# Patient Record
Sex: MALE | Race: WHITE | NOT HISPANIC OR LATINO | Employment: OTHER | URBAN - METROPOLITAN AREA
[De-identification: names, ages, dates, MRNs, and addresses within clinical notes are randomized per-mention and may not be internally consistent; named-entity substitution may affect disease eponyms.]

---

## 2017-01-12 ENCOUNTER — PATIENT OUTREACH (OUTPATIENT)
Dept: OTHER | Facility: OTHER | Age: 67
End: 2017-01-12
Payer: COMMERCIAL

## 2017-01-12 NOTE — LETTER
"   Hillary Connell, PharmD  [address] [place] [address]     Dear Esteban Mike,    Welcome to the [facility]!         My name is Hillary Connell and I am your dedicated clinical pharmacist.  As an expert in medication management, I will help ensure that the medications you are taking continue to provide you with the intended benefits.      This is Diana Nolasco and she will be your health  for the duration of the program.  Her job is to help you identify lifestyle changes to improve your blood pressure control.  You will talk about nutrition, exercise, and other ways that you may be able to adjust your current habits to better your health. Together, we will work to improve your overall health and encourage you to meet your goals for a healthier lifestyle.    What we expect from YOU:    You will need to take blood pressure readings multiple times a week and no less than one reading per week.   It is important that you take your measurements at different times during the day, when possible.     What you should expect from your Digital Medicine Care Team:   We will provide you with education about high blood pressure, including lifestyle changes that could help you to control your blood pressure.   We will review your weekly readings and provide you with monthly blood pressure progress reports after you have been in the program for more than 30 days.   We will send monthly progress reports on your blood pressure control to your physician so they can follow along with your progress as well.    You will be able to reach me by phone at   or through your [facility] account by clicking "Send a message to your doctor's office" on the home screen then selecting my name in the "To the office of:" field.     I look forward to working with you to achieve your blood pressure goals!    Sincerely,    Hillary Connell, Chun  Your personal Clinical " Pharmacist    Please visit [email or web address] to learn more about high blood pressure and what you can do lower your blood pressure.                                                                                         Esteban Mike  [address] [place] [id number]

## 2017-01-12 NOTE — PROGRESS NOTES
Last 5 Patient Entered Readings                                                               Current 30 Day Average: 157/86     Recent Readings 1/10/2017 12/31/2016 12/25/2016 12/23/2016 12/20/2016    Systolic BP (mmHg) 144 150 178 140 154    Diastolic BP (mmHg) 78 83 101 72 96    Pulse 66 57 70 62 67          Called Mr. Mike to introduce him to the Hypertension Digital Medicine Program.     He is currently not taking BP readings after meds, but will be start this going forward.      Reviewed patient's medications and verified allergies on file.     Reviewed questionnaire:  Depression: indicated- he is going through a divorce and felt the holidays were rough, but he feels he doesn't need medications or a referral at this time.  Sleep apnea: not indicated    Explained that we expect him to obtain several blood pressures/week at random times of day. Also asked that the BP be taken at least 1 hour after taking BP medications.     Explained that our goal is to get his BP to consistently below 140/90mmHg.     Patient and I agreed that the patient will take his BP daily to every other day at varying times of the day.     I will plan to follow-up with the patient in 2 weeks.    Emailed patient link to [email or web address] HT[facility] webpage as well as my direct phone number in case he has in questions.

## 2017-01-19 ENCOUNTER — PATIENT OUTREACH (OUTPATIENT)
Dept: OTHER | Facility: OTHER | Age: 67
End: 2017-01-19
Payer: COMMERCIAL

## 2017-01-19 NOTE — PROGRESS NOTES
"Last 5 Patient Entered Readings                                                               Current 30 Day Average: 152/86     Recent Readings 1/18/2017 1/10/2017 12/31/2016 12/25/2016 12/23/2016    Systolic BP (mmHg) 150 144 150 178 140    Diastolic BP (mmHg) 86 78 83 101 72    Pulse 64 66 57 70 62        Follow up with Mr. Esteban Gomes Cee completed. Patient has not made any additional changes to his lifestyle. He is still doing some walking though. He "knows" he needs to make more changes and "plans on it". Patient did not have any further questions or concerns. I will follow up in a few weeks to see how he is doing and progressing.        "

## 2017-01-31 ENCOUNTER — PATIENT OUTREACH (OUTPATIENT)
Dept: OTHER | Facility: OTHER | Age: 67
End: 2017-01-31
Payer: COMMERCIAL

## 2017-01-31 NOTE — PROGRESS NOTES
Last 5 Patient Entered Readings                                                               Current 30 Day Average: 145/81     Recent Readings 1/22/2017 1/18/2017 1/10/2017 12/31/2016 12/25/2016    Systolic BP (mmHg) 142 150 144 150 178    Diastolic BP (mmHg) 79 86 78 83 101    Pulse 71 64 66 57 70        Mr. Mike BP is not at goal. He denies any symptoms. He has a lot of stress in his life right now as he's going through a divorce. He has been very preoccupied, but will begin sending more readings. Explained that I would like 2-3 readings/week to determine if a medication adjustment is needed.     Current HTN regimen:  Outpatient Hypertension Medications as of 1/31/2017             atenolol (TENORMIN) 50 MG tablet TAKE ONE TABLET BY MOUTH EVERY DAY    hydrochlorothiazide (HYDRODIURIL) 25 MG tablet Take 1 tablet (25 mg total) by mouth once daily.    losartan (COZAAR) 100 MG tablet Take 1 tablet (100 mg total) by mouth once daily.            Changes made today:  None    Will continue to monitor regularly. Will follow up in 2-3 weeks, sooner if BP begins to trend upward or downward.    Patient has my contact information and knows to call with any concerns or clinical changes.

## 2017-02-08 ENCOUNTER — PATIENT OUTREACH (OUTPATIENT)
Dept: OTHER | Facility: OTHER | Age: 67
End: 2017-02-08
Payer: COMMERCIAL

## 2017-02-08 NOTE — PROGRESS NOTES
Last 5 Patient Entered Readings                                                               Current 30 Day Average: 151/81     Recent Readings 2/21/2017 2/6/2017 2/5/2017 2/1/2017 1/22/2017    Systolic BP (mmHg) 138 144 161 163 142    Diastolic BP (mmHg) 85 72 82 85 79    Pulse 66 60 55 57 71      Last average: 152/86    Follow up with Mr. Esteban Mike completed. Mr. Mike is doing well. His blood pressure wasn't working because he had the Bluetooth turned off on his phone. He will continue to take readings. Patient has not made many changes to his diet and exercise routine. Patient did not have any further questions or concerns. I will follow up in a few weeks to see how he is doing and progressing.

## 2017-02-10 RX ORDER — HYDROCHLOROTHIAZIDE 25 MG/1
25 TABLET ORAL DAILY
Qty: 90 TABLET | Refills: 0 | Status: SHIPPED | OUTPATIENT
Start: 2017-02-10 | End: 2017-04-26 | Stop reason: SDUPTHER

## 2017-02-10 RX ORDER — ATENOLOL 50 MG/1
50 TABLET ORAL DAILY
Qty: 90 TABLET | Refills: 3 | Status: SHIPPED | OUTPATIENT
Start: 2017-02-10 | End: 2017-04-26 | Stop reason: SDUPTHER

## 2017-03-10 ENCOUNTER — PATIENT OUTREACH (OUTPATIENT)
Dept: OTHER | Facility: OTHER | Age: 67
End: 2017-03-10
Payer: COMMERCIAL

## 2017-03-10 DIAGNOSIS — I10 ESSENTIAL HYPERTENSION: Primary | ICD-10-CM

## 2017-03-10 RX ORDER — VALSARTAN 160 MG/1
160 TABLET ORAL DAILY
Qty: 90 TABLET | Refills: 3 | Status: SHIPPED | OUTPATIENT
Start: 2017-03-10 | End: 2017-04-26 | Stop reason: SDUPTHER

## 2017-03-10 NOTE — PROGRESS NOTES
Last 5 Patient Entered Readings                                                               Current 30 Day Average: 149/84     Recent Readings 3/10/2017 3/5/2017 2/27/2017 2/23/2017 2/21/2017    Systolic BP (mmHg) 145 143 166 153 138    Diastolic BP (mmHg) 76 79 96 85 85    Pulse 62 65 63 56 66        Mr. Mike's BP is not at goal. He denies any symptoms. Will change losartan to valsartan. He wants to finish his losartan before starting the valsartan to avoid wasting them. He thinks he has about 30 left, so he will start valsartan in about 1 month. Will follow up in about 6-8 weeks.     Current HTN regimen:   Hypertension Medications             atenolol (TENORMIN) 50 MG tablet Take 1 tablet (50 mg total) by mouth once daily.    hydrochlorothiazide (HYDRODIURIL) 25 MG tablet Take 1 tablet (25 mg total) by mouth once daily.    valsartan (DIOVAN) 160 MG tablet Take 1 tablet (160 mg total) by mouth once daily.          Will continue to monitor regularly.     Patient has my contact information and knows to call with any concerns or clinical changes.

## 2017-03-23 ENCOUNTER — PATIENT OUTREACH (OUTPATIENT)
Dept: OTHER | Facility: OTHER | Age: 67
End: 2017-03-23
Payer: COMMERCIAL

## 2017-03-23 NOTE — PROGRESS NOTES
Last 5 Patient Entered Readings                                                               Current 30 Day Average: 152/79     Recent Readings 3/20/2017 3/16/2017 3/15/2017 3/14/2017 3/13/2017    Systolic BP (mmHg) 153 157 150 159 164    Diastolic BP (mmHg) 86 80 78 75 75    Pulse 62 59 57 55 52        Follow up with Mr. Esteban Mike completed. Mr. Mike is doing okay. Patient has family in from out of town and has been very busy with them. They will be leaving in about a week. Things will settle back down for him and then he will be able to get back into his normal routine. Patient did not have any further questions or concerns. I will follow up in a few weeks to see how he is doing and progressing.

## 2017-04-11 ENCOUNTER — PATIENT OUTREACH (OUTPATIENT)
Dept: OTHER | Facility: OTHER | Age: 67
End: 2017-04-11
Payer: COMMERCIAL

## 2017-04-11 NOTE — PROGRESS NOTES
Last 5 Patient Entered Readings                                                               Current 30 Day Average: 151/79     Recent Readings 4/11/2017 4/10/2017 4/7/2017 4/6/2017 4/2/2017    Systolic BP (mmHg) 138 155 144 137 152    Diastolic BP (mmHg) 78 74 89 67 86    Pulse 59 65 66 56 56        Follow up with Mr. Esteban Mike completed. Mr. Mike is doing okay. He had laser eye surgery on 4/4/17. He cannot exercise for lift anything over 20 pounds for two weeks. Patient reports that he is maintaining his diet. Patient did not have any further questions or concerns. I will follow up in a few weeks to see how he is doing and progressing.

## 2017-04-26 ENCOUNTER — PATIENT OUTREACH (OUTPATIENT)
Dept: OTHER | Facility: OTHER | Age: 67
End: 2017-04-26
Payer: COMMERCIAL

## 2017-04-26 DIAGNOSIS — I10 ESSENTIAL HYPERTENSION: ICD-10-CM

## 2017-04-26 RX ORDER — ATENOLOL 50 MG/1
50 TABLET ORAL DAILY
Qty: 90 TABLET | Refills: 3 | Status: SHIPPED | OUTPATIENT
Start: 2017-04-26 | End: 2017-07-18

## 2017-04-26 RX ORDER — HYDROCHLOROTHIAZIDE 25 MG/1
25 TABLET ORAL DAILY
Qty: 90 TABLET | Refills: 3 | Status: SHIPPED | OUTPATIENT
Start: 2017-04-26 | End: 2018-07-27 | Stop reason: SDUPTHER

## 2017-04-26 RX ORDER — VALSARTAN 320 MG/1
320 TABLET ORAL DAILY
Qty: 90 TABLET | Refills: 3 | Status: SHIPPED | OUTPATIENT
Start: 2017-04-26 | End: 2018-04-02 | Stop reason: SDUPTHER

## 2017-04-26 NOTE — PROGRESS NOTES
Last 5 Patient Entered Readings                                                               Current 30 Day Average: 147/78     Recent Readings 4/19/2017 4/15/2017 4/14/2017 4/11/2017 4/10/2017    Systolic BP (mmHg) 149 145 139 138 155    Diastolic BP (mmHg) 79 84 74 78 74    Pulse 55 70 59 59 65        Mr. Mike has not started valsartan. He's continued on losartan 100 mg QD. He is needing a refill now, so will send valsartan to his new pharmacy. He is also asking that all HTN meds be sent to new pharmacy as his old pharmacy was shut down. He is also having issues with his new phone and taking BP readings; he will visit [facility] about this.     Patient's BP is not at goal. Patient denies any symptoms.      Current HTN regimen:  Hypertension Medications             atenolol (TENORMIN) 50 MG tablet Take 1 tablet (50 mg total) by mouth once daily.    hydrochlorothiazide (HYDRODIURIL) 25 MG tablet Take 1 tablet (25 mg total) by mouth once daily.    valsartan (DIOVAN) 320 MG tablet Take 1 tablet (320 mg total) by mouth once daily.          Will continue to monitor regularly. Will follow up in 2-3 weeks, sooner if BP begins to trend upward or downward.    Patient has my contact information and knows to call with any concerns or clinical changes.

## 2017-05-03 ENCOUNTER — PATIENT OUTREACH (OUTPATIENT)
Dept: OTHER | Facility: OTHER | Age: 67
End: 2017-05-03
Payer: COMMERCIAL

## 2017-05-03 NOTE — PROGRESS NOTES
Last 5 Patient Entered Readings                                                               Current 30 Day Average: 150/83     Recent Readings 5/9/2017 5/8/2017 4/19/2017 4/15/2017 4/14/2017    Systolic BP (mmHg) 138 168 149 145 139    Diastolic BP (mmHg) 79 90 79 84 74    Pulse 68 61 55 70 59        Follow up with Mr. Esteban Mike completed. Mr. Mike reports that he has started taking valsartan 320 mg. He has not been sending in many BP readings because he got a new phone. Everything is working properly now and he will resume taking readings. Patient did not have any further questions or concerns. I will follow up in a few weeks to see how he is doing and progressing.

## 2017-06-05 ENCOUNTER — PATIENT OUTREACH (OUTPATIENT)
Dept: OTHER | Facility: OTHER | Age: 67
End: 2017-06-05
Payer: COMMERCIAL

## 2017-06-05 NOTE — PROGRESS NOTES
"Last 5 Patient Entered Readings                                                               Current 30 Day Average: 155/79     Recent Readings 6/1/2017 5/27/2017 5/27/2017 5/19/2017 5/17/2017    Systolic BP (mmHg) 165 151 188 127 164    Diastolic BP (mmHg) 79 83 89 65 73    Pulse 58 62 60 58 61        Mr. Mike says he "just got his blood pressure medications." He confirms he is taking valsartan, atenolol, and HCTZ. He says he is not waiting enough time from being active to resting before checking his BP, but will start to do so. Explained that if his average remains elevated in 2 weeks, we will have to make a medication change and add amlodipine.     Patient's BP is not at goal. Patient denies any symptoms.      Current HTN regimen:  Hypertension Medications             atenolol (TENORMIN) 50 MG tablet Take 1 tablet (50 mg total) by mouth once daily.    hydrochlorothiazide (HYDRODIURIL) 25 MG tablet Take 1 tablet (25 mg total) by mouth once daily.    valsartan (DIOVAN) 320 MG tablet Take 1 tablet (320 mg total) by mouth once daily.     Will continue to monitor regularly. Will follow up in 2-3 weeks, sooner if BP begins to trend upward or downward.    Patient has my contact information and knows to call with any concerns or clinical changes.     "

## 2017-06-05 NOTE — PROGRESS NOTES
Last 5 Patient Entered Readings                                                               Current 30 Day Average: 155/79     Recent Readings 6/1/2017 5/27/2017 5/27/2017 5/19/2017 5/17/2017    Systolic BP (mmHg) 165 151 188 127 164    Diastolic BP (mmHg) 79 83 89 65 73    Pulse 58 62 60 58 61          Follow up with Mr. Esteban Mike completed. Mr. Mike is doing well. Patient reports that he does not give himself enough time to rest before taking a BP reading. He also is concerned about having to start using insulin. I encouraged Mr. Mike to increase his physical activity and focus more on his diet. Patient did not have any further questions or concerns. I will follow up in a few weeks to see how he is doing and progressing.

## 2017-06-26 ENCOUNTER — PATIENT OUTREACH (OUTPATIENT)
Dept: OTHER | Facility: OTHER | Age: 67
End: 2017-06-26

## 2017-06-26 NOTE — PROGRESS NOTES
Last 5 Patient Entered Readings                                                               Current 30 Day Average: 153/77     Recent Readings 6/24/2017 6/13/2017 6/13/2017 6/7/2017 6/5/2017    Systolic BP (mmHg) 136 134 154 154 145    Diastolic BP (mmHg) 63 80 82 82 77    Pulse 63 53 60 60 59          Mr. Mike's BP remains above goal, but most recent readings are improved. Asked that he send readings every other day for the next 2 weeks to determine if we need to add amlodipine.     Patient's BP is not at goal. Patient denies any symptoms.      Current HTN regimen:  Hypertension Medications             atenolol (TENORMIN) 50 MG tablet Take 1 tablet (50 mg total) by mouth once daily.    hydrochlorothiazide (HYDRODIURIL) 25 MG tablet Take 1 tablet (25 mg total) by mouth once daily.    valsartan (DIOVAN) 320 MG tablet Take 1 tablet (320 mg total) by mouth once daily.          Will continue to monitor regularly. Will follow up in 4-6 weeks, sooner if BP begins to trend upward or downward.    Patient has my contact information and knows to call with any concerns or clinical changes.

## 2017-07-05 ENCOUNTER — PATIENT OUTREACH (OUTPATIENT)
Dept: OTHER | Facility: OTHER | Age: 67
End: 2017-07-05

## 2017-07-05 NOTE — PROGRESS NOTES
Last 5 Patient Entered Readings                                                               Current 30 Day Average: 143/75     Recent Readings 6/30/2017 6/28/2017 6/24/2017 6/13/2017 6/13/2017    Systolic BP (mmHg) 138 138 136 134 154    Diastolic BP (mmHg) 74 72 63 80 82    Pulse 62 52 63 53 60        Follow up with Mr. Esteban Mike completed. Mr. Mike is doing well. Patient reports that he has been giving himself enough time to relax before taking a BP reading. Mr. Mike is conintuing to focus on maintaining a healthier diet and increasing physical activity to avoid being put back on insulin. Patient did not have any further questions or concerns. I will follow up in a few weeks to see how he is doing and progressing.

## 2017-07-18 ENCOUNTER — OFFICE VISIT (OUTPATIENT)
Dept: INTERNAL MEDICINE | Facility: CLINIC | Age: 67
End: 2017-07-18
Payer: COMMERCIAL

## 2017-07-18 VITALS
DIASTOLIC BLOOD PRESSURE: 80 MMHG | WEIGHT: 188.25 LBS | HEIGHT: 67 IN | TEMPERATURE: 98 F | BODY MASS INDEX: 29.55 KG/M2 | SYSTOLIC BLOOD PRESSURE: 142 MMHG | HEART RATE: 53 BPM

## 2017-07-18 DIAGNOSIS — E11.51 TYPE 2 DIABETES, WITH PERIPHERAL CIRCULATORY DISORDER NOT AT GOAL: Primary | ICD-10-CM

## 2017-07-18 DIAGNOSIS — I65.21 OCCLUSION OF RIGHT CAROTID ARTERY: ICD-10-CM

## 2017-07-18 DIAGNOSIS — E78.2 MIXED HYPERLIPIDEMIA: ICD-10-CM

## 2017-07-18 DIAGNOSIS — I25.810 CORONARY ARTERY DISEASE INVOLVING AUTOLOGOUS VEIN CORONARY BYPASS GRAFT WITHOUT ANGINA PECTORIS: ICD-10-CM

## 2017-07-18 DIAGNOSIS — I10 HYPERTENSION, ESSENTIAL: ICD-10-CM

## 2017-07-18 PROCEDURE — 3045F PR MOST RECENT HEMOGLOBIN A1C LEVEL 7.0-9.0%: CPT | Mod: S$GLB,,, | Performed by: INTERNAL MEDICINE

## 2017-07-18 PROCEDURE — 99214 OFFICE O/P EST MOD 30 MIN: CPT | Mod: S$GLB,,, | Performed by: INTERNAL MEDICINE

## 2017-07-18 PROCEDURE — 4010F ACE/ARB THERAPY RXD/TAKEN: CPT | Mod: S$GLB,,, | Performed by: INTERNAL MEDICINE

## 2017-07-18 PROCEDURE — 99999 PR PBB SHADOW E&M-EST. PATIENT-LVL III: CPT | Mod: PBBFAC,,, | Performed by: INTERNAL MEDICINE

## 2017-07-18 RX ORDER — CARVEDILOL 12.5 MG/1
12.5 TABLET ORAL 2 TIMES DAILY WITH MEALS
Qty: 180 TABLET | Refills: 3 | Status: SHIPPED | OUTPATIENT
Start: 2017-07-18 | End: 2017-08-04 | Stop reason: SDUPTHER

## 2017-07-18 NOTE — PROGRESS NOTES
Subjective:       Patient ID: Esteban Mike is a 66 y.o. male.    Chief Complaint: Diabetes and Hypertension    HPI - Pt of Dr. Hartley's who hasn't been seen in a year.  He doesn't know his medications and seems to be taking two ARBs and two gliptins.  He was hard to focus during the interview, but his main complaint seemed to be fatigue.  Not checking BS at home.  He says he had his diabetic eye exam done at an outside facility 3 months ago.  Declines pneumovax, as he says he's had that done, also.  He is a never smoker, uses no illicit drugs.  Sexually active with his wife.  Drinks alcohol socially.  Considering retiring from his job to reduce stress, which seems extensive.    PMH/Meds:  Reviewed and reconciled in EPIC with patient during visit today.     Review of Systems   Constitutional: Positive for fatigue.   HENT: Negative for congestion.    Cardiovascular: Negative for chest pain.   Gastrointestinal: Negative for abdominal pain.   Endocrine: Positive for polyuria. Negative for polydipsia and polyphagia.   Genitourinary: Positive for frequency.   Musculoskeletal: Positive for arthralgias.   Skin: Negative for pallor.   Neurological: Negative for dizziness, tremors, seizures, speech difficulty, weakness and headaches.   Psychiatric/Behavioral: The patient is nervous/anxious.        Objective:      Physical Exam   Constitutional: He is oriented to person, place, and time. He appears well-developed and well-nourished. No distress.   Very talkative man with loose associations and perseveration   HENT:   Head: Normocephalic and atraumatic.   Cardiovascular: Normal rate, regular rhythm and normal heart sounds.  Exam reveals no gallop and no friction rub.    No murmur heard.  Pulmonary/Chest: No respiratory distress. He has no wheezes. He has no rales. He exhibits no tenderness.   Neurological: He is alert and oriented to person, place, and time.   Skin: Skin is warm. He is not diaphoretic. No erythema.    Psychiatric: He has a normal mood and affect. Thought content normal.   Nursing note and vitals reviewed.      Assessment:       1. Type 2 diabetes, with peripheral circulatory disorder not at goal    2. Hypertension, essential    3. Mixed hyperlipidemia    4. Coronary artery disease involving autologous vein coronary bypass graft without angina pectoris    5. Occlusion of right carotid artery        Plan:       Esteban was seen today for diabetes and hypertension.    Diagnoses and all orders for this visit:    Type 2 diabetes, with peripheral circulatory disorder not at goal - uncertain status; well overdue for a1c.    -     Hemoglobin A1c; Future    Hypertension, essential - poor control.  Repeat labwork and change atenolol to carvedilol.  I'm suspicious of his adherence; recommended he bring pill bottles to next appt with Dr. Hartley, Aug 11.  -     Comprehensive metabolic panel; Future    Mixed hyperlipidemia - on a statin, I think.  Repeat lipid panel  -     Lipid panel; Future    Coronary artery disease involving autologous vein coronary bypass graft without angina pectoris  -     carvedilol (COREG) 12.5 MG tablet; Take 1 tablet (12.5 mg total) by mouth 2 (two) times daily with meals.    Occlusion of right carotid artery    rtc prn; copy to Dr. Naeem Ashby MD MPH  Staff Internist

## 2017-07-20 ENCOUNTER — PATIENT MESSAGE (OUTPATIENT)
Dept: CARDIOLOGY | Facility: CLINIC | Age: 67
End: 2017-07-20

## 2017-07-25 ENCOUNTER — PATIENT OUTREACH (OUTPATIENT)
Dept: OTHER | Facility: OTHER | Age: 67
End: 2017-07-25

## 2017-07-25 NOTE — PROGRESS NOTES
Last 5 Patient Entered Redings Current 30 Day Average: 141/76     Recent Readings 7/13/2017 7/9/2017 7/7/2017 6/30/2017 6/28/2017    Systolic BP (mmHg) 160 140 127 138 138    Diastolic BP (mmHg) 85 82 67 74 72    Pulse 59 62 59 62 52        Mr. Mike's BP is not at goal. He saw Dr. Ashby last week and Dr. Ashby found duplications of his medications. He was still taking losartan and valsartan, despite losartan being discontinued in April. Dr. Ashby stopped atenolol and started carvedilol. Mr. Mike says he will start carvedilol once he finishes atenolol. He also wants to finish losartan before starting valsartan. Mr. Mike uses different local pharmacies which I think is causing the issues with duplications of therapy. He is currently at work and unable to review all medications. Asked that he call me tomorrow with all of his medication bottles to review what he is currently taking to prevent duplications and also will advise he use only one pharmacy.     Current HTN regimen:  Hypertension Medications             carvedilol (COREG) 12.5 MG tablet Take 1 tablet (12.5 mg total) by mouth 2 (two) times daily with meals.    hydrochlorothiazide (HYDRODIURIL) 25 MG tablet Take 1 tablet (25 mg total) by mouth once daily.    valsartan (DIOVAN) 320 MG tablet Take 1 tablet (320 mg total) by mouth once daily.          Will continue to monitor regularly. Will follow up in 2-3 weeks, sooner if BP begins to trend upward or downward.    Patient has my contact information and knows to call with any concerns or clinical changes.

## 2017-08-01 ENCOUNTER — PATIENT OUTREACH (OUTPATIENT)
Dept: OTHER | Facility: OTHER | Age: 67
End: 2017-08-01

## 2017-08-01 NOTE — PROGRESS NOTES
Last 5 Patient Entered Redings Current 30 Day Average: 154/81     Recent Readings 8/1/2017 8/1/2017 7/28/2017 7/13/2017 7/9/2017    Systolic BP (mmHg) 181 181 160 160 140    Diastolic BP (mmHg) 86 92 85 85 82    Pulse 50 49 59 59 62        Follow up with Mr. Esteban Mike for elevated BP completed. Patient reports that he may not have given himself enough time to rest before taking the reading. He will retake the reading soon. Mr. Mike has a lot of questions about his medications. Patient will call PharmD to discuss. Patient did not have any further questions or concerns. I will follow up in a few weeks to see how he is doing and progressing.

## 2017-08-02 ENCOUNTER — PATIENT MESSAGE (OUTPATIENT)
Dept: CARDIOLOGY | Facility: CLINIC | Age: 67
End: 2017-08-02

## 2017-08-04 ENCOUNTER — PATIENT OUTREACH (OUTPATIENT)
Dept: OTHER | Facility: OTHER | Age: 67
End: 2017-08-04

## 2017-08-04 DIAGNOSIS — I25.810 CORONARY ARTERY DISEASE INVOLVING AUTOLOGOUS VEIN CORONARY BYPASS GRAFT WITHOUT ANGINA PECTORIS: ICD-10-CM

## 2017-08-04 DIAGNOSIS — I10 ESSENTIAL HYPERTENSION: ICD-10-CM

## 2017-08-04 RX ORDER — CARVEDILOL 12.5 MG/1
12.5 TABLET ORAL 2 TIMES DAILY WITH MEALS
Qty: 180 TABLET | Refills: 3 | Status: SHIPPED | OUTPATIENT
Start: 2017-08-04 | End: 2017-11-13 | Stop reason: SDUPTHER

## 2017-08-04 NOTE — PROGRESS NOTES
"Last 5 Patient Entered Redings Current 30 Day Average: 154/81     Recent Readings 8/1/2017 8/1/2017 7/28/2017 7/13/2017 7/9/2017    Systolic BP (mmHg) 181 181 160 160 140    Diastolic BP (mmHg) 86 92 85 85 82    Pulse 50 49 59 59 62        Mr. Mike has called to review his medications with me. He was still taking atenolol, despite it being discontinued when he saw Dr. Ashby on 7/18/17. Dr. Ashby started him on carvedilol 12.5 mg BID. He also had losartan 100 mg, valsartan 160 mg, and valsartan 320 mg in his possession. He was taking losartan 100 mg and valsartan 160 mg. Explained that losartan was discontinued by me on 3/10/17 and valsartan 160 mg QD was started. On 4/26/17, I increased valsartan to 320 mg, and he received this prescription on 5/19/17. He did not understand that when the valsartan was started that he should have stopped the losartan, so he was taking both losartan and valsartan. It is unclear if he has been taking both everyday, as he still has "half a bottle of losartan" left. Asked that he discard the losartan and atenolol to prevent further inadvertent duplications of therapy. By the end of the encounter, Mr. Mike understood that he should not be taking losartan 100 mg at all and that he should not be taking atenolol 50 mg. He is now taking 2 valsartan 160 mg tablets once daily (will start valsartan 320 mg QD once he is out of the 160 mg tablets), HCTZ 25 mg, and carvedilol 12.5 mg BID. Also clarified that he is taking tradjenta 5 mg QD and no longer taking Januvia 100 mg QD.     Called [facility] pharmacy to confirm all of the above changes with the pharmacist, Ti. He will deactivate prescriptions for atenolol 50 mg, losartan 100 mg, valsartan 160 mg, and Januvia 100 mg.     Current HTN regimen:  Hypertension Medications             carvedilol (COREG) 12.5 MG tablet Take 1 tablet (12.5 mg total) by mouth 2 (two) times daily with meals.    hydrochlorothiazide (HYDRODIURIL) 25 MG tablet " Take 1 tablet (25 mg total) by mouth once daily.    valsartan (DIOVAN) 320 MG tablet Take 1 tablet (320 mg total) by mouth once daily.          Will continue to monitor regularly. Will follow up in 4-6 weeks, sooner if BP begins to trend upward or downward.    Patient has my contact information and knows to call with any concerns or clinical changes.

## 2017-08-07 ENCOUNTER — OFFICE VISIT (OUTPATIENT)
Dept: CARDIOLOGY | Facility: CLINIC | Age: 67
End: 2017-08-07
Payer: COMMERCIAL

## 2017-08-07 VITALS
BODY MASS INDEX: 29.13 KG/M2 | OXYGEN SATURATION: 97 % | SYSTOLIC BLOOD PRESSURE: 116 MMHG | HEIGHT: 67 IN | WEIGHT: 185.63 LBS | DIASTOLIC BLOOD PRESSURE: 69 MMHG | HEART RATE: 61 BPM

## 2017-08-07 DIAGNOSIS — I25.810 CORONARY ARTERY DISEASE INVOLVING AUTOLOGOUS VEIN CORONARY BYPASS GRAFT WITHOUT ANGINA PECTORIS: Primary | ICD-10-CM

## 2017-08-07 DIAGNOSIS — I65.21 OCCLUSION OF RIGHT CAROTID ARTERY: ICD-10-CM

## 2017-08-07 DIAGNOSIS — I73.9 PVD (PERIPHERAL VASCULAR DISEASE): ICD-10-CM

## 2017-08-07 DIAGNOSIS — K76.0 NAFLD (NONALCOHOLIC FATTY LIVER DISEASE): ICD-10-CM

## 2017-08-07 DIAGNOSIS — I10 HYPERTENSION, ESSENTIAL: ICD-10-CM

## 2017-08-07 DIAGNOSIS — E78.2 MIXED HYPERLIPIDEMIA: ICD-10-CM

## 2017-08-07 DIAGNOSIS — E11.51 TYPE 2 DIABETES, WITH PERIPHERAL CIRCULATORY DISORDER NOT AT GOAL: ICD-10-CM

## 2017-08-07 PROCEDURE — 3078F DIAST BP <80 MM HG: CPT | Mod: S$GLB,,, | Performed by: NURSE PRACTITIONER

## 2017-08-07 PROCEDURE — 99999 PR PBB SHADOW E&M-EST. PATIENT-LVL III: CPT | Mod: PBBFAC,,, | Performed by: NURSE PRACTITIONER

## 2017-08-07 PROCEDURE — 3045F PR MOST RECENT HEMOGLOBIN A1C LEVEL 7.0-9.0%: CPT | Mod: S$GLB,,, | Performed by: NURSE PRACTITIONER

## 2017-08-07 PROCEDURE — 3074F SYST BP LT 130 MM HG: CPT | Mod: S$GLB,,, | Performed by: NURSE PRACTITIONER

## 2017-08-07 PROCEDURE — 4010F ACE/ARB THERAPY RXD/TAKEN: CPT | Mod: S$GLB,,, | Performed by: NURSE PRACTITIONER

## 2017-08-07 PROCEDURE — 3008F BODY MASS INDEX DOCD: CPT | Mod: S$GLB,,, | Performed by: NURSE PRACTITIONER

## 2017-08-07 PROCEDURE — 99214 OFFICE O/P EST MOD 30 MIN: CPT | Mod: S$GLB,,, | Performed by: NURSE PRACTITIONER

## 2017-08-07 NOTE — PATIENT INSTRUCTIONS
Call [facility] and have results faxed to Dr. Grajeda [phone].  Continue current medications. Continue with digital hypertension program.  Patient has been encouraged to exercise a minimum of 30 minutes daily, 5 times per week.   Patient advised to consume a low salt, heart healthy diet. Mediterranean diet recommended.  Carotid ultrasound in one year. Return to clinic in one year.

## 2017-08-07 NOTE — PROGRESS NOTES
Mr. Mike is a patient of Dr. Grajeda and was last seen in [facility] 7/22/2016.      Subjective:   Patient ID:  Esteban Mike is a 66 y.o. male who presents for follow-up of Hypertension  .   HPI:    Mr. Mike is a 67yo male with a PMHx of CAD (CABGx4 in 2008), carotid artery disease (s/p right CEA in 2006 and left ICA stent 2007), DM II, NLFLD, HTN, HLD, and PVD here for follow up. He is currently in the digital HTN program. Today he has no cardiac complaints. Mr. Mike denies chest pain with exertion or at rest, palpitations, SOB, CHAU, dizziness, syncope, leg edema, claudication, PND, or orthopnea. He is currently taking ASA 81mg and atorvastatin 80mg for CAD management. LDL 56.2 on 9/3/2016. He also takes carvedilol 12.5mg BID, HCTZ 25mg, and valsartan 320mg. He cuts 6 yards per week for exercise.He experiences no limitations in activity tolerance. [facility] but results are not in his chart. ALT chronically elevated in context of NAFLD.     Recent Cardiac Tests:    Carotid U/S (7/25/2016):  Known right ICA total occlusion.  There is 40 - 49% left Internal Carotid stenosis.  High grade left ECA stenosis.    Current Outpatient Prescriptions   Medication Sig    aspirin 81 MG chewable tablet Take 81 mg by mouth once daily.      atorvastatin (LIPITOR) 80 MG tablet Take 1 tablet (80 mg total) by mouth once daily.    blood-glucose meter (CONTOUR METER) kit Use as instructed    carvedilol (COREG) 12.5 MG tablet Take 1 tablet (12.5 mg total) by mouth 2 (two) times daily with meals.    CONTOUR NEXT STRIPS Strp TEST UP TO FOUR TIMES A DAY    fish oil-omega-3 fatty acids 300-1,000 mg capsule Take 2 g by mouth once daily. 3 tablets       hydrochlorothiazide (HYDRODIURIL) 25 MG tablet Take 1 tablet (25 mg total) by mouth once daily.    linagliptin (TRADJENTA) 5 mg Tab tablet Take 5 mg by mouth once daily.    metformin (GLUCOPHAGE) 500 MG tablet TAKE TWO  "TABLETS BY MOUTH TWICE DAILY    MICROLET LANCET Misc TEST UP TO FOUR TIMES A DAY    valsartan (DIOVAN) 320 MG tablet Take 1 tablet (320 mg total) by mouth once daily.     No current facility-administered medications for this visit.      Review of Systems   Constitution: Negative for malaise/fatigue.   HENT: Negative for headaches.    Eyes: Negative for blurred vision.   Cardiovascular: Negative for chest pain, claudication, dyspnea on exertion, irregular heartbeat, leg swelling, orthopnea, palpitations, paroxysmal nocturnal dyspnea and syncope.   Respiratory: Negative for shortness of breath.    Hematologic/Lymphatic: Negative for bleeding problem.   Skin: Negative for rash.   Musculoskeletal: Negative for myalgias.   Gastrointestinal: Negative for abdominal pain, constipation, diarrhea and nausea.   Genitourinary: Negative for hematuria.   Neurological: Negative for loss of balance and numbness.   Psychiatric/Behavioral: Negative for altered mental status.   Allergic/Immunologic: Negative for persistent infections.     Objective:     Right Arm BP - Sittin/70 (17 1243)  Left Arm BP - Sittin/69 (17 1243)    /69 (BP Location: Left arm, Patient Position: Sitting, BP Method: Automatic)   Pulse 61   Ht 5' 7" (1.702 m)   Wt 84.2 kg (185 lb 10 oz)   SpO2 97%   BMI 29.07 kg/m²     Physical Exam   Constitutional: He is oriented to person, place, and time. He appears well-developed and well-nourished.   HENT:   Head: Normocephalic.   Nose: Nose normal.   Eyes: Pupils are equal, round, and reactive to light.   Neck: No JVD present. Carotid bruit is not present.   Right CEA scar noted   Cardiovascular: Normal rate, regular rhythm, S1 normal and S2 normal.   Occasional extrasystoles are present. Exam reveals no gallop.    Murmur heard.   Blowing systolic murmur is present with a grade of 1/6  at the upper left sternal border  Pulses:       Carotid pulses are 2+ on the right side, and 2+ on " the left side.       Radial pulses are 2+ on the right side, and 2+ on the left side.        Dorsalis pedis pulses are 2+ on the right side, and 2+ on the left side.   Pulmonary/Chest: Breath sounds normal. No respiratory distress.   Median sternotomy scar noted.     Abdominal: Soft. Bowel sounds are normal. He exhibits no distension. There is no tenderness.   Musculoskeletal: Normal range of motion. He exhibits no edema.   Neurological: He is alert and oriented to person, place, and time.   Skin: Skin is warm and dry. No erythema.   Psychiatric: He has a normal mood and affect. His speech is normal and behavior is normal.   Nursing note and vitals reviewed.    Lab Results   Component Value Date     09/03/2016    K 4.5 09/03/2016    MG 2.6 05/06/2008     09/03/2016    CO2 28 09/03/2016    BUN 22 09/03/2016    CREATININE 1.1 09/03/2016     (H) 09/03/2016    HGBA1C 7.6 (H) 09/03/2016    AST 35 09/03/2016    ALT 85 (H) 09/03/2016    ALBUMIN 3.9 09/03/2016    PROT 6.4 09/03/2016    BILITOT 0.6 09/03/2016    WBC 6.31 10/22/2014    HGB 14.6 10/22/2014    HCT 44.4 10/22/2014    MCV 94 10/22/2014     10/22/2014    TSH 1.632 09/03/2016    CHOL 113 (L) 09/03/2016    HDL 33 (L) 09/03/2016    LDLCALC 56.2 (L) 09/03/2016    TRIG 119 09/03/2016          Test(s) Reviewed  I have reviewed the following in detail:  [] Stress test   [] Angiography   [] Echocardiogram   [x] Labs   [] Other:         Assessment:         1. Coronary artery disease involving autologous vein coronary bypass graft without angina pectoris. On ASA and high intensity statin. Patient has no angina, SOB or other signs of ischemia. No unstable arrhythmia. No symptoms of heart failure. Will obtain lab results.     2. Mixed hyperlipidemia. LDL 56.2 on atorvastatin 80mg.     3. PVD (peripheral vascular disease). Asymptomatic.     4. Occlusion of right carotid artery. On ASA and statin.     5. Hypertension, essential. Controlled on digital  HTN program.     6. Type 2 diabetes, with peripheral circulatory disorder not at goal. Not on insulin. Last creatinine 1.1.     7. NAFLD (nonalcoholic fatty liver disease). ALT elevated but stable.     Plan:     Esteban was seen today for hypertension.    Diagnoses and all orders for this visit:    Coronary artery disease involving autologous vein coronary bypass graft without angina pectoris    Mixed hyperlipidemia    PVD (peripheral vascular disease)    Occlusion of right carotid artery  -     CAR Ultrasound doppler carotid bliateral; Future; Expected date: 08/07/2018    Hypertension, essential    Type 2 diabetes, with peripheral circulatory disorder not at goal    NAFLD (nonalcoholic fatty liver disease)      Call [email or web address] and have results faxed to Dr. Grajeda [phone].  Continue current medications. Continue with digital hypertension program.  Patient has been encouraged to exercise a minimum of 30 minutes daily, 5 times per week.   Patient advised to consume a low salt, heart healthy diet. Mediterranean diet recommended.  Carotid ultrasound in one year. Return to clinic in one year.    Return in about 1 year (around 8/7/2018).    ------------------------------------------------------------------    MYRANDA Palma, NP-C  Consult Cardiology

## 2017-08-09 DIAGNOSIS — Z00.00 ANNUAL PHYSICAL EXAM: Primary | ICD-10-CM

## 2017-08-09 DIAGNOSIS — E78.5 HYPERLIPIDEMIA, UNSPECIFIED HYPERLIPIDEMIA TYPE: ICD-10-CM

## 2017-08-09 DIAGNOSIS — E11.9 TYPE 2 DIABETES MELLITUS WITHOUT COMPLICATION, UNSPECIFIED LONG TERM INSULIN USE STATUS: ICD-10-CM

## 2017-08-09 DIAGNOSIS — I10 HYPERTENSION, ESSENTIAL: ICD-10-CM

## 2017-08-09 RX ORDER — METFORMIN HYDROCHLORIDE 500 MG/1
1000 TABLET ORAL 2 TIMES DAILY
Qty: 360 TABLET | Refills: 1 | Status: SHIPPED | OUTPATIENT
Start: 2017-08-09 | End: 2017-09-18 | Stop reason: SDUPTHER

## 2017-08-09 NOTE — TELEPHONE ENCOUNTER
Patient is due for a follow up appointment - please review lab order section and draw any before appointment on 8/11 - thank you

## 2017-08-10 NOTE — TELEPHONE ENCOUNTER
Spoke with pt in regards to scheduling lab appt prior to appt on 8/11/17. Pt stated [facility] and will have results faxed to us.

## 2017-08-11 ENCOUNTER — OFFICE VISIT (OUTPATIENT)
Dept: INTERNAL MEDICINE | Facility: CLINIC | Age: 67
End: 2017-08-11
Attending: FAMILY MEDICINE
Payer: COMMERCIAL

## 2017-08-11 ENCOUNTER — TELEPHONE (OUTPATIENT)
Dept: INTERNAL MEDICINE | Facility: CLINIC | Age: 67
End: 2017-08-11

## 2017-08-11 VITALS
OXYGEN SATURATION: 98 % | DIASTOLIC BLOOD PRESSURE: 60 MMHG | WEIGHT: 187 LBS | HEIGHT: 67 IN | BODY MASS INDEX: 29.35 KG/M2 | SYSTOLIC BLOOD PRESSURE: 128 MMHG | HEART RATE: 73 BPM

## 2017-08-11 DIAGNOSIS — I65.23 BILATERAL CAROTID ARTERY STENOSIS: ICD-10-CM

## 2017-08-11 DIAGNOSIS — I25.810 CORONARY ARTERY DISEASE INVOLVING AUTOLOGOUS VEIN CORONARY BYPASS GRAFT WITHOUT ANGINA PECTORIS: ICD-10-CM

## 2017-08-11 DIAGNOSIS — Z00.00 ANNUAL PHYSICAL EXAM: Primary | ICD-10-CM

## 2017-08-11 DIAGNOSIS — E78.2 MIXED HYPERLIPIDEMIA: ICD-10-CM

## 2017-08-11 DIAGNOSIS — K76.0 NAFLD (NONALCOHOLIC FATTY LIVER DISEASE): ICD-10-CM

## 2017-08-11 DIAGNOSIS — I73.9 PVD (PERIPHERAL VASCULAR DISEASE): ICD-10-CM

## 2017-08-11 DIAGNOSIS — E11.51 TYPE 2 DIABETES, WITH PERIPHERAL CIRCULATORY DISORDER NOT AT GOAL: ICD-10-CM

## 2017-08-11 DIAGNOSIS — I10 HYPERTENSION, ESSENTIAL: ICD-10-CM

## 2017-08-11 DIAGNOSIS — E11.42 DIABETIC POLYNEUROPATHY ASSOCIATED WITH TYPE 2 DIABETES MELLITUS: ICD-10-CM

## 2017-08-11 PROBLEM — Z91.199 NONCOMPLIANCE: Status: ACTIVE | Noted: 2017-08-11

## 2017-08-11 LAB
ALBUMIN SERPL-MCNC: 4.8 G/DL (ref 3.6–4.8)
ALBUMIN/GLOB SERPL: 2.8 {RATIO} (ref 1.2–2.2)
ALP SERPL-CCNC: 71 IU/L (ref 39–117)
ALT SERPL-CCNC: 86 IU/L (ref 0–44)
AST SERPL-CCNC: 37 IU/L (ref 0–40)
BILIRUB SERPL-MCNC: 0.5 MG/DL (ref 0–1.2)
BUN SERPL-MCNC: 15 MG/DL (ref 8–27)
BUN/CREAT SERPL: 15 (ref 10–24)
CALCIUM SERPL-MCNC: 10 MG/DL (ref 8.6–10.2)
CHLORIDE SERPL-SCNC: 101 MMOL/L (ref 96–106)
CHOLEST SERPL-MCNC: 155 MG/DL (ref 100–199)
CO2 SERPL-SCNC: 24 MMOL/L (ref 18–29)
CREAT SERPL-MCNC: 0.97 MG/DL (ref 0.76–1.27)
GLOBULIN SER CALC-MCNC: 1.7 G/DL (ref 1.5–4.5)
GLUCOSE SERPL-MCNC: 206 MG/DL (ref 65–99)
HBA1C MFR BLD: 8.2 % (ref 4.8–5.6)
HDLC SERPL-MCNC: 42 MG/DL
LDLC SERPL CALC-MCNC: 85 MG/DL (ref 0–99)
POTASSIUM SERPL-SCNC: 4.8 MMOL/L (ref 3.5–5.2)
PROT SERPL-MCNC: 6.5 G/DL (ref 6–8.5)
SODIUM SERPL-SCNC: 143 MMOL/L (ref 134–144)
SPECIMEN STATUS REPORT: NORMAL
TRIGL SERPL-MCNC: 139 MG/DL (ref 0–149)
VLDLC SERPL CALC-MCNC: 28 MG/DL (ref 5–40)

## 2017-08-11 PROCEDURE — 99999 PR PBB SHADOW E&M-EST. PATIENT-LVL IV: CPT | Mod: PBBFAC,,, | Performed by: FAMILY MEDICINE

## 2017-08-11 PROCEDURE — 99396 PREV VISIT EST AGE 40-64: CPT | Mod: S$GLB,,, | Performed by: FAMILY MEDICINE

## 2017-08-11 NOTE — PROGRESS NOTES
Subjective:       Patient ID: Esteban Mike is a 66 y.o. male.    Chief Complaint: Annual Exam    Established patient for an annual wellness check/physical exam. No specific complaints, please see ROS.\  Established patient follows up for management of chronic medical illnesses without complaints today. Please see ROS for interval problems and complaints.        Review of Systems   Constitutional: Positive for fatigue. Negative for chills and fever.   HENT: Negative for congestion and trouble swallowing.    Eyes: Negative for redness.   Respiratory: Negative for cough, chest tightness and shortness of breath.    Cardiovascular: Negative for chest pain, palpitations and leg swelling.   Gastrointestinal: Negative for abdominal pain and blood in stool.   Genitourinary: Negative for hematuria.   Musculoskeletal: Negative for arthralgias, back pain, gait problem, joint swelling, myalgias and neck pain.   Skin: Negative for color change and rash.   Neurological: Negative for tremors, speech difficulty, weakness, numbness and headaches.   Hematological: Negative for adenopathy. Does not bruise/bleed easily.   Psychiatric/Behavioral: Positive for sleep disturbance. Negative for behavioral problems and confusion. The patient is not nervous/anxious.        Objective:      Physical Exam   Constitutional: He is oriented to person, place, and time. He appears well-developed and well-nourished.   Eyes: No scleral icterus.   Neck: Normal range of motion. Neck supple. Carotid bruit is not present.   Cardiovascular: Normal rate, regular rhythm, normal heart sounds and intact distal pulses.  Exam reveals no gallop and no friction rub.    No murmur heard.  Pulses:       Dorsalis pedis pulses are 2+ on the right side, and 2+ on the left side.   Pulmonary/Chest: Effort normal and breath sounds normal. No respiratory distress. He has no wheezes. He has no rales.   Abdominal: Soft. Bowel sounds are normal.   Musculoskeletal: He  exhibits no edema.   Feet:   Right Foot:   Protective Sensation: 3 sites tested. 3 sites sensed.   Skin Integrity: Negative for skin breakdown.   Left Foot:   Protective Sensation: 3 sites tested. 3 sites sensed.   Skin Integrity: Negative for skin breakdown.   Neurological: He is alert and oriented to person, place, and time. He displays no tremor. No cranial nerve deficit. Coordination and gait normal.   Skin: Skin is warm and dry. No rash noted. He is not diaphoretic. No erythema.   Psychiatric: He has a normal mood and affect. His behavior is normal. Judgment and thought content normal.   Nursing note and vitals reviewed.      Assessment:       1. Annual physical exam    2. Type 2 diabetes, with peripheral circulatory disorder not at goal    3. PVD (peripheral vascular disease)    4. NAFLD (nonalcoholic fatty liver disease)    5. Mixed hyperlipidemia    6. Hypertension, essential    7. Diabetic polyneuropathy associated with type 2 diabetes mellitus    8. Coronary artery disease involving autologous vein coronary bypass graft without angina pectoris    9. Bilateral carotid artery stenosis        Plan:   Esteban was seen today for annual exam.    Diagnoses and all orders for this visit:    Annual physical exam    Type 2 diabetes, with peripheral circulatory disorder not at goal  -     Ambulatory referral to Optometry  -     Ambulatory consult to Endocrinology  -     Ambulatory Referral to Diabetes Education    PVD (peripheral vascular disease)    NAFLD (nonalcoholic fatty liver disease)  -     Ambulatory Referral to Hepatology    Mixed hyperlipidemia    Hypertension, essential    Diabetic polyneuropathy associated with type 2 diabetes mellitus    Coronary artery disease involving autologous vein coronary bypass graft without angina pectoris    Bilateral carotid artery stenosis      See meds, orders, follow up, routing and instructions sections of encounter.    A 66-year-old patient.  He is in, lost to follow up.   He is overdue for his   carotid ultrasound.  He had outside laboratory.  A1c was 8.2.  He has been   noncompliant with his diabetic treatment, followups, etc.  He states he had an   eye exam recently.    RECOMMENDATIONS:  Follow up in three months.  We will schedule his carotid   ultrasound.  He is due for Endocrinology followup.  His medications were changed   last year.  His hypertension is adequate control today, continue digital   management.      SONU/RAGHAVENDRA  dd: 08/11/2017 10:11:39 (CDT)  td: 08/11/2017 12:48:20 (CDT)  Doc ID   #[id number]  Job ID #[id number]    CC:

## 2017-08-11 NOTE — MEDICAL/APP STUDENT
Subjective:       Patient ID: Esteban Mike is a 66 y.o. male.    Chief Complaint: Annual Exam    Patient is a 66 year old male here for annual physical      Review of Systems   Constitutional: Positive for fatigue. Negative for activity change and appetite change.   HENT: Negative for congestion, hearing loss, sinus pressure and sore throat.    Eyes: Positive for visual disturbance (Floaters right eye).   Respiratory: Negative for apnea, cough, choking, chest tightness and shortness of breath.    Cardiovascular: Positive for chest pain (GERD). Negative for palpitations and leg swelling.   Gastrointestinal: Negative for abdominal distention, abdominal pain, constipation and diarrhea.   Endocrine: Negative.    Genitourinary: Negative for difficulty urinating, discharge, dysuria and hematuria.   Musculoskeletal: Negative for arthralgias, back pain, myalgias and neck pain.   Skin: Negative.    Allergic/Immunologic: Negative.    Neurological: Positive for dizziness. Negative for seizures, speech difficulty and headaches.   Psychiatric/Behavioral: Negative.        Objective:      Physical Exam   Constitutional: He is oriented to person, place, and time. He appears well-developed and well-nourished.   HENT:   Head: Normocephalic and atraumatic.   Right Ear: External ear normal.   Left Ear: External ear normal.   Nose: Nose normal.   Mouth/Throat: Oropharynx is clear and moist.   Eyes: Conjunctivae and EOM are normal. Pupils are equal, round, and reactive to light.   Neck: Normal range of motion. Neck supple.   Cardiovascular: Normal rate, regular rhythm, normal heart sounds and intact distal pulses.    Pulmonary/Chest: Effort normal and breath sounds normal.   Abdominal: Soft. Bowel sounds are normal.   Neurological: He is alert and oriented to person, place, and time.   Skin: Skin is warm.       Assessment:   DMII  HTN essential  Carotid artery disease s/p carotid endarterectomy R, current Right carotid  occlusion  HLD  Micro steatosis of the liver  Plan:   -HbA1c, CMP, LFT  -Melatonin for sleep disturbance  -PPSV23 today  -Diabetic Education  -Endocrinology and Hepatology    Srikanth Jackson MS4

## 2017-08-14 ENCOUNTER — PATIENT MESSAGE (OUTPATIENT)
Dept: INTERNAL MEDICINE | Facility: CLINIC | Age: 67
End: 2017-08-14

## 2017-08-18 ENCOUNTER — PATIENT OUTREACH (OUTPATIENT)
Dept: OTHER | Facility: OTHER | Age: 67
End: 2017-08-18

## 2017-08-18 NOTE — PROGRESS NOTES
Last 5 Patient Entered [place] Current 30 Day Average: 148/81     Recent Readings 8/17/2017 8/13/2017 8/13/2017 8/9/2017 8/7/2017    Systolic BP (mmHg) 146 162 149 136 105    Diastolic BP (mmHg) 78 88 96 72 74    Pulse 63 61 60 65 65        Follow up with Mr. Esteban Mike completed. Mr. Mike reports that he is doing okay. Patient is concerned about fluctuations in A1C. He has been modifying his diet by trying to stay away from carbs. Advised Mr. Mike to avoid salt to help improve BP. He will meet with a diabetes educator next week. Will reiterate the importance of physical activity for BP and diabetes at next encounter. Patient did not have any further questions or concerns. I will follow up in a few weeks to see how he is doing and progressing.

## 2017-09-05 ENCOUNTER — PATIENT OUTREACH (OUTPATIENT)
Dept: OTHER | Facility: OTHER | Age: 67
End: 2017-09-05

## 2017-09-05 NOTE — PROGRESS NOTES
Last 5 Patient Entered [facility] Current 30 Day Average: 137/76     Recent Readings 9/3/2017 9/2/2017 8/29/2017 8/23/2017 8/17/2017    Systolic BP (mmHg) 147 141 145 117 146    Diastolic BP (mmHg) 75 73 78 68 78    Pulse 53 55 60 61 63        Hypertension Digital Medicine Program (HDMP): Health  Follow Up    Lifestyle Modifications:    1. Low sodium diet: yes Patient reports that he is trying to eat a little better.     2.Physical activity: no Patient is trying to stay active.     3.Hypotension/Hypertension symptoms: no   Frequency/Alleviating factors/Precipitating factors, etc.     4. Patient has been compliant with the medicaiton regimen    Follow up with  Esteban Gomes Cee completed. Mr. Mike is retiring soon. Encouraged patient to keep making healthy changes to his diet and exercise routine for improved quality of life post jail. No further questions or concerns. I will follow up in a few weeks to assess progress.

## 2017-09-07 ENCOUNTER — CLINICAL SUPPORT (OUTPATIENT)
Dept: CARDIOLOGY | Facility: CLINIC | Age: 67
End: 2017-09-07
Payer: COMMERCIAL

## 2017-09-07 DIAGNOSIS — I65.21 OCCLUSION OF RIGHT CAROTID ARTERY: ICD-10-CM

## 2017-09-07 LAB — INTERNAL CAROTID STENOSIS: ABNORMAL

## 2017-09-07 PROCEDURE — 93880 EXTRACRANIAL BILAT STUDY: CPT | Mod: S$GLB,,, | Performed by: INTERNAL MEDICINE

## 2017-09-08 ENCOUNTER — CLINICAL SUPPORT (OUTPATIENT)
Dept: DIABETES | Facility: CLINIC | Age: 67
End: 2017-09-08
Attending: FAMILY MEDICINE
Payer: COMMERCIAL

## 2017-09-08 DIAGNOSIS — E11.42 DIABETIC POLYNEUROPATHY ASSOCIATED WITH TYPE 2 DIABETES MELLITUS: ICD-10-CM

## 2017-09-08 PROCEDURE — G0109 DIAB MANAGE TRN IND/GROUP: HCPCS | Mod: S$GLB,,, | Performed by: INTERNAL MEDICINE

## 2017-09-08 NOTE — PROGRESS NOTES
Diabetes Education  Author: Brenda Hood RD, CDE  Date: 9/8/2017    Diabetes Education Visit  Diabetes Education Record Assessment/Progress: Initial    Diabetes Type  Diabetes Type : Type II    Diabetes History  Diabetes Diagnosis: 5-10 years    Nutrition  Meal Planning: 3 meals per day, snacks between meal, artificial sweeteners (u/s tea, juice, coffee, powerade)    Monitoring   Self Monitoring :  (Rarely checks)    Exercise   Exercise Type:  (Cuts grass - 2 days a week for 6 hours each)    Current Diabetes Treatment   Current Treatment: Oral Medication    Social History  Preferred Learning Method: Group Education  Primary Support: Self  Educational Level: [employer]  Smoking Status: Never a Smoker  Alcohol Use: Rarely            DDS Score  ( > 3 = SIGNIFICANT DISTRESS): 3.35  Emotional [place] Score: 3.8  Physician-Related Distress: 1.25  Regimen-Related Distress: 6  Interpersonal Distress: 1    Barriers to Change  Barriers to Change: None  Learning Challenges : None    Readiness to Learn   Readiness to Learn : Acceptance    Cultural Influences  Cultural Influences: No    Diabetes Education Assessment/Progress  Acute Complications (preventing, detecting, and treating acute complications): Discussion, Class, Instructed, Written Materials Provided  Chronic Complications (preventing, detecting, and treating chronic complications): Discussion, Class, Instructed, Written Materials Provided  Diabetes Disease Process (diabetes disease process and treatment options): Discussion, Class, Instructed, Written Materials Provided  Nutrition (Incorporating nutritional management into one's lifestyle): Discussion, Class, Instructed, Written Materials Provided  Physical Activity (incorporating physical activity into one's lifestyle): Discussion, Class, Instructed, Written Materials Provided  Medications (states correct name, dose, onset, peak, duration, side effects & timing of meds): Discussion, Class, Instructed, Written  Materials Provided  Monitoring (monitoring blood glucose/other parameters & using results): Discussion, Class, Instructed, Written Materials Provided  Goal Setting and Problem Solving (verbalizes behavior change strategies & sets realistic goals): Discussion, Class, Instructed  Behavior Change (developing personal strategies to health & behavior change): Discussion, Class, Instructed  Psychosocial Issues (developing personal srategies to address psychosocial concerns): Discussion, Class, Instructed    Goals  Healthy Eating: Set (See attached goal sheet)  Start Date: 09/08/17  Target Date: 03/08/18  Physical Activity: Set (See attached goal sheet)  Start Date: 09/08/17  Target Date: 03/08/18  Monitoring: Set (See attached goal sheet)  Start Date: 09/08/17  Target Date: 03/08/18  Medications: Set (See attached goal sheet)  Start Date: 09/08/17  Target Date: 03/08/18  Healthy Coping: Set (See attached goal sheet)  Start Date: 09/08/17  Target Date: 03/08/18  Reducing Risks: Set (See attached goal sheet)  Start Date: 09/08/17  Target Date: 03/08/18         Diabetes Care Plan/Intervention  Education Plan/Intervention: Individual Follow-Up DSMT    Diabetes Meal Plan  Carbohydrate Per Meal: 45-60g  Carbohydrate Per Snack : 7-15g    Education Units of Time   Time Spent: 60 min      Health Maintenance Topics with due status: Not Due       Topic Last Completion Date    TETANUS VACCINE 05/18/2009    Colonoscopy 01/23/2013    Lipid Panel 07/20/2017    Foot Exam 08/11/2017    Hemoglobin A1c 08/11/2017    Eye Exam 08/11/2017     Health Maintenance Due   Topic Date Due    Influenza Vaccine  08/01/2017    Pneumococcal (65+) (1 of 2 - PCV13) 08/18/2017

## 2017-09-12 ENCOUNTER — PATIENT OUTREACH (OUTPATIENT)
Dept: OTHER | Facility: OTHER | Age: 67
End: 2017-09-12

## 2017-09-12 NOTE — PROGRESS NOTES
"Last 5 Patient Entered [place] Current 30 Day Average: 135/73     Recent Readings 9/12/2017 9/11/2017 9/3/2017 9/2/2017 8/29/2017    Systolic BP (mmHg) 140 121 147 141 145    Diastolic BP (mmHg) 77 64 75 73 78    Pulse 55 56 53 55 60          Mr. Mike's BP average is at goal. It was elevated at his clinic appointment yesterday but he attributes this to drinking coffee prior to the appointment. He says he is taking his BP medications "most of the time." Explained the importance of taking all medications as prescribed. He has also not taken metformin for over 2 weeks due to it "smelling bad" and thinking he got a "bad batch." He will be picking up a new prescription and resume metformin as prescribed.     Current HTN regimen:  Hypertension Medications             carvedilol (COREG) 12.5 MG tablet Take 1 tablet (12.5 mg total) by mouth 2 (two) times daily with meals.    hydrochlorothiazide (HYDRODIURIL) 25 MG tablet Take 1 tablet (25 mg total) by mouth once daily.    valsartan (DIOVAN) 320 MG tablet Take 1 tablet (320 mg total) by mouth once daily.        Will continue to monitor regularly. Will follow up in 1-2 months, sooner if BP begins to trend upward or downward.    Patient has my contact information and knows to call with any concerns or clinical changes.           "

## 2017-09-18 ENCOUNTER — OFFICE VISIT (OUTPATIENT)
Dept: ENDOCRINOLOGY | Facility: CLINIC | Age: 67
End: 2017-09-18
Payer: COMMERCIAL

## 2017-09-18 VITALS
SYSTOLIC BLOOD PRESSURE: 170 MMHG | DIASTOLIC BLOOD PRESSURE: 80 MMHG | BODY MASS INDEX: 29.82 KG/M2 | HEART RATE: 68 BPM | WEIGHT: 190 LBS | HEIGHT: 67 IN

## 2017-09-18 DIAGNOSIS — E11.42 DIABETIC POLYNEUROPATHY ASSOCIATED WITH TYPE 2 DIABETES MELLITUS: ICD-10-CM

## 2017-09-18 DIAGNOSIS — I10 HYPERTENSION, ESSENTIAL: ICD-10-CM

## 2017-09-18 DIAGNOSIS — E78.2 MIXED HYPERLIPIDEMIA: ICD-10-CM

## 2017-09-18 DIAGNOSIS — E11.51 TYPE 2 DIABETES, WITH PERIPHERAL CIRCULATORY DISORDER NOT AT GOAL: Primary | ICD-10-CM

## 2017-09-18 DIAGNOSIS — K76.0 NAFLD (NONALCOHOLIC FATTY LIVER DISEASE): ICD-10-CM

## 2017-09-18 PROCEDURE — 99214 OFFICE O/P EST MOD 30 MIN: CPT | Mod: S$GLB,,, | Performed by: NURSE PRACTITIONER

## 2017-09-18 PROCEDURE — 99999 PR PBB SHADOW E&M-EST. PATIENT-LVL III: CPT | Mod: PBBFAC,,, | Performed by: NURSE PRACTITIONER

## 2017-09-18 PROCEDURE — 3045F PR MOST RECENT HEMOGLOBIN A1C LEVEL 7.0-9.0%: CPT | Mod: S$GLB,,, | Performed by: NURSE PRACTITIONER

## 2017-09-18 PROCEDURE — 3077F SYST BP >= 140 MM HG: CPT | Mod: S$GLB,,, | Performed by: NURSE PRACTITIONER

## 2017-09-18 PROCEDURE — 3079F DIAST BP 80-89 MM HG: CPT | Mod: S$GLB,,, | Performed by: NURSE PRACTITIONER

## 2017-09-18 PROCEDURE — 3008F BODY MASS INDEX DOCD: CPT | Mod: S$GLB,,, | Performed by: NURSE PRACTITIONER

## 2017-09-18 PROCEDURE — 4010F ACE/ARB THERAPY RXD/TAKEN: CPT | Mod: S$GLB,,, | Performed by: NURSE PRACTITIONER

## 2017-09-18 RX ORDER — METFORMIN HYDROCHLORIDE 500 MG/1
1000 TABLET ORAL 2 TIMES DAILY
Qty: 360 TABLET | Refills: 1 | Status: SHIPPED | OUTPATIENT
Start: 2017-09-18 | End: 2017-11-02

## 2017-09-18 NOTE — PROGRESS NOTES
"CC: Esteban Mike arrives today for management of Type 2 DM.      HPI: Esteban Mike was diagnosed with T2 Dm in 2008.    DM complicated by neuropathy and PVD.    Medical history also includes CAD post CABG x4 in 2008, post right CEA 2006 and left ICA stent 2007.   NAFLD. 06/2014 liver Bx: simple steatosis with no steatohepatitis or fibrosis.        A1c increased. Reports had A1c at health screening 1 month ago and BG 7.4%.  Has not been taking metformin x 2 weeks-- believes pharmacy gave him a bad batch.  Not checking BG.     HTN- BP above goal. Did not take medication this AM.   Home BP monitoring reported at goal.    CURRENT DIABETIC MEDS: Tradjenta 100mg daily  metformin 1000mg BID (Rx)    Hypoglycemia: No  Type of Glucose Meter: Contour    Physical Activity: No  Dietary Habits: Eats 3 meals/day, denies snacking.    Last Eye Exam: 6/2017, external [facility], no DR  Last Podiatry Exam: 12/2016    REVIEW OF SYSTEMS  General: no weakness or fatigue.   Eyes: no intermittent blurry vision or visual disturbances. Uses reading glasses.   Cardiac: no chest pain or palpitations.   Respiratory: no cough or dyspnea.   GI: no abdominal pain or nausea.   Skin: no rashes or itching.   Neuro: no numbness or tingling.   Endocrine: no polyuria, polydipsia, polyphagia.     Vital Signs  BP (!) 170/80   Pulse 68   Ht 5' 7" (1.702 m)   Wt 86.2 kg (190 lb)   BMI 29.76 kg/m²     Hemoglobin A1C   Date Value Ref Range Status   07/20/2017 8.2 (H) 4.8 - 5.6 % Final     Comment:              Pre-diabetes: 5.7 - 6.4           Diabetes: >6.4           Glycemic control for adults with diabetes: <7.0     09/03/2016 7.6 (H) 4.5 - 6.2 % Final     Comment:     According to ADA guidelines, hemoglobin A1C <7.0% represents  optimal control in non-pregnant diabetic patients.  Different  metrics may apply to specific populations.   Standards of Medical Care in Diabetes - 2016.  For the purpose of screening for the " presence of diabetes:  <5.7%     Consistent with the absence of diabetes  5.7-6.4%  Consistent with increasing risk for diabetes   (prediabetes)  >or=6.5%  Consistent with diabetes  Currently no consensus exists for use of hemoglobin A1C  for diagnosis of diabetes for children.     03/30/2016 7.4 (H) 4.5 - 6.2 % Final       Chemistry        Component Value Date/Time     07/20/2017 0801    K 4.8 07/20/2017 0801     07/20/2017 0801    CO2 24 07/20/2017 0801    BUN 15 07/20/2017 0801    CREATININE 0.97 07/20/2017 0801     (H) 07/20/2017 0801        Component Value Date/Time    CALCIUM 10.0 07/20/2017 0801    ALKPHOS 71 07/20/2017 0801    AST 37 07/20/2017 0801    ALT 86 (H) 07/20/2017 0801    BILITOT 0.5 07/20/2017 0801        Lab Results   Component Value Date    CHOL 155 07/20/2017    CHOL 113 (L) 09/03/2016    CHOL 135 05/15/2015     Lab Results   Component Value Date    HDL 42 07/20/2017    HDL 33 (L) 09/03/2016    HDL 35 (L) 05/15/2015     Lab Results   Component Value Date    LDLCALC 85 07/20/2017    LDLCALC 56.2 (L) 09/03/2016    LDLCALC 81.4 05/15/2015     Lab Results   Component Value Date    TRIG 139 07/20/2017    TRIG 119 09/03/2016    TRIG 93 05/15/2015     Lab Results   Component Value Date    CHOLHDL 29.2 09/03/2016    CHOLHDL 25.9 05/15/2015    CHOLHDL 28.6 10/22/2014     Lab Results   Component Value Date    TSH 1.632 09/03/2016     Lab Results   Component Value Date    MICALBCREAT 6.0 04/06/2016     Vit D, 25-Hydroxy   Date Value Ref Range Status   09/03/2016 30 30 - 96 ng/mL Final     Comment:     Vitamin D deficiency.........<10 ng/mL                              Vitamin D insufficiency......10-29 ng/mL       Vitamin D sufficiency........> or equal to 30 ng/mL  Vitamin D toxicity............>100 ng/mL         PHYSICAL EXAMINATION  Constitutional: Appears well, no distress  Neck: Supple, trachea midline. No thyromegaly.   Respiratory: even and unlabored, CTA without  wheezes.  Cardiovascular: RRR; no murmurs.   Lymph: DP pulses  2+ bilaterally; no edema.   Skin: warm and dry; no acanthosis nigracans observed.  Neuro: patient alert and cooperative; CN 2-12 grossly intact  Feet: appropriate shoes    Assessment/Plan    1. Type 2 diabetes, with complications   - A1c at goal for cardiac, PVD h/o. No hypoglycemia.     - Continue Tradjenta. Resume metformin.   - Discussed BG monitoring twice daily at alternating times.   - Reviewed BG goals. Send logs if repeatedly above goal. Consider GLP1RA instead of DPP4i at that time.    -On ACEi, statin, ASA   2. Diabetic neuropathy- Optimize BG control. Reviewed DM foot care.   3. NAFLD- Discussed diet and lifestyle modification. Recommend follow-up with  Hepatology. He cancelled last appt.    4. Hypertension, essential - Above goal. Take meds this AM. On ARB. No changes.    5. Hyperlipidemia - On statin and fish oil. LDL and trigs acceptable.      FOLLOW UP  Return in about 3 months (around 12/18/2017).     Orders Placed This Encounter   Procedures    Hemoglobin A1c     Standing Status:   Future     Standing Expiration Date:   11/17/2018

## 2017-09-23 RX ORDER — LINAGLIPTIN 5 MG/1
TABLET, FILM COATED ORAL
Qty: 90 TABLET | Refills: 3 | Status: SHIPPED | OUTPATIENT
Start: 2017-09-23 | End: 2018-02-26 | Stop reason: SDUPTHER

## 2017-10-06 ENCOUNTER — PATIENT OUTREACH (OUTPATIENT)
Dept: OTHER | Facility: OTHER | Age: 67
End: 2017-10-06

## 2017-10-06 NOTE — PROGRESS NOTES
"Last 5 Patient Entered Redings Current 30 Day Average: 136/74     Recent Readings 10/4/2017 10/1/2017 9/26/2017 9/26/2017 9/24/2017    Systolic BP (mmHg) 145 144 137 146 142    Diastolic BP (mmHg) 75 77 87 102 83    Pulse 57 57 59 62 67        Hypertension Digital Medicine Program (HDMP): Health  Follow Up    Lifestyle Modifications:    1.Low sodium diet: Deferred    2.Physical activity: Deferred    3.Hypotension/Hypertension symptoms: no   Frequency/Alleviating factors/Precipitating factors, etc.     4.Patient has been compliant with the medication regimen.     Follow up with Mr. Esteban Mike completed. Mr. Mike is doing okay. Patient states that he has been urinating more frequently and has been unable to "hold it". Mr. Mike is also concerned about his metformin. Patient reports that he has not been taking it because it "smells bad". Patient also reports having diarrhea when taking metformin. Advised patient to message PCP with health/medication concerns. Patient confirms understanding. No further questions or concerns. I will follow up in a few weeks to assess progress.         "

## 2017-10-24 ENCOUNTER — PATIENT OUTREACH (OUTPATIENT)
Dept: OTHER | Facility: OTHER | Age: 67
End: 2017-10-24

## 2017-10-24 NOTE — PROGRESS NOTES
Last 5 Patient Entered Readings Current 30 Day Average: 133/75     Recent Readings 11/6/2017 11/5/2017 11/4/2017 11/2/2017 10/29/2017    Systolic BP (mmHg) 122 118 134 127 139    Diastolic BP (mmHg) 67 73 73 77 80    Pulse 54 65 55 55 62        Mr. Mike's BP average is controlled. He is very pleased to have BP better controlled. He is now trying to get his diabetes better controlled because he A1C increased from 8.2 in July to 12.2 in November. He is very shocked by this but acknowledges it is because he was not taking metformin or monitoring carb intake for quite sometime. He does not want to be put back on insulin, so he is working to reduce carb intake and he is now taking metformin ER as prescribed. He is eligible for DDMP, so his health , Diana, will discuss this with him at her next encounter.     Current HTN regimen:  Hypertension Medications             carvedilol (COREG) 12.5 MG tablet Take 1 tablet (12.5 mg total) by mouth 2 (two) times daily with meals.    hydrochlorothiazide (HYDRODIURIL) 25 MG tablet Take 1 tablet (25 mg total) by mouth once daily.    valsartan (DIOVAN) 320 MG tablet Take 1 tablet (320 mg total) by mouth once daily.        Will continue to monitor regularly. Will follow up in 1-2 months, sooner if BP begins to trend upward or downward.    Patient has my contact information and knows to call with any concerns or clinical changes.

## 2017-10-30 ENCOUNTER — PATIENT MESSAGE (OUTPATIENT)
Dept: INTERNAL MEDICINE | Facility: CLINIC | Age: 67
End: 2017-10-30

## 2017-10-30 DIAGNOSIS — R30.0 DYSURIA: Primary | ICD-10-CM

## 2017-11-02 ENCOUNTER — OFFICE VISIT (OUTPATIENT)
Dept: INTERNAL MEDICINE | Facility: CLINIC | Age: 67
End: 2017-11-02
Attending: FAMILY MEDICINE
Payer: COMMERCIAL

## 2017-11-02 VITALS
HEIGHT: 67 IN | DIASTOLIC BLOOD PRESSURE: 70 MMHG | BODY MASS INDEX: 29.03 KG/M2 | SYSTOLIC BLOOD PRESSURE: 124 MMHG | HEART RATE: 55 BPM | WEIGHT: 185 LBS

## 2017-11-02 DIAGNOSIS — E11.42 DIABETIC POLYNEUROPATHY ASSOCIATED WITH TYPE 2 DIABETES MELLITUS: ICD-10-CM

## 2017-11-02 DIAGNOSIS — I73.9 PVD (PERIPHERAL VASCULAR DISEASE): ICD-10-CM

## 2017-11-02 DIAGNOSIS — N40.0 BENIGN PROSTATIC HYPERPLASIA, UNSPECIFIED WHETHER LOWER URINARY TRACT SYMPTOMS PRESENT: ICD-10-CM

## 2017-11-02 DIAGNOSIS — I65.23 BILATERAL CAROTID ARTERY STENOSIS: ICD-10-CM

## 2017-11-02 DIAGNOSIS — Z12.5 PROSTATE CANCER SCREENING: ICD-10-CM

## 2017-11-02 DIAGNOSIS — Z00.00 ANNUAL PHYSICAL EXAM: Primary | ICD-10-CM

## 2017-11-02 DIAGNOSIS — I10 HYPERTENSION, ESSENTIAL: ICD-10-CM

## 2017-11-02 DIAGNOSIS — K76.0 NAFLD (NONALCOHOLIC FATTY LIVER DISEASE): ICD-10-CM

## 2017-11-02 DIAGNOSIS — E78.2 MIXED HYPERLIPIDEMIA: ICD-10-CM

## 2017-11-02 DIAGNOSIS — I25.810 CORONARY ARTERY DISEASE INVOLVING AUTOLOGOUS VEIN CORONARY BYPASS GRAFT WITHOUT ANGINA PECTORIS: ICD-10-CM

## 2017-11-02 PROCEDURE — 90471 IMMUNIZATION ADMIN: CPT | Mod: S$GLB,,, | Performed by: FAMILY MEDICINE

## 2017-11-02 PROCEDURE — 90670 PCV13 VACCINE IM: CPT | Mod: S$GLB,,, | Performed by: FAMILY MEDICINE

## 2017-11-02 PROCEDURE — 99999 PR PBB SHADOW E&M-EST. PATIENT-LVL III: CPT | Mod: PBBFAC,,, | Performed by: FAMILY MEDICINE

## 2017-11-02 PROCEDURE — 99397 PER PM REEVAL EST PAT 65+ YR: CPT | Mod: 25,S$GLB,, | Performed by: FAMILY MEDICINE

## 2017-11-02 RX ORDER — METFORMIN HYDROCHLORIDE 500 MG/1
1000 TABLET, EXTENDED RELEASE ORAL 2 TIMES DAILY WITH MEALS
Qty: 360 TABLET | Refills: 3 | Status: SHIPPED | OUTPATIENT
Start: 2017-11-02 | End: 2018-03-02 | Stop reason: SDUPTHER

## 2017-11-02 NOTE — PROGRESS NOTES
Subjective:       Patient ID: Esteban Mike is a 67 y.o. male.    Chief Complaint: Dysuria    HPI  Review of Systems   Constitutional: Negative for chills, fatigue and fever.   HENT: Negative for congestion and trouble swallowing.    Eyes: Negative for redness.   Respiratory: Negative for cough, chest tightness and shortness of breath.    Cardiovascular: Negative for chest pain, palpitations and leg swelling.   Gastrointestinal: Negative for abdominal pain and blood in stool.   Genitourinary: Positive for difficulty urinating and enuresis. Negative for hematuria.   Musculoskeletal: Negative for arthralgias, back pain, gait problem, joint swelling, myalgias and neck pain.   Skin: Negative for color change and rash.   Neurological: Negative for tremors, speech difficulty, weakness, numbness and headaches.   Hematological: Negative for adenopathy. Does not bruise/bleed easily.   Psychiatric/Behavioral: Negative for behavioral problems, confusion and sleep disturbance. The patient is not nervous/anxious.        Objective:      Physical Exam   Constitutional: He is oriented to person, place, and time. He appears well-developed and well-nourished. No distress.   Eyes: No scleral icterus.   Neck: Normal range of motion. Neck supple. Carotid bruit is not present.   Cardiovascular: Normal rate, regular rhythm, normal heart sounds and intact distal pulses.  Exam reveals no gallop and no friction rub.    No murmur heard.  Pulmonary/Chest: Effort normal and breath sounds normal. No respiratory distress. He has no wheezes. He has no rales.   Abdominal: Soft. Bowel sounds are normal.   Genitourinary: Rectal exam shows no mass. Prostate is not enlarged and not tender.   Musculoskeletal: He exhibits no edema.        Right lower leg: He exhibits no edema.        Left lower leg: He exhibits no edema.   Neurological: He is alert and oriented to person, place, and time. He displays no tremor. No cranial nerve deficit.  Coordination and gait normal.   Skin: Skin is warm and dry. No rash noted. He is not diaphoretic. No erythema.   Psychiatric: He has a normal mood and affect. His behavior is normal. Judgment and thought content normal.   Nursing note and vitals reviewed.      Assessment:       1. Annual physical exam    2. Bilateral carotid artery stenosis    3. Coronary artery disease involving autologous vein coronary bypass graft without angina pectoris    4. Diabetic polyneuropathy associated with type 2 diabetes mellitus    5. Hypertension, essential    6. Mixed hyperlipidemia    7. NAFLD (nonalcoholic fatty liver disease)    8. PVD (peripheral vascular disease)    9. Prostate cancer screening    10. Benign prostatic hyperplasia, unspecified whether lower urinary tract symptoms present        Plan:   Esteban was seen today for dysuria.    Diagnoses and all orders for this visit:    Annual physical exam  -     Comprehensive metabolic panel; Future  -     Hemoglobin A1c; Future  -     Lipid panel; Future  -     Urinalysis; Future  -     Urinalysis Microscopic; Future  -     Urinalysis  -     Urinalysis Microscopic  -     Comprehensive metabolic panel  -     Hemoglobin A1c  -     Lipid panel    Bilateral carotid artery stenosis    Coronary artery disease involving autologous vein coronary bypass graft without angina pectoris    Diabetic polyneuropathy associated with type 2 diabetes mellitus  -     Hemoglobin A1c; Future  -     Hemoglobin A1c    Hypertension, essential    Mixed hyperlipidemia  -     Lipid panel; Future  -     Lipid panel    NAFLD (nonalcoholic fatty liver disease)    PVD (peripheral vascular disease)    Prostate cancer screening  -     PSA, Screening; Future  -     PSA, Screening    Benign prostatic hyperplasia, unspecified whether lower urinary tract symptoms present    Other orders  -     metFORMIN (GLUCOPHAGE-XR) 500 MG 24 hr tablet; Take 2 tablets (1,000 mg total) by mouth 2 (two) times daily with meals.  -      Pneumococcal Conjugate Vaccine (13 Valent) (IM)      See meds, orders, follow up, routing and instructions sections of encounter.  The patient messaged in concerning dysuria, frequency, increased urination and   difficulty urination, nocturia x4.  I did review his labs, last A1c was 8.2 in   July.    The patient has a right ICA stenosis of 100%, old finding, and a left ICA   stenosis of 20-30%.    He had a right ICA, CEA in 2006 and a left ICA stent 2007.    See orders.  Diet and exercise discussed.      SONU/HN  dd: 11/02/2017 15:32:59 (CDT)  td: 11/03/2017 00:15:28 (CDT)  Doc ID   #[id number]  Job ID #[id number]    CC:

## 2017-11-03 ENCOUNTER — TELEPHONE (OUTPATIENT)
Dept: INTERNAL MEDICINE | Facility: CLINIC | Age: 67
End: 2017-11-03

## 2017-11-03 DIAGNOSIS — E11.9 TYPE 2 DIABETES MELLITUS WITHOUT COMPLICATION, UNSPECIFIED LONG TERM INSULIN USE STATUS: Primary | ICD-10-CM

## 2017-11-03 DIAGNOSIS — R79.9 ABNORMAL BLOOD CHEMISTRY: ICD-10-CM

## 2017-11-03 LAB
ALBUMIN SERPL-MCNC: 4.4 G/DL (ref 3.6–5.1)
ALBUMIN/GLOB SERPL: 1.8 (CALC) (ref 1–2.5)
ALP SERPL-CCNC: 121 U/L (ref 40–115)
ALT SERPL-CCNC: 75 U/L (ref 9–46)
APPEARANCE UR: CLEAR
AST SERPL-CCNC: 29 U/L (ref 10–35)
BACTERIA #/AREA URNS HPF: NORMAL /HPF
BILIRUB SERPL-MCNC: 0.9 MG/DL (ref 0.2–1.2)
BILIRUB UR QL STRIP: NEGATIVE
BUN SERPL-MCNC: 16 MG/DL (ref 7–25)
BUN/CREAT SERPL: ABNORMAL (CALC) (ref 6–22)
CALCIUM SERPL-MCNC: 10.4 MG/DL (ref 8.6–10.3)
CHLORIDE SERPL-SCNC: 95 MMOL/L (ref 98–110)
CHOLEST SERPL-MCNC: 137 MG/DL
CHOLEST/HDLC SERPL: 4.3 (CALC)
CO2 SERPL-SCNC: 30 MMOL/L (ref 20–31)
COLOR UR: YELLOW
CREAT SERPL-MCNC: 1.05 MG/DL (ref 0.7–1.25)
GFR SERPL CREATININE-BSD FRML MDRD: 74 ML/MIN/1.73M2
GLOBULIN SER CALC-MCNC: 2.5 G/DL (CALC) (ref 1.9–3.7)
GLUCOSE SERPL-MCNC: 413 MG/DL (ref 65–99)
GLUCOSE UR QL STRIP: ABNORMAL
HDLC SERPL-MCNC: 32 MG/DL
HGB UR QL STRIP: NEGATIVE
HYALINE CASTS #/AREA URNS LPF: NORMAL /LPF
KETONES UR QL STRIP: NEGATIVE
LDLC SERPL CALC-MCNC: 68 MG/DL (CALC)
LEUKOCYTE ESTERASE UR QL STRIP: NEGATIVE
NITRITE UR QL STRIP: NEGATIVE
NONHDLC SERPL-MCNC: 105 MG/DL (CALC)
PH UR STRIP: 5.5 [PH] (ref 5–8)
POTASSIUM SERPL-SCNC: 4.6 MMOL/L (ref 3.5–5.3)
PROT SERPL-MCNC: 6.9 G/DL (ref 6.1–8.1)
PROT UR QL STRIP: NEGATIVE
PSA SERPL-MCNC: 0.3 NG/ML
RBC #/AREA URNS HPF: NORMAL /HPF
SODIUM SERPL-SCNC: 135 MMOL/L (ref 135–146)
SP GR UR STRIP: ABNORMAL (ref 1–1.03)
SQUAMOUS #/AREA URNS HPF: NORMAL /HPF
TRIGL SERPL-MCNC: 304 MG/DL
WBC #/AREA URNS HPF: NORMAL /HPF

## 2017-11-03 NOTE — TELEPHONE ENCOUNTER
Called patient and discussed labs and or test results. Patient expressed understanding and had the opportunity to ask questions. Any questions were answered. See meds, orders, follow up and instructions sections of encounter.    Specific issues include:   Non-fasting sugar is 400+  Will repeat on Monday - asked him to check in on Monday- repeat [facility] labs on monday

## 2017-11-06 NOTE — TELEPHONE ENCOUNTER
Spoke to pt and he stated that he picked up his [facility] orders for labs. He also wanted to let the Dr know that his Blood sugar levels are decreasing. Pt reported that his blood sugar was 242 this morning.

## 2017-11-08 LAB
ALP BONE CFR SERPL: 30 % (ref 28–66)
ALP INTEST CFR SERPL: 33 % (ref 1–24)
ALP LIVER CFR SERPL: 37 % (ref 25–69)
ALP PLAC CFR SERPL: 0 %
ALP SERPL-CCNC: 70 U/L (ref 40–115)
GLUCOSE SERPL-MCNC: 272 MG/DL (ref 65–99)
HBA1C MFR BLD: 12.2 % OF TOTAL HGB

## 2017-11-09 ENCOUNTER — TELEPHONE (OUTPATIENT)
Dept: INTERNAL MEDICINE | Facility: CLINIC | Age: 67
End: 2017-11-09

## 2017-11-09 DIAGNOSIS — E11.9 TYPE 2 DIABETES MELLITUS WITHOUT COMPLICATION, UNSPECIFIED LONG TERM INSULIN USE STATUS: Primary | ICD-10-CM

## 2017-11-09 NOTE — TELEPHONE ENCOUNTER
Called patient and discussed labs and or test results. Patient expressed understanding and had the opportunity to ask questions. Any questions were answered. See meds, orders, follow up and instructions sections of encounter.    Specific issues include:  A1C 12.2  Better home control - 175 today   See's endo next month

## 2017-11-13 DIAGNOSIS — I25.810 CORONARY ARTERY DISEASE INVOLVING AUTOLOGOUS VEIN CORONARY BYPASS GRAFT WITHOUT ANGINA PECTORIS: ICD-10-CM

## 2017-11-13 RX ORDER — CARVEDILOL 12.5 MG/1
12.5 TABLET ORAL 2 TIMES DAILY WITH MEALS
Qty: 180 TABLET | Refills: 3 | Status: SHIPPED | OUTPATIENT
Start: 2017-11-13 | End: 2018-04-02

## 2017-11-13 RX ORDER — ATORVASTATIN CALCIUM 80 MG/1
80 TABLET, FILM COATED ORAL DAILY
Qty: 90 TABLET | Refills: 3 | Status: SHIPPED | OUTPATIENT
Start: 2017-11-13 | End: 2018-11-26 | Stop reason: SDUPTHER

## 2017-11-13 RX ORDER — LANCETS
EACH MISCELLANEOUS
Qty: 100 EACH | Refills: 11 | OUTPATIENT
Start: 2017-11-13

## 2017-11-13 NOTE — TELEPHONE ENCOUNTER
----- Message from Dnaa Bautista sent at 11/13/2017  1:50 PM CST -----  Contact: patient  Patient needs refills on his Lancets, strips & Carvedilol 12.5 mgs, 30 day supply.  Pharmacy is [facility] [phone]  Patient's # is [phone]

## 2017-11-16 ENCOUNTER — PATIENT OUTREACH (OUTPATIENT)
Dept: OTHER | Facility: OTHER | Age: 67
End: 2017-11-16

## 2017-11-16 NOTE — PROGRESS NOTES
"Last 5 Patient Entered Readings Current 30 Day Average: 130/74     Recent Readings 11/10/2017 11/6/2017 11/5/2017 11/4/2017 11/2/2017    Systolic BP (mmHg) 107 122 118 134 127    Diastolic BP (mmHg) 63 67 73 73 77    Pulse 57 54 65 55 55        Hypertension Digital Medicine Program (HDMP): Health  Follow Up    Lifestyle Modifications:    1.Low sodium diet: no Patient reports that he has been making small changes to his diet. He is trying to cook healthier foods at home, instead of eating out as often as he used to. Mr. Mike would like some additional information on how to season his foods and ideas of things to cook. Will send resources via email.    2.Physical activity: no Patient denies any changes to physical activity.    3.Hypotension/Hypertension symptoms: no   Frequency/Alleviating factors/Precipitating factors, etc.     4.Patient has been compliant with the medication regimen.     Follow up with Mr. Esteban Mike completed. Mr. Mike is doing okay. Patient states that he is having trouble getting more test strips. He should be able to pick some up from the pharmacy tomorrow. Mr. Mike still has some concerns taking metformin and has questions about whether there is "something else that is safer and has less side effects". Mr. Mike would like to take advantage of the DDMP to help with better A1C and BG control. No further questions or concerns. I will follow up in a few weeks to assess progress.         "

## 2017-11-17 ENCOUNTER — TELEPHONE (OUTPATIENT)
Dept: INTERNAL MEDICINE | Facility: CLINIC | Age: 67
End: 2017-11-17

## 2017-11-17 DIAGNOSIS — E11.51 TYPE 2 DIABETES, WITH PERIPHERAL CIRCULATORY DISORDER NOT AT GOAL: Primary | ICD-10-CM

## 2017-11-17 RX ORDER — INSULIN PUMP SYRINGE, 3 ML
EACH MISCELLANEOUS
Qty: 1 EACH | Refills: 0 | Status: SHIPPED | OUTPATIENT
Start: 2017-11-17

## 2017-11-17 RX ORDER — LANCETS
EACH MISCELLANEOUS
Qty: 200 EACH | Refills: 11 | Status: SHIPPED | OUTPATIENT
Start: 2017-11-17

## 2017-11-17 NOTE — TELEPHONE ENCOUNTER
Please give patient a call and let him know that we sent in a script for diabetic testing supplies. The original request from his pharmacy did not have the correct information to run through Epic. Thank you.

## 2017-11-30 ENCOUNTER — PATIENT MESSAGE (OUTPATIENT)
Dept: ADMINISTRATIVE | Facility: OTHER | Age: 67
End: 2017-11-30

## 2017-11-30 DIAGNOSIS — E11.51 TYPE 2 DIABETES, WITH PERIPHERAL CIRCULATORY DISORDER NOT AT GOAL: Primary | ICD-10-CM

## 2017-12-07 ENCOUNTER — PATIENT OUTREACH (OUTPATIENT)
Dept: OTHER | Facility: OTHER | Age: 67
End: 2017-12-07

## 2017-12-07 DIAGNOSIS — E11.51 TYPE 2 DIABETES, WITH PERIPHERAL CIRCULATORY DISORDER NOT AT GOAL: Primary | ICD-10-CM

## 2017-12-07 NOTE — PROGRESS NOTES
"Last 5 Patient Entered Readings Current 30 Day Average: 131/70     Recent Readings 12/3/2017 12/3/2017 11/17/2017 11/10/2017 11/6/2017    Systolic BP (mmHg) - 158 129 107 122    Diastolic BP (mmHg) - 79 67 63 67    Pulse 55 55 47 57 54        Hypertension & Diabetes Digital Medicine Program (DDMP & HDMP): Health  Follow Up    Lifestyle Modifications:    1.Low sodium diet: no Mr. Mike was able to review the diabetes meal plan and salt free seasoning information that was sent to him. Patient reports that the meal plan is "complicated". Advised him to use it as a reference. He can choose one meal at a time to prepare, or take snack ideas from it. Mr. Mike states that he is making small changes to his diet such as swapping out white rice for brown, trying salt free seasonings, and cooking with a bigger variety of fresh vegetables.     2.Physical activity: yes Patient is on his feet most hours of the day at work.     3.Hypotension/Hypertension symptoms: no   Frequency/Alleviating factors/Precipitating factors, etc.     4.Patient has been compliant with the medication regimen.     Follow up with Mr. Esteban Mike completed. Mr. Mike has a cold. He has completed his questionnaries for DDMP and picked up his glucometer from the [facility]. Patient states that he took a reading this morning, but not readings have transmitted. Asked that he double check the apps. Mr. Mike continues to express concern about metformin.  He has also heard that you are not supposed to be taking metformin with tradashutosha. Mr. Mike will see Dr. De La Rosa on 12/19 and express all concerns. No further questions or concerns. I will follow up in a few weeks to assess progress.         "

## 2017-12-07 NOTE — PROGRESS NOTES
"Last 5 Patient Entered Readings Current 30 Day Average: 148/78     Recent Readings 12/20/2017 12/20/2017 12/3/2017 12/3/2017 11/17/2017    SBP (mmHg) - 137 - 158 129    DBP (mmHg) - 76 - 79 67    Pulse 59 59 55 55 47        Diabetes orders placed.     Mr. Mike has not started using the [employer] glucometer yet. He also has not taken a BP reading in over a week. He plans to send a BP reading today. He thinks he "messed something up" when using the test solution on [employer] glucometer, so he will go back to the [facility] to get assistance with the glucometer. Mr. Mike is feeling well. He confirms adherence to all HTN and DM medications.     Patient's BP average is above goal of <130/80.     Will continue to monitor regularly. Will follow up in 2-3 weeks, sooner if BP begins to trend upward or downward.    Patient has my contact information and knows to call with any concerns or clinical changes.     Current HTN regimen:  Hypertension Medications             carvedilol (COREG) 12.5 MG tablet Take 1 tablet (12.5 mg total) by mouth 2 (two) times daily with meals.    hydrochlorothiazide (HYDRODIURIL) 25 MG tablet Take 1 tablet (25 mg total) by mouth once daily.    valsartan (DIOVAN) 320 MG tablet Take 1 tablet (320 mg total) by mouth once daily.            "

## 2017-12-19 ENCOUNTER — OFFICE VISIT (OUTPATIENT)
Dept: ENDOCRINOLOGY | Facility: CLINIC | Age: 67
End: 2017-12-19
Payer: COMMERCIAL

## 2017-12-19 VITALS
HEART RATE: 69 BPM | BODY MASS INDEX: 28.99 KG/M2 | SYSTOLIC BLOOD PRESSURE: 122 MMHG | DIASTOLIC BLOOD PRESSURE: 68 MMHG | HEIGHT: 67 IN | WEIGHT: 184.69 LBS

## 2017-12-19 DIAGNOSIS — E78.2 MIXED HYPERLIPIDEMIA: ICD-10-CM

## 2017-12-19 DIAGNOSIS — E11.42 DIABETIC POLYNEUROPATHY ASSOCIATED WITH TYPE 2 DIABETES MELLITUS: Primary | ICD-10-CM

## 2017-12-19 DIAGNOSIS — I10 HYPERTENSION, ESSENTIAL: ICD-10-CM

## 2017-12-19 DIAGNOSIS — E11.65 TYPE 2 DIABETES MELLITUS WITH HYPERGLYCEMIA, WITHOUT LONG-TERM CURRENT USE OF INSULIN: ICD-10-CM

## 2017-12-19 LAB
CREAT UR-MCNC: 147 MG/DL
MICROALBUMIN UR DL<=1MG/L-MCNC: 9 UG/ML
MICROALBUMIN/CREATININE RATIO: 6.1 UG/MG

## 2017-12-19 PROCEDURE — 99214 OFFICE O/P EST MOD 30 MIN: CPT | Mod: S$GLB,,, | Performed by: NURSE PRACTITIONER

## 2017-12-19 PROCEDURE — 99999 PR PBB SHADOW E&M-EST. PATIENT-LVL III: CPT | Mod: PBBFAC,,, | Performed by: NURSE PRACTITIONER

## 2017-12-19 PROCEDURE — 82570 ASSAY OF URINE CREATININE: CPT

## 2017-12-19 RX ORDER — GLIPIZIDE 5 MG/1
5 TABLET ORAL 2 TIMES DAILY WITH MEALS
Qty: 60 TABLET | Refills: 3 | Status: SHIPPED | OUTPATIENT
Start: 2017-12-19 | End: 2018-01-25 | Stop reason: SDUPTHER

## 2017-12-19 NOTE — PROGRESS NOTES
"CC: Esteban Mike arrives today for management of Type 2 DM.      HPI: Esteban Mike was diagnosed with T2 Dm in 2008.    DM complicated by neuropathy and PVD.    Medical history also includes CAD post CABG x4 in 2008, post right CEA 2006 and left ICA stent 2007.   NAFLD. 06/2014 liver Bx: simple steatosis with no steatohepatitis or fibrosis.        A1c continues to increase, now 12.2%.  States he was not checking BGs and eating whatever he wanted.   Has started SMBG again. BGs fasting 140s-170s. No hypoglycemia.  BG was 170s-200s in November when he resumed self-management. He was experiencing polyuria at that time.   Trying to watch what he eats.     CURRENT DIABETIC MEDS: Tradjenta 100mg daily  Metformin XR 1000mg BID  Denies missed doses.    Hypoglycemia: No  Type of Glucose Meter: Contour    Physical Activity: No formal exercise.   Dietary Habits: Eats 3 meals/day, +snacking.    Last Eye Exam: 6/2017, external [facility], no DR  Last Podiatry Exam: 12/2016    REVIEW OF SYSTEMS  General: no weakness or fatigue.   Eyes: no intermittent blurry vision or visual disturbances. Uses reading glasses.   Cardiac: no chest pain or palpitations.   Respiratory: no cough or dyspnea.   GI: no abdominal pain or nausea.   Skin: no rashes or itching.   Neuro: no numbness or tingling.   Endocrine: no polyuria, polydipsia, polyphagia.     Vital Signs  /68   Pulse 69   Ht 5' 7" (1.702 m)   Wt 83.8 kg (184 lb 11.2 oz)   BMI 28.93 kg/m²     Lab Results   Component Value Date    LABA1C 12.2 (H) 11/06/2017    HGBA1C 8.2 (H) 07/20/2017         Chemistry        Component Value Date/Time     11/02/2017 1519    K 4.6 11/02/2017 1519    CL 95 (L) 11/02/2017 1519    CO2 30 11/02/2017 1519    BUN 16 11/02/2017 1519    CREATININE 1.05 11/02/2017 1519     (H) 11/06/2017 0928        Component Value Date/Time    CALCIUM 10.4 (H) 11/02/2017 1519    ALKPHOS 70 11/06/2017 0928    AST 29 " 11/02/2017 1519    ALT 75 (H) 11/02/2017 1519    BILITOT 0.9 11/02/2017 1519        Lab Results   Component Value Date    CHOL 137 11/02/2017    CHOL 155 07/20/2017    CHOL 113 (L) 09/03/2016     Lab Results   Component Value Date    HDL 32 (L) 11/02/2017    HDL 42 07/20/2017    HDL 33 (L) 09/03/2016     Lab Results   Component Value Date    LDLCALC 68 11/02/2017    LDLCALC 85 07/20/2017    LDLCALC 56.2 (L) 09/03/2016     Lab Results   Component Value Date    TRIG 304 (H) 11/02/2017    TRIG 139 07/20/2017    TRIG 119 09/03/2016     Lab Results   Component Value Date    CHOLHDL 4.3 11/02/2017    CHOLHDL 29.2 09/03/2016    CHOLHDL 25.9 05/15/2015     Lab Results   Component Value Date    TSH 1.632 09/03/2016     Lab Results   Component Value Date    MICALBCREAT 6.0 04/06/2016     Vit D, 25-Hydroxy   Date Value Ref Range Status   09/03/2016 30 30 - 96 ng/mL Final     Comment:     Vitamin D deficiency.........<10 ng/mL                              Vitamin D insufficiency......10-29 ng/mL       Vitamin D sufficiency........> or equal to 30 ng/mL  Vitamin D toxicity............>100 ng/mL         PHYSICAL EXAMINATION  Constitutional: Appears well, no distress  Neck: Supple, trachea midline. No thyromegaly or nodule.   Respiratory: even and unlabored, CTA without wheezes.  Cardiovascular: RRR; no murmurs.   Lymph: DP pulses  2+ bilaterally; no edema.   Skin: warm and dry; no acanthosis nigracans observed.  Neuro: patient alert and cooperative; CN 2-12 grossly intact  Feet: appropriate shoes    Assessment/Plan    1. Type 2 diabetes, with complications   - A1c at goal for cardiac, PVD h/o. No hypoglycemia.     - Continue Tradjenta, metformin.   - Discussed A1c indication for basal insulin. He is not interested in insulin/ injectables. He is firm that he will continue current progress.   - Glipizide 5mg twice daily. Reviewed hypoglycemia management and precautions.   - Advise continued diet modifications, start exercise as  tolerated.     - Discussed BG monitoring once- twice daily at alternating times.   Call for BG repeatedly <100 or > 200.    - Reviewed BG goals. Send logs if repeatedly above goal. Consider GLP1RA instead of DPP4i at that time.    -On ARB, statin, ASA   2. Diabetic neuropathy- Optimize BG control.    3. Hypertension, essential - Controlled. On ARB. No changes.    4 Hyperlipidemia - On statin and fish oil. LDL at goal, < 70.      FOLLOW UP  Return in about 3 months (around 3/19/2018).     Orders Placed This Encounter   Procedures    Hemoglobin A1c     Standing Status:   Future     Standing Expiration Date:   2/17/2019    TSH     Standing Status:   Future     Standing Expiration Date:   2/17/2019    Microalbumin/creatinine urine ratio     Order Specific Question:   Specimen Source     Answer:   Urine

## 2018-01-15 ENCOUNTER — PATIENT OUTREACH (OUTPATIENT)
Dept: OTHER | Facility: OTHER | Age: 68
End: 2018-01-15

## 2018-01-15 NOTE — PROGRESS NOTES
Last 5 Patient Entered Readings                                      Current 30 Day Average: 141/76     Recent Readings 1/15/2018 1/15/2018 1/5/2018 1/5/2018 1/3/2018    SBP (mmHg) - 138 - 156 137    DBP (mmHg) - 74 - 78 81    Pulse 54 54 55 55 51          Last 6 Patient Entered Readings                                          Most Recent A1c: 12.2% (Goal: 8%)     Recent Readings 1/10/2018 1/9/2018 1/8/2018 1/6/2018 1/6/2018    Blood Glucose (mg/dL) 144 144 98 147 140        Lifestyle Modifications:    1.Dietary Modifications (Sodium intake <2,000mg/day, food labels, dining out): Patient states that he is trying to eat better.     2.Physical Activity: Patient states that he has not started an exercise routine. He plans to start walking soon.    3.Medication Therapy: Patient has been compliant with the medication regimen. He started Glipizide per Dr. De La Rosa.     4.Patient has the following medication side effects/concerns: NA  (Frequency/Alleviating factors/Precipitating factors, etc.)     Follow up with Fausto Esteban Gomes Cee completed. Mr. Mike is doing well. Patient states that he is continuing to make small changes to his diet. Encouragement provided for patient to start regular exercise. He would like to retest A1C next month to determine progress since dietary changes. No further questions or concerns. I will follow up in a few weeks to assess progress.

## 2018-01-19 ENCOUNTER — PATIENT OUTREACH (OUTPATIENT)
Dept: OTHER | Facility: OTHER | Age: 68
End: 2018-01-19

## 2018-01-19 DIAGNOSIS — E11.65 TYPE 2 DIABETES MELLITUS WITH HYPERGLYCEMIA, WITHOUT LONG-TERM CURRENT USE OF INSULIN: Primary | ICD-10-CM

## 2018-01-19 NOTE — PROGRESS NOTES
Last 5 Patient Entered Readings                                      Current 30 Day Average: 141/76     Recent Readings 1/15/2018 1/15/2018 1/5/2018 1/5/2018 1/3/2018    SBP (mmHg) - 138 - 156 137    DBP (mmHg) - 74 - 78 81    Pulse 54 54 55 55 51      Patient's BP average is above goal of <130/80.     Mr. Mike's BP has been higher lately. He has not been checking BP often, but will begin to do so. Explained that we need more BP readings to determine if a medication change is warranted.     Patient denies s/s of hypertension (SOB, CP, severe headaches, changes in vision) associated with high readings. Instructed patient to go to the ED if BP > 180/110 and accompanied by hypertensive s/s, patient confirms understanding.    Current HTN regimen:  Hypertension Medications             carvedilol (COREG) 12.5 MG tablet Take 1 tablet (12.5 mg total) by mouth 2 (two) times daily with meals.    hydrochlorothiazide (HYDRODIURIL) 25 MG tablet Take 1 tablet (25 mg total) by mouth once daily.    valsartan (DIOVAN) 320 MG tablet Take 1 tablet (320 mg total) by mouth once daily.        ________________________________________________________________________________________________________________________________________________________________________________________________________________    Last 6 Patient Entered Readings                                          Most Recent A1c: 12.2% (Goal: 8%)     Recent Readings 1/23/2018 1/23/2018 1/22/2018 1/21/2018 1/20/2018    Blood Glucose (mg/dL) 85 148 159 165 86        Mr. Granger had a BG of 50 on Friday morning. He is unsure of the cause. He does not recall skipping dinner. He denies feeling LH/dizzy, weak, or nauseous. He reports that he is working on his diet. Encouraged him to limit carbs, especially candy. He ate half a pack of Mentos candy and BG was 165. Also reminded him to take glipizide with meals to prevent drops in BG.    Diabetes Medications              glipiZIDE (GLUCOTROL) 5 MG tablet Take 1 tablet (5 mg total) by mouth 2 (two) times daily with meals.    metFORMIN (GLUCOPHAGE-XR) 500 MG 24 hr tablet Take 2 tablets (1,000 mg total) by mouth 2 (two) times daily with meals.    TRADJENTA 5 mg Tab tablet TAKE ONE TABLET BY MOUTH EVERY DAY FOR DIABETES

## 2018-01-25 RX ORDER — GLIPIZIDE 5 MG/1
5 TABLET ORAL 2 TIMES DAILY WITH MEALS
Qty: 180 TABLET | Refills: 3 | Status: SHIPPED | OUTPATIENT
Start: 2018-01-25 | End: 2019-07-31 | Stop reason: SDUPTHER

## 2018-02-15 ENCOUNTER — PATIENT OUTREACH (OUTPATIENT)
Dept: OTHER | Facility: OTHER | Age: 68
End: 2018-02-15

## 2018-02-15 DIAGNOSIS — E11.65 TYPE 2 DIABETES MELLITUS WITH HYPERGLYCEMIA, WITHOUT LONG-TERM CURRENT USE OF INSULIN: Primary | ICD-10-CM

## 2018-02-15 NOTE — PROGRESS NOTES
Last 5 Patient Entered Readings                                      Current 30 Day Average: 135/75     Recent Readings 2/23/2018 2/23/2018 2/21/2018 2/21/2018 2/19/2018    SBP (mmHg) 119 - 154 - 137    DBP (mmHg) 66 - 80 - 75    Pulse 57 57 54 54 58         Mr. Mike's BP is improving. He has both valsartan 160 mg and 320 mg listed in his medication list. Asked that he check to verify he is taking 320 mg QD. He has been monitoring salt intake. He reports not sleeping well and being stressed which he knows can cause elevated BP readings. He is looking into a sleep study but wants to see what his insurance will cover first.     Patient's BP average is above goal of <130/80.     Will continue to monitor regularly. Will follow up in 4-6 weeks, sooner if BP begins to trend upward or downward.    Patient has my contact information and knows to call with any concerns or clinical changes.     Current HTN regimen:  Hypertension Medications             atenolol (TENORMIN) 50 MG tablet Take 50 mg by mouth once daily.    carvedilol (COREG) 12.5 MG tablet Take 1 tablet (12.5 mg total) by mouth 2 (two) times daily with meals.    hydrochlorothiazide (HYDRODIURIL) 25 MG tablet Take 1 tablet (25 mg total) by mouth once daily.    valsartan (DIOVAN) 160 MG tablet Take 160 mg by mouth once daily.    valsartan (DIOVAN) 320 MG tablet Take 1 tablet (320 mg total) by mouth once daily.          ______________________________________________________________________________________________      Last 6 Patient Entered Readings                                          Most Recent A1c: 12.2% (Goal: 8%)     Recent Readings 2/14/2018 2/13/2018 2/12/2018 2/11/2018 2/10/2018    Blood Glucose (mg/dL) 124 130 150 149 129        Mr. Mike's BG readings are lower. He had an A1C done on Monday at [facility], so results are not available for review. Encouraged him to continue to take his medications as prescribed.     Diabetes Medications              glipiZIDE (GLUCOTROL) 5 MG tablet Take 1 tablet (5 mg total) by mouth 2 (two) times daily with meals.    metFORMIN (GLUCOPHAGE-XR) 500 MG 24 hr tablet Take 2 tablets (1,000 mg total) by mouth 2 (two) times daily with meals.    TRADJENTA 5 mg Tab tablet TAKE ONE TABLET BY MOUTH EVERY DAY FOR DIABETES        Will continue to monitor regularly. Will follow up in 4-6 weeks, sooner if BP begins to trend upward or downward.    Patient has my contact information and knows to call with any concerns or clinical changes.

## 2018-02-19 ENCOUNTER — OFFICE VISIT (OUTPATIENT)
Dept: INTERNAL MEDICINE | Facility: CLINIC | Age: 68
End: 2018-02-19
Attending: FAMILY MEDICINE
Payer: COMMERCIAL

## 2018-02-19 VITALS
HEIGHT: 67 IN | SYSTOLIC BLOOD PRESSURE: 138 MMHG | DIASTOLIC BLOOD PRESSURE: 66 MMHG | HEART RATE: 60 BPM | TEMPERATURE: 98 F | WEIGHT: 187 LBS | BODY MASS INDEX: 29.35 KG/M2 | OXYGEN SATURATION: 97 %

## 2018-02-19 DIAGNOSIS — E11.319 DIABETIC RETINOPATHY ASSOCIATED WITH TYPE 2 DIABETES MELLITUS, MACULAR EDEMA PRESENCE UNSPECIFIED, UNSPECIFIED LATERALITY, UNSPECIFIED RETINOPATHY SEVERITY: ICD-10-CM

## 2018-02-19 DIAGNOSIS — G47.33 OSA (OBSTRUCTIVE SLEEP APNEA): ICD-10-CM

## 2018-02-19 DIAGNOSIS — I25.810 CORONARY ARTERY DISEASE INVOLVING AUTOLOGOUS VEIN CORONARY BYPASS GRAFT WITHOUT ANGINA PECTORIS: ICD-10-CM

## 2018-02-19 DIAGNOSIS — E11.42 DIABETIC POLYNEUROPATHY ASSOCIATED WITH TYPE 2 DIABETES MELLITUS: ICD-10-CM

## 2018-02-19 DIAGNOSIS — E78.2 MIXED HYPERLIPIDEMIA: ICD-10-CM

## 2018-02-19 DIAGNOSIS — I65.23 BILATERAL CAROTID ARTERY STENOSIS: ICD-10-CM

## 2018-02-19 DIAGNOSIS — I65.21 INTERNAL CAROTID ARTERY OCCLUSION, RIGHT: ICD-10-CM

## 2018-02-19 DIAGNOSIS — I10 HYPERTENSION, ESSENTIAL: ICD-10-CM

## 2018-02-19 DIAGNOSIS — G47.00 INSOMNIA, UNSPECIFIED TYPE: ICD-10-CM

## 2018-02-19 PROCEDURE — 3008F BODY MASS INDEX DOCD: CPT | Mod: S$GLB,,, | Performed by: FAMILY MEDICINE

## 2018-02-19 PROCEDURE — 99214 OFFICE O/P EST MOD 30 MIN: CPT | Mod: S$GLB,,, | Performed by: FAMILY MEDICINE

## 2018-02-19 PROCEDURE — 99999 PR PBB SHADOW E&M-EST. PATIENT-LVL III: CPT | Mod: PBBFAC,,, | Performed by: FAMILY MEDICINE

## 2018-02-19 RX ORDER — VALSARTAN 160 MG/1
160 TABLET ORAL DAILY
Refills: 3 | COMMUNITY
Start: 2018-01-24 | End: 2018-04-02 | Stop reason: SDUPTHER

## 2018-02-19 RX ORDER — ATENOLOL 50 MG/1
50 TABLET ORAL DAILY
Refills: 3 | COMMUNITY
Start: 2017-12-28 | End: 2018-05-21

## 2018-02-19 NOTE — PATIENT INSTRUCTIONS
Melatonin OTC - sleep aid        Information about cholesterol, high blood pressure and healthy diet and activity recommendations can be found at the following links on the Internet.    http://www.nhlbi.nih.gov/health/health-topics/topics/hbc    http://www.nhlbi.nih.gov/health/educational/lose_wt/index.htm    Http://www.nhlbi.nih.gov/files/docs/public/heart/hbp_low.pdf    http://www.heart.org/HEARTORG/    http://diabetes.org/    https://www.cdc.gov/    https://healthfinder.gov/

## 2018-02-19 NOTE — PROGRESS NOTES
Subjective:       Patient ID: Esteban Mike is a 67 y.o. male.    Chief Complaint: General Illness    HPI  Review of Systems   Constitutional: Positive for fatigue. Negative for chills and fever.   HENT: Positive for congestion. Negative for trouble swallowing.    Eyes: Negative for redness.   Respiratory: Positive for cough. Negative for chest tightness and shortness of breath.    Cardiovascular: Negative for chest pain, palpitations and leg swelling.   Gastrointestinal: Negative for abdominal pain and blood in stool.   Genitourinary: Negative for hematuria.   Musculoskeletal: Negative for arthralgias, back pain, gait problem, joint swelling, myalgias and neck pain.   Skin: Negative for color change and rash.   Neurological: Negative for tremors, speech difficulty, weakness, numbness and headaches.   Hematological: Negative for adenopathy. Does not bruise/bleed easily.   Psychiatric/Behavioral: Positive for dysphoric mood and sleep disturbance. Negative for behavioral problems and confusion. The patient is not nervous/anxious.        Objective:      Physical Exam   Constitutional: He is oriented to person, place, and time. He appears well-developed and well-nourished. No distress.   HENT:   Head: Normocephalic.   Right Ear: Tympanic membrane, external ear and ear canal normal.   Left Ear: Tympanic membrane, external ear and ear canal normal.   Nose: Nose normal.   Mouth/Throat: Uvula is midline, oropharynx is clear and moist and mucous membranes are normal. No oropharyngeal exudate.   Eyes: Conjunctivae are normal. Right eye exhibits no discharge. Left eye exhibits no discharge. No scleral icterus.   Neck: Normal range of motion. Neck supple. No thyromegaly present.   Cardiovascular: Normal rate, regular rhythm, normal heart sounds and intact distal pulses.  Exam reveals no gallop and no friction rub.    No murmur heard.  Pulmonary/Chest: Effort normal. No respiratory distress. He has no wheezes. He has  "no rales. He exhibits no tenderness.   Abdominal: Soft. There is no tenderness.   Musculoskeletal: He exhibits no edema.   Lymphadenopathy:     He has no cervical adenopathy.   Neurological: He is alert and oriented to person, place, and time.   Skin: Skin is warm and dry. No rash noted. He is not diaphoretic.   Nursing note and vitals reviewed.      Assessment:       1. Uncontrolled type 2 diabetes mellitus with complication, without long-term current use of insulin    2. Hypertension, essential    3. Mixed hyperlipidemia    4. Insomnia, unspecified type    5. Bilateral carotid artery stenosis    6. Coronary artery disease involving autologous vein coronary bypass graft without angina pectoris    7. Diabetic polyneuropathy associated with type 2 diabetes mellitus    8. Internal carotid artery occlusion, right    9. Diabetic retinopathy associated with type 2 diabetes mellitus, macular edema presence unspecified, unspecified laterality, unspecified retinopathy severity    10. NIDIA (obstructive sleep apnea)        Plan:   Esteban was seen today for general illness.    Diagnoses and all orders for this visit:    Uncontrolled type 2 diabetes mellitus with complication, without long-term current use of insulin  -     Hemoglobin A1c; Future  -     Hemoglobin A1c    Hypertension, essential    Mixed hyperlipidemia    Insomnia, unspecified type    Bilateral carotid artery stenosis    Coronary artery disease involving autologous vein coronary bypass graft without angina pectoris    Diabetic polyneuropathy associated with type 2 diabetes mellitus    Internal carotid artery occlusion, right    Diabetic retinopathy associated with type 2 diabetes mellitus, macular edema presence unspecified, unspecified laterality, unspecified retinopathy severity  Comments:  NO records - patient is treated at "[facility]"    NIDIA (obstructive sleep apnea)  Comments:  patient reports was advised to seek sleep study from Ophthalmologist - " declines referral at this time      See meds, orders, follow up, routing and instructions sections of encounter.  A 67-year-old established male patient who was scheduled in a non-appointment   slot today for upper respiratory complaints, but he had several other issues   that he discussed including stress, anxiety, insomnia, recent visit with an   ophthalmologist who stated he had some issues with his eyes and attributes these   to stress and insomnia.  He had apparently requested a sleep study, but the   patient did not want a follow through with that.  His recent laboratory was   noted and his visit with the diabetic provider was noted.  The patient   apparently is not adhering to a significant diet and until recently was not   checking his sugars.  He does have some data in the digital system now, which we   reviewed.    RECOMMENDATIONS:  1.  Diet and exercise was discussed and encouraged follow up in a few months was   discussed.  Check A1c today.  He has no active cold symptoms.  His cough,   congestion that was present prior to this visit seems to be improving.  2.  I did review the patient's cardiovascular status, he will be due for some   followups in the coming months.    At this time, he has no chest pain, dyspnea, diaphoresis, syncope, near syncope,   palpitations or significant exercise intolerance, though he is not performing   any current aerobic activity.      SONU/HN  dd: 02/19/2018 13:14:28 (CST)  td: 02/20/2018 03:10:18 (CST)  Doc ID   #[id number]  Job ID #[id number]    CC:

## 2018-02-26 ENCOUNTER — PATIENT MESSAGE (OUTPATIENT)
Dept: INTERNAL MEDICINE | Facility: CLINIC | Age: 68
End: 2018-02-26

## 2018-03-02 ENCOUNTER — PATIENT OUTREACH (OUTPATIENT)
Dept: OTHER | Facility: OTHER | Age: 68
End: 2018-03-02

## 2018-03-02 RX ORDER — METFORMIN HYDROCHLORIDE 500 MG/1
1000 TABLET, EXTENDED RELEASE ORAL 2 TIMES DAILY WITH MEALS
Qty: 360 TABLET | Refills: 3 | Status: SHIPPED | OUTPATIENT
Start: 2018-03-02 | End: 2019-01-28 | Stop reason: SDUPTHER

## 2018-03-02 NOTE — PROGRESS NOTES
"Last 5 Patient Entered Readings                                      Current 30 Day Average: 135/73     Recent Readings 2/26/2018 2/26/2018 2/25/2018 2/25/2018 2/25/2018    SBP (mmHg) 120 - 137 - 156    DBP (mmHg) 61 - 72 - 86    Pulse 58 58 52 52 54          Last 6 Patient Entered Readings                                          Most Recent A1c: 12.2% (Goal: 8%)     Recent Readings 2/28/2018 2/27/2018 2/26/2018 2/25/2018 2/24/2018    Blood Glucose (mg/dL) 124 139 145 112 135        Digital Medicine: Health  Follow Up    Lifestyle Modifications:    1.Dietary Modifications (Sodium intake <2,000mg/day, food labels, dining out): Patient reports that he is trying to eat better, and cut back on portions.    2.Physical Activity: Mr. Mike reports that he has been "more mobile" since the weather has been warmer. He also has an elliptical that he can use.     3.Medication Therapy: Patient has been compliant with the medication regimen.    4.Patient has the following medication side effects/concerns: NA  (Frequency/Alleviating factors/Precipitating factors, etc.)     Follow up with Mr. Esteban Mike completed. Mr. Mike is doing okay. He is frustrated that [facility] has not received his lab results from [facility]. Advised patient to and request results be sent to Dr. Hartley. He also needs a refill submitted for metformin. No further questions or concerns. I will follow up in a few weeks to assess progress.      "

## 2018-03-13 ENCOUNTER — PATIENT OUTREACH (OUTPATIENT)
Dept: OTHER | Facility: OTHER | Age: 68
End: 2018-03-13

## 2018-03-13 DIAGNOSIS — E11.65 TYPE 2 DIABETES MELLITUS WITH HYPERGLYCEMIA, WITHOUT LONG-TERM CURRENT USE OF INSULIN: Primary | ICD-10-CM

## 2018-03-13 NOTE — PROGRESS NOTES
Last 6 Patient Entered Readings                                          Most Recent A1c: 12.2% (Goal: 8%)     Recent Readings 4/2/2018 4/1/2018 3/31/2018 3/30/2018 3/29/2018    Blood Glucose (mg/dL) 113 166 101 115 100        Mr. Mike has not had any more low BG readings recently. He is unsure if the low BG readings are accurate as he denies any s/s of hypoglycemia. He has still not received results of A1C from [facility] or from Dr. Hartley's office. He confirms he is taking all medications as prescribed.     Patient's health , Diana Nolasco, will be following up every 3-4 weeks. I will continue to monitor regularly and will follow up in 1-2 months, sooner if there are any concerning blood glucose readings.    Patient has my contact information and knows to call with any concerns or clinical changes.     Diabetes Medications             glipiZIDE (GLUCOTROL) 5 MG tablet Take 1 tablet (5 mg total) by mouth 2 (two) times daily with meals.    linagliptin (TRADJENTA) 5 mg Tab tablet Take 1 tablet (5 mg total) by mouth once daily.    metFORMIN (GLUCOPHAGE-XR) 500 MG 24 hr tablet Take 2 tablets (1,000 mg total) by mouth 2 (two) times daily with meals.

## 2018-03-27 ENCOUNTER — OFFICE VISIT (OUTPATIENT)
Dept: INTERNAL MEDICINE | Facility: CLINIC | Age: 68
End: 2018-03-27
Payer: COMMERCIAL

## 2018-03-27 VITALS
OXYGEN SATURATION: 98 % | BODY MASS INDEX: 29.66 KG/M2 | WEIGHT: 189 LBS | SYSTOLIC BLOOD PRESSURE: 161 MMHG | HEART RATE: 63 BPM | DIASTOLIC BLOOD PRESSURE: 60 MMHG | TEMPERATURE: 99 F | HEIGHT: 67 IN

## 2018-03-27 DIAGNOSIS — J06.9 VIRAL URI: Primary | ICD-10-CM

## 2018-03-27 PROCEDURE — 99214 OFFICE O/P EST MOD 30 MIN: CPT | Mod: S$GLB,,, | Performed by: INTERNAL MEDICINE

## 2018-03-27 PROCEDURE — 3078F DIAST BP <80 MM HG: CPT | Mod: CPTII,S$GLB,, | Performed by: INTERNAL MEDICINE

## 2018-03-27 PROCEDURE — 3077F SYST BP >= 140 MM HG: CPT | Mod: CPTII,S$GLB,, | Performed by: INTERNAL MEDICINE

## 2018-03-27 PROCEDURE — 99999 PR PBB SHADOW E&M-EST. PATIENT-LVL III: CPT | Mod: PBBFAC,,, | Performed by: INTERNAL MEDICINE

## 2018-03-27 RX ORDER — PROMETHAZINE HYDROCHLORIDE AND CODEINE PHOSPHATE 6.25; 1 MG/5ML; MG/5ML
5 SOLUTION ORAL EVERY 8 HOURS PRN
Qty: 100 ML | Refills: 0 | Status: SHIPPED | OUTPATIENT
Start: 2018-03-27 | End: 2018-04-06

## 2018-03-27 NOTE — PROGRESS NOTES
"Subjective:       Patient ID: Esteban Mike is a 67 y.o. male.    Chief Complaint: URI (pt complains of symptoms like excessive coughing, fatigue, weak & low grade fever. started on Sat.)    HPI   68 yo M here for urgent eval of coughing, fatigue, weakness and low grade fever starting 2-3 days ago. Pt of Dr. Hartley.     Coughing, sore throat, tired, aching. No fever.  Took nyquil and waldryl last night without relief. Boss was also sick. Cough with clear phlegm.   Some wheezing. No asthma, copd nor smoking.     Review of Systems   Constitutional: Positive for fatigue. Negative for fever.   HENT: Positive for sore throat.    Respiratory: Positive for cough. Negative for shortness of breath.    Cardiovascular: Negative for chest pain.   Musculoskeletal: Negative.    Skin: Negative.        Objective:   BP (!) 161/60 (BP Location: Left arm, Patient Position: Sitting, BP Method: Large (Manual))   Pulse 63   Temp 98.6 °F (37 °C) (Oral)   Ht 5' 7" (1.702 m)   Wt 85.7 kg (189 lb)   SpO2 98%   BMI 29.60 kg/m²      Physical Exam   Constitutional: He is oriented to person, place, and time. He appears well-developed and well-nourished. No distress.   HENT:   Head: Normocephalic and atraumatic.   Cardiovascular: Normal rate and regular rhythm.    No murmur heard.  Pulmonary/Chest: Effort normal. No respiratory distress. He has no wheezes. He has no rales.   Neurological: He is alert and oriented to person, place, and time.   Skin: Skin is warm and dry. He is not diaphoretic.   Psychiatric: He has a normal mood and affect. His behavior is normal.       Assessment:       1. Viral URI        Plan:       Esteban was seen today for uri.    Diagnoses and all orders for this visit:    Viral URI  -     promethazine-codeine 6.25-10 mg/5 ml (PHENERGAN WITH CODEINE) 6.25-10 mg/5 mL syrup; Take 5 mLs by mouth every 8 (eight) hours as needed for Cough. May make you drowsy   Supportive care, rest, fluids   Counseled pt that " process likely viral and that based on exam and duration of symptoms antibiotic treatment is unlikely to be helpful. Also explained to pt process of antibiotic resistance that can result from taking unnecessary antibiotics.

## 2018-03-27 NOTE — PATIENT INSTRUCTIONS
Viral Upper Respiratory Illness (Adult)  You have a viral upper respiratory illness (URI), which is another term for the common cold. This illness is contagious during the first few days. It is spread through the air by coughing and sneezing. It may also be spread by direct contact (touching the sick person and then touching your own eyes, nose, or mouth). Frequent handwashing will decrease risk of spread. Most viral illnesses go away within 7 to 10 days with rest and simple home remedies. Sometimes the illness may last for several weeks. Antibiotics will not kill a virus, and they are generally not prescribed for this condition.    Home care  · If symptoms are severe, rest at home for the first 2 to 3 days. When you resume activity, don't let yourself get too tired.  · Avoid being exposed to cigarette smoke (yours or others).  · You may use acetaminophen or ibuprofen to control pain and fever, unless another medicine was prescribed. (Note: If you have chronic liver or kidney disease, have ever had a stomach ulcer or gastrointestinal bleeding, or are taking blood-thinning medicines, talk with your healthcare provider before using these medicines.) Aspirin should never be given to anyone under 18 years of age who is ill with a viral infection or fever. It may cause severe liver or brain damage.  · Your appetite may be poor, so a light diet is fine. Avoid dehydration by drinking 6 to 8 glasses of fluids per day (water, soft drinks, juices, tea, or soup). Extra fluids will help loosen secretions in the nose and lungs.  · Over-the-counter cold medicines will not shorten the length of time youre sick, but they may be helpful for the following symptoms: cough, sore throat, and nasal and sinus congestion. (Note: Do not use decongestants if you have high blood pressure.)  Follow-up care  Follow up with your healthcare provider, or as advised.  When to seek medical advice  Call your healthcare provider right away if any  of these occur:  · Cough with lots of colored sputum (mucus)  · Severe headache; face, neck, or ear pain  · Difficulty swallowing due to throat pain  · Fever of 100.4°F (38°C)  Call 911, or get immediate medical care  Call emergency services right away if any of these occur:  · Chest pain, shortness of breath, wheezing, or difficulty breathing  · Coughing up blood  · Inability to swallow due to throat pain  Date Last Reviewed: 9/13/2015  © 1839-1646 [employer]. [address] [place] [address]. All rights reserved. This information is not intended as a substitute for professional medical care. Always follow your healthcare professional's instructions.

## 2018-03-28 ENCOUNTER — TELEPHONE (OUTPATIENT)
Dept: INTERNAL MEDICINE | Facility: CLINIC | Age: 68
End: 2018-03-28

## 2018-03-28 NOTE — TELEPHONE ENCOUNTER
Called pt no answer left voicemail requesting a call back in regards to lab results.  Pt is requesting lab results

## 2018-03-28 NOTE — TELEPHONE ENCOUNTER
----- Message from Brittany Arce sent at 3/27/2018 10:51 AM CDT -----  Contact: Patient [phone]  Patient is requesting a call about test results.    Please call and advise.    Thank You

## 2018-04-02 ENCOUNTER — PATIENT OUTREACH (OUTPATIENT)
Dept: OTHER | Facility: OTHER | Age: 68
End: 2018-04-02

## 2018-04-02 DIAGNOSIS — I10 ESSENTIAL HYPERTENSION: ICD-10-CM

## 2018-04-02 RX ORDER — VALSARTAN 320 MG/1
320 TABLET ORAL DAILY
Qty: 90 TABLET | Refills: 1 | Status: SHIPPED | OUTPATIENT
Start: 2018-04-02 | End: 2018-06-28 | Stop reason: SDUPTHER

## 2018-04-02 NOTE — PROGRESS NOTES
Last 5 Patient Entered Readings                                      Current 30 Day Average: 137/74     Recent Readings 3/27/2018 3/27/2018 3/22/2018 3/22/2018 3/21/2018    SBP (mmHg) - 151 133 - 125    DBP (mmHg) - 72 71 - 70    Pulse 58 58 62 62 58        Mr. Mike's BP is above goal. Reviewed medications with him and he was taking carvedilol and atenolol. He has also been taking valsartan 160 mg QD instead of 320 mg QD. He is unsure about the length of time he has been taking these incorrectly. He will stop carvedilol today. He will start to double up on valsartan to get 320 mg QD. Also called his local pharmacy to reconcile his medication list. They have the correct medications listed. Mr. Mike is considering a sleep study also. He will speak to Dr. Hartley about this at his next appointment.    Will continue to monitor regularly. Will follow up in 2-3 weeks, sooner if BP begins to trend upward or downward.    Patient has my contact information and knows to call with any concerns or clinical changes.     Current HTN regimen:  Hypertension Medications             atenolol (TENORMIN) 50 MG tablet Take 50 mg by mouth once daily.    hydrochlorothiazide (HYDRODIURIL) 25 MG tablet Take 1 tablet (25 mg total) by mouth once daily.    valsartan (DIOVAN) 320 MG tablet Take 1 tablet (320 mg total) by mouth once daily.

## 2018-04-10 PROBLEM — I10 ESSENTIAL HYPERTENSION: Chronic | Status: ACTIVE | Noted: 2018-04-10

## 2018-04-10 PROBLEM — E11.9 NON-INSULIN DEPENDENT TYPE 2 DIABETES MELLITUS: Chronic | Status: ACTIVE | Noted: 2018-04-10

## 2018-04-13 ENCOUNTER — PATIENT OUTREACH (OUTPATIENT)
Dept: OTHER | Facility: OTHER | Age: 68
End: 2018-04-13

## 2018-04-13 NOTE — PROGRESS NOTES
"  Last 5 Patient Entered Readings                                      Current 30 Day Average: 150/74     Recent Readings 5/3/2018 5/3/2018 5/1/2018 5/1/2018 5/1/2018    SBP (mmHg) 149 - - 138 -    DBP (mmHg) 67 - - 70 -    Pulse 68 68 51 51 53        Last 6 Patient Entered Readings                                          Most Recent A1c: 6.8% (Goal: 8%)     Recent Readings 5/3/2018 5/2/2018 5/1/2018 4/30/2018 4/29/2018    Blood Glucose (mg/dL) 135 160 111 161 140        Digital Medicine: Health  Follow Up    Lifestyle Modifications:    1.Dietary Modifications (Sodium intake <2,000mg/day, food labels, dining out): Mr. Mike states that he has been trying to eat smaller portions. He said that he is trying eat better by choosing healthier foods. However, he finds this to be a challenge when dining out. Encouraged patient to continue making healthy food swaps.     2.Physical Activity: Patient states that he tries to stay active. He hopes that he will be able to maintain a regular exercise routine after retiring. He states that now he just "doesn't have the time". Encouragement provided for patient to continue to stay active.    3.Medication Therapy: Patient has been compliant with the medication regimen.    4.Patient has the following medication side effects/concerns: None  (Frequency/Alleviating factors/Precipitating factors, etc.)     Follow up with Mr. Esteban Mike completed. Mr. Mike is doing okay. He is pleased with his recent A1C results. However, his BP average has increased. He attributes elevated readings to being sick/cough. He saw Dr. Hartley on 4/18, and said that he was told stop valsartan "for a while" to see if cough subsides. He states that his coughing is minimal. Per PharmD, Hillary Connell, advised patient to restart valsartan tonight. Patient verbalized understanding. No further questions or concerns. Will continue follow up to achieve health goals.      "

## 2018-04-16 ENCOUNTER — PATIENT OUTREACH (OUTPATIENT)
Dept: OTHER | Facility: OTHER | Age: 68
End: 2018-04-16

## 2018-04-16 NOTE — PROGRESS NOTES
Last 5 Patient Entered Readings                                      Current 30 Day Average: 136/74     Recent Readings 4/4/2018 4/4/2018 4/4/2018 4/4/2018 3/27/2018    SBP (mmHg) 142 - 160 - -    DBP (mmHg) 66 - 82 - -    Pulse 46 46 46 46 58        Patient's BP average is above goal of <130/80.     Mr. Mike is still dealing with a cough that has not resolved after antibiotics. He will check his BP today. He did see an urgent care provider today and BP was 131/82. His priority is getting better and recovering from this cough.     Will continue to monitor regularly. Will follow up in 2-3 weeks, sooner if BP begins to trend upward or downward.    Patient has my contact information and knows to call with any concerns or clinical changes.     Current HTN regimen:  Hypertension Medications             atenolol (TENORMIN) 50 MG tablet Take 50 mg by mouth once daily.    hydrochlorothiazide (HYDRODIURIL) 25 MG tablet Take 1 tablet (25 mg total) by mouth once daily.    valsartan (DIOVAN) 320 MG tablet Take 1 tablet (320 mg total) by mouth once daily.

## 2018-04-18 ENCOUNTER — TELEPHONE (OUTPATIENT)
Dept: INTERNAL MEDICINE | Facility: CLINIC | Age: 68
End: 2018-04-18

## 2018-04-18 ENCOUNTER — OFFICE VISIT (OUTPATIENT)
Dept: INTERNAL MEDICINE | Facility: CLINIC | Age: 68
End: 2018-04-18
Attending: FAMILY MEDICINE
Payer: COMMERCIAL

## 2018-04-18 ENCOUNTER — HOSPITAL ENCOUNTER (OUTPATIENT)
Dept: RADIOLOGY | Facility: HOSPITAL | Age: 68
Discharge: HOME | End: 2018-04-18
Attending: FAMILY MEDICINE
Payer: COMMERCIAL

## 2018-04-18 VITALS
DIASTOLIC BLOOD PRESSURE: 70 MMHG | HEIGHT: 67 IN | SYSTOLIC BLOOD PRESSURE: 143 MMHG | WEIGHT: 185.75 LBS | BODY MASS INDEX: 29.15 KG/M2 | HEART RATE: 64 BPM

## 2018-04-18 DIAGNOSIS — E78.5 HYPERLIPIDEMIA, UNSPECIFIED HYPERLIPIDEMIA TYPE: ICD-10-CM

## 2018-04-18 DIAGNOSIS — E11.9 NON-INSULIN DEPENDENT TYPE 2 DIABETES MELLITUS: Chronic | ICD-10-CM

## 2018-04-18 DIAGNOSIS — R05.9 COUGH: Primary | ICD-10-CM

## 2018-04-18 DIAGNOSIS — R05.9 COUGH: ICD-10-CM

## 2018-04-18 DIAGNOSIS — E11.51 TYPE 2 DIABETES, WITH PERIPHERAL CIRCULATORY DISORDER NOT AT GOAL: ICD-10-CM

## 2018-04-18 DIAGNOSIS — I10 HYPERTENSION, ESSENTIAL: ICD-10-CM

## 2018-04-18 PROBLEM — E11.65 TYPE 2 DIABETES MELLITUS WITH HYPERGLYCEMIA: Status: RESOLVED | Noted: 2017-12-19 | Resolved: 2018-04-18

## 2018-04-18 PROCEDURE — 71046 X-RAY EXAM CHEST 2 VIEWS: CPT | Mod: TC

## 2018-04-18 PROCEDURE — 3078F DIAST BP <80 MM HG: CPT | Mod: CPTII,S$GLB,, | Performed by: FAMILY MEDICINE

## 2018-04-18 PROCEDURE — 3044F HG A1C LEVEL LT 7.0%: CPT | Mod: CPTII,S$GLB,, | Performed by: FAMILY MEDICINE

## 2018-04-18 PROCEDURE — 99214 OFFICE O/P EST MOD 30 MIN: CPT | Mod: S$GLB,,, | Performed by: FAMILY MEDICINE

## 2018-04-18 PROCEDURE — 71046 X-RAY EXAM CHEST 2 VIEWS: CPT | Mod: 26,,, | Performed by: RADIOLOGY

## 2018-04-18 PROCEDURE — 3077F SYST BP >= 140 MM HG: CPT | Mod: CPTII,S$GLB,, | Performed by: FAMILY MEDICINE

## 2018-04-18 PROCEDURE — 99999 PR PBB SHADOW E&M-EST. PATIENT-LVL III: CPT | Mod: PBBFAC,,, | Performed by: FAMILY MEDICINE

## 2018-04-18 RX ORDER — PROMETHAZINE HYDROCHLORIDE AND DEXTROMETHORPHAN HYDROBROMIDE 6.25; 15 MG/5ML; MG/5ML
5 SYRUP ORAL 3 TIMES DAILY PRN
Qty: 118 ML | Refills: 0 | Status: SHIPPED | OUTPATIENT
Start: 2018-04-18 | End: 2018-04-28

## 2018-04-18 RX ORDER — BUDESONIDE AND FORMOTEROL FUMARATE DIHYDRATE 80; 4.5 UG/1; UG/1
2 AEROSOL RESPIRATORY (INHALATION) 2 TIMES DAILY
Qty: 6.9 G | Refills: 1 | Status: SHIPPED | OUTPATIENT
Start: 2018-04-18 | End: 2019-01-24 | Stop reason: ALTCHOICE

## 2018-04-18 NOTE — PROGRESS NOTES
Subjective:       Patient ID: Esteban Mike is a 67 y.o. male.    Chief Complaint: Follow-up and Sinus Problem    HPI  Review of Systems   Constitutional: Negative for chills, fatigue and fever.   HENT: Positive for congestion and postnasal drip. Negative for trouble swallowing.    Eyes: Negative for redness.   Respiratory: Positive for cough. Negative for chest tightness and shortness of breath.    Cardiovascular: Negative for chest pain, palpitations and leg swelling.   Gastrointestinal: Negative for abdominal pain and blood in stool.   Genitourinary: Negative for hematuria.   Musculoskeletal: Negative for arthralgias, back pain, gait problem, joint swelling, myalgias and neck pain.   Skin: Negative for color change and rash.   Neurological: Negative for tremors, speech difficulty, weakness, numbness and headaches.   Hematological: Negative for adenopathy. Does not bruise/bleed easily.   Psychiatric/Behavioral: Negative for behavioral problems, confusion and sleep disturbance. The patient is not nervous/anxious.        Objective:      Physical Exam   Constitutional: He is oriented to person, place, and time. He appears well-developed and well-nourished. No distress.   HENT:   Head: Normocephalic.   Right Ear: Tympanic membrane, external ear and ear canal normal.   Left Ear: Tympanic membrane, external ear and ear canal normal.   Nose: Nose normal.   Mouth/Throat: Uvula is midline, oropharynx is clear and moist and mucous membranes are normal. No oropharyngeal exudate.   Eyes: Conjunctivae are normal. Right eye exhibits no discharge. Left eye exhibits no discharge. No scleral icterus.   Neck: Normal range of motion. Neck supple. No thyromegaly present.   Cardiovascular: Normal rate, regular rhythm, normal heart sounds and intact distal pulses.  Exam reveals no gallop and no friction rub.    No murmur heard.  Pulmonary/Chest: Effort normal. No respiratory distress. He has no wheezes. He has no rales. He  exhibits no tenderness.   Abdominal: Soft. There is no tenderness.   Musculoskeletal: He exhibits no edema.   Lymphadenopathy:     He has no cervical adenopathy.   Neurological: He is alert and oriented to person, place, and time.   Skin: Skin is warm and dry. No rash noted. He is not diaphoretic.   Nursing note and vitals reviewed.      Assessment:       1. Cough    2. Non-insulin dependent type 2 diabetes mellitus    3. Type 2 diabetes, with peripheral circulatory disorder not at goal    4. Hypertension, essential    5. Hyperlipidemia, unspecified hyperlipidemia type        Plan:   Esteban was seen today for follow-up and sinus problem.    Diagnoses and all orders for this visit:    Cough  -     X-Ray Chest PA And Lateral; Future    Non-insulin dependent type 2 diabetes mellitus    Type 2 diabetes, with peripheral circulatory disorder not at goal    Hypertension, essential    Hyperlipidemia, unspecified hyperlipidemia type    Other orders  -     promethazine-dextromethorphan (PROMETHAZINE-DM) 6.25-15 mg/5 mL Syrp; Take 5 mLs by mouth 3 (three) times daily as needed.  -     budesonide-formoterol 80-4.5 mcg (SYMBICORT) 80-4.5 mcg/actuation HFAA; Inhale 2 puffs into the lungs 2 (two) times daily.      See meds, orders, follow up, routing and instructions sections of encounter.  This is a gentleman in for a followup on laboratory.  His last A1c was 6.8.  It   was run through our lab through [facility].  Reviewed his lipids, CMP, etc.  His   blood pressure was a bit elevated today.  I would like him to come back in a   month for a recheck.  No medication changes at this time.    RECOMMENDATIONS:  Diet and exercise discussed.  Laboratory reviewed.      SONU/RAGHAVENDRA  dd: 04/18/2018 15:01:35 (CDT)  td: 04/19/2018 08:35:10 (CDT)  Doc ID   #[id number]  Job ID #[id number]    CC:

## 2018-04-18 NOTE — TELEPHONE ENCOUNTER
----- Message from Keren Hi MA sent at 4/17/2018 10:00 AM CDT -----  Contact: self/[phone]      ----- Message -----  From: Tair Johnston  Sent: 4/17/2018   9:55 AM  To: [facility] Staff    .1 Patient would like to get medical advice.  Symptoms (please be specific): cough,sinus,  How long has patient had these symptoms: 3 weeks  Pharmacy name and phone#: [facility]  [place], [place] - [address] [phone] (Phone) [phone] (Fax)   Any drug allergies: no  Comments: Patient would like to get medical advice.

## 2018-05-09 ENCOUNTER — PATIENT OUTREACH (OUTPATIENT)
Dept: OTHER | Facility: OTHER | Age: 68
End: 2018-05-09

## 2018-05-09 NOTE — PROGRESS NOTES
Last 6 Patient Entered Readings                                          Most Recent A1c: 6.8% (Goal: 8%)     Recent Readings 5/8/2018 5/7/2018 5/5/2018 5/4/2018 5/3/2018    Blood Glucose (mg/dL) 116 (No Data)  170 85 135        Mr. Mike's BG readings are improved. His A1C is now at goal. He had 2 low BG readings that were errors as he had no hypoglycemic s/s.     Patient's health , Diana Nolasco, will be following up every 3-4 weeks. I will continue to monitor regularly and will follow up in 1-2 months, sooner if there are any concerning blood glucose readings.     Patient has my contact information and knows to call with any concerns or clinical changes.     Diabetes Medications             glipiZIDE (GLUCOTROL) 5 MG tablet Take 1 tablet (5 mg total) by mouth 2 (two) times daily with meals.    linagliptin (TRADJENTA) 5 mg Tab tablet Take 1 tablet (5 mg total) by mouth once daily.    metFORMIN (GLUCOPHAGE-XR) 500 MG 24 hr tablet Take 2 tablets (1,000 mg total) by mouth 2 (two) times daily with meals.

## 2018-05-09 NOTE — PROGRESS NOTES
Last 5 Patient Entered Readings                                      Current 30 Day Average: 152/76     Recent Readings 5/4/2018 5/4/2018 5/3/2018 5/3/2018 5/3/2018    SBP (mmHg) - 149 159 - 149    DBP (mmHg) - 67 78 - 67    Pulse 49 49 55 55 68        Mr. Mike's BP has been elevated over the past month. He did stop valsartan at one point because Dr. Hartley thought it was causing his cough. He has resumed it because his cough did not improve while not taking valsartan. Will monitor for now and if BP remains elevated will discuss medication changes.     Patient's BP average is above goal of <130/80.     Patient denies s/s of hypertension (SOB, CP, severe headaches, changes in vision) associated with high readings. Instructed patient to go to the ED if BP > 180/110 and accompanied by hypertensive s/s, patient confirms understanding.    Will continue to monitor regularly. Will follow up in 2-3 weeks, sooner if BP begins to trend upward or downward.    Patient has my contact information and knows to call with any concerns or clinical changes.     Current HTN regimen:  Hypertension Medications             atenolol (TENORMIN) 50 MG tablet Take 50 mg by mouth once daily.    hydrochlorothiazide (HYDRODIURIL) 25 MG tablet Take 1 tablet (25 mg total) by mouth once daily.    valsartan (DIOVAN) 320 MG tablet Take 1 tablet (320 mg total) by mouth once daily.

## 2018-05-21 ENCOUNTER — OFFICE VISIT (OUTPATIENT)
Dept: INTERNAL MEDICINE | Facility: CLINIC | Age: 68
End: 2018-05-21
Attending: FAMILY MEDICINE
Payer: COMMERCIAL

## 2018-05-21 VITALS
OXYGEN SATURATION: 98 % | DIASTOLIC BLOOD PRESSURE: 68 MMHG | SYSTOLIC BLOOD PRESSURE: 144 MMHG | WEIGHT: 188 LBS | TEMPERATURE: 98 F | HEIGHT: 67 IN | BODY MASS INDEX: 29.51 KG/M2 | HEART RATE: 64 BPM

## 2018-05-21 DIAGNOSIS — I73.9 PVD (PERIPHERAL VASCULAR DISEASE): ICD-10-CM

## 2018-05-21 DIAGNOSIS — I25.810 CORONARY ARTERY DISEASE INVOLVING AUTOLOGOUS VEIN CORONARY BYPASS GRAFT WITHOUT ANGINA PECTORIS: ICD-10-CM

## 2018-05-21 DIAGNOSIS — B35.4 TINEA CORPORIS: ICD-10-CM

## 2018-05-21 DIAGNOSIS — I10 HYPERTENSION, ESSENTIAL: Primary | ICD-10-CM

## 2018-05-21 DIAGNOSIS — E78.5 HYPERLIPIDEMIA, UNSPECIFIED HYPERLIPIDEMIA TYPE: ICD-10-CM

## 2018-05-21 DIAGNOSIS — I65.21 INTERNAL CAROTID ARTERY OCCLUSION, RIGHT: ICD-10-CM

## 2018-05-21 DIAGNOSIS — E11.51 TYPE 2 DIABETES, WITH PERIPHERAL CIRCULATORY DISORDER NOT AT GOAL: ICD-10-CM

## 2018-05-21 DIAGNOSIS — I65.23 BILATERAL CAROTID ARTERY STENOSIS: ICD-10-CM

## 2018-05-21 DIAGNOSIS — E11.9 NON-INSULIN DEPENDENT TYPE 2 DIABETES MELLITUS: Chronic | ICD-10-CM

## 2018-05-21 PROCEDURE — 3078F DIAST BP <80 MM HG: CPT | Mod: CPTII,S$GLB,, | Performed by: FAMILY MEDICINE

## 2018-05-21 PROCEDURE — 3044F HG A1C LEVEL LT 7.0%: CPT | Mod: CPTII,S$GLB,, | Performed by: FAMILY MEDICINE

## 2018-05-21 PROCEDURE — 3008F BODY MASS INDEX DOCD: CPT | Mod: CPTII,S$GLB,, | Performed by: FAMILY MEDICINE

## 2018-05-21 PROCEDURE — 99214 OFFICE O/P EST MOD 30 MIN: CPT | Mod: S$GLB,,, | Performed by: FAMILY MEDICINE

## 2018-05-21 PROCEDURE — 3077F SYST BP >= 140 MM HG: CPT | Mod: CPTII,S$GLB,, | Performed by: FAMILY MEDICINE

## 2018-05-21 PROCEDURE — 99999 PR PBB SHADOW E&M-EST. PATIENT-LVL III: CPT | Mod: PBBFAC,,, | Performed by: FAMILY MEDICINE

## 2018-05-21 RX ORDER — KETOCONAZOLE 20 MG/G
CREAM TOPICAL 2 TIMES DAILY
Qty: 60 G | Refills: 0 | Status: SHIPPED | OUTPATIENT
Start: 2018-05-21 | End: 2019-01-24 | Stop reason: ALTCHOICE

## 2018-05-21 RX ORDER — CARVEDILOL 3.12 MG/1
3.12 TABLET ORAL 2 TIMES DAILY WITH MEALS
Qty: 60 TABLET | Refills: 2 | Status: SHIPPED | OUTPATIENT
Start: 2018-05-21 | End: 2018-07-11 | Stop reason: SDUPTHER

## 2018-05-21 NOTE — PROGRESS NOTES
Subjective:       Patient ID: Esteban Mike is a 67 y.o. male.    Chief Complaint: Follow-up    HPI  Review of Systems   Constitutional: Negative for chills, fatigue and fever.   HENT: Negative for congestion and trouble swallowing.    Eyes: Negative for redness.   Respiratory: Negative for cough, chest tightness and shortness of breath.    Cardiovascular: Negative for chest pain, palpitations and leg swelling.   Gastrointestinal: Negative for abdominal pain and blood in stool.   Genitourinary: Negative for hematuria.   Musculoskeletal: Negative for arthralgias, back pain, gait problem, joint swelling, myalgias and neck pain.   Skin: Positive for rash. Negative for color change.   Neurological: Negative for tremors, speech difficulty, weakness, numbness and headaches.   Hematological: Negative for adenopathy. Does not bruise/bleed easily.   Psychiatric/Behavioral: Negative for behavioral problems, confusion and sleep disturbance. The patient is not nervous/anxious.        Objective:      Physical Exam   Constitutional: He is oriented to person, place, and time. He appears well-developed and well-nourished.   Eyes: No scleral icterus.   Neck: Normal range of motion. Neck supple. Carotid bruit is not present.   Cardiovascular: Normal rate, regular rhythm and intact distal pulses.  Exam reveals distant heart sounds. Exam reveals no gallop and no friction rub.    No murmur heard.  Pulmonary/Chest: Effort normal and breath sounds normal. No respiratory distress. He has no wheezes. He has no rales.   Abdominal: Soft. Bowel sounds are normal.   Musculoskeletal: He exhibits no edema.   Neurological: He is alert and oriented to person, place, and time. He displays no tremor. No cranial nerve deficit. Coordination and gait normal.   Skin: Skin is warm and dry. No rash noted. He is not diaphoretic. No erythema.        Psychiatric: He has a normal mood and affect. His behavior is normal. Judgment and thought content  normal.   Nursing note and vitals reviewed.      Assessment:       1. Hypertension, essential    2. Hyperlipidemia, unspecified hyperlipidemia type    3. Bilateral carotid artery stenosis    4. Coronary artery disease involving autologous vein coronary bypass graft without angina pectoris    5. Type 2 diabetes, with peripheral circulatory disorder not at goal    6. PVD (peripheral vascular disease)    7. Non-insulin dependent type 2 diabetes mellitus    8. Internal carotid artery occlusion, right    9. Tinea corporis        Plan:   Esteban was seen today for follow-up.    Diagnoses and all orders for this visit:    Hypertension, essential    Hyperlipidemia, unspecified hyperlipidemia type    Bilateral carotid artery stenosis    Coronary artery disease involving autologous vein coronary bypass graft without angina pectoris    Type 2 diabetes, with peripheral circulatory disorder not at goal    PVD (peripheral vascular disease)    Non-insulin dependent type 2 diabetes mellitus    Internal carotid artery occlusion, right    Tinea corporis    Other orders  -     carvedilol (COREG) 3.125 MG tablet; Take 1 tablet (3.125 mg total) by mouth 2 (two) times daily with meals. Stop atenolol  -     ketoconazole (NIZORAL) 2 % cream; Apply topically 2 (two) times daily.      See meds, orders, follow up, routing and instructions sections of encounter.  SUBJECTIVE:  A 67-year-old established male patient, diabetes, hypertension, and   vascular disease followup.  His blood pressure is still elevated.  His home   sugars are better with an A1c of 6.8 recently.  He states compliance with   medications and acknowledges he is due to see Cardiology in August.  He is   currently having no TIA symptoms, chest pain, dyspnea, diaphoresis, syncope,   near syncope, or palpitations.    He complained of a rash to the chest over the past week or two.  Small circular   areas that do not hurt or itch.    The patient complained that his medications were  redundant.  He had had some   changes to the digital program.  I scanned his chart.  I do not see any   redundant medication listed.  I explained to him that the current format of   filling medications electronically does not cancel old medications.    With his hypertension today, we will discontinue atenolol.  I did document that   on his new prescription that that medication would be stopped.  I asked him to   also confirm that with his pharmacy and we will begin carvedilol 3.125 mg twice   daily.    Follow up in six weeks for BP recheck and diet, exercise, activity, etc., were   discussed.      SONU/RAGHAVENDRA  dd: 05/21/2018 09:59:45 (CDT)  td: 05/21/2018 20:23:34 (CDT)  Doc ID   #[id number]  Job ID #[id number]    CC:

## 2018-05-22 ENCOUNTER — PATIENT MESSAGE (OUTPATIENT)
Dept: CARDIOLOGY | Facility: CLINIC | Age: 68
End: 2018-05-22

## 2018-05-23 ENCOUNTER — PATIENT OUTREACH (OUTPATIENT)
Dept: OTHER | Facility: OTHER | Age: 68
End: 2018-05-23

## 2018-05-23 ENCOUNTER — PATIENT MESSAGE (OUTPATIENT)
Dept: CARDIOLOGY | Facility: CLINIC | Age: 68
End: 2018-05-23

## 2018-05-23 DIAGNOSIS — E11.9 NON-INSULIN DEPENDENT TYPE 2 DIABETES MELLITUS: Primary | Chronic | ICD-10-CM

## 2018-05-23 NOTE — PROGRESS NOTES
Last 5 Patient Entered Readings                                      Current 30 Day Average: 143/73     Recent Readings 5/30/2018 5/30/2018 5/30/2018 5/30/2018 5/24/2018    SBP (mmHg) 137 - 155 - 133    DBP (mmHg) 64 - 85 - 81    Pulse 54 54 54 54 63        Mr. Mike's BP average is improving. He has not checked his BP much since last encounter. Explained that we may need to increase carvedilol, but will monitor for a few more weeks. His HR is in the 50s-60s currently. He feels well and has no questions or concerns.    Patient's BP average is above goal of <130/80.     Will continue to monitor regularly. Will follow up in 4-6 weeks, sooner if BP begins to trend upward or downward.    Patient has my contact information and knows to call with any concerns or clinical changes.     Current HTN regimen:  Hypertension Medications             carvedilol (COREG) 3.125 MG tablet Take 1 tablet (3.125 mg total) by mouth 2 (two) times daily with meals. Stop atenolol    hydrochlorothiazide (HYDRODIURIL) 25 MG tablet Take 1 tablet (25 mg total) by mouth once daily.    valsartan (DIOVAN) 320 MG tablet Take 1 tablet (320 mg total) by mouth once daily.

## 2018-05-23 NOTE — PROGRESS NOTES
Last 6 Patient Entered Readings                                          Most Recent A1c: 6.8% (Goal: 8%)     Recent Readings 5/30/2018 5/29/2018 5/28/2018 5/27/2018 5/26/2018    Blood Glucose (mg/dL) 122 (No Data)  108 117 100        Mr. Mike has had 3 low BG readings come in over the past few weeks. He feels these readings were not accurate as he did not have any s/s of hypoglycemia. Mr. Mike does not use the test solution on test strips each time he opens a new bottle. Advised he start to do this going forward to ensure test strips are give accurate BG readings. He will have to open a new bottle in about 1 week and will be sure to test the solution first. Overall, he is doing well from diabetes standpoint. No changes required to medications today.    Patient's health , Diana Nolasco, will be following up every 3-4 weeks. I will continue to monitor regularly and will follow up in 1-2 months, sooner if there are any concerning blood glucose readings. .    Patient has my contact information and knows to call with any concerns or clinical changes.     Diabetes Medications             glipiZIDE (GLUCOTROL) 5 MG tablet Take 1 tablet (5 mg total) by mouth 2 (two) times daily with meals.    linagliptin (TRADJENTA) 5 mg Tab tablet Take 1 tablet (5 mg total) by mouth once daily.    metFORMIN (GLUCOPHAGE-XR) 500 MG 24 hr tablet Take 2 tablets (1,000 mg total) by mouth 2 (two) times daily with meals.

## 2018-06-13 ENCOUNTER — PATIENT OUTREACH (OUTPATIENT)
Dept: OTHER | Facility: OTHER | Age: 68
End: 2018-06-13

## 2018-06-13 NOTE — PROGRESS NOTES
"Last 5 Patient Entered Readings                                      Current 30 Day Average: 144/75     Recent Readings 6/12/2018 6/12/2018 6/8/2018 6/8/2018 5/30/2018    SBP (mmHg) - 167 164 - 137    DBP (mmHg) - 75 87 - 64    Pulse 60 60 56 56 54          Last 6 Patient Entered Readings                                          Most Recent A1c: 6.8% (Goal: 8%)     Recent Readings 6/13/2018 6/12/2018 6/10/2018 6/9/2018 6/8/2018    Blood Glucose (mg/dL) 145 64 201 175 118        Digital Medicine: Health  Follow Up    Lifestyle Modifications:    1.Dietary Modifications (Sodium intake <2,000mg/day, food labels, dining out): Mr. Mike attributes elevated BP to "eating too much salt". However, he reports that he has lost a little weight. This morning he weighted 180. He goal is to be less than 170. He wants to try to get back to maintaining a low sodium diet before adding more medications. Encouragement provided.     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    3.Medication Therapy: Patient has been compliant with the medication regimen.    4.Patient has the following medication side effects/concerns: None  (Frequency/Alleviating factors/Precipitating factors, etc.)     Follow up with Mr. Esteban Mike completed. Mr. Mike is doing okay. Patient states that he is not sure if his low BG reading was true. This was the first reading he took after using the test solution. He also reports that he did not feel bad. He hopes to see his A1c below his current 6.8%. He will have labs drawn again in August. No further questions or concerns. Will continue follow up to achieve health goals.          "

## 2018-06-28 DIAGNOSIS — I10 ESSENTIAL HYPERTENSION: ICD-10-CM

## 2018-06-28 RX ORDER — VALSARTAN 320 MG/1
TABLET ORAL
Qty: 90 TABLET | Refills: 1 | Status: SHIPPED | OUTPATIENT
Start: 2018-06-28 | End: 2019-05-11 | Stop reason: SDUPTHER

## 2018-07-03 NOTE — TELEPHONE ENCOUNTER
Patient Comment:   -->oone of the bottles was marked with a exp. date of march 2019, but when scanned it said that it exp. on march of 2018. I am out of the [employer] strips now.   --> [facility] supply [facility] strip, I usually have someone from [facility] send these to me in the mail

## 2018-07-11 ENCOUNTER — PATIENT OUTREACH (OUTPATIENT)
Dept: OTHER | Facility: OTHER | Age: 68
End: 2018-07-11

## 2018-07-11 DIAGNOSIS — E11.51 TYPE 2 DIABETES, WITH PERIPHERAL CIRCULATORY DISORDER NOT AT GOAL: ICD-10-CM

## 2018-07-11 DIAGNOSIS — I10 HYPERTENSION, ESSENTIAL: Primary | ICD-10-CM

## 2018-07-11 RX ORDER — CARVEDILOL 6.25 MG/1
6.25 TABLET ORAL 2 TIMES DAILY WITH MEALS
Qty: 60 TABLET | Refills: 1 | Status: SHIPPED | OUTPATIENT
Start: 2018-07-11 | End: 2018-08-31

## 2018-07-11 NOTE — PROGRESS NOTES
Last 5 Patient Entered Readings                                      Current 30 Day Average: 147/70     Recent Readings 7/5/2018 7/5/2018 7/2/2018 7/2/2018 6/29/2018    SBP (mmHg) - 144 - 151 164    DBP (mmHg) - 76 - 69 77    Pulse 56 56 59 59 61        Mr. Mike's BP average is above goal. He is taking smallest dose of carvedilol. Will increase dose as tolerated for better BP control.     Patient denies s/s of hypertension (SOB, CP, severe headaches, changes in vision) associated with high readings. Instructed patient to go to the ED if BP > 180/110 and accompanied by hypertensive s/s, patient confirms understanding.    Will continue to monitor regularly. Will follow up in 2-3 weeks, sooner if BP begins to trend upward or downward.    Patient has my contact information and knows to call with any concerns or clinical changes.     Current HTN regimen:  Hypertension Medications             carvedilol (COREG) 3.125 MG tablet Take 1 tablet (3.125 mg total) by mouth 2 (two) times daily with meals. Stop atenolol    hydrochlorothiazide (HYDRODIURIL) 25 MG tablet Take 1 tablet (25 mg total) by mouth once daily.    valsartan (DIOVAN) 320 MG tablet TAKE ONE TABLET BY MOUTH DAILY FOR BLOOD PRESSURE              Last 6 Patient Entered Readings                                          Most Recent A1c: 6.8% (Goal: 8%)     Recent Readings 7/10/2018 7/9/2018 7/7/2018 7/6/2018 7/5/2018    Blood Glucose (mg/dL) 146 147 92 111 143        Mr. Granger BG readings are stable. His A1C is at goal. He has not had any recent hypoglycemic readings.     Patient's health , Diana Nolasco, will be following up every 3-4 weeks. I will continue to monitor regularly and will follow up in 1-2 months, sooner if there are any concerning blood glucose readings. .    Patient has my contact information and knows to call with any concerns or clinical changes.     Diabetes Medications             glipiZIDE (GLUCOTROL) 5 MG tablet Take 1  tablet (5 mg total) by mouth 2 (two) times daily with meals.    linagliptin (TRADJENTA) 5 mg Tab tablet Take 1 tablet (5 mg total) by mouth once daily.    metFORMIN (GLUCOPHAGE-XR) 500 MG 24 hr tablet Take 2 tablets (1,000 mg total) by mouth 2 (two) times daily with meals.

## 2018-07-16 ENCOUNTER — OFFICE VISIT (OUTPATIENT)
Dept: URGENT CARE | Facility: CLINIC | Age: 68
End: 2018-07-16
Payer: COMMERCIAL

## 2018-07-16 VITALS
SYSTOLIC BLOOD PRESSURE: 147 MMHG | OXYGEN SATURATION: 98 % | HEIGHT: 67 IN | BODY MASS INDEX: 29.51 KG/M2 | WEIGHT: 188 LBS | DIASTOLIC BLOOD PRESSURE: 71 MMHG | RESPIRATION RATE: 18 BRPM | HEART RATE: 61 BPM | TEMPERATURE: 99 F

## 2018-07-16 DIAGNOSIS — R09.82 POST-NASAL DRIP: ICD-10-CM

## 2018-07-16 DIAGNOSIS — R09.81 SINUS CONGESTION: ICD-10-CM

## 2018-07-16 DIAGNOSIS — J20.9 ACUTE BRONCHITIS, UNSPECIFIED ORGANISM: Primary | ICD-10-CM

## 2018-07-16 DIAGNOSIS — H65.03 BILATERAL ACUTE SEROUS OTITIS MEDIA, RECURRENCE NOT SPECIFIED: ICD-10-CM

## 2018-07-16 PROCEDURE — 3008F BODY MASS INDEX DOCD: CPT | Mod: CPTII,S$GLB,, | Performed by: EMERGENCY MEDICINE

## 2018-07-16 PROCEDURE — 96372 THER/PROPH/DIAG INJ SC/IM: CPT | Mod: S$GLB,,, | Performed by: EMERGENCY MEDICINE

## 2018-07-16 PROCEDURE — 99214 OFFICE O/P EST MOD 30 MIN: CPT | Mod: 25,S$GLB,, | Performed by: EMERGENCY MEDICINE

## 2018-07-16 PROCEDURE — 3078F DIAST BP <80 MM HG: CPT | Mod: CPTII,S$GLB,, | Performed by: EMERGENCY MEDICINE

## 2018-07-16 PROCEDURE — 3077F SYST BP >= 140 MM HG: CPT | Mod: CPTII,S$GLB,, | Performed by: EMERGENCY MEDICINE

## 2018-07-16 RX ORDER — BETAMETHASONE SODIUM PHOSPHATE AND BETAMETHASONE ACETATE 3; 3 MG/ML; MG/ML
6 INJECTION, SUSPENSION INTRA-ARTICULAR; INTRALESIONAL; INTRAMUSCULAR; SOFT TISSUE
Status: COMPLETED | OUTPATIENT
Start: 2018-07-16 | End: 2018-07-16

## 2018-07-16 RX ORDER — DOXYCYCLINE 100 MG/1
100 CAPSULE ORAL 2 TIMES DAILY
Qty: 20 CAPSULE | Refills: 0 | Status: SHIPPED | OUTPATIENT
Start: 2018-07-16 | End: 2018-07-26

## 2018-07-16 RX ORDER — BENZONATATE 100 MG/1
100 CAPSULE ORAL 3 TIMES DAILY PRN
Qty: 21 CAPSULE | Refills: 0 | Status: SHIPPED | OUTPATIENT
Start: 2018-07-16 | End: 2018-07-23

## 2018-07-16 RX ADMIN — BETAMETHASONE SODIUM PHOSPHATE AND BETAMETHASONE ACETATE 6 MG: 3; 3 INJECTION, SUSPENSION INTRA-ARTICULAR; INTRALESIONAL; INTRAMUSCULAR; SOFT TISSUE at 03:07

## 2018-07-16 NOTE — PATIENT INSTRUCTIONS
See bronchitis sheet  Rest and hydrate with plenty of fluids  Doxycycline Rx-antibiotic  Tessalon perles rx for cough suppressant  Mucinex DM twiice daily for cough expectorant to get mucous up and cough suppressant  1 cc celestone given in clinic  Take over the counter Coricidin HBP for congestion/sinus drip  Tylenol 650 mg every 6 hours for fever/headaches, body aches, sore throat.    Go to the er if worse  Follow up with your pcp      Bronchitis, Antibiotic Treatment (Adult)    Bronchitis is an infection of the air passages (bronchial tubes) in your lungs. It often occurs when you have a cold. This illness is contagious during the first few days and is spread through the air by coughing and sneezing, or by direct contact (touching the sick person and then touching your own eyes, nose, or mouth).  Symptoms of bronchitis include cough with mucus (phlegm) and low-grade fever. Bronchitis usually lasts 7 to 14 days. Mild cases can be treated with simple home remedies. More severe infection is treated with an antibiotic.  Home care  Follow these guidelines when caring for yourself at home:  · If your symptoms are severe, rest at home for the first 2 to 3 days. When you go back to your usual activities, don't let yourself get too tired.  · Do not smoke. Also avoid being exposed to secondhand smoke.  · You may use over-the-counter medicines to control fever or pain, unless another medicine was prescribed. (Note: If you have chronic liver or kidney disease or have ever had a stomach ulcer or gastrointestinal bleeding, talk with your healthcare provider before using these medicines. Also talk to your provider if you are taking medicine to prevent blood clots.) Aspirin should never be given to anyone younger than 18 years of age who is ill with a viral infection or fever. It may cause severe liver or brain damage.  · Your appetite may be poor, so a light diet is fine. Avoid dehydration by drinking 6 to 8 glasses of fluids  per day (such as water, soft drinks, sports drinks, juices, tea, or soup). Extra fluids will help loosen secretions in the nose and lungs.  · Over-the-counter cough, cold, and sore-throat medicines will not shorten the length of the illness, but they may be helpful to reduce symptoms. (Note: Do not use decongestants if you have high blood pressure.)  · Finish all antibiotic medicine. Do this even if you are feeling better after only a few days.  Follow-up care  Follow up with your healthcare provider, or as advised. If you had an X-ray or ECG (electrocardiogram), a specialist will review it. You will be notified of any new findings that may affect your care.  Note: If you are age 65 or older, or if you have a chronic lung disease or condition that affects your immune system, or you smoke, talk to your healthcare provider about having pneumococcal vaccinations and a yearly influenza vaccination (flu shot).  When to seek medical advice  Call your healthcare provider right away if any of these occur:  · Fever of 100.4°F (38°C) or higher  · Coughing up increased amounts of colored sputum  · Weakness, drowsiness, headache, facial pain, ear pain, or a stiff neck  Call 911, or get immediate medical care  Contact emergency services right away if any of these occur.  · Coughing up blood  · Worsening weakness, drowsiness, headache, or stiff neck  · Trouble breathing, wheezing, or pain with breathing  Date Last Reviewed: 9/13/2015  © 1149-2710 [employer]. [address] [place] [address], [place] [address]. All rights reserved. This information is not intended as a substitute for professional medical care. Always follow your healthcare professional's instructions.        When You Have a Sore Throat    A sore throat can be painful. There are many reasons why you may have a sore throat. Your healthcare provider will work with you to find the cause of your sore throat. He or she will also find the best treatment for  you.  What causes a sore throat?  Sore throats can be caused or worsened by:  · Cold or flu viruses  · Bacteria  · Irritants such as tobacco smoke or air pollution  · Acid reflux  A healthy throat  The tonsils are on the sides of the throat near the base of the tongue. They collect viruses and bacteria and help fight infection. The throat (pharynx) is the passage for air. Mucus from the nasal cavity also moves down the passage.  An inflamed throat  The tonsils and pharynx can become inflamed due to a cold or flu virus. Postnasal drip (excess mucus draining from the nasal cavity) can irritate the throat. It can also make the throat or tonsils more likely to be infected by bacteria. Severe, untreated tonsillitis in children or adults can cause a pocket of pus (abscess) to form near the tonsil.  Your evaluation  A medical evaluation can help find the cause of your sore throat. It can also help your healthcare provider choose the best treatment for you. The evaluation may include a health history, physical exam, and diagnostic tests.  Health history  Your healthcare provider may ask you the following:  · How long has the sore throat lasted and how have you been treating it?  · Do you have any other symptoms, such as body aches, fever, or cough?  · Does your sore throat recur? If so, how often? How many days of school or work have you missed because of a sore throat?  · Do you have trouble eating or swallowing?  · Have you been told that you snore or have other sleep problems?  · Do you have bad breath?  · Do you cough up bad-tasting mucus?  Physical exam  During the exam, your healthcare provider checks your ears, nose, and throat for problems. He or she also checks for swelling in the neck, and may listen to your chest.  Possible tests  Other tests your healthcare provider may perform include:  · A throat swab to check for bacteria such as streptococcus (the bacteria that causes strep throat)  · A blood test to check  "for mononucleosis (a viral infection)  · A chest X-ray to rule out pneumonia, especially if you have a cough  Treating a sore throat  Treatment depends on many factors. What is the likely cause? Is the problem recent? Does it keep coming back? In many cases, the best thing to do is to treat the symptoms, rest, and let the problem heal itself. Antibiotics may help clear up some bacterial infections. For cases of severe or recurring tonsillitis, the tonsils may need to be removed.  Relieving your symptoms  · Dont smoke, and avoid secondhand smoke.  · For children, try throat sprays or Popsicles. Adults and older children may try lozenges.  · Drink warm liquids to soothe the throat and help thin mucus. Avoid alcohol, spicy foods, and acidic drinks such as orange juice. These can irritate the throat.  · Gargle with warm saltwater (1 teaspoon of salt to 8 ounces of warm water).  · Use a humidifier to keep air moist and relieve throat dryness.  · Try over-the-counter pain relievers such as acetaminophen or ibuprofen. Use as directed, and dont exceed the recommended dose. Dont give aspirin to children.   Are antibiotics needed?  If your sore throat is due to a bacterial infection, antibiotics may speed healing and prevent complications. Although group A streptococcus ("strep throat" or GAS) is the major treatable infection for a sore throat, GAS causes only 5% to 15% of sore throats in adults who seek medical care. Most sore throats are caused by cold or flu viruses. And antibiotics dont treat viral illness. In fact, using antibiotics when theyre not needed may produce bacteria that are harder to kill. Your healthcare provider will prescribe antibiotics only if he or she thinks they are likely to help.  If antibiotics are prescribed  Take the medicine exactly as directed. Be sure to finish your prescription even if youre feeling better. And be sure to ask your healthcare provider or pharmacist what side effects are " common and what to do about them.  Is surgery needed?  In some cases, tonsils need to be removed. This is often done as outpatient (same-day) surgery. Your healthcare provider may advise removing the tonsils in cases of:  · Several severe bouts of tonsillitis in a year. Severe episodes include those that lead to missed days of school or work, or that need to be treated with antibiotics.  · Tonsillitis that causes breathing problems during sleep  · Tonsillitis caused by food particles collecting in pouches in the tonsils (cryptic tonsillitis)  Call your healthcare provider if any of the following occur:  · Symptoms worsen, or new symptoms develop.  · Swollen tonsils make breathing difficult.  · The pain is severe enough to keep you from drinking liquids.  · A skin rash, hives, or wheezing develops. Any of these could signal an allergic reaction to antibiotics.  · Symptoms dont improve within a week.  · Symptoms dont improve within 2 to 3 days of starting antibiotics.   Date Last Reviewed: 10/1/2016  © 4489-0500 [employer]. [address] [place] [address], [place] [address]. All rights reserved. This information is not intended as a substitute for professional medical care. Always follow your healthcare professional's instructions.

## 2018-07-16 NOTE — PROGRESS NOTES
"Subjective:       Patient ID: Esteban Mike is a 67 y.o. male.    Vitals:    07/16/18 1534   BP: (!) 147/71   Pulse: 61   Resp: 18   Temp: 98.6 °F (37 °C)   SpO2: 98%   Weight: 85.3 kg (188 lb)   Height: 5' 7" (1.702 m)       Chief Complaint: Sinus Problem (3 days ) and Cough    Reports 3-4 days of cough productive of green sputum, no fever, no chills, no sob, no chest pain, no lower extremity edema. Also does report some ear pressure, loss of voice, sinus drainage and post-nasal drip, with no fever, and post-nasal drip. Reports several sick contascts in people at work and they were all using the same phone.      Sinus Problem   This is a new problem. The current episode started in the past 7 days. The problem has been gradually worsening since onset. There has been no fever. His pain is at a severity of 3/10. The pain is mild. Associated symptoms include coughing, headaches, sinus pressure and sneezing. Pertinent negatives include no chills, congestion, ear pain, hoarse voice, shortness of breath or sore throat. Past treatments include nothing.     Review of Systems   Constitution: Negative for chills, fever and malaise/fatigue.   HENT: Positive for sinus pressure and sneezing. Negative for congestion, ear pain, hoarse voice and sore throat.    Eyes: Negative for discharge and redness.   Cardiovascular: Negative for chest pain, dyspnea on exertion and leg swelling.   Respiratory: Positive for cough and sputum production (green ). Negative for shortness of breath and wheezing.    Musculoskeletal: Negative for myalgias.   Gastrointestinal: Negative for abdominal pain and nausea.   Neurological: Positive for headaches.       Objective:      Physical Exam   Constitutional: He is oriented to person, place, and time. He appears well-developed and well-nourished. He is cooperative.  Non-toxic appearance. He does not appear ill. No distress.   HENT:   Head: Normocephalic and atraumatic.   Right Ear: Hearing, " tympanic membrane, external ear and ear canal normal.   Left Ear: Hearing, tympanic membrane, external ear and ear canal normal.   Nose: Nose normal. No mucosal edema, rhinorrhea or nasal deformity. No epistaxis. Right sinus exhibits no maxillary sinus tenderness and no frontal sinus tenderness. Left sinus exhibits no maxillary sinus tenderness and no frontal sinus tenderness.   Mouth/Throat: Uvula is midline, oropharynx is clear and moist and mucous membranes are normal. No trismus in the jaw. Normal dentition. No uvula swelling. No posterior oropharyngeal erythema.   Clear fluid, scant amount noted bilateral tm, no layering out, not cloudy, clear    Eyes: Conjunctivae and lids are normal. No scleral icterus.   Sclera clear bilat   Neck: Trachea normal, normal range of motion, full passive range of motion without pain and phonation normal. Neck supple.   Cardiovascular: Normal rate, regular rhythm, normal heart sounds, intact distal pulses and normal pulses.    Pulmonary/Chest: Effort normal and breath sounds normal. No respiratory distress. He has no wheezes. He has no rales. He exhibits no tenderness.   Intermittent coughing on exam   Abdominal: Soft. Normal appearance and bowel sounds are normal. There is no tenderness.   Musculoskeletal: Normal range of motion. He exhibits no edema or deformity.   Neurological: He is alert and oriented to person, place, and time. He exhibits normal muscle tone. Coordination normal.   Skin: Skin is warm, dry and intact. He is not diaphoretic. No pallor.   Psychiatric: He has a normal mood and affect. His speech is normal and behavior is normal. Cognition and memory are normal.   Nursing note and vitals reviewed.      Assessment:       1. Acute bronchitis, unspecified organism    2. Sinus congestion    3. Post-nasal drip    4. Bilateral acute serous otitis media, recurrence not specified        Plan:       Esteban was seen today for sinus problem and cough.    Diagnoses and all  orders for this visit:    Acute bronchitis, unspecified organism    Sinus congestion    Post-nasal drip    Bilateral acute serous otitis media, recurrence not specified    Other orders  -     betamethasone acetate-betamethasone sodium phosphate injection 6 mg; Inject 1 mL (6 mg total) into the muscle one time.  -     benzonatate (TESSALON PERLES) 100 MG capsule; Take 1 capsule (100 mg total) by mouth 3 (three) times daily as needed for Cough.  -     doxycycline (VIBRAMYCIN) 100 MG Cap; Take 1 capsule (100 mg total) by mouth 2 (two) times daily. for 10 days          Patient Instructions     See bronchitis sheet  Rest and hydrate with plenty of fluids  Doxycycline Rx-antibiotic  Tessalon perles rx for cough suppressant  Mucinex DM twiice daily for cough expectorant to get mucous up and cough suppressant  1 cc celestone given in clinic  Take over the counter Coricidin HBP for congestion/sinus drip  Tylenol 650 mg every 6 hours for fever/headaches, body aches, sore throat.    Go to the er if worse  Follow up with your pcp      Bronchitis, Antibiotic Treatment (Adult)    Bronchitis is an infection of the air passages (bronchial tubes) in your lungs. It often occurs when you have a cold. This illness is contagious during the first few days and is spread through the air by coughing and sneezing, or by direct contact (touching the sick person and then touching your own eyes, nose, or mouth).  Symptoms of bronchitis include cough with mucus (phlegm) and low-grade fever. Bronchitis usually lasts 7 to 14 days. Mild cases can be treated with simple home remedies. More severe infection is treated with an antibiotic.  Home care  Follow these guidelines when caring for yourself at home:  · If your symptoms are severe, rest at home for the first 2 to 3 days. When you go back to your usual activities, don't let yourself get too tired.  · Do not smoke. Also avoid being exposed to secondhand smoke.  · You may use over-the-counter  medicines to control fever or pain, unless another medicine was prescribed. (Note: If you have chronic liver or kidney disease or have ever had a stomach ulcer or gastrointestinal bleeding, talk with your healthcare provider before using these medicines. Also talk to your provider if you are taking medicine to prevent blood clots.) Aspirin should never be given to anyone younger than 18 years of age who is ill with a viral infection or fever. It may cause severe liver or brain damage.  · Your appetite may be poor, so a light diet is fine. Avoid dehydration by drinking 6 to 8 glasses of fluids per day (such as water, soft drinks, sports drinks, juices, tea, or soup). Extra fluids will help loosen secretions in the nose and lungs.  · Over-the-counter cough, cold, and sore-throat medicines will not shorten the length of the illness, but they may be helpful to reduce symptoms. (Note: Do not use decongestants if you have high blood pressure.)  · Finish all antibiotic medicine. Do this even if you are feeling better after only a few days.  Follow-up care  Follow up with your healthcare provider, or as advised. If you had an X-ray or ECG (electrocardiogram), a specialist will review it. You will be notified of any new findings that may affect your care.  Note: If you are age 65 or older, or if you have a chronic lung disease or condition that affects your immune system, or you smoke, talk to your healthcare provider about having pneumococcal vaccinations and a yearly influenza vaccination (flu shot).  When to seek medical advice  Call your healthcare provider right away if any of these occur:  · Fever of 100.4°F (38°C) or higher  · Coughing up increased amounts of colored sputum  · Weakness, drowsiness, headache, facial pain, ear pain, or a stiff neck  Call 911, or get immediate medical care  Contact emergency services right away if any of these occur.  · Coughing up blood  · Worsening weakness, drowsiness, headache, or  stiff neck  · Trouble breathing, wheezing, or pain with breathing  Date Last Reviewed: 9/13/2015  © 0381-5165 [employer]. [address] [place] [address], [place] [address]. All rights reserved. This information is not intended as a substitute for professional medical care. Always follow your healthcare professional's instructions.        When You Have a Sore Throat    A sore throat can be painful. There are many reasons why you may have a sore throat. Your healthcare provider will work with you to find the cause of your sore throat. He or she will also find the best treatment for you.  What causes a sore throat?  Sore throats can be caused or worsened by:  · Cold or flu viruses  · Bacteria  · Irritants such as tobacco smoke or air pollution  · Acid reflux  A healthy throat  The tonsils are on the sides of the throat near the base of the tongue. They collect viruses and bacteria and help fight infection. The throat (pharynx) is the passage for air. Mucus from the nasal cavity also moves down the passage.  An inflamed throat  The tonsils and pharynx can become inflamed due to a cold or flu virus. Postnasal drip (excess mucus draining from the nasal cavity) can irritate the throat. It can also make the throat or tonsils more likely to be infected by bacteria. Severe, untreated tonsillitis in children or adults can cause a pocket of pus (abscess) to form near the tonsil.  Your evaluation  A medical evaluation can help find the cause of your sore throat. It can also help your healthcare provider choose the best treatment for you. The evaluation may include a health history, physical exam, and diagnostic tests.  Health history  Your healthcare provider may ask you the following:  · How long has the sore throat lasted and how have you been treating it?  · Do you have any other symptoms, such as body aches, fever, or cough?  · Does your sore throat recur? If so, how often? How many days of school or work have you  "missed because of a sore throat?  · Do you have trouble eating or swallowing?  · Have you been told that you snore or have other sleep problems?  · Do you have bad breath?  · Do you cough up bad-tasting mucus?  Physical exam  During the exam, your healthcare provider checks your ears, nose, and throat for problems. He or she also checks for swelling in the neck, and may listen to your chest.  Possible tests  Other tests your healthcare provider may perform include:  · A throat swab to check for bacteria such as streptococcus (the bacteria that causes strep throat)  · A blood test to check for mononucleosis (a viral infection)  · A chest X-ray to rule out pneumonia, especially if you have a cough  Treating a sore throat  Treatment depends on many factors. What is the likely cause? Is the problem recent? Does it keep coming back? In many cases, the best thing to do is to treat the symptoms, rest, and let the problem heal itself. Antibiotics may help clear up some bacterial infections. For cases of severe or recurring tonsillitis, the tonsils may need to be removed.  Relieving your symptoms  · Dont smoke, and avoid secondhand smoke.  · For children, try throat sprays or Popsicles. Adults and older children may try lozenges.  · Drink warm liquids to soothe the throat and help thin mucus. Avoid alcohol, spicy foods, and acidic drinks such as orange juice. These can irritate the throat.  · Gargle with warm saltwater (1 teaspoon of salt to 8 ounces of warm water).  · Use a humidifier to keep air moist and relieve throat dryness.  · Try over-the-counter pain relievers such as acetaminophen or ibuprofen. Use as directed, and dont exceed the recommended dose. Dont give aspirin to children.   Are antibiotics needed?  If your sore throat is due to a bacterial infection, antibiotics may speed healing and prevent complications. Although group A streptococcus ("strep throat" or GAS) is the major treatable infection for a sore " throat, GAS causes only 5% to 15% of sore throats in adults who seek medical care. Most sore throats are caused by cold or flu viruses. And antibiotics dont treat viral illness. In fact, using antibiotics when theyre not needed may produce bacteria that are harder to kill. Your healthcare provider will prescribe antibiotics only if he or she thinks they are likely to help.  If antibiotics are prescribed  Take the medicine exactly as directed. Be sure to finish your prescription even if youre feeling better. And be sure to ask your healthcare provider or pharmacist what side effects are common and what to do about them.  Is surgery needed?  In some cases, tonsils need to be removed. This is often done as outpatient (same-day) surgery. Your healthcare provider may advise removing the tonsils in cases of:  · Several severe bouts of tonsillitis in a year. Severe episodes include those that lead to missed days of school or work, or that need to be treated with antibiotics.  · Tonsillitis that causes breathing problems during sleep  · Tonsillitis caused by food particles collecting in pouches in the tonsils (cryptic tonsillitis)  Call your healthcare provider if any of the following occur:  · Symptoms worsen, or new symptoms develop.  · Swollen tonsils make breathing difficult.  · The pain is severe enough to keep you from drinking liquids.  · A skin rash, hives, or wheezing develops. Any of these could signal an allergic reaction to antibiotics.  · Symptoms dont improve within a week.  · Symptoms dont improve within 2 to 3 days of starting antibiotics.   Date Last Reviewed: 10/1/2016  © 7257-4867 [employer]. [address] [place] [address], [place] [address]. All rights reserved. This information is not intended as a substitute for professional medical care. Always follow your healthcare professional's instructions.

## 2018-07-18 ENCOUNTER — PATIENT OUTREACH (OUTPATIENT)
Dept: OTHER | Facility: OTHER | Age: 68
End: 2018-07-18

## 2018-07-18 ENCOUNTER — OFFICE VISIT (OUTPATIENT)
Dept: CARDIOLOGY | Facility: CLINIC | Age: 68
End: 2018-07-18
Payer: COMMERCIAL

## 2018-07-18 VITALS
HEART RATE: 53 BPM | SYSTOLIC BLOOD PRESSURE: 159 MMHG | WEIGHT: 188.88 LBS | BODY MASS INDEX: 29.64 KG/M2 | OXYGEN SATURATION: 96 % | HEIGHT: 67 IN | DIASTOLIC BLOOD PRESSURE: 80 MMHG

## 2018-07-18 DIAGNOSIS — R07.9 CHEST PAIN, UNSPECIFIED TYPE: ICD-10-CM

## 2018-07-18 DIAGNOSIS — I25.810 CORONARY ARTERY DISEASE INVOLVING AUTOLOGOUS VEIN CORONARY BYPASS GRAFT WITHOUT ANGINA PECTORIS: Primary | ICD-10-CM

## 2018-07-18 DIAGNOSIS — I65.23 BILATERAL CAROTID ARTERY STENOSIS: ICD-10-CM

## 2018-07-18 DIAGNOSIS — I10 HYPERTENSION, ESSENTIAL: ICD-10-CM

## 2018-07-18 DIAGNOSIS — E78.2 MIXED HYPERLIPIDEMIA: ICD-10-CM

## 2018-07-18 DIAGNOSIS — K21.00 GASTROESOPHAGEAL REFLUX DISEASE WITH ESOPHAGITIS: ICD-10-CM

## 2018-07-18 DIAGNOSIS — K76.0 NAFLD (NONALCOHOLIC FATTY LIVER DISEASE): ICD-10-CM

## 2018-07-18 DIAGNOSIS — E11.51 TYPE 2 DIABETES, WITH PERIPHERAL CIRCULATORY DISORDER NOT AT GOAL: ICD-10-CM

## 2018-07-18 PROCEDURE — 99999 PR PBB SHADOW E&M-EST. PATIENT-LVL III: CPT | Mod: PBBFAC,,, | Performed by: INTERNAL MEDICINE

## 2018-07-18 PROCEDURE — 99214 OFFICE O/P EST MOD 30 MIN: CPT | Mod: S$GLB,,, | Performed by: INTERNAL MEDICINE

## 2018-07-18 PROCEDURE — 3008F BODY MASS INDEX DOCD: CPT | Mod: CPTII,S$GLB,, | Performed by: INTERNAL MEDICINE

## 2018-07-18 PROCEDURE — 3044F HG A1C LEVEL LT 7.0%: CPT | Mod: CPTII,S$GLB,, | Performed by: INTERNAL MEDICINE

## 2018-07-18 PROCEDURE — 3079F DIAST BP 80-89 MM HG: CPT | Mod: CPTII,S$GLB,, | Performed by: INTERNAL MEDICINE

## 2018-07-18 PROCEDURE — 3077F SYST BP >= 140 MM HG: CPT | Mod: CPTII,S$GLB,, | Performed by: INTERNAL MEDICINE

## 2018-07-18 NOTE — PROGRESS NOTES
Last 5 Patient Entered Readings                                      Current 30 Day Average: 144/71     Recent Readings 7/16/2018 7/16/2018 7/5/2018 7/5/2018 7/2/2018    SBP (mmHg) - 147 - 144 -    DBP (mmHg) - 80 - 76 -    Pulse 54 54 56 56 59          Last 6 Patient Entered Readings                                          Most Recent A1c: 6.8% (Goal: 8%)     Recent Readings 7/18/2018 7/17/2018 7/17/2018 7/16/2018 7/15/2018    Blood Glucose (mg/dL) 170 177 203 106 115        Digital Medicine: Health  Follow Up    Lifestyle Modifications:    1.Dietary Modifications (Sodium intake <2,000mg/day, food labels, dining out): Patient reports that he is trying to make changes to his diet by trying to eat better (smaller portions, more fresh fruits and vegetables, etc). He admits to eating too late. He will try to eat 2-3 hours before bed.     2.Physical Activity: Mr. Mike is continuing to cut grass for exercise. He would like to increase walking after retiring this year.    3.Medication Therapy: Patient has been compliant with the medication regimen. Advised patient to call pharmacy to inquire about valsartan recall. He will call back if a change is needed.     4.Patient has the following medication side effects/concerns: None  (Frequency/Alleviating factors/Precipitating factors, etc.)     Follow up with Mr. Esteban Mike completed. Mr. Mike has a cold. He went to Urgent Care on Monday. He also saw cardiology today, and will have an echo stress test next week. He just received his refill test strips last week. No further questions or concerns. Will continue follow up to achieve health goals.

## 2018-07-18 NOTE — PROGRESS NOTES
"Subjective:    Patient ID:  Esteban Mike is a 67 y.o. male who presents for follow-up of CAD    HPI     67 year old male last seen 8/7/17 is followed with CAD post CABG 2008, post right CEA 2006 and left ICA stent 2007. He is followed by [facility] medicine. He reports several months of occasional non exertional "tightness" which radiated to his jaw. He mows several yards a week with out chest pain or CHAU. A recent A1C was 7.2. He has significant GERD.   Lab Results   Component Value Date     04/11/2018    K 4.3 04/11/2018     04/11/2018    CO2 25 04/11/2018    BUN 23 04/11/2018    CREATININE 0.90 04/11/2018     (H) 04/11/2018    HGBA1C 8.2 (H) 07/20/2017    MG 2.6 05/06/2008    AST 17 04/11/2018    ALT 41 04/11/2018    ALBUMIN 4.4 04/11/2018    PROT 6.7 04/11/2018    BILITOT 0.6 04/11/2018    WBC 10.3 04/11/2018    HGB 14.2 04/11/2018    HCT 42.3 04/11/2018    MCV 90.8 04/11/2018     04/11/2018    INR 1.0 06/25/2014    PSA 0.28 05/20/2015    TSH 0.69 04/11/2018         Lab Results   Component Value Date    CHOL 145 04/11/2018    HDL 44 04/11/2018    TRIG 101 04/11/2018       Lab Results   Component Value Date    LDLCALC 82 04/11/2018       Past Medical History:   Diagnosis Date    Aortic valve disease     CAD (coronary artery disease)     Carotid artery occlusion      LICA, 70-80% JULIUS    Diabetes mellitus     Diabetes mellitus type II     Elevated LFTs     GERD (gastroesophageal reflux disease)     Hyperlipidemia     Hypertension     PVD (peripheral vascular disease)        Current Outpatient Prescriptions:     albuterol 90 mcg/actuation inhaler, Inhale 2 puffs into the lungs every 6 (six) hours as needed for Wheezing. Rescue, Disp: 18 g, Rfl: 0    aspirin 81 MG chewable tablet, Take 81 mg by mouth once daily.  , Disp: , Rfl:     atorvastatin (LIPITOR) 80 MG tablet, Take 1 tablet (80 mg total) by mouth once daily., Disp: 90 tablet, Rfl: 3    benzonatate (TESSALON " PERLES) 100 MG capsule, Take 1 capsule (100 mg total) by mouth 3 (three) times daily as needed for Cough., Disp: 21 capsule, Rfl: 0    blood sugar diagnostic Strp, To check BG 3 times daily, to use with insurance preferred meter, Disp: 200 strip, Rfl: 11    blood-glucose meter kit, To check BG 3 times daily, to use with insurance preferred meter, Disp: 1 each, Rfl: 0    carvedilol (COREG) 6.25 MG tablet, Take 1 tablet (6.25 mg total) by mouth 2 (two) times daily with meals. Stop atenolol, Disp: 60 tablet, Rfl: 1    doxycycline (VIBRAMYCIN) 100 MG Cap, Take 1 capsule (100 mg total) by mouth 2 (two) times daily. for 10 days, Disp: 20 capsule, Rfl: 0    fish oil-omega-3 fatty acids 300-1,000 mg capsule, Take 2 g by mouth once daily. 3 tablets  , Disp: , Rfl:     glipiZIDE (GLUCOTROL) 5 MG tablet, Take 1 tablet (5 mg total) by mouth 2 (two) times daily with meals., Disp: 180 tablet, Rfl: 3    hydrochlorothiazide (HYDRODIURIL) 25 MG tablet, Take 1 tablet (25 mg total) by mouth once daily., Disp: 90 tablet, Rfl: 3    ketoconazole (NIZORAL) 2 % cream, Apply topically 2 (two) times daily., Disp: 60 g, Rfl: 0    lancets Misc, To check BG 3 times daily, to use with insurance preferred meter, Disp: 200 each, Rfl: 11    linagliptin (TRADJENTA) 5 mg Tab tablet, Take 1 tablet (5 mg total) by mouth once daily., Disp: 90 tablet, Rfl: 3    metFORMIN (GLUCOPHAGE-XR) 500 MG 24 hr tablet, Take 2 tablets (1,000 mg total) by mouth 2 (two) times daily with meals., Disp: 360 tablet, Rfl: 3    MICROLET LANCET Misc, TEST UP TO FOUR TIMES A DAY, Disp: 100 each, Rfl: 11    multivitamin capsule, Take 1 capsule by mouth once daily., Disp: , Rfl:     valsartan (DIOVAN) 320 MG tablet, TAKE ONE TABLET BY MOUTH DAILY FOR BLOOD PRESSURE, Disp: 90 tablet, Rfl: 1    vit C-vit E-lutein-min-om-3 (OCUVITE) 849-56-5-150 mg-unit-mg-mg Cap, Take 1 tablet by mouth once daily., Disp: , Rfl:     budesonide-formoterol 80-4.5 mcg (SYMBICORT)  "80-4.5 mcg/actuation HFAA, Inhale 2 puffs into the lungs 2 (two) times daily., Disp: 6.9 g, Rfl: 1        Review of Systems   Cardiovascular: Positive for chest pain.   Gastrointestinal: Positive for heartburn.        Objective:BP (!) 159/80   Pulse (!) 53   Ht 5' 7" (1.702 m)   Wt 85.7 kg (188 lb 14.4 oz)   SpO2 96%   BMI 29.59 kg/m²           Physical Exam   Constitutional: He is oriented to person, place, and time. He appears well-developed and well-nourished.   HENT:   Head: Normocephalic.   Right Ear: External ear normal.   Left Ear: External ear normal.   Nose: Nose normal.   Inspection of lips, teeth and gums normal   Eyes: EOM are normal. Pupils are equal, round, and reactive to light. No scleral icterus.   Neck: Normal range of motion. Neck supple. No JVD present. No tracheal deviation present. No thyromegaly present.   Cardiovascular: Normal rate, regular rhythm and intact distal pulses.  Exam reveals no gallop and no friction rub.    No murmur heard.  Pulses:       Dorsalis pedis pulses are 2+ on the right side, and 2+ on the left side.        Posterior tibial pulses are 2+ on the right side, and 2+ on the left side.   Pulmonary/Chest: Effort normal and breath sounds normal.       Abdominal: Bowel sounds are normal. He exhibits no distension. There is no hepatosplenomegaly. There is no tenderness. There is no guarding.   Musculoskeletal: Normal range of motion. He exhibits no edema or tenderness.   Lymphadenopathy:   Palpation of neck and groin lymph nodes normal   Neurological: He is alert and oriented to person, place, and time. No cranial nerve deficit. He exhibits normal muscle tone. Coordination normal.   Skin: Skin is dry.   Palpation of skin normal   Psychiatric: His behavior is normal. Judgment and thought content normal.         Assessment:       1. Coronary artery disease involving autologous vein coronary bypass graft without angina pectoris    2. Mixed hyperlipidemia    3. Hypertension, " essential    4. Type 2 diabetes, with peripheral circulatory disorder not at goal    5. NAFLD (nonalcoholic fatty liver disease)    6. Bilateral carotid artery stenosis    7. Gastroesophageal reflux disease with esophagitis    8. Chest pain, unspecified type         Plan:       Esteban was seen today for follow-up.    Diagnoses and all orders for this visit:    Coronary artery disease involving autologous vein coronary bypass graft without angina pectoris  -     Exercise stress echo; Future  -     Lipid panel; Future; Expected date: 01/17/2019    Mixed hyperlipidemia  -     Lipid panel; Future; Expected date: 01/17/2019    Hypertension, essential  -     Comprehensive metabolic panel; Future; Expected date: 01/17/2019  -     CBC auto differential; Future; Expected date: 01/17/2019    Type 2 diabetes, with peripheral circulatory disorder not at goal  -     Comprehensive metabolic panel; Future; Expected date: 01/17/2019  -     Hemoglobin A1c; Future; Expected date: 01/17/2019    NAFLD (nonalcoholic fatty liver disease)    Bilateral carotid artery stenosis    Gastroesophageal reflux disease with esophagitis    Chest pain, unspecified type  -     Exercise stress echo; Future

## 2018-07-26 ENCOUNTER — TELEPHONE (OUTPATIENT)
Dept: CARDIOLOGY | Facility: CLINIC | Age: 68
End: 2018-07-26

## 2018-07-26 ENCOUNTER — PATIENT OUTREACH (OUTPATIENT)
Dept: OTHER | Facility: OTHER | Age: 68
End: 2018-07-26

## 2018-07-26 DIAGNOSIS — I25.709 CORONARY ARTERY DISEASE INVOLVING CORONARY BYPASS GRAFT OF NATIVE HEART WITH ANGINA PECTORIS: Primary | ICD-10-CM

## 2018-07-26 DIAGNOSIS — R94.31 ABNORMAL ECG DURING EXERCISE STRESS TEST: ICD-10-CM

## 2018-07-26 NOTE — PROGRESS NOTES
"Last 5 Patient Entered Readings                                      Current 30 Day Average: 146/72     Recent Readings 7/24/2018 7/24/2018 7/20/2018 7/20/2018 7/16/2018    SBP (mmHg) 141 - 123 - -    DBP (mmHg) 73 - 61 - -    Pulse 55 55 61 61 54        Patient's BP average is above goal of <130/80.     Mr. Mike's BP continues to fluctuate. He had an exercise stress test yesterday and will be having a cardiac PET scan due to abnormal results. He is taking doxycycline for an upper respiratory infection. Explained that this can cause elevations in BP also. Will not adjust medication doses until after PET scan.     Patient does not currently have a prescription from manufacturers affected by the recall. Will continue current medication regimen.     Will continue to monitor regularly. Will follow up in 3-4 weeks, sooner if BP begins to trend upward or downward.    Patient has my contact information and knows to call with any concerns or clinical changes.     Current HTN regimen:  Hypertension Medications             carvedilol (COREG) 6.25 MG tablet Take 1 tablet (6.25 mg total) by mouth 2 (two) times daily with meals. Stop atenolol    hydrochlorothiazide (HYDRODIURIL) 25 MG tablet Take 1 tablet (25 mg total) by mouth once daily.    valsartan (DIOVAN) 320 MG tablet TAKE ONE TABLET BY MOUTH DAILY FOR BLOOD PRESSURE                Last 6 Patient Entered Readings                                          Most Recent A1c: 6.8% (Goal: 8%)     Recent Readings 7/25/2018 7/24/2018 7/23/2018 7/23/2018 7/22/2018    Blood Glucose (mg/dL) 142 146 158 (No Data)  335        Mr. Mike had elevated BG readings over the weekend. He reports "eating something I shouldn't have and eating too late." Readings have been lower the past few days. He is entering some readings manually as [facility] glucometer are giving frequent inaccurate low BG readings.     Patient has my contact information and knows to call with any concerns or " clinical changes.     Diabetes Medications             glipiZIDE (GLUCOTROL) 5 MG tablet Take 1 tablet (5 mg total) by mouth 2 (two) times daily with meals.    linagliptin (TRADJENTA) 5 mg Tab tablet Take 1 tablet (5 mg total) by mouth once daily.    metFORMIN (GLUCOPHAGE-XR) 500 MG 24 hr tablet Take 2 tablets (1,000 mg total) by mouth 2 (two) times daily with meals.

## 2018-07-27 DIAGNOSIS — I10 ESSENTIAL HYPERTENSION: ICD-10-CM

## 2018-07-27 RX ORDER — HYDROCHLOROTHIAZIDE 25 MG/1
25 TABLET ORAL DAILY
Qty: 90 TABLET | Refills: 3 | Status: SHIPPED | OUTPATIENT
Start: 2018-07-27 | End: 2019-05-11 | Stop reason: SDUPTHER

## 2018-08-01 ENCOUNTER — OFFICE VISIT (OUTPATIENT)
Dept: INTERNAL MEDICINE | Facility: CLINIC | Age: 68
End: 2018-08-01
Attending: FAMILY MEDICINE
Payer: COMMERCIAL

## 2018-08-01 VITALS
HEART RATE: 57 BPM | DIASTOLIC BLOOD PRESSURE: 58 MMHG | BODY MASS INDEX: 29.76 KG/M2 | HEIGHT: 67 IN | SYSTOLIC BLOOD PRESSURE: 138 MMHG | WEIGHT: 189.63 LBS | OXYGEN SATURATION: 96 %

## 2018-08-01 DIAGNOSIS — I25.810 CORONARY ARTERY DISEASE INVOLVING AUTOLOGOUS VEIN CORONARY BYPASS GRAFT WITHOUT ANGINA PECTORIS: ICD-10-CM

## 2018-08-01 DIAGNOSIS — I10 HYPERTENSION, ESSENTIAL: ICD-10-CM

## 2018-08-01 DIAGNOSIS — R94.39 POSITIVE CARDIAC STRESS TEST: ICD-10-CM

## 2018-08-01 DIAGNOSIS — E11.9 NON-INSULIN DEPENDENT TYPE 2 DIABETES MELLITUS: Chronic | ICD-10-CM

## 2018-08-01 DIAGNOSIS — R05.9 COUGH: Primary | ICD-10-CM

## 2018-08-01 DIAGNOSIS — I65.23 BILATERAL CAROTID ARTERY STENOSIS: ICD-10-CM

## 2018-08-01 DIAGNOSIS — I73.9 PVD (PERIPHERAL VASCULAR DISEASE): ICD-10-CM

## 2018-08-01 PROCEDURE — 99214 OFFICE O/P EST MOD 30 MIN: CPT | Mod: S$GLB,,, | Performed by: FAMILY MEDICINE

## 2018-08-01 PROCEDURE — 3075F SYST BP GE 130 - 139MM HG: CPT | Mod: CPTII,S$GLB,, | Performed by: FAMILY MEDICINE

## 2018-08-01 PROCEDURE — 3008F BODY MASS INDEX DOCD: CPT | Mod: CPTII,S$GLB,, | Performed by: FAMILY MEDICINE

## 2018-08-01 PROCEDURE — 99999 PR PBB SHADOW E&M-EST. PATIENT-LVL IV: CPT | Mod: PBBFAC,,, | Performed by: FAMILY MEDICINE

## 2018-08-01 PROCEDURE — 3078F DIAST BP <80 MM HG: CPT | Mod: CPTII,S$GLB,, | Performed by: FAMILY MEDICINE

## 2018-08-01 PROCEDURE — 3044F HG A1C LEVEL LT 7.0%: CPT | Mod: CPTII,S$GLB,, | Performed by: FAMILY MEDICINE

## 2018-08-01 NOTE — PROGRESS NOTES
Subjective:       Patient ID: Esteban Mike is a 67 y.o. male.    Chief Complaint: Follow-up (from cold)    HPI  Review of Systems   Constitutional: Negative for chills, fatigue, fever and unexpected weight change.   HENT: Positive for postnasal drip, rhinorrhea and sore throat. Negative for congestion, ear pain and trouble swallowing.    Eyes: Negative for redness and visual disturbance.   Respiratory: Positive for cough and wheezing. Negative for chest tightness and shortness of breath.    Cardiovascular: Negative for chest pain, palpitations and leg swelling.   Gastrointestinal: Negative for abdominal pain and blood in stool.   Genitourinary: Negative for difficulty urinating and hematuria.   Musculoskeletal: Positive for myalgias. Negative for arthralgias, back pain, gait problem, joint swelling and neck pain.   Skin: Negative for color change and rash.   Allergic/Immunologic: Negative for environmental allergies.   Neurological: Negative for tremors, speech difficulty, weakness, numbness and headaches.   Hematological: Negative for adenopathy. Does not bruise/bleed easily.   Psychiatric/Behavioral: Negative for behavioral problems, confusion and sleep disturbance. The patient is not nervous/anxious.        Objective:      Physical Exam   Constitutional: He is oriented to person, place, and time. He appears well-developed and well-nourished. No distress.   HENT:   Head: Normocephalic and atraumatic.   Eyes: Conjunctivae are normal. No scleral icterus.   Neck: Normal range of motion. Neck supple. Carotid bruit is not present.   Cardiovascular: Normal rate, regular rhythm and intact distal pulses.  Exam reveals no gallop and no friction rub.    Murmur heard.  Pulmonary/Chest: Effort normal and breath sounds normal. No respiratory distress. He has no wheezes. He has no rales.   Abdominal: He exhibits no distension. There is no tenderness.   Musculoskeletal: He exhibits no edema or deformity.        Right  lower leg: He exhibits no edema.        Left lower leg: He exhibits no edema.   Neurological: He is alert and oriented to person, place, and time. He displays no tremor. No cranial nerve deficit. Coordination and gait normal.   Skin: Skin is warm and dry. No rash noted. He is not diaphoretic. No erythema.   Psychiatric: He has a normal mood and affect. His behavior is normal. Judgment and thought content normal.   Nursing note and vitals reviewed.      Assessment:       1. Cough    2. Positive cardiac stress test    3. Coronary artery disease involving autologous vein coronary bypass graft without angina pectoris    4. Bilateral carotid artery stenosis    5. Hypertension, essential    6. Non-insulin dependent type 2 diabetes mellitus    7. PVD (peripheral vascular disease)        Plan:   Esteban was seen today for follow-up.    Diagnoses and all orders for this visit:    Cough  -     Ambulatory referral to Pulmonology    Positive cardiac stress test    Coronary artery disease involving autologous vein coronary bypass graft without angina pectoris    Bilateral carotid artery stenosis    Hypertension, essential    Non-insulin dependent type 2 diabetes mellitus  -     Ambulatory referral to Optometry  -     Ambulatory consult to Podiatry    PVD (peripheral vascular disease)      See meds, orders, follow up, routing and instructions sections of encounter.  A 67-year-old established male patient.  He is in for a followup on his blood   pressure.    We discussed these issues at length.  He is currently considering [facility].    He was counseled to get  first, which is in the process of.  He is   having continued to work difficulties.  His blood pressure today is under   control.  We did discuss his carotid vascular disease and recent stress test,   which was abnormal.  I did review the clinic notes and purported stress echo   that was ordered.  He did have some ST depressions as indicated.  He is having   no  anginal symptoms at this time.    We did discuss the possibility of him having a sleep test.  He wants to defer   that for the time being.    The patient is complaining of cough, not improved with Urgent Care antibiotics,   prednisone, etc.  He also is not complaining of any dyspnea.  Symptoms times   several weeks or beyond.  He is not having any sputum production.  He denies a   recent smoking history.  He did have a chest x-ray.    RECOMMENDATIONS:  Pulmonary consult.  Continue current medications.  Keep   appointments with his cardiologist and he is due to get a PET stress scan and we   will try to get that scheduled through his cardiologist at this time.      SONU/RAGHAVENDRA  dd: 08/01/2018 11:01:40 (CDT)  td: 08/01/2018 23:51:03 (CDT)  Doc ID   #[id number]  Job ID #[id number]    CC:

## 2018-08-07 ENCOUNTER — PATIENT OUTREACH (OUTPATIENT)
Dept: OTHER | Facility: OTHER | Age: 68
End: 2018-08-07

## 2018-08-07 NOTE — PROGRESS NOTES
Last 6 Patient Entered Readings                                          Most Recent A1c: 6.8% (Goal: 8%)     Recent Readings 8/6/2018 8/3/2018 8/3/2018 8/2/2018 8/1/2018    Blood Glucose (mg/dL) 140 132 (No Data)  139 94        Mr. Mike had a BG reading of 24 on 8/3/18. This reading was an error. He had no hypoglycemic symptoms. He repeated the reading on his other glucometer and BG was 132.     Patient's health , Diana Nolasco, will be following up every 3-4 weeks. I will continue to monitor regularly and will follow up in 1-2 months, sooner if there are any concerning blood glucose readings. .    Patient has my contact information and knows to call with any concerns or clinical changes.     Diabetes Medications             glipiZIDE (GLUCOTROL) 5 MG tablet Take 1 tablet (5 mg total) by mouth 2 (two) times daily with meals.    linagliptin (TRADJENTA) 5 mg Tab tablet Take 1 tablet (5 mg total) by mouth once daily.    metFORMIN (GLUCOPHAGE-XR) 500 MG 24 hr tablet Take 2 tablets (1,000 mg total) by mouth 2 (two) times daily with meals.

## 2018-08-08 ENCOUNTER — CLINICAL SUPPORT (OUTPATIENT)
Dept: CARDIOLOGY | Facility: CLINIC | Age: 68
End: 2018-08-08
Attending: INTERNAL MEDICINE
Payer: COMMERCIAL

## 2018-08-08 DIAGNOSIS — R94.31 ABNORMAL ECG DURING EXERCISE STRESS TEST: ICD-10-CM

## 2018-08-08 DIAGNOSIS — I25.709 CORONARY ARTERY DISEASE INVOLVING CORONARY BYPASS GRAFT OF NATIVE HEART WITH ANGINA PECTORIS: ICD-10-CM

## 2018-08-08 PROCEDURE — A9555 RB82 RUBIDIUM: HCPCS | Mod: S$GLB,,, | Performed by: INTERNAL MEDICINE

## 2018-08-08 PROCEDURE — 78492 MYOCRD IMG PET MLT RST&STRS: CPT | Mod: S$GLB,,, | Performed by: INTERNAL MEDICINE

## 2018-08-08 PROCEDURE — 93015 CV STRESS TEST SUPVJ I&R: CPT | Mod: S$GLB,,, | Performed by: INTERNAL MEDICINE

## 2018-08-14 ENCOUNTER — OFFICE VISIT (OUTPATIENT)
Dept: DERMATOLOGY | Facility: CLINIC | Age: 68
End: 2018-08-14
Payer: COMMERCIAL

## 2018-08-14 VITALS — BODY MASS INDEX: 1.88 KG/M2 | WEIGHT: 12 LBS

## 2018-08-14 DIAGNOSIS — L91.8 CUTANEOUS SKIN TAGS: ICD-10-CM

## 2018-08-14 DIAGNOSIS — L81.4 LENTIGINES: ICD-10-CM

## 2018-08-14 DIAGNOSIS — L82.1 SEBORRHEIC KERATOSES: Primary | ICD-10-CM

## 2018-08-14 DIAGNOSIS — D18.00 ANGIOMA: ICD-10-CM

## 2018-08-14 PROCEDURE — 3008F BODY MASS INDEX DOCD: CPT | Mod: CPTII,S$GLB,, | Performed by: DERMATOLOGY

## 2018-08-14 PROCEDURE — 99213 OFFICE O/P EST LOW 20 MIN: CPT | Mod: S$GLB,,, | Performed by: DERMATOLOGY

## 2018-08-14 PROCEDURE — 99999 PR PBB SHADOW E&M-EST. PATIENT-LVL III: CPT | Mod: PBBFAC,,, | Performed by: DERMATOLOGY

## 2018-08-14 NOTE — PROGRESS NOTES
Subjective:       Patient ID:  Estebna Mike is a 67 y.o. male who presents for   Chief Complaint   Patient presents with    Mole     right arm     Skin Check     UBSE     History of Present Illness: The patient presents with chief complaint of spots.  Location: left arm  Duration: months  Signs/Symptoms: none    Prior treatments: none          Review of Systems   Constitutional: Negative for fever.   Skin: Negative for itching and rash.   Hematologic/Lymphatic: Does not bruise/bleed easily.        Objective:    Physical Exam   Constitutional: He appears well-developed and well-nourished. No distress.   Neurological: He is alert and oriented to person, place, and time. He is not disoriented.   Psychiatric: He has a normal mood and affect.   Skin:   Areas Examined (abnormalities noted in diagram):   Scalp / Hair Palpated and Inspected  Head / Face Inspection Performed  Neck Inspection Performed  Chest / Axilla Inspection Performed  Back Inspection Performed  RUE Inspected  LUE Inspection Performed                   Diagram Legend     Erythematous scaling macule/papule c/w actinic keratosis       Vascular papule c/w angioma      Pigmented verrucoid papule/plaque c/w seborrheic keratosis      Yellow umbilicated papule c/w sebaceous hyperplasia      Irregularly shaped tan macule c/w lentigo     1-2 mm smooth white papules consistent with Milia      Movable subcutaneous cyst with punctum c/w epidermal inclusion cyst      Subcutaneous movable cyst c/w pilar cyst      Firm pink to brown papule c/w dermatofibroma      Pedunculated fleshy papule(s) c/w skin tag(s)      Evenly pigmented macule c/w junctional nevus     Mildly variegated pigmented, slightly irregular-bordered macule c/w mildly atypical nevus      Flesh colored to evenly pigmented papule c/w intradermal nevus       Pink pearly papule/plaque c/w basal cell carcinoma      Erythematous hyperkeratotic cursted plaque c/w SCC      Surgical scar with no  "sign of skin cancer recurrence      Open and closed comedones      Inflammatory papules and pustules      Verrucoid papule consistent consistent with wart     Erythematous eczematous patches and plaques     Dystrophic onycholytic nail with subungual debris c/w onychomycosis     Umbilicated papule    Erythematous-base heme-crusted tan verrucoid plaque consistent with inflamed seborrheic keratosis     Erythematous Silvery Scaling Plaque c/w Psoriasis     See annotation      Assessment / Plan:        Seborrheic keratoses  Brochure provided      Angiomas  reassurance      Lentigines  The "ABCD" rules to observe pigmented lesions were reviewed.      Cutaneous skin tags  reassurance               Follow-up in about 1 year (around 8/14/2019).  "

## 2018-08-15 ENCOUNTER — OFFICE VISIT (OUTPATIENT)
Dept: PODIATRY | Facility: CLINIC | Age: 68
End: 2018-08-15
Attending: FAMILY MEDICINE
Payer: COMMERCIAL

## 2018-08-15 VITALS
WEIGHT: 189 LBS | BODY MASS INDEX: 29.66 KG/M2 | SYSTOLIC BLOOD PRESSURE: 131 MMHG | HEART RATE: 60 BPM | DIASTOLIC BLOOD PRESSURE: 72 MMHG | HEIGHT: 67 IN

## 2018-08-15 DIAGNOSIS — E11.9 COMPREHENSIVE DIABETIC FOOT EXAMINATION, TYPE 2 DM, ENCOUNTER FOR: ICD-10-CM

## 2018-08-15 PROCEDURE — 3078F DIAST BP <80 MM HG: CPT | Mod: CPTII,S$GLB,, | Performed by: PODIATRIST

## 2018-08-15 PROCEDURE — 99999 PR PBB SHADOW E&M-EST. PATIENT-LVL IV: CPT | Mod: PBBFAC,,, | Performed by: PODIATRIST

## 2018-08-15 PROCEDURE — 3008F BODY MASS INDEX DOCD: CPT | Mod: CPTII,S$GLB,, | Performed by: PODIATRIST

## 2018-08-15 PROCEDURE — 3044F HG A1C LEVEL LT 7.0%: CPT | Mod: CPTII,S$GLB,, | Performed by: PODIATRIST

## 2018-08-15 PROCEDURE — 3075F SYST BP GE 130 - 139MM HG: CPT | Mod: CPTII,S$GLB,, | Performed by: PODIATRIST

## 2018-08-15 PROCEDURE — 99213 OFFICE O/P EST LOW 20 MIN: CPT | Mod: S$GLB,,, | Performed by: PODIATRIST

## 2018-08-15 NOTE — LETTER
August 15, 2018      Ned Hartley MD  [address]  [place] [address]           [facility]  [address]  [facility] [address]  Phone: [phone]          Patient: Esteban Mike   MR Number: [id number]   YOB: 1950   Date of Visit: 8/15/2018       Dear Dr. Ned Hartley:    Thank you for referring Esteban Mike to me for evaluation. Attached you will find relevant portions of my assessment and plan of care.    If you have questions, please do not hesitate to call me. I look forward to following Esteban Mike along with you.    Sincerely,    Marilee Nash DPM    Enclosure  CC:  No Recipients    If you would like to receive this communication electronically, please contact [email or web address] or [phone] to request more information on [employer] access.    For providers and/or their staff who would like to refer a patient to [facility], please contact us through our one-stop-shop provider referral line, [facility], at [phone].    If you feel you have received this communication in error or would no longer like to receive these types of communications, please e-mail [email or web address]

## 2018-08-15 NOTE — PROGRESS NOTES
Subjective:      Patient ID: Esteban Mike is a 67 y.o. male.    Chief Complaint: Diabetes Mellitus (bilateral pcp Dr Jaramillo 8/1/18); Diabetic Foot Exam; and Nail Care    Esteban is a 67 y.o. male who presents to the clinic upon referral from Dr. Hartley  for evaluation and treatment of diabetic feet. Esteban has a past medical history of Aortic valve disease, CAD (coronary artery disease), Carotid artery occlusion, Diabetes mellitus, Diabetes mellitus type II, Elevated LFTs, GERD (gastroesophageal reflux disease), Hyperlipidemia, Hypertension, and PVD (peripheral vascular disease). Patient relates no major problem with feet. Doing pretty well overall and states he just retired so he is trying to get all of his medical issues under control. Here for annual DM foot exam but has not had one in about 2 years.     PCP: Ned Hartley MD    Chief Complaint   Patient presents with    Diabetes Mellitus     bilateral pcp Dr Jaramillo 8/1/18    Diabetic Foot Exam    Nail Care       Current shoe gear: Casual shoes    Hemoglobin A1C   Date Value Ref Range Status   07/20/2017 8.2 (H) 4.8 - 5.6 % Final     Comment:              Pre-diabetes: 5.7 - 6.4           Diabetes: >6.4           Glycemic control for adults with diabetes: <7.0     09/03/2016 7.6 (H) 4.5 - 6.2 % Final     Comment:     According to ADA guidelines, hemoglobin A1C <7.0% represents  optimal control in non-pregnant diabetic patients.  Different  metrics may apply to specific populations.   Standards of Medical Care in Diabetes - 2016.  For the purpose of screening for the presence of diabetes:  <5.7%     Consistent with the absence of diabetes  5.7-6.4%  Consistent with increasing risk for diabetes   (prediabetes)  >or=6.5%  Consistent with diabetes  Currently no consensus exists for use of hemoglobin A1C  for diagnosis of diabetes for children.     03/30/2016 7.4 (H) 4.5 - 6.2 % Final       Past Medical History:   Diagnosis Date    Aortic  valve disease     CAD (coronary artery disease)     Carotid artery occlusion      LICA, 70-80% JULIUS    Diabetes mellitus     Diabetes mellitus type II     Elevated LFTs     GERD (gastroesophageal reflux disease)     Hyperlipidemia     Hypertension     PVD (peripheral vascular disease)        Past Surgical History:   Procedure Laterality Date    CARDIAC CATHETERIZATION      carotid      CEA      CORONARY ARTERY BYPASS GRAFT  5/2/08    X4    HERNIA REPAIR      ica stent      TONSILLECTOMY         Family History   Problem Relation Age of Onset    Diabetes Mother     Heart disease Mother     Heart disease Father     Heart disease Maternal Grandfather     Heart disease Paternal Grandfather     Cirrhosis Paternal Grandfather     Colon cancer Neg Hx     Inflammatory bowel disease Neg Hx     Stomach cancer Neg Hx     Skin cancer Neg Hx        Social History     Socioeconomic History    Marital status:      Spouse name: None    Number of children: None    Years of education: None    Highest education level: None   Social Needs    Financial resource strain: None    Food insecurity - worry: None    Food insecurity - inability: None    Transportation needs - medical: None    Transportation needs - non-medical: None   Occupational History    Occupation: [employer]     Employer: [employer]   Tobacco Use    Smoking status: Never Smoker    Smokeless tobacco: Never Used   Substance and Sexual Activity    Alcohol use: Yes     Comment: Socially     Drug use: No    Sexual activity: Yes     Partners: Female     Birth control/protection: None   Other Topics Concern    None   Social History Narrative    [employer], 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 Rescue 18 g 0    aspirin 81 MG  chewable tablet Take 81 mg by mouth once daily.        atorvastatin (LIPITOR) 80 MG tablet Take 1 tablet (80 mg total) by mouth once daily. 90 tablet 3    blood sugar diagnostic Strp To check BG 3 times daily, to use with insurance preferred meter 200 strip 11    blood-glucose meter kit To check BG 3 times daily, to use with insurance preferred meter 1 each 0    budesonide-formoterol 80-4.5 mcg (SYMBICORT) 80-4.5 mcg/actuation HFAA Inhale 2 puffs into the lungs 2 (two) times daily. 6.9 g 1    carvedilol (COREG) 6.25 MG tablet Take 1 tablet (6.25 mg total) by mouth 2 (two) times daily with meals. Stop atenolol 60 tablet 1    fish oil-omega-3 fatty acids 300-1,000 mg capsule Take 2 g by mouth once daily. 3 tablets         glipiZIDE (GLUCOTROL) 5 MG tablet Take 1 tablet (5 mg total) by mouth 2 (two) times daily with meals. 180 tablet 3    hydroCHLOROthiazide (HYDRODIURIL) 25 MG tablet Take 1 tablet (25 mg total) by mouth once daily. 90 tablet 3    ketoconazole (NIZORAL) 2 % cream Apply topically 2 (two) times daily. 60 g 0    lancets Misc To check BG 3 times daily, to use with insurance preferred meter 200 each 11    linagliptin (TRADJENTA) 5 mg Tab tablet Take 1 tablet (5 mg total) by mouth once daily. 90 tablet 3    metFORMIN (GLUCOPHAGE-XR) 500 MG 24 hr tablet Take 2 tablets (1,000 mg total) by mouth 2 (two) times daily with meals. 360 tablet 3    MICROLET LANCET Misc TEST UP TO FOUR TIMES A  each 11    multivitamin capsule Take 1 capsule by mouth once daily.      valsartan (DIOVAN) 320 MG tablet TAKE ONE TABLET BY MOUTH DAILY FOR BLOOD PRESSURE 90 tablet 1    vit C-vit E-lutein-min-om-3 (OCUVITE) 861-15-3-150 mg-unit-mg-mg Cap Take 1 tablet by mouth once daily.       No current facility-administered medications for this visit.        Review of patient's allergies indicates:  No Known Allergies        Review of Systems   Constitution: Negative for chills, fever and malaise/fatigue.    Cardiovascular: Negative for chest pain, leg swelling, orthopnea and palpitations.   Respiratory: Negative for cough, shortness of breath and wheezing.    Skin: Positive for dry skin. Negative for color change, itching, nail changes, poor wound healing and rash.   Musculoskeletal: Negative for arthritis, gout, joint pain, joint swelling, muscle weakness and myalgias.   Neurological: Negative for disturbances in coordination, dizziness, focal weakness, numbness, paresthesias and tremors.           Objective:      Physical Exam   Constitutional: He is oriented to person, place, and time. He appears well-developed and well-nourished.   HENT:   Head: Normocephalic.   Cardiovascular: Intact distal pulses.   Pulses:       Dorsalis pedis pulses are 1+ on the right side, and 1+ on the left side.        Posterior tibial pulses are 1+ on the right side, and 1+ on the left side.   CRT < 3 sec to tips of toes. No edema noted to b/l LE.     Pulmonary/Chest: No respiratory distress.   Musculoskeletal:        Right foot: There is normal range of motion, no tenderness, no bony tenderness, no swelling and no deformity.        Left foot: There is normal range of motion, no tenderness, no bony tenderness, no swelling and no deformity.   Gastrocnemius equinus noted to b/l ankles with decreased DF noted on exam. MMT 5/5 in DF/PF/Inv/Ev resistance with no reproduction of pain in any direction. Passive range of motion of ankle and pedal joints is painless. Adequate pedal joint ROM.      Feet:   Right Foot:   Protective Sensation: 10 sites tested. 10 sites sensed.   Skin Integrity: Negative for ulcer, blister, skin breakdown, erythema, warmth, callus or dry skin.   Left Foot:   Protective Sensation: 10 sites tested. 10 sites sensed.   Skin Integrity: Negative for ulcer, blister, skin breakdown, erythema, warmth, callus or dry skin.   Neurological: He is alert and oriented to person, place, and time. He has normal strength. No sensory  deficit.   Light touch, proprioception, and sharp/dull sensation are all intact bilaterally. Protective threshold with the [place]Wienstein monofilament is intact bilaterally.     Skin: Skin is warm, dry and intact. No erythema.   No open lesions, lacerations or wounds noted. Nails are slightly dystrophic but well trimmed to R 1-5 and L 1-5. Interdigital spaces clean, dry and intact b/l. No erythema noted to b/l foot. Skin texture normal. Pedal hair normal. Skin temperature normal b/l foot.      Psychiatric: He has a normal mood and affect. His behavior is normal. Judgment and thought content normal.   Vitals reviewed.            Assessment:       Encounter Diagnoses   Name Primary?    Uncontrolled diabetes mellitus type 2 without complications, unspecified whether long term insulin use Yes    Comprehensive diabetic foot examination, type 2 DM, encounter for          Plan:       Esteban was seen today for diabetes mellitus, diabetic foot exam and nail care.    Diagnoses and all orders for this visit:    Uncontrolled diabetes mellitus type 2 without complications, unspecified whether long term insulin use    Comprehensive diabetic foot examination, type 2 DM, encounter for      I counseled the patient on his conditions, their implications and medical management.      - Shoe inspection. Diabetic Foot Education. Patient reminded of the importance of good nutrition and blood sugar control to help prevent podiatric complications of diabetes. Patient instructed on proper foot hygeine. We discussed wearing proper shoe gear, daily foot inspections, never walking without protective shoe gear, caution putting sharp instruments to feet     - Discussed DM foot care:  Wear comfortable, proper fitting shoes. Wash feet daily. Dry well. After drying, apply moisturizer to feet (no lotion to webspaces). Inspect feet daily for skin breaks, blisters, swelling, or redness. Wear cotton socks (preferably white)  Change socks every day.  Do NOT walk barefoot. Do NOT use heating pads or warm/hot water soaks     Discussed regular and routine moisturizer to skin of both feet to help improve dry skin. Advised to apply twice daily until resolution of symptoms. Avoid between toes.     Long discussion with patient regarding appropriate, supportive and comfortable shoes. Recommended supportive athletic style shoes with adequate arch supports to alleviate abnormal pressure and improve stability of foot while walking. Avoid flat shoes and barefoot walking as these will exacerbate or worsen symptoms.     RTC 1 year, sooner PRN

## 2018-08-16 ENCOUNTER — PATIENT OUTREACH (OUTPATIENT)
Dept: OTHER | Facility: OTHER | Age: 68
End: 2018-08-16

## 2018-08-16 NOTE — PROGRESS NOTES
"Last 5 Patient Entered Readings                                      Current 30 Day Average: 139/71     Recent Readings 8/16/2018 8/16/2018 8/16/2018 8/16/2018 8/13/2018    SBP (mmHg) 139 - 172 - 147    DBP (mmHg) 72 - 85 - 77    Pulse 51 51 50 50 54          Last 6 Patient Entered Readings                                          Most Recent A1c: 6.8% (Goal: 8%)     Recent Readings 8/16/2018 8/15/2018 8/14/2018 8/14/2018 8/13/2018    Blood Glucose (mg/dL) 134 134 180 210 150        Digital Medicine: Health  Follow Up    Lifestyle Modifications:    1.Dietary Modifications (Sodium intake <2,000mg/day, food labels, dining out): Patient denies changes to his diet. Encouraged sodium restriction and CHO intake for better BP/BG control.     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 Encouraged patient to continue to stay active.     3.Medication Therapy: Patient has been compliant with the medication regimen.    4.Patient has the following medication side effects/concerns: Patient continues to have a cough. He now feels that it may be a side effect of one of his DM medications (tradjunta). He plans to discuss with Dr. Hartley.   (Frequency/Alleviating factors/Precipitating factors, etc.)     Follow up with Mr. Esteban Mike completed. Mr. Mike is doing okay. He saw podiatry yesterday and states that everything went well. He will have an eye exam at the end of the month, and is due for A1c. Patient states that he prefers to use [facility] because he will not have a copay. Advised patient to inquire about standing order at [facility]. Patient verbalized understanding. Mr. Mike mentioned that he was asked about NIDIA, but he is afraid to take the test. He fears that he will not be able to sleep if he has to use a machine. However, if its "better for my health", he may consider it. Mr. Mike also states that he is not sure if his ealth glucometer is always accurate. If he " feels that his BG is too low or too high, he will recheck using his other meter. He is also entering these readings manually. No further questions or concerns. Will continue follow up to achieve health goals.

## 2018-08-28 ENCOUNTER — OFFICE VISIT (OUTPATIENT)
Dept: OPTOMETRY | Facility: CLINIC | Age: 68
End: 2018-08-28
Attending: FAMILY MEDICINE
Payer: COMMERCIAL

## 2018-08-28 DIAGNOSIS — H25.13 NUCLEAR SCLEROTIC CATARACT OF BOTH EYES: ICD-10-CM

## 2018-08-28 DIAGNOSIS — E11.9 TYPE 2 DIABETES MELLITUS WITHOUT RETINOPATHY: Primary | ICD-10-CM

## 2018-08-28 DIAGNOSIS — Z79.84 LONG TERM CURRENT USE OF ORAL HYPOGLYCEMIC DRUG: ICD-10-CM

## 2018-08-28 DIAGNOSIS — Z98.890 HISTORY OF REPAIR OF RETINAL DEFECT BY LASER PHOTOCOAGULATION: ICD-10-CM

## 2018-08-28 PROCEDURE — 92004 COMPRE OPH EXAM NEW PT 1/>: CPT | Mod: S$GLB,,, | Performed by: OPTOMETRIST

## 2018-08-28 PROCEDURE — 99999 PR PBB SHADOW E&M-EST. PATIENT-LVL III: CPT | Mod: PBBFAC,,, | Performed by: OPTOMETRIST

## 2018-08-28 NOTE — PATIENT INSTRUCTIONS
FLASHES AND FLOATERS    You've noticed something new in your field of vision, possibly one or more movable spots or squiggles that you can't remember seeing before. Here are some points to follow and some information about this condition:    Located in the hollow interior of the eye is a transparent gel called the VITREOUS. It is composed of microscopic fibers, large molecules, and fluid-like water.  Most people also have small pieces of material suspended within the vitreous, which under ideal lighting conditions will cast a shadow on the retina and become visible as moving spots, which are called vitreous floaters. Floaters become more common with age.    During life the vitreous fibers condense together and the gel becomes more fluid. Fibrous clumps can form which may become large enough to be seen as floaters. As the fibers condense the liquid portion escapes, allowing the vitreous to shrink, separate from the retina, and collapse forward in the eye. While shrinking, the vitreous gel can pull against the retina causing episodes of light flashes or arcs of light.  The retina does not have pain sensors, and its only response is flashes of light. People often describe these flashes as lightning streaks or fireworks.     A concern is that traction on the retina from the shrinking vitreous may pull a tear in the retina, which could evolve into a retinal detachment.  Therefore persistent flashes do require urgent evaluation. Other symptoms that require urgent evaluation are a large shadow, curtain, or blank spot in your vision, or a sudden shower of dozens or hundreds of tiny floaters resembling pepper or soot.    To summarize, nearly everyone has floaters, but a sudden shower of dots, persistent light flashes in one eye, or a curtain or shadow in your vision require urgent consultation.  If these occur, please let us know immediately.     ==============================================    CATARACT    Symptoms and  Signs:  A cataract starts out small, and at first has little effect on your vision. You may notice that your vision is blurred a little, like looking through a cloudy piece of glass or viewing an impressionist painting. A cataract may make light from the sun or a lamp seem too bright or glaring. Or you may notice when you drive at night that the oncoming headlights cause more glare than before. Colors may not appear as bright as they once did.  The type of cataract you have will affect exactly which symptoms you experience and how soon they will occur. When a nuclear cataract first develops it can bring about a temporary improvement in your near vision, called second sight. Unfortunately, the improved vision is short-lived and will disappear as the cataract worsens. Meanwhile, a sub-capsular cataract may not produce any symptoms until it's well-developed.    Causes:  No one knows for sure why the eye's lens changes as we age, forming cataracts. Researchers are gradually identifying factors that may cause cataracts - and information that may help to prevent them.  Many studies suggest that exposure to ultraviolet light is associated with cataracts, so eye care practitioners recommend wearing sunglasses and a wide-brimmed hat to lessen your exposure.  Other studies suggest people with diabetes are at risk for developing a cataract.   Some eye care practitioners believe that a diet high in antioxidants, such as beta-carotene (vitamin A), selenium and vitamins C and E, may forestall cataracts.  The most important of these is probably vitamin C; it might be helpful to supplement the diet with an extra Vitamin C tablet.  Meanwhile, eating a lot of salt may increase your risk.  Other risk factors include cigarette smoke, air pollution and heavy alcohol consumption.  We simply recommend that you be careful to use sunglasses and to take Vitamin C.    Treatment:  When symptoms begin to appear, we can improve your vision for a  while using new glasses, strong bifocals, magnification, appropriate lighting or other visual aids.  This is true in your case; your cataract does not impact your vision very much at this time. If you experience any of the symptoms we described you can return at any time. Otherwise it is fine to see you in 1 year.

## 2018-08-28 NOTE — LETTER
August 29, 2018      Ned Hartley MD  [address]  [facility] [address]           [facility]  [address]  [facility] [address]  Phone: [phone]          Patient: Esteban Mike   MR Number: [id number]   YOB: 1950   Date of Visit: 8/28/2018       Dear Dr. Ned Hartley:    Thank you for referring Esteban Mike to me for evaluation. Attached you will find relevant portions of my assessment and plan of care.    If you have questions, please do not hesitate to call me. I look forward to following Esteban Mike along with you.    Sincerely,    Brandy Lagunas, OD    Enclosure  CC:  No Recipients    If you would like to receive this communication electronically, please contact [email or web address] or [phone] to request more information on [facility] access.    For providers and/or their staff who would like to refer a patient to [facility], please contact us through our one-stop-shop provider referral line, [facility], at [phone].    If you feel you have received this communication in error or would no longer like to receive these types of communications, please e-mail [email or web address]

## 2018-08-29 ENCOUNTER — TELEPHONE (OUTPATIENT)
Dept: OPTOMETRY | Facility: CLINIC | Age: 68
End: 2018-08-29

## 2018-08-29 PROBLEM — E11.319 DIABETIC RETINOPATHY ASSOCIATED WITH TYPE 2 DIABETES MELLITUS: Status: RESOLVED | Noted: 2018-02-19 | Resolved: 2018-08-29

## 2018-08-29 NOTE — PROGRESS NOTES
"HPI     Mr. Esteban Mike was referred by Ned John MD for a   diabetic eye exam.  He is transferring eye care from the [facility] to [facility].    No visual/ocular concerns. He reports clear distance vision without   correction and clear near vision with +3.25 OTC readers. He declines   refraction.    Pt says he started seeing the [facility] a few years ago, initially   for floaters. He has been followed-up about every 6 months with regular   fluorescein angiography. Pt is not sure what he was being treated/followed   for, but says he was told that his retinal vessels were "stressed." He   also says he has had retinal laser OD for "thin areas" about 1-2 years   ago, cataract OD, and h/o corneal ulcer OS about 15 years ago. He also   reports history of 100% occlusion of right carotid artery around November 2006 and left carotid stent in 2007.     (-)drops  (-)flashes  (+)floaters x 2 years, unsure which eye  (-)diplopia    Diabetic yes  Hemoglobin A1C       Date                     Value               Ref Range             Status           07/20/2017               8.2 (H)             4.8 - 5.6 %         Final  09/03/2016               7.6 (H)             4.5 - 6.2 %         Final  03/30/2016               7.4 (H)             4.5 - 6.2 %         Final    HTN: yes  BP Readings from Last 3 Encounters:  08/15/18 : 131/72  08/01/18 : (!) 138/58  07/18/18 : (!) 159/80    OCULAR HISTORY (pt-reported)  Last Eye Exam: about 2-3 weeks ago at [facility] (seen about q6   months with regular FAs)  Retinal laser for "thin areas" OD about 1-2 years ago  Cataract OD  "Stressed" retinal vessels OU  H/o corneal ulcer OS about 15 yrs ago    FAMILY HISTORY  (-)Glaucoma none         Last edited by Brandy Lagunas, OD on 8/28/2018  2:10 PM. (History)            Assessment /Plan     For exam results, see Encounter Report.    Type 2 diabetes mellitus without retinopathy  Long term " current use of oral hypoglycemic drug   No retinopathy noted OU. Monitor with yearly DFE.     Nuclear sclerotic cataract of both eyes   Mild visual significance but asymptomatic. Monitor.    History of repair of retinal defect by laser photocoagulation   Possibly for lattice degeneration. Records release form signed and faxed to the [facility]. After review of outside records, will call pt if f/u schedule changes; otherwise RTC 6 months as discussed.        RTC 6 months (pending review of outside records)    ==============================================    ADDENDUM 9/6/2018   Records from [facility] received and reviewed. Pt was seen at [facility] on 04/04/17 and on 08/08/18. 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 Garrido, next f/u recommended in 6 months with [facility] OCT and FA.

## 2018-08-31 ENCOUNTER — PATIENT OUTREACH (OUTPATIENT)
Dept: OTHER | Facility: OTHER | Age: 68
End: 2018-08-31

## 2018-08-31 DIAGNOSIS — I10 HYPERTENSION, ESSENTIAL: ICD-10-CM

## 2018-08-31 RX ORDER — CARVEDILOL 3.12 MG/1
3.12 TABLET ORAL 2 TIMES DAILY WITH MEALS
Qty: 60 TABLET | Refills: 1 | Status: SHIPPED | OUTPATIENT
Start: 2018-08-31 | End: 2018-09-27 | Stop reason: SDUPTHER

## 2018-08-31 NOTE — PROGRESS NOTES
Last 5 Patient Entered Readings                                      Current 30 Day Average: 142/73     Recent Readings 8/29/2018 8/29/2018 8/28/2018 8/28/2018 8/16/2018    SBP (mmHg) - 145 - 145 139    DBP (mmHg) - 69 - 75 72    Pulse 45 45 52 52 51        Patient's BP average is above goal of <130/80.     Mr. Mike's BP remains elevated on current regimen. His HR average is 52 on current dose of carvedilol, will reduce dose of 3.125 mg BID going forward. Will also change HCTZ to chlorthalidone once he runs out of HCTZ.     Will continue to monitor regularly. Will follow up in 3-4 weeks, sooner if BP begins to trend upward or downward.    Patient has my contact information and knows to call with any concerns or clinical changes.     Current HTN regimen:  Hypertension Medications             carvedilol (COREG) 6.25 MG tablet Take 1 tablet (6.25 mg total) by mouth 2 (two) times daily with meals. Stop atenolol    hydroCHLOROthiazide (HYDRODIURIL) 25 MG tablet Take 1 tablet (25 mg total) by mouth once daily.    valsartan (DIOVAN) 320 MG tablet TAKE ONE TABLET BY MOUTH DAILY FOR BLOOD PRESSURE                Last 6 Patient Entered Readings                                          Most Recent A1c: 6.8% on 4/11/2018  (Goal: 8%)     Recent Readings 8/31/2018 8/27/2018 8/22/2018 8/21/2018 8/20/2018    Blood Glucose (mg/dL) 224 180 157 125 128        Mr. Mike ate a biscuit with strawberry jelly, blueberry muffin, and coffee then checked his BG. Explained that he should limit carbohydrate intake and balance it with protein to prevent BG spikes. He has also not taken Tradjenta in a few weeks because he thought his cough may be related to it. He still has the cough, so he will resume taking Tradjenta.     Patient's health , Diana Nolasco, will be following up every 3-4 weeks. I will continue to monitor regularly and will follow up in 1-2 months, sooner if there are any concerning blood glucose readings.      Patient has my contact information and knows to call with any concerns or clinical changes.     Diabetes Medications             glipiZIDE (GLUCOTROL) 5 MG tablet Take 1 tablet (5 mg total) by mouth 2 (two) times daily with meals.    linagliptin (TRADJENTA) 5 mg Tab tablet Take 1 tablet (5 mg total) by mouth once daily.    metFORMIN (GLUCOPHAGE-XR) 500 MG 24 hr tablet Take 2 tablets (1,000 mg total) by mouth 2 (two) times daily with meals.

## 2018-09-04 ENCOUNTER — TELEPHONE (OUTPATIENT)
Dept: SLEEP MEDICINE | Facility: CLINIC | Age: 68
End: 2018-09-04

## 2018-09-04 DIAGNOSIS — R06.02 SOB (SHORTNESS OF BREATH): Primary | ICD-10-CM

## 2018-09-04 DIAGNOSIS — R05.9 COUGH: ICD-10-CM

## 2018-09-05 ENCOUNTER — HOSPITAL ENCOUNTER (OUTPATIENT)
Dept: PULMONOLOGY | Facility: CLINIC | Age: 68
Discharge: HOME | End: 2018-09-05
Payer: COMMERCIAL

## 2018-09-05 ENCOUNTER — HOSPITAL ENCOUNTER (OUTPATIENT)
Dept: RADIOLOGY | Facility: HOSPITAL | Age: 68
Discharge: HOME | End: 2018-09-05
Attending: INTERNAL MEDICINE
Payer: COMMERCIAL

## 2018-09-05 ENCOUNTER — OFFICE VISIT (OUTPATIENT)
Dept: PULMONOLOGY | Facility: CLINIC | Age: 68
End: 2018-09-05
Payer: COMMERCIAL

## 2018-09-05 VITALS
OXYGEN SATURATION: 96 % | DIASTOLIC BLOOD PRESSURE: 72 MMHG | WEIGHT: 187 LBS | HEART RATE: 58 BPM | BODY MASS INDEX: 29.35 KG/M2 | HEIGHT: 67 IN | SYSTOLIC BLOOD PRESSURE: 140 MMHG

## 2018-09-05 DIAGNOSIS — R06.02 SOB (SHORTNESS OF BREATH): ICD-10-CM

## 2018-09-05 DIAGNOSIS — R05.9 COUGH: ICD-10-CM

## 2018-09-05 LAB
POST FEV1 FVC: 0.81
POST FEV1: 2.54
POST FVC: 3.12
PRE FEV1 FVC: 82
PRE FEV1: 2.68
PRE FVC: 3.27
PREDICTED FEV1 FVC: 80
PREDICTED FEV1: 2.89
PREDICTED FVC: 3.6

## 2018-09-05 PROCEDURE — 3077F SYST BP >= 140 MM HG: CPT | Mod: CPTII,S$GLB,, | Performed by: INTERNAL MEDICINE

## 2018-09-05 PROCEDURE — 71046 X-RAY EXAM CHEST 2 VIEWS: CPT | Mod: TC,FY

## 2018-09-05 PROCEDURE — 71046 X-RAY EXAM CHEST 2 VIEWS: CPT | Mod: 26,,, | Performed by: RADIOLOGY

## 2018-09-05 PROCEDURE — 94060 EVALUATION OF WHEEZING: CPT | Mod: S$GLB,,, | Performed by: INTERNAL MEDICINE

## 2018-09-05 PROCEDURE — 3078F DIAST BP <80 MM HG: CPT | Mod: CPTII,S$GLB,, | Performed by: INTERNAL MEDICINE

## 2018-09-05 PROCEDURE — 1101F PT FALLS ASSESS-DOCD LE1/YR: CPT | Mod: CPTII,S$GLB,, | Performed by: INTERNAL MEDICINE

## 2018-09-05 PROCEDURE — 94729 DIFFUSING CAPACITY: CPT | Mod: S$GLB,,, | Performed by: INTERNAL MEDICINE

## 2018-09-05 PROCEDURE — 99999 PR PBB SHADOW E&M-EST. PATIENT-LVL IV: CPT | Mod: PBBFAC,,, | Performed by: INTERNAL MEDICINE

## 2018-09-05 PROCEDURE — 99203 OFFICE O/P NEW LOW 30 MIN: CPT | Mod: 25,S$GLB,, | Performed by: INTERNAL MEDICINE

## 2018-09-05 NOTE — PATIENT INSTRUCTIONS
· No need to use the inhalers that were previously prescribed.  · Use fluticasone nasal spray (over the counter) 1 spray to each nostril daily.

## 2018-09-05 NOTE — LETTER
September 10, 2018      Ned Hartley MD  [address]  [place] [address]           [facility] - Pulmonary Services  [address]  [facility] [address]  Phone: [phone]          Patient: Esteban Mike   MR Number: [id number]   YOB: 1950   Date of Visit: 9/5/2018       Dear Dr. Ned Hartley:    Thank you for referring Esteban Mike to me for evaluation. Attached you will find relevant portions of my assessment and plan of care.    If you have questions, please do not hesitate to call me. I look forward to following Esteban Mike along with you.    Sincerely,    Jerry Styles MD    Enclosure  CC:  No Recipients    If you would like to receive this communication electronically, please contact [email or web address] or [phone] to request more information on [facility] access.    For providers and/or their staff who would like to refer a patient to [facility], please contact us through our one-stop-shop provider referral line, [facility], at [phone].    If you feel you have received this communication in error or would no longer like to receive these types of communications, please e-mail [email or web address]

## 2018-09-05 NOTE — PROGRESS NOTES
Subjective:       Patient ID: Esteban Mike is a 68 y.o. male.    Chief Complaint: Cough    HPI     Mr. Mike is lifelong non-smoker who comes to Pulmonary Clinic for evaluation of non-productive cough that started in mid-July with cold symptoms.  He went to Urgent Care after a few days of cough productive of green sputum.  He was treated with antibiotics and an inhaler but did not notice quick improvement.  However, over the last couple weeks he feels that he has improved significantly.  In the last couple days, he again has a dry cough with sore throat that he attributes to another cold.  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     Chart review shows that he had another respiratory issue in April 2018 for which he was given Symbicort/albuterol rescue inhaler.  He does not really remember the specifics of this illness.  Family history is notable for a sister with asthma but he denies any past asthma, allergies, or recurring bronchitis episodes.      He denies shortness of breath, chest pain, pleurisy, fever, chills, night sweats, weight loss, activity change, or nocturnal symptoms.  He does report poor quality sleep and typically only sleeps 4-5 hours per night.  He does not know if he snores (he lives alone) but does not feel well rested in the morning.  He denies excessive daytime somnolence.  He has tried melatonin without any benefit but may have slept a little better when taking Ambien.      Medical History includes the following:  · Diabetes mellitus (type 2) with Hgb A1c 6.8 in April 2018  · Hypertension  · Atherosclerotic cardiovascular disease with past CABG and carotid surgery  · Cataracts      Review of Systems   Constitutional: Negative for fever, weight loss, activity change, appetite change, fatigue, night  sweats and weakness.   HENT: Positive for postnasal drip and sore throat. Negative for nosebleeds, rhinorrhea, sinus pressure and trouble swallowing.    Respiratory: Positive for cough. Negative for hemoptysis, sputum production, choking, shortness of breath, wheezing, previous hospitialization due to pulmonary problems, asthma nighttime symptoms, pleurisy, dyspnea on extertion and use of rescue inhaler.    Cardiovascular: Negative for chest pain, palpitations and leg swelling.   Musculoskeletal: Negative for arthralgias and gait problem.   Gastrointestinal: Positive for acid reflux. Negative for nausea and vomiting.   Neurological: Negative for syncope, weakness and light-headedness.   Hematological: Does not bruise/bleed easily and no excessive bruising.   Psychiatric/Behavioral: Negative for sleep disturbance. The patient is not nervous/anxious.        Objective:      Physical Exam   Constitutional: He is oriented to person, place, and time. He appears well-nourished. He appears not cachectic. No distress.   HENT:   Right Ear: External ear normal.   Left Ear: External ear normal.   Nose: Nose normal. No mucosal edema.   Mouth/Throat: Oropharynx is clear and moist. Normal dentition. No oropharyngeal exudate.   Neck: No JVD present.   Healed right CEA scar.   Cardiovascular: Normal rate, regular rhythm and normal heart sounds. Exam reveals no gallop.   No murmur heard.  Pulmonary/Chest: Normal expansion, symmetric chest wall expansion, effort normal and breath sounds normal. No stridor. No respiratory distress. He has no decreased breath sounds. He has no wheezes. He has no rhonchi. He has no rales.   Abdominal: Soft. He exhibits no distension.   Musculoskeletal: He exhibits no edema.   Lymphadenopathy: No supraclavicular adenopathy is present.     He has no cervical adenopathy.   Neurological: He is alert and oriented to person, place, and time. Gait normal.   Skin: No cyanosis. Nails show no clubbing.    Psychiatric: His behavior is normal. Thought content normal.   Nursing note and vitals reviewed.    Personal Diagnostic Review    · CXR from today is reviewed in person with the patient.  There is no active cardiopulmonary disease.    FINDINGS:  The lungs are symmetrically expanded and clear without focal consolidation.  There is no pleural effusion or pneumothorax.  Pulmonary vasculature is within normal limits.    The cardiac silhouette is within normal limits.  The hilar and mediastinal contours are unremarkable.  There is aortic arch calcification.    Sternal sutures are well aligned and intact.  There are surgical clips posterior and to the left of the inferior sternum consistent with LIMA harvest, as well as surgical clips overlying the superolateral border of the left atrium.  There is degenerative joint disease without acute fracture.      Impression       No acute cardiopulmonary process.       · Pulse oximetry at rest:  Heart rate 53 and SpO2 95% while breathing room air.  · Pulse oximetry with walking:  Heart rate 79 and SpO2 100% while breathing room air.    · PFTs from today were personally reviewed with the patient.  Spirometry and DLCO are both normal.    PFTs 05/01/2008 09/05/2018   FVC  (pre-BD) 3.15 liters 3.27 liters   FVC%  80% 91%   FEV1 (pre-BD) 2.44 liters 2.68 liters   FEV1%  76% 94%   FEV1/FVC  77 82   FVC (post-BD)  3.12 liters   FVC%  87%   FEV1 (post-BD)  2.54 liters   FEV1%  89%   FEV1/FVC  82   DLCO   22.8 mL/mmHg/min   DLCO%   91%   VA  7.93 liters     Not corrected       Assessment:       1. Cough        Outpatient Encounter Medications as of 9/5/2018   Medication Sig Dispense Refill    aspirin 81 MG chewable tablet Take 81 mg by mouth once daily.        atorvastatin (LIPITOR) 80 MG tablet Take 1 tablet (80 mg total) by mouth once daily. 90 tablet 3    blood sugar diagnostic Strp To check BG 3 times daily, to use with insurance preferred meter 200 strip 11    blood-glucose meter  kit To check BG 3 times daily, to use with insurance preferred meter 1 each 0    carvedilol (COREG) 3.125 MG tablet Take 1 tablet (3.125 mg total) by mouth 2 (two) times daily with meals. Stop atenolol 60 tablet 1    fish oil-omega-3 fatty acids 300-1,000 mg capsule Take 2 g by mouth once daily. 3 tablets         glipiZIDE (GLUCOTROL) 5 MG tablet Take 1 tablet (5 mg total) by mouth 2 (two) times daily with meals. 180 tablet 3    hydroCHLOROthiazide (HYDRODIURIL) 25 MG tablet Take 1 tablet (25 mg total) by mouth once daily. 90 tablet 3    lancets Misc To check BG 3 times daily, to use with insurance preferred meter 200 each 11    metFORMIN (GLUCOPHAGE-XR) 500 MG 24 hr tablet Take 2 tablets (1,000 mg total) by mouth 2 (two) times daily with meals. 360 tablet 3    MICROLET LANCET Misc TEST UP TO FOUR TIMES A  each 11    multivitamin capsule Take 1 capsule by mouth once daily.      valsartan (DIOVAN) 320 MG tablet TAKE ONE TABLET BY MOUTH DAILY FOR BLOOD PRESSURE 90 tablet 1    vit C-vit E-lutein-min-om-3 (OCUVITE) 807-69-2-150 mg-unit-mg-mg Cap Take 1 tablet by mouth once daily.      albuterol 90 mcg/actuation inhaler Inhale 2 puffs into the lungs every 6 (six) hours as needed for Wheezing. Rescue 18 g 0    budesonide-formoterol 80-4.5 mcg (SYMBICORT) 80-4.5 mcg/actuation HFAA Inhale 2 puffs into the lungs 2 (two) times daily. 6.9 g 1    ketoconazole (NIZORAL) 2 % cream Apply topically 2 (two) times daily. 60 g 0    linagliptin (TRADJENTA) 5 mg Tab tablet Take 1 tablet (5 mg total) by mouth once daily. 90 tablet 3     No facility-administered encounter medications on file as of 9/5/2018.      No orders of the defined types were placed in this encounter.    Plan:   There is no evidence of pulmonary pathology at this visit to suggest any significant pulmonary cause for Mr. Mike's modest cough.  He may have episodic bronchitis but there is no evidence of COPD/asthma and no need for regular  "inhaler use.  I do think that he may have an element of rhinitis that may respond to nasal fluticasone.  Although he reports not sleeping well, he doesn't report many symptoms to suggest sleep apnea and he is not particularly interested in "sleeping with a machine on."  He may eventually be a candidate for polysomnography to assess his sleep quality.    · No need to use the inhalers that were previously prescribed.  · Use fluticasone nasal spray (over the counter) 1 spray to each nostril daily.    I have copied Dr. Hartley (Primary Care) on this note.    Jerry Styles MD  Pulmonary/Critical Care Medicine    "

## 2018-09-13 ENCOUNTER — PATIENT OUTREACH (OUTPATIENT)
Dept: OTHER | Facility: OTHER | Age: 68
End: 2018-09-13

## 2018-09-13 NOTE — PROGRESS NOTES
Last 5 Patient Entered Readings                                      Current 30 Day Average: 142/71     Recent Readings 9/14/2018 9/14/2018 9/4/2018 9/4/2018 8/29/2018    SBP (mmHg) - 123 157 - -    DBP (mmHg) - 63 77 - -    Pulse 57 57 52 52 45         Last 6 Patient Entered Readings                                          Most Recent A1c: 6.8% on 4/11/2018  (Goal: 8%)     Recent Readings 9/14/2018 9/6/2018 9/4/2018 8/31/2018 8/27/2018    Blood Glucose (mg/dL) 233 177 230 224 180        Digital Medicine: Health  Follow Up    Lifestyle Modifications:    1.Dietary Modifications (Sodium intake <2,000mg/day, food labels, dining out): Mr. Mike states that he is trying to make healthier eating choices. He attributes elevated BG to checking right after breakfast. He had coffee and a plain biscuit. He has no questions for improving dietary habits at this time. Will continue to follow up.     2.Physical Activity: Patient continues to cut grass for exercise.     3.Medication Therapy: Patient has been compliant with the medication regimen.    4.Patient has the following medication side effects/concerns: None  (Frequency/Alleviating factors/Precipitating factors, etc.)     Follow up with Mr. Esteban Mike completed. Mr. Mike is doing okay. He is excited to retire in January. Patient is aware that he is due for an A1c. He prefers to use [facility], and plans to have it done soon. No further questions or concerns. Will continue to follow up to achieve health goals.

## 2018-09-27 DIAGNOSIS — I10 HYPERTENSION, ESSENTIAL: ICD-10-CM

## 2018-09-27 RX ORDER — CARVEDILOL 3.12 MG/1
3.12 TABLET ORAL 2 TIMES DAILY WITH MEALS
Qty: 180 TABLET | Refills: 1 | Status: SHIPPED | OUTPATIENT
Start: 2018-09-27 | End: 2019-01-10 | Stop reason: SDUPTHER

## 2018-10-01 ENCOUNTER — PATIENT OUTREACH (OUTPATIENT)
Dept: OTHER | Facility: OTHER | Age: 68
End: 2018-10-01

## 2018-10-01 NOTE — PROGRESS NOTES
"Last 5 Patient Entered Readings                                      Current 30 Day Average: 131/68     Recent Readings 9/27/2018 9/27/2018 9/24/2018 9/24/2018 9/19/2018    SBP (mmHg) 135 - 110 - 132    DBP (mmHg) 76 - 59 - 66    Pulse 65 65 55 55 56        Mr. Granger BP BP readings are improving and average is trending down. He has no questions or concerns about BP at this time. He is pleased with current readings.     Patient's health , Diana Nolasco, will be following up every 3-4 weeks. I will continue to monitor regularly and will follow up in 3-4 months, sooner if BP begins to trend upward or downward.    Patient has my contact information and knows to call with any concerns or clinical changes.     Current HTN regimen:  Hypertension Medications             carvedilol (COREG) 3.125 MG tablet Take 1 tablet (3.125 mg total) by mouth 2 (two) times daily with meals. Stop atenolol    hydroCHLOROthiazide (HYDRODIURIL) 25 MG tablet Take 1 tablet (25 mg total) by mouth once daily.    valsartan (DIOVAN) 320 MG tablet TAKE ONE TABLET BY MOUTH DAILY FOR BLOOD PRESSURE            Last 6 Patient Entered Readings                                          Most Recent A1c: 6.8% on 4/11/2018  (Goal: 8%)     Recent Readings 10/1/2018 9/30/2018 9/29/2018 9/27/2018 9/25/2018    Blood Glucose (mg/dL) 121 192 78 148 109        Mr. Granger BG readings have been fluctuating. He reports that he has been eating "late" and eating things that he should not be eating. He also was out of Tradjenta for a while, but has it now and most recent BG readings are improving. He had a reading of 79 mg/dL, but feels this was not accurate as he didn't "feel like blood sugar was that low. " He also has had high BG readings in the 200s (one reading of 419- that he feels was inaccurate). He reports the higher readings were while he was out of Tradjenta. Asked the he notify myself or his PCP should he need refills on any medications to " prevent missed doses.     Patient's health , Diana Nolasco, will be following up every 3-4 weeks. I will continue to monitor regularly and will follow up in 1-2 months, sooner if there are any concerning blood glucose readings. .    Patient has my contact information and knows to call with any concerns or clinical changes.     Diabetes Medications             glipiZIDE (GLUCOTROL) 5 MG tablet Take 1 tablet (5 mg total) by mouth 2 (two) times daily with meals.    linagliptin (TRADJENTA) 5 mg Tab tablet Take 1 tablet (5 mg total) by mouth once daily.    metFORMIN (GLUCOPHAGE-XR) 500 MG 24 hr tablet Take 2 tablets (1,000 mg total) by mouth 2 (two) times daily with meals.

## 2018-10-02 ENCOUNTER — OFFICE VISIT (OUTPATIENT)
Dept: INTERNAL MEDICINE | Facility: CLINIC | Age: 68
End: 2018-10-02
Attending: FAMILY MEDICINE
Payer: COMMERCIAL

## 2018-10-02 VITALS
DIASTOLIC BLOOD PRESSURE: 66 MMHG | HEART RATE: 57 BPM | SYSTOLIC BLOOD PRESSURE: 132 MMHG | HEIGHT: 67 IN | BODY MASS INDEX: 29.3 KG/M2 | TEMPERATURE: 98 F | OXYGEN SATURATION: 96 % | WEIGHT: 186.69 LBS

## 2018-10-02 DIAGNOSIS — M75.102 ROTATOR CUFF SYNDROME OF LEFT SHOULDER: ICD-10-CM

## 2018-10-02 DIAGNOSIS — E11.9 NON-INSULIN DEPENDENT TYPE 2 DIABETES MELLITUS: Primary | Chronic | ICD-10-CM

## 2018-10-02 DIAGNOSIS — E78.2 MIXED HYPERLIPIDEMIA: ICD-10-CM

## 2018-10-02 DIAGNOSIS — I65.23 BILATERAL CAROTID ARTERY STENOSIS: ICD-10-CM

## 2018-10-02 DIAGNOSIS — I10 HYPERTENSION, ESSENTIAL: ICD-10-CM

## 2018-10-02 DIAGNOSIS — E11.42 DIABETIC POLYNEUROPATHY ASSOCIATED WITH TYPE 2 DIABETES MELLITUS: ICD-10-CM

## 2018-10-02 PROCEDURE — 99214 OFFICE O/P EST MOD 30 MIN: CPT | Mod: S$GLB,,, | Performed by: FAMILY MEDICINE

## 2018-10-02 PROCEDURE — 3075F SYST BP GE 130 - 139MM HG: CPT | Mod: CPTII,S$GLB,, | Performed by: FAMILY MEDICINE

## 2018-10-02 PROCEDURE — 99999 PR PBB SHADOW E&M-EST. PATIENT-LVL IV: CPT | Mod: PBBFAC,,, | Performed by: FAMILY MEDICINE

## 2018-10-02 PROCEDURE — 3044F HG A1C LEVEL LT 7.0%: CPT | Mod: CPTII,S$GLB,, | Performed by: FAMILY MEDICINE

## 2018-10-02 PROCEDURE — 3078F DIAST BP <80 MM HG: CPT | Mod: CPTII,S$GLB,, | Performed by: FAMILY MEDICINE

## 2018-10-02 PROCEDURE — 1101F PT FALLS ASSESS-DOCD LE1/YR: CPT | Mod: CPTII,S$GLB,, | Performed by: FAMILY MEDICINE

## 2018-10-02 NOTE — PATIENT INSTRUCTIONS
Physical Therapy/Occupational Therapy number to schedule appointments - [phone]      Information about cholesterol, high blood pressure and healthy diet and activity recommendations can be found at the following links on the Internet:    http://www.nhlbi.nih.gov/health/health-topics/topics/hbc  http://www.nhlbi.nih.gov/health/educational/lose_wt/index.htm  Http://www.nhlbi.nih.gov/files/docs/public/heart/hbp_low.pdf  http://www.heart.org/HEARTORG/  http://diabetes.org/  https://www.cdc.gov/  https://healthfinder.gov/

## 2018-10-02 NOTE — PROGRESS NOTES
Subjective:       Patient ID: Esteban Mike is a 68 y.o. male.    Chief Complaint: Shoulder Pain    HPI  Review of Systems   Constitutional: Negative for activity change, chills, fatigue, fever and unexpected weight change.   HENT: Negative for congestion, hearing loss, rhinorrhea and trouble swallowing.    Eyes: Negative for discharge, redness and visual disturbance.   Respiratory: Negative for cough, chest tightness, shortness of breath and wheezing.    Cardiovascular: Negative for chest pain, palpitations and leg swelling.   Gastrointestinal: Negative for abdominal pain, blood in stool, constipation, diarrhea and vomiting.   Endocrine: Negative for polydipsia and polyuria.   Genitourinary: Negative for difficulty urinating, hematuria and urgency.   Musculoskeletal: Positive for arthralgias. Negative for back pain, gait problem, joint swelling, myalgias and neck pain.   Skin: Negative for color change and rash.   Neurological: Negative for tremors, speech difficulty, weakness, numbness and headaches.   Hematological: Negative for adenopathy. Does not bruise/bleed easily.   Psychiatric/Behavioral: Negative for behavioral problems, confusion, dysphoric mood and sleep disturbance. The patient is not nervous/anxious.        Objective:      Physical Exam   Constitutional: He is oriented to person, place, and time. He appears well-developed and well-nourished. No distress.   Eyes: No scleral icterus.   Neck: Normal range of motion and full passive range of motion without pain. Neck supple. No JVD present. Carotid bruit is not present. No tracheal deviation present. No thyroid mass and no thyromegaly present.   Cardiovascular: Normal rate, regular rhythm and intact distal pulses. Exam reveals distant heart sounds. Exam reveals no gallop and no friction rub.   Murmur heard.  Pulmonary/Chest: Effort normal. No respiratory distress. He has decreased breath sounds. He has no wheezes. He has no rales.   Abdominal:  Soft. He exhibits no distension and no mass. There is no tenderness. There is no rebound and no guarding.   Musculoskeletal: He exhibits no edema.        Right shoulder: He exhibits normal range of motion, no tenderness, no bony tenderness, no swelling, no effusion, no crepitus, no deformity, no laceration, no pain, no spasm, normal pulse and normal strength.        Left shoulder: He exhibits tenderness. He exhibits normal range of motion, no bony tenderness, no swelling, no effusion, no crepitus, no deformity, no laceration, no pain, no spasm, normal pulse and normal strength.        Cervical back: He exhibits normal range of motion, no tenderness and no spasm.        Right hand: He exhibits normal range of motion, no tenderness, normal capillary refill and no swelling. Normal sensation noted. Decreased sensation is not present in the ulnar distribution, is not present in the medial distribution and is not present in the radial distribution. Normal strength noted. He exhibits no finger abduction, no thumb/finger opposition and no wrist extension trouble.        Left hand: He exhibits normal range of motion, no tenderness, normal capillary refill and no swelling. Normal sensation noted. Decreased sensation is not present in the ulnar distribution, is not present in the medial redistribution and is not present in the radial distribution. Normal strength noted. He exhibits no finger abduction, no thumb/finger opposition and no wrist extension trouble.        Right lower leg: He exhibits no edema.        Left lower leg: He exhibits no edema.   Lymphadenopathy:     He has no cervical adenopathy.   Neurological: He is alert and oriented to person, place, and time. He has normal strength. He displays no tremor. No cranial nerve deficit or sensory deficit. Coordination and gait normal.   Skin: Skin is warm and dry. No rash noted. He is not diaphoretic. No erythema.   Psychiatric: He has a normal mood and affect. His  behavior is normal. Judgment and thought content normal.   Nursing note and vitals reviewed.      Assessment:       1. Non-insulin dependent type 2 diabetes mellitus    2. Hypertension, essential    3. Mixed hyperlipidemia    4. Diabetic polyneuropathy associated with type 2 diabetes mellitus    5. Bilateral carotid artery stenosis    6. Rotator cuff syndrome of left shoulder        Plan:   Esteban was seen today for shoulder pain.    Diagnoses and all orders for this visit:    Non-insulin dependent type 2 diabetes mellitus    Hypertension, essential    Mixed hyperlipidemia    Diabetic polyneuropathy associated with type 2 diabetes mellitus    Bilateral carotid artery stenosis    Rotator cuff syndrome of left shoulder  -     Ambulatory Referral to Physical/Occupational Therapy      See meds, orders, follow up, routing and instructions sections of encounter.  A 68-year-old gentleman.  He is in for essentially a followup.  He states there   were no labs at [facility]; however, we did provide those several months ago and will   provide those once again.  He is having no cardiopulmonary complaints.  He does   have a right ICA occlusion 100% and left 20-30.  He is under the care of   Cardiology Service and Vascular Cardiology.  States he may be retiring sometime   soon.  States his diet has been good recently.  He does not check his sugars on   a regular basis.    Complaint of left shoulder pain, worse overhead motions.  No numbness, tingling   or weakness.  No neck pain.    We have reprinted his [facility] orders and will try to get those done.  Consult   Physical Therapy.  Follow up in six months or sooner if problematic.  Keep   appointments with Cardiology.      SAMIRA  dd: 10/02/2018 17:24:36 (CDT)  td: 10/03/2018 08:08:42 (CDT)  Doc ID   #[id number]  Job ID #[id number]    CC:

## 2018-10-04 ENCOUNTER — DOCUMENTATION ONLY (OUTPATIENT)
Dept: INTERNAL MEDICINE | Facility: CLINIC | Age: 68
End: 2018-10-04

## 2018-10-04 LAB
ALBUMIN SERPL-MCNC: 4.2 G/DL (ref 3.6–5.1)
ALBUMIN/GLOB SERPL: 1.8 (CALC) (ref 1–2.5)
ALP SERPL-CCNC: 63 U/L (ref 40–115)
ALT SERPL-CCNC: 50 U/L (ref 9–46)
AST SERPL-CCNC: 23 U/L (ref 10–35)
BILIRUB SERPL-MCNC: 0.5 MG/DL (ref 0.2–1.2)
BUN SERPL-MCNC: 18 MG/DL (ref 7–25)
BUN/CREAT SERPL: ABNORMAL (CALC) (ref 6–22)
CALCIUM SERPL-MCNC: 10.1 MG/DL (ref 8.6–10.3)
CHLORIDE SERPL-SCNC: 103 MMOL/L (ref 98–110)
CHOLEST SERPL-MCNC: 125 MG/DL
CHOLEST/HDLC SERPL: 3.5 (CALC)
CO2 SERPL-SCNC: 27 MMOL/L (ref 20–32)
CREAT SERPL-MCNC: 0.96 MG/DL (ref 0.7–1.25)
GFR SERPL CREATININE-BSD FRML MDRD: 82 ML/MIN/1.73M2
GLOBULIN SER CALC-MCNC: 2.3 G/DL (CALC) (ref 1.9–3.7)
GLUCOSE SERPL-MCNC: 168 MG/DL (ref 65–99)
HBA1C MFR BLD: 7.8 % OF TOTAL HGB
HDLC SERPL-MCNC: 36 MG/DL
LDLC SERPL CALC-MCNC: 67 MG/DL (CALC)
NONHDLC SERPL-MCNC: 89 MG/DL (CALC)
POTASSIUM SERPL-SCNC: 4.5 MMOL/L (ref 3.5–5.3)
PROT SERPL-MCNC: 6.5 G/DL (ref 6.1–8.1)
PSA SERPL-MCNC: 0.3 NG/ML
SODIUM SERPL-SCNC: 140 MMOL/L (ref 135–146)
TRIGL SERPL-MCNC: 135 MG/DL

## 2018-10-18 ENCOUNTER — PATIENT OUTREACH (OUTPATIENT)
Dept: OTHER | Facility: OTHER | Age: 68
End: 2018-10-18

## 2018-10-18 NOTE — PROGRESS NOTES
"Last 5 Patient Entered Readings                                      Current 30 Day Average: 129/69     Recent Readings 10/16/2018 10/16/2018 10/16/2018 10/16/2018 10/6/2018    SBP (mmHg) - 139 - 133 127    DBP (mmHg) - 71 - 72 71    Pulse 62 62 63 63 58          Last 6 Patient Entered Readings                                          Most Recent A1c: 7.8% on 10/3/2018  (Goal: 8%)     Recent Readings 10/18/2018 10/17/2018 10/16/2018 10/12/2018 10/11/2018    Blood Glucose (mg/dL) 157 109 112 135 86        Digital Medicine: Health  Follow Up    Lifestyle Modifications:    1.Dietary Modifications (Sodium intake <2,000mg/day, food labels, dining out): Patient continues to eat late. He had turkey and mentos candy after 9 pm last night. Encouraged patient to try to eat meals earlier, and avoid snacking on candy after dinner.     2.Physical Activity: Mr. Mike continues to cut grass for exercise.     3.Medication Therapy: Patient has not been compliant with the medication regimen. Patient has not been taking metformin as prescribed. Patient states that he stopped because he was afraid "the pills were bad" due to the odor. Advised patient to resume metformin. Patient verbalized understanding.     4.Patient has the following medication side effects/concerns: None  (Frequency/Alleviating factors/Precipitating factors, etc.)     Follow up with Mr. Esteban Mike completed. Mr. Mike is doing well. He is pleased with his BP overall, but continues to notice spikes in his BG. BG has been better since restarting tradjenta. Will continue to monitor. No further questions or concerns. Will continue to follow up to achieve health goals.        "

## 2018-11-14 ENCOUNTER — PATIENT OUTREACH (OUTPATIENT)
Dept: OTHER | Facility: OTHER | Age: 68
End: 2018-11-14

## 2018-11-14 NOTE — PROGRESS NOTES
Last 5 Patient Entered Readings                                      Current 30 Day Average: 158/85     Recent Readings 11/14/2018 11/14/2018 11/13/2018 11/13/2018 10/24/2018    SBP (mmHg) - 150 - 142 177    DBP (mmHg) - 85 - 77 102    Pulse 62 62 65 65 56        Mr. Mike's BP average is above goal. His readings have been much higher. He states he is using another BP monitor that he has had for a while as his iHealth monitor is giving error messages. He will get to the [facility] to have it checked out as it is unclear if his BP readings are accurate as they are much different than when he was using his i[employer] monitor.     Patient's BP average is above goal of <130/80.      Patient denies s/s of hypertension (SOB, CP, severe headaches, changes in vision) associated with high readings. Instructed patient to go to the ED if BP > 180/110 and accompanied by hypertensive s/s, patient confirms understanding.    Will continue to monitor regularly. Will follow up in 2-3 weeks, sooner if BP begins to trend upward or downward.    Patient has my contact information and knows to call with any concerns or clinical changes.     Current HTN regimen:  Hypertension Medications             carvedilol (COREG) 3.125 MG tablet Take 1 tablet (3.125 mg total) by mouth 2 (two) times daily with meals. Stop atenolol    hydroCHLOROthiazide (HYDRODIURIL) 25 MG tablet Take 1 tablet (25 mg total) by mouth once daily.    valsartan (DIOVAN) 320 MG tablet TAKE ONE TABLET BY MOUTH DAILY FOR BLOOD PRESSURE            Last 6 Patient Entered Readings                                          Most Recent A1c: 7.8% on 10/3/2018  (Goal: 8%)     Recent Readings 11/13/2018 11/7/2018 10/29/2018 10/24/2018 10/23/2018    Blood Glucose (mg/dL) 109 132 132 91 118        Mr. Granger A1C has increased. He has been having better blood sugar readings recently now that he is taking medications as prescribed.     Patient's health , Diana Nolasco, will be  following up every 3-4 weeks. I will continue to monitor regularly and will follow up in 1-2 months, sooner if there are any concerning blood glucose readings.     Patient has my contact information and knows to call with any concerns or clinical changes.     Diabetes Medications             glipiZIDE (GLUCOTROL) 5 MG tablet Take 1 tablet (5 mg total) by mouth 2 (two) times daily with meals.    linagliptin (TRADJENTA) 5 mg Tab tablet Take 1 tablet (5 mg total) by mouth once daily.    metFORMIN (GLUCOPHAGE-XR) 500 MG 24 hr tablet Take 2 tablets (1,000 mg total) by mouth 2 (two) times daily with meals.

## 2018-11-26 RX ORDER — ATORVASTATIN CALCIUM 80 MG/1
TABLET, FILM COATED ORAL
Qty: 90 TABLET | Refills: 3 | Status: SHIPPED | OUTPATIENT
Start: 2018-11-26 | End: 2019-08-30 | Stop reason: SDUPTHER

## 2018-12-06 ENCOUNTER — PATIENT OUTREACH (OUTPATIENT)
Dept: OTHER | Facility: OTHER | Age: 68
End: 2018-12-06

## 2018-12-06 NOTE — PROGRESS NOTES
Last 5 Patient Entered Readings                                      Current 30 Day Average: 150/85     Recent Readings 11/29/2018 11/29/2018 11/24/2018 11/24/2018 11/15/2018    SBP (mmHg) - 160 - 148 148    DBP (mmHg) - 84 - 97 80    Pulse 58 58 54 54 64          Last 6 Patient Entered Readings                                          Most Recent A1c: 7.8% on 10/3/2018  (Goal: 8%)     Recent Readings 12/5/2018 12/2/2018 11/29/2018 11/27/2018 11/22/2018    Blood Glucose (mg/dL) 106 145 165 168 141        Digital Medicine: Health  Follow Up    Lifestyle Modifications:    1.Dietary Modifications (Sodium intake <2,000mg/day, food labels, dining out): Patient denies changes to his diet.     2.Physical Activity: Mr. Mike is cutting some grass, but not as often.     3.Medication Therapy: Patient has been compliant with the medication regimen.    4.Patient has the following medication side effects/concerns: None   (Frequency/Alleviating factors/Precipitating factors, etc.)     Follow up with Mr. Esteban Mike completed. Mr. Mike is doing okay. He is needing more testing supplies. DME contact information provided. Mr. Mike continues to have trouble with his iHealth glucometer. He went to the [facility] yesterday and was able to have it fixed. He attribute spikes in his BP to not resting prior to checking his BP, work related stress, and dining out. Encouraged sodium restriction. He plans to retire in June 2019. He thanks me for calling. No further questions or concerns currently. Will continue to follow up to achieve health goals.

## 2019-01-09 ENCOUNTER — PATIENT OUTREACH (OUTPATIENT)
Dept: OTHER | Facility: OTHER | Age: 69
End: 2019-01-09

## 2019-01-09 NOTE — PROGRESS NOTES
"Last 5 Patient Entered Readings                                      Current 30 Day Average: 144/79     Recent Readings 1/3/2019 1/3/2019 1/3/2019 1/3/2019 12/30/2018    SBP (mmHg) 158 - 181 - 136    DBP (mmHg) 86 - 92 - 74    Pulse 52 52 54 54 63          Last 6 Patient Entered Readings                                          Most Recent A1c: 7.8% on 10/3/2018  (Goal: 8%)     Recent Readings 1/9/2019 1/7/2019 1/5/2019 1/4/2019 1/3/2019    Blood Glucose (mg/dL) 131 146 161 186 123        Digital Medicine: Health  Follow Up    Lifestyle Modifications:    1.Dietary Modifications (Sodium intake <2,000mg/day, food labels, dining out): Patient attributes elevated BP to dietary indiscretions over the holidays. Encouraged sodium restriction.     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 He states that these jobs are how he gets his exercise.    3.Medication Therapy: Patient has been compliant with the medication regimen.    4.Patient has the following medication side effects/concerns: None  (Frequency/Alleviating factors/Precipitating factors, etc.)     Follow up with Mr. Esteban Mike completed. Mr. Mike is doing okay. He was pleased with his BG this morning, but has noticed his BP is elevated. He reports "eating bad". Encouraged patient to get back on track with healthier eating habits. He has no questions for improving dietary habits currently. Mr. Mike is set to retire June 7, 2019. Will continue to follow up to achieve health goals.          "

## 2019-01-10 DIAGNOSIS — I10 HYPERTENSION, ESSENTIAL: ICD-10-CM

## 2019-01-10 RX ORDER — CARVEDILOL 3.12 MG/1
TABLET ORAL
Qty: 180 TABLET | Refills: 1 | Status: SHIPPED | OUTPATIENT
Start: 2019-01-10 | End: 2019-09-25 | Stop reason: SDUPTHER

## 2019-01-14 ENCOUNTER — DOCUMENTATION ONLY (OUTPATIENT)
Dept: INTERNAL MEDICINE | Facility: CLINIC | Age: 69
End: 2019-01-14

## 2019-01-15 ENCOUNTER — PATIENT OUTREACH (OUTPATIENT)
Dept: OTHER | Facility: OTHER | Age: 69
End: 2019-01-15

## 2019-01-15 DIAGNOSIS — E11.51 TYPE 2 DIABETES, WITH PERIPHERAL CIRCULATORY DISORDER NOT AT GOAL: Primary | ICD-10-CM

## 2019-01-15 NOTE — PROGRESS NOTES
Last 5 Patient Entered Readings                                      Current 30 Day Average: 142/78     Recent Readings 1/11/2019 1/11/2019 1/3/2019 1/3/2019 1/3/2019    SBP (mmHg) - 136 158 - 181    DBP (mmHg) - 71 86 - 92    Pulse 58 58 52 52 54        Patient's BP average is above goal of <130/80.     Mr. Mike has not been consistent in checking BP readings. His average is above goal. HR limits carvedilol dose increase. Discussed starting amlodipine, but he does not want to take additional medications. Explained that he may be able to stop carvedilol and start amlodipine. Will have him send BP readings in more often to determine next steps. He will be seeing Dr. Hartley in 2 weeks.     Patient denies s/s of hypertension (SOB, CP, severe headaches, changes in vision) associated with high readings. Instructed patient to go to the ED if BP > 180/110 and accompanied by hypertensive s/s, patient confirms understanding.    Will continue to monitor regularly. Will follow up in 3-4 weeks, sooner if BP begins to trend upward or downward.    Patient has my contact information and knows to call with any concerns or clinical changes.     Current HTN regimen:  Hypertension Medications             carvedilol (COREG) 3.125 MG tablet TAKE ONE TABLET BY MOUTH TWICE DAILY WITH MEALS (STOP ATENOLOL)    hydroCHLOROthiazide (HYDRODIURIL) 25 MG tablet Take 1 tablet (25 mg total) by mouth once daily.    valsartan (DIOVAN) 320 MG tablet TAKE ONE TABLET BY MOUTH DAILY FOR BLOOD PRESSURE              Last 6 Patient Entered Readings                                          Most Recent A1c: 7.8% on 10/3/2018  (Goal: 8%)     Recent Readings 1/12/2019 1/10/2019 1/9/2019 1/7/2019 1/5/2019    Blood Glucose (mg/dL) 158 195 131 146 161        Mr. Mike's BG readings have started to increase. He states it is due to his diet. He confirms adherence to all antidiabetic medications. Discussed stopping Tradjenta and starting Farxiga or  Jardiance, but he just received a new prescription of Tradjenta, so will wait until he is almost done with current prescription. He has only been checking FPGs. Asked that he also check some BG readings 2 hours after meals. Encouraged him to monitor and limit carbohydrate intake.     I will continue to monitor regularly and will follow up in 3-4 weeks, sooner if there are any concerning blood glucose readings. .    Patient has my contact information and knows to call with any concerns or clinical changes.     Diabetes Medications             glipiZIDE (GLUCOTROL) 5 MG tablet Take 1 tablet (5 mg total) by mouth 2 (two) times daily with meals.    linagliptin (TRADJENTA) 5 mg Tab tablet Take 1 tablet (5 mg total) by mouth once daily.    metFORMIN (GLUCOPHAGE-XR) 500 MG 24 hr tablet Take 2 tablets (1,000 mg total) by mouth 2 (two) times daily with meals.

## 2019-01-24 ENCOUNTER — OFFICE VISIT (OUTPATIENT)
Dept: INTERNAL MEDICINE | Facility: CLINIC | Age: 69
End: 2019-01-24
Attending: FAMILY MEDICINE
Payer: COMMERCIAL

## 2019-01-24 ENCOUNTER — DOCUMENTATION ONLY (OUTPATIENT)
Dept: INTERNAL MEDICINE | Facility: CLINIC | Age: 69
End: 2019-01-24

## 2019-01-24 VITALS
HEIGHT: 67 IN | OXYGEN SATURATION: 97 % | DIASTOLIC BLOOD PRESSURE: 66 MMHG | HEART RATE: 64 BPM | SYSTOLIC BLOOD PRESSURE: 124 MMHG | WEIGHT: 192 LBS | BODY MASS INDEX: 30.13 KG/M2 | TEMPERATURE: 98 F

## 2019-01-24 DIAGNOSIS — I25.810 CORONARY ARTERY DISEASE INVOLVING AUTOLOGOUS VEIN CORONARY BYPASS GRAFT WITHOUT ANGINA PECTORIS: ICD-10-CM

## 2019-01-24 DIAGNOSIS — I65.21 INTERNAL CAROTID ARTERY OCCLUSION, RIGHT: ICD-10-CM

## 2019-01-24 DIAGNOSIS — K76.0 NAFLD (NONALCOHOLIC FATTY LIVER DISEASE): ICD-10-CM

## 2019-01-24 DIAGNOSIS — K21.00 GASTROESOPHAGEAL REFLUX DISEASE WITH ESOPHAGITIS: ICD-10-CM

## 2019-01-24 DIAGNOSIS — E11.9 NON-INSULIN DEPENDENT TYPE 2 DIABETES MELLITUS: Chronic | ICD-10-CM

## 2019-01-24 DIAGNOSIS — I10 HYPERTENSION, ESSENTIAL: ICD-10-CM

## 2019-01-24 DIAGNOSIS — E11.51 TYPE 2 DIABETES, WITH PERIPHERAL CIRCULATORY DISORDER NOT AT GOAL: ICD-10-CM

## 2019-01-24 DIAGNOSIS — I65.22 LEFT CAROTID ARTERY STENOSIS: ICD-10-CM

## 2019-01-24 DIAGNOSIS — E78.2 MIXED HYPERLIPIDEMIA: ICD-10-CM

## 2019-01-24 DIAGNOSIS — Z00.00 ANNUAL PHYSICAL EXAM: Primary | ICD-10-CM

## 2019-01-24 DIAGNOSIS — I73.9 PVD (PERIPHERAL VASCULAR DISEASE): ICD-10-CM

## 2019-01-24 PROBLEM — I65.23 BILATERAL CAROTID ARTERY STENOSIS: Status: RESOLVED | Noted: 2018-07-18 | Resolved: 2019-01-24

## 2019-01-24 PROBLEM — R05.9 COUGH: Status: RESOLVED | Noted: 2018-09-05 | Resolved: 2019-01-24

## 2019-01-24 PROCEDURE — 3045F PR MOST RECENT HEMOGLOBIN A1C LEVEL 7.0-9.0%: CPT | Mod: CPTII,S$GLB,, | Performed by: FAMILY MEDICINE

## 2019-01-24 PROCEDURE — 3074F PR MOST RECENT SYSTOLIC BLOOD PRESSURE < 130 MM HG: ICD-10-PCS | Mod: CPTII,S$GLB,, | Performed by: FAMILY MEDICINE

## 2019-01-24 PROCEDURE — 99397 PER PM REEVAL EST PAT 65+ YR: CPT | Mod: S$GLB,,, | Performed by: FAMILY MEDICINE

## 2019-01-24 PROCEDURE — 99999 PR PBB SHADOW E&M-EST. PATIENT-LVL IV: CPT | Mod: PBBFAC,,, | Performed by: FAMILY MEDICINE

## 2019-01-24 PROCEDURE — 99999 PR PBB SHADOW E&M-EST. PATIENT-LVL IV: ICD-10-PCS | Mod: PBBFAC,,, | Performed by: FAMILY MEDICINE

## 2019-01-24 PROCEDURE — 3078F DIAST BP <80 MM HG: CPT | Mod: CPTII,S$GLB,, | Performed by: FAMILY MEDICINE

## 2019-01-24 PROCEDURE — 3074F SYST BP LT 130 MM HG: CPT | Mod: CPTII,S$GLB,, | Performed by: FAMILY MEDICINE

## 2019-01-24 PROCEDURE — 3078F PR MOST RECENT DIASTOLIC BLOOD PRESSURE < 80 MM HG: ICD-10-PCS | Mod: CPTII,S$GLB,, | Performed by: FAMILY MEDICINE

## 2019-01-24 PROCEDURE — 99397 PR PREVENTIVE VISIT,EST,65 & OVER: ICD-10-PCS | Mod: S$GLB,,, | Performed by: FAMILY MEDICINE

## 2019-01-24 PROCEDURE — 3045F PR MOST RECENT HEMOGLOBIN A1C LEVEL 7.0-9.0%: ICD-10-PCS | Mod: CPTII,S$GLB,, | Performed by: FAMILY MEDICINE

## 2019-01-24 NOTE — PATIENT INSTRUCTIONS
Pneumococcal 23 vaccine today      Schedule lab orders for today - A1C - [facility].       Information about cholesterol, high blood pressure and healthy diet and activity recommendations can be found at the following links on the Internet:    http://www.nhlbi.nih.gov/health/health-topics/topics/hbc  http://www.nhlbi.nih.gov/health/educational/lose_wt/index.htm  Http://www.nhlbi.nih.gov/files/docs/public/heart/hbp_low.pdf  http://www.heart.org/HEARTORG/  http://diabetes.org/  https://www.cdc.gov/  Https://healthfinder.gov/

## 2019-01-24 NOTE — PROGRESS NOTES
Subjective:       Patient ID: Esteban Mike is a 68 y.o. male.    Chief Complaint: Annual Exam    Established patient for an annual wellness check/physical exam and also chronic disease management. Specific complaints - see dictation and please see ROS.  P, S, Fm, Soc Hx's; Meds, allergies reviewed and reconciled.  Health maintenance file reviewed and addressed items due.      Review of Systems   Constitutional: Negative for chills, fatigue, fever and unexpected weight change.   HENT: Negative for congestion and trouble swallowing.    Eyes: Negative for redness and visual disturbance.   Respiratory: Negative for cough, chest tightness and shortness of breath.    Cardiovascular: Negative for chest pain, palpitations and leg swelling.   Gastrointestinal: Negative for abdominal pain and blood in stool.   Genitourinary: Negative for difficulty urinating and hematuria.   Musculoskeletal: Positive for back pain. Negative for arthralgias, gait problem, joint swelling, myalgias and neck pain.   Skin: Negative for color change and rash.   Neurological: Negative for tremors, speech difficulty, weakness, numbness and headaches.   Hematological: Negative for adenopathy. Does not bruise/bleed easily.   Psychiatric/Behavioral: Negative for behavioral problems, confusion and sleep disturbance. The patient is not nervous/anxious.        Objective:      Physical Exam   Constitutional: He appears well-developed and well-nourished.   HENT:   Head: Normocephalic.   Right Ear: Tympanic membrane, external ear and ear canal normal.   Left Ear: Tympanic membrane, external ear and ear canal normal.   Mouth/Throat: Oropharynx is clear and moist.   Eyes: Pupils are equal, round, and reactive to light.   Neck: Normal range of motion. Neck supple. Carotid bruit is not present. No thyromegaly present.   Cardiovascular: Normal rate, regular rhythm, normal heart sounds and intact distal pulses. Exam reveals no gallop and no friction  rub.   No murmur heard.  Pulmonary/Chest: Effort normal and breath sounds normal.   Abdominal: Soft. He exhibits no distension and no mass. There is no tenderness. There is no rebound and no guarding.   Musculoskeletal: Normal range of motion. He exhibits no edema or tenderness.   Lymphadenopathy:     He has no cervical adenopathy.   Neurological: He is alert. He has normal strength. No cranial nerve deficit or sensory deficit. Coordination and gait normal.   Skin: Skin is warm and dry.   Psychiatric: He has a normal mood and affect. His behavior is normal. Judgment and thought content normal.   Nursing note and vitals reviewed.      Assessment:       1. Annual physical exam    2. Non-insulin dependent type 2 diabetes mellitus    3. Type 2 diabetes, with peripheral circulatory disorder not at goal    4. Internal carotid artery occlusion, right    5. Left carotid artery stenosis    6. Coronary artery disease involving autologous vein coronary bypass graft without angina pectoris    7. PVD (peripheral vascular disease)    8. Gastroesophageal reflux disease with esophagitis    9. NAFLD (nonalcoholic fatty liver disease)    10. Hypertension, essential    11. Mixed hyperlipidemia        Plan:   Esteban was seen today for annual exam.    Diagnoses and all orders for this visit:    Annual physical exam    Non-insulin dependent type 2 diabetes mellitus    Type 2 diabetes, with peripheral circulatory disorder not at goal  -     Hemoglobin A1c; Future  -     Hemoglobin A1c    Internal carotid artery occlusion, right  -     CV Ultrasound doppler carotid (Cupid Only); Future    Left carotid artery stenosis  -     CV Ultrasound doppler carotid (Cupid Only); Future    Coronary artery disease involving autologous vein coronary bypass graft without angina pectoris  -     Ambulatory referral to Cardiology    PVD (peripheral vascular disease)    Gastroesophageal reflux disease with esophagitis  -     Ambulatory referral to  Gastroenterology    NAFLD (nonalcoholic fatty liver disease)    Hypertension, essential    Mixed hyperlipidemia      See meds, orders, follow up, routing and instructions sections of encounter.  A 68-year-old patient.  He is in for a checkup.  His last A1c was 7.8.  We did   go over his problem list.  He does plan to retire at some point.  He has no   acute complaints at this time.  He is compliant with his current medication, but   he has gained some weight from dietary reasons.      SONU/RAGHAVENDRA  dd: 01/24/2019 12:43:23 (CST)  td: 01/25/2019 08:03:27 (CST)  Doc ID   #[id number]  Job ID #[id number]    CC:

## 2019-01-28 DIAGNOSIS — E11.65 TYPE 2 DIABETES MELLITUS WITH HYPERGLYCEMIA, WITHOUT LONG-TERM CURRENT USE OF INSULIN: ICD-10-CM

## 2019-01-28 RX ORDER — METFORMIN HYDROCHLORIDE 500 MG/1
TABLET, EXTENDED RELEASE ORAL
Qty: 360 TABLET | Refills: 3 | Status: SHIPPED | OUTPATIENT
Start: 2019-01-28 | End: 2020-05-25

## 2019-01-31 ENCOUNTER — CLINICAL SUPPORT (OUTPATIENT)
Dept: CARDIOLOGY | Facility: CLINIC | Age: 69
End: 2019-01-31
Attending: FAMILY MEDICINE
Payer: COMMERCIAL

## 2019-01-31 ENCOUNTER — TELEPHONE (OUTPATIENT)
Dept: ENDOSCOPY | Facility: HOSPITAL | Age: 69
End: 2019-01-31

## 2019-01-31 ENCOUNTER — OFFICE VISIT (OUTPATIENT)
Dept: GASTROENTEROLOGY | Facility: CLINIC | Age: 69
End: 2019-01-31
Attending: FAMILY MEDICINE
Payer: COMMERCIAL

## 2019-01-31 VITALS
HEART RATE: 60 BPM | SYSTOLIC BLOOD PRESSURE: 141 MMHG | BODY MASS INDEX: 29.86 KG/M2 | HEIGHT: 67 IN | WEIGHT: 190.25 LBS | DIASTOLIC BLOOD PRESSURE: 76 MMHG

## 2019-01-31 DIAGNOSIS — I65.21 INTERNAL CAROTID ARTERY OCCLUSION, RIGHT: ICD-10-CM

## 2019-01-31 DIAGNOSIS — I73.9 PVD (PERIPHERAL VASCULAR DISEASE): ICD-10-CM

## 2019-01-31 DIAGNOSIS — G47.33 OSA (OBSTRUCTIVE SLEEP APNEA): ICD-10-CM

## 2019-01-31 DIAGNOSIS — I65.22 LEFT CAROTID ARTERY STENOSIS: ICD-10-CM

## 2019-01-31 DIAGNOSIS — K22.10 EROSIVE ESOPHAGITIS: Primary | ICD-10-CM

## 2019-01-31 DIAGNOSIS — I25.810 CORONARY ARTERY DISEASE INVOLVING AUTOLOGOUS VEIN CORONARY BYPASS GRAFT WITHOUT ANGINA PECTORIS: ICD-10-CM

## 2019-01-31 LAB
LEFT ARM DIASTOLIC BLOOD PRESSURE: 76 MMHG
LEFT ARM SYSTOLIC BLOOD PRESSURE: 141 MMHG
LEFT CBA DIAS: 23 CM/S
LEFT CBA SYS: 59 CM/S
LEFT CCA DIST DIAS: 24 CM/S
LEFT CCA DIST SYS: 69 CM/S
LEFT CCA MID DIAS: 25 CM/S
LEFT CCA MID SYS: 71 CM/S
LEFT CCA PROX DIAS: 27 CM/S
LEFT CCA PROX SYS: 84 CM/S
LEFT ECA DIAS: 35 CM/S
LEFT ECA SYS: 460 CM/S
LEFT ICA DIST DIAS: 41 CM/S
LEFT ICA DIST SYS: 127 CM/S
LEFT ICA MID DIAS: 38 CM/S
LEFT ICA MID SYS: 111 CM/S
LEFT ICA PROX DIAS: 22 CM/S
LEFT ICA PROX SYS: 76 CM/S
LEFT VERTEBRAL DIAS: 28 CM/S
LEFT VERTEBRAL SYS: 100 CM/S
OHS CV CAROTID RIGHT ICA EDV HIGHEST: 0
OHS CV CAROTID ULTRASOUND LEFT ICA/CCA RATIO: 1.51
OHS CV CAROTID ULTRASOUND RIGHT ICA/CCA RATIO: 0
OHS CV PV CAROTID LEFT HIGHEST CCA: 84
OHS CV PV CAROTID LEFT HIGHEST ICA: 127
OHS CV PV CAROTID RIGHT HIGHEST CCA: 29
OHS CV PV CAROTID RIGHT HIGHEST ICA: 0
OHS CV US CAROTID LEFT HIGHEST EDV: 41
RIGHT ARM DIASTOLIC BLOOD PRESSURE: 76 MMHG
RIGHT ARM SYSTOLIC BLOOD PRESSURE: 141 MMHG
RIGHT CBA DIAS: 11 CM/S
RIGHT CBA SYS: 54 CM/S
RIGHT CCA DIST DIAS: 0 CM/S
RIGHT CCA DIST SYS: 18 CM/S
RIGHT CCA MID DIAS: 0 CM/S
RIGHT CCA MID SYS: 27 CM/S
RIGHT CCA PROX DIAS: 0 CM/S
RIGHT CCA PROX SYS: 29 CM/S
RIGHT ECA DIAS: 9 CM/S
RIGHT ECA SYS: 47 CM/S
RIGHT ICA DIST DIAS: 0 CM/S
RIGHT ICA DIST SYS: 0 CM/S
RIGHT ICA MID DIAS: 0 CM/S
RIGHT ICA MID SYS: 0 CM/S
RIGHT ICA PROX DIAS: 0 CM/S
RIGHT ICA PROX SYS: 0 CM/S
RIGHT VERTEBRAL DIAS: 24 CM/S
RIGHT VERTEBRAL SYS: 74 CM/S

## 2019-01-31 PROCEDURE — 1100F PR PT FALLS ASSESS DOC 2+ FALLS/FALL W/INJURY/YR: ICD-10-PCS | Mod: CPTII,S$GLB,, | Performed by: INTERNAL MEDICINE

## 2019-01-31 PROCEDURE — 1100F PTFALLS ASSESS-DOCD GE2>/YR: CPT | Mod: CPTII,S$GLB,, | Performed by: INTERNAL MEDICINE

## 2019-01-31 PROCEDURE — 93880 CV US DOPPLER CAROTID (CUPID ONLY): ICD-10-PCS | Mod: S$GLB,,, | Performed by: INTERNAL MEDICINE

## 2019-01-31 PROCEDURE — 99204 PR OFFICE/OUTPT VISIT, NEW, LEVL IV, 45-59 MIN: ICD-10-PCS | Mod: S$GLB,,, | Performed by: INTERNAL MEDICINE

## 2019-01-31 PROCEDURE — 99999 PR PBB SHADOW E&M-EST. PATIENT-LVL III: ICD-10-PCS | Mod: PBBFAC,,, | Performed by: INTERNAL MEDICINE

## 2019-01-31 PROCEDURE — 99204 OFFICE O/P NEW MOD 45 MIN: CPT | Mod: S$GLB,,, | Performed by: INTERNAL MEDICINE

## 2019-01-31 PROCEDURE — 93880 EXTRACRANIAL BILAT STUDY: CPT | Mod: S$GLB,,, | Performed by: INTERNAL MEDICINE

## 2019-01-31 PROCEDURE — 99999 PR PBB SHADOW E&M-EST. PATIENT-LVL III: CPT | Mod: PBBFAC,,, | Performed by: INTERNAL MEDICINE

## 2019-01-31 NOTE — PROGRESS NOTES
CHIEF COMPLAINT:  Erosive esophagitis.    HISTORY OF PRESENT ILLNESS:  This is a 68-year-old white male who had an EGD   back on 02/18/2013, which showed LA grade B esophagitis, normal stomach, normal   duodenum.  The patient was instructed to take a PPI once daily.  He has just   taken over-the-counter antacids.  Never had a repeat EGD for Plasencia's   surveillance and checking for erosive esophagitis healing.  His last colonoscopy   is up-to-date on 01/23/2013 by Dr. Howard.  It was complete all the way to the   cecum.  The bowel prep was good.  Dr. Howard recommended a repeat colonoscopy in   seven years for surveillance.  He is average risk by history.  He has no family   history of GI malignancies, colon cancer or advanced colon adenomatous polyps.    No hereditary colon cancer syndrome.  The patient states he has heartburn   symptoms really several times a week after meals lying down, never exertional.    He has a Cardiology appointment today.  He does have coronary artery disease,   status post CABG.  He has had carotid disease as well.  He is not having any   chest pain, shortness of breath.  No abdominal pain.    REVIEW OF SYSTEMS:  CONSTITUTIONAL:  No fever, fatigue or weight loss.  ENT:  No difficulty swallowing or sore throat.  No odynophagia, no dysphagia, no   hoarseness.  CARDIOVASCULAR:  No chest pain or palpitations.  RESPIRATORY:  No shortness of breath or cough.  GENITOURINARY:  No dysuria, urgency or frequency.  MUSCULOSKELETAL:  Some arthritis, occasionally takes some NSAIDs.  SKIN:  No itching or rash.  NEUROLOGIC:  No headache, syncope or stroke.  PSYCHIATRIC:  No uncontrolled depression or anxiety.  ENDOCRINE:  No cold or heat intolerance.  LYMPHATICS:  No lymphadenopathy.  ALLERGIES:  He has no known drug allergies.  GASTROINTESTINAL:  No nausea or vomiting.  Heartburn as above.  No abdominal   pain.  No bloating.  No early satiety.  No constipation.  No diarrhea.    Occasionally, he had a  little bit of pasty stools.  No blood in his stools.  No   change in bowel habits.  His one-time screen for hep C was negative on   01/04/2013.    PAST SURGICAL HISTORY:  He had a four-vessel CABG back in 2008.  He had a left   carotid endarterectomy in 2006.  He has also had hernia repair, tonsillectomy,   internal carotid artery stent.    PAST MEDICAL HISTORY:  Positive for hypertension, hyperlipidemia, diabetes,   aortic valve disease, peripheral vascular disease.    FAMILY HISTORY:  Nobody with colon cancer.  Nobody with advanced colon   adenomatous polyps.  No FAP, no attenuated FAP, no MAP, no Bolanos syndrome.    Nobody with celiac sprue or inflammatory bowel disease.  Nobody with chronic   hepatitis.  Dad's dad had ETOH cirrhosis.  Nobody with pancreatitis or   pancreatic cancer.  No FAP, attenuated FAP, MAP or Bolanos syndrome.    SOCIAL HISTORY:  Nonsmoker.  Does not really drink.  He works for the [employer].  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  He was    five times, single since August 2018.  One healthy daughter born in   1974.    PHYSICAL EXAMINATION:  With chaperone in the room, Joanna,  VITAL SIGNS:  Blood pressure is 142/76, pulse is 60, 5 feet 7 inches tall, 190   pounds, BMI is 29.80.  GENERAL:  He is alert and oriented x4, not in any acute distress.  ABDOMEN:  Obese, but soft, no guarding, no rebound.  No tenderness.  No palpable   organomegaly.  No bruits.  No pulsatile masses.  No stigmata of chronic liver   disease.  No appreciative ascites or hernias.  Normoactive bowel sounds.  No CVA   tenderness.  EYES:  Conjunctivae are nonicteric.  CARDIOVASCULAR:  S1 and S2 without murmurs, gallops or rubs.  RESPIRATORY:  Clear to auscultation bilaterally without wheezes, rhonchi or   rales.  SKIN:  No petechiae or rash on exposed skin areas.  NEUROLOGIC:  Alert and oriented x4.  PSYCHIATRIC:  Normal speech, mentation and affect.  LYMPHATICS:  No cervical or  supraclavicular lymphadenopathy.  No appreciative   thyromegaly.    MEDICAL DECISION MAKING:  As above.  Prior EGD and colonoscopy reports and   images reviewed personally by myself.  Pathology also reviewed.  No dysplasia.    No Plasencia's.  EGD talk given.  PPI talk given.    IMPRESSION AND PLAN:  1.  History of erosive esophagitis, never had his followup, still with   heartburn, taking over-the-counter antacids.  We will schedule the patient for   an EGD for followup.  We will order PPI labs.  2.  Occasional pasty stools.  We will screen for celiac.  Lab work shows no   anemia.  The patient has Cardiology followup today.  He knows to stay on his   aspirin for his EGD.  The patient can follow up with his primary care doctor or   GI pending EGD results.      SEC/HN  dd: 01/31/2019 09:19:26 (CST)  td: 02/01/2019 00:54:06 (CST)  Doc ID   #[id number]  Job ID #[id number]    CC:

## 2019-01-31 NOTE — LETTER
January 31, 2019      Ned Hartley MD  [address]  [place] [facility] [address]           [facility] - Gastroenterology  [address]  [place] [address]  Phone: [phone]  Fax: [phone]          Patient: Esteban Mike   MR Number: [id number]   YOB: 1950   Date of Visit: 1/31/2019       Dear Dr. Ned Hartley:    Thank you for referring Esteban Mike to me for evaluation. Attached you will find relevant portions of my assessment and plan of care.    If you have questions, please do not hesitate to call me. I look forward to following Esteban Mike along with you.    Sincerely,    William Holman MD    Enclosure  CC:  No Recipients    If you would like to receive this communication electronically, please contact [email or web address] or [phone] to request more information on [facility] [employer] access.    For providers and/or their staff who would like to refer a patient to [facility], please contact us through our one-stop-shop provider referral line, [facility], at [phone].    If you feel you have received this communication in error or would no longer like to receive these types of communications, please e-mail [email or web address]

## 2019-02-07 ENCOUNTER — TELEPHONE (OUTPATIENT)
Dept: INTERNAL MEDICINE | Facility: CLINIC | Age: 69
End: 2019-02-07

## 2019-02-07 NOTE — TELEPHONE ENCOUNTER
Called and spoke to pt and he stated that lab orders were supposed to be faxed over to [facility]. I advised that lab orders were faxed to [facility] 1-24-19. Spoke to Heladio at [facility] and he advised that the orders were never received and he asked for a verbal. Gave verbal: E11.42-A1C - [facility].

## 2019-02-07 NOTE — TELEPHONE ENCOUNTER
----- Message from Amber Barnett sent at 2/7/2019  8:01 AM CST -----  Contact: andreia [phone]  [facility] is [facility] to have lab work done but no orders . Please fax over lab orders [phone]. Please advise, Thanks

## 2019-02-08 ENCOUNTER — DOCUMENTATION ONLY (OUTPATIENT)
Dept: INTERNAL MEDICINE | Facility: CLINIC | Age: 69
End: 2019-02-08

## 2019-02-10 ENCOUNTER — TELEPHONE (OUTPATIENT)
Dept: INTERNAL MEDICINE | Facility: CLINIC | Age: 69
End: 2019-02-10

## 2019-02-10 DIAGNOSIS — E11.9 NON-INSULIN DEPENDENT TYPE 2 DIABETES MELLITUS: Primary | ICD-10-CM

## 2019-02-10 DIAGNOSIS — I65.21 INTERNAL CAROTID ARTERY OCCLUSION, RIGHT: ICD-10-CM

## 2019-02-11 ENCOUNTER — DOCUMENTATION ONLY (OUTPATIENT)
Dept: INTERNAL MEDICINE | Facility: CLINIC | Age: 69
End: 2019-02-11

## 2019-02-11 NOTE — TELEPHONE ENCOUNTER
Called patient and discussed labs and or test results. Patient expressed understanding and had the opportunity to ask questions. Any questions were answered. See meds, orders, follow up and instructions sections of encounter.    Specific issues include:  Carotid stenosis - possible progression - FU w/ vasc med, repeat in 1 year

## 2019-02-11 NOTE — TELEPHONE ENCOUNTER
Please see referral orders and please call patient to schedule a consult with Dr. Case and also repeat carotid ultrasound in 1 year. Thank you.

## 2019-02-11 NOTE — TELEPHONE ENCOUNTER
Good afternoon, the patient already has an existing appointment with Cardio and a recall for CV Ultrasound     Thank you

## 2019-02-13 ENCOUNTER — PATIENT OUTREACH (OUTPATIENT)
Dept: OTHER | Facility: OTHER | Age: 69
End: 2019-02-13

## 2019-02-13 NOTE — PROGRESS NOTES
"Last 5 Patient Entered Readings                                      Current 30 Day Average: 139/69     Recent Readings 1/28/2019 1/28/2019 1/16/2019 1/16/2019 1/11/2019    SBP (mmHg) - 147 131 - -    DBP (mmHg) - 78 59 - -    Pulse 63 63 57 57 58        Last 6 Patient Entered Readings                                          Most Recent A1c: 7.8% on 10/3/2018  (Goal: 8%)     Recent Readings 2/11/2019 2/6/2019 2/5/2019 2/1/2019 1/31/2019    Blood Glucose (mg/dL) 164 128 139 103 173        Digital Medicine: Health  Follow Up    Lifestyle Modifications:    1.Dietary Modifications (Sodium intake <2,000mg/day, food labels, dining out): Deferred    2.Physical Activity: Mr. Mike has not been as active since having trouble with his hip (feeling sore).    3.Medication Therapy: Patient has been compliant with the medication regimen.    4.Patient has the following medication side effects/concerns: None  (Frequency/Alleviating factors/Precipitating factors, etc.)     Follow up with Mr. Esteban Mike completed. Mr. Mike is doing okay. He has been working with Dr. Hartley after discovering new blockages in major arteries. He attributes lack of BP monitoring to "being lazy", and has been entering manual readings for his BG. He reports his BG this morning at 117. Encouraged patient to resume BP monitoring, and to double check MyChart so that manual readings are submitted. No further questions or concerns. Will continue to follow up to achieve health goals.      "

## 2019-02-19 ENCOUNTER — PATIENT MESSAGE (OUTPATIENT)
Dept: CARDIOLOGY | Facility: CLINIC | Age: 69
End: 2019-02-19

## 2019-02-19 ENCOUNTER — OFFICE VISIT (OUTPATIENT)
Dept: CARDIOLOGY | Facility: CLINIC | Age: 69
End: 2019-02-19
Payer: COMMERCIAL

## 2019-02-19 VITALS
DIASTOLIC BLOOD PRESSURE: 69 MMHG | WEIGHT: 189.38 LBS | OXYGEN SATURATION: 97 % | BODY MASS INDEX: 29.72 KG/M2 | HEIGHT: 67 IN | HEART RATE: 71 BPM | SYSTOLIC BLOOD PRESSURE: 140 MMHG

## 2019-02-19 DIAGNOSIS — I73.9 PVD (PERIPHERAL VASCULAR DISEASE): Primary | ICD-10-CM

## 2019-02-19 DIAGNOSIS — I65.22 LEFT CAROTID ARTERY STENOSIS: ICD-10-CM

## 2019-02-19 DIAGNOSIS — I65.21 INTERNAL CAROTID ARTERY OCCLUSION, RIGHT: ICD-10-CM

## 2019-02-19 DIAGNOSIS — I25.810 CORONARY ARTERY DISEASE INVOLVING AUTOLOGOUS VEIN CORONARY BYPASS GRAFT WITHOUT ANGINA PECTORIS: ICD-10-CM

## 2019-02-19 DIAGNOSIS — I10 HYPERTENSION, ESSENTIAL: ICD-10-CM

## 2019-02-19 PROCEDURE — 3078F PR MOST RECENT DIASTOLIC BLOOD PRESSURE < 80 MM HG: ICD-10-PCS | Mod: CPTII,S$GLB,, | Performed by: INTERNAL MEDICINE

## 2019-02-19 PROCEDURE — 3077F PR MOST RECENT SYSTOLIC BLOOD PRESSURE >= 140 MM HG: ICD-10-PCS | Mod: CPTII,S$GLB,, | Performed by: INTERNAL MEDICINE

## 2019-02-19 PROCEDURE — 99999 PR PBB SHADOW E&M-EST. PATIENT-LVL III: ICD-10-PCS | Mod: PBBFAC,,, | Performed by: INTERNAL MEDICINE

## 2019-02-19 PROCEDURE — 99215 PR OFFICE/OUTPT VISIT, EST, LEVL V, 40-54 MIN: ICD-10-PCS | Mod: S$GLB,,, | Performed by: INTERNAL MEDICINE

## 2019-02-19 PROCEDURE — 99999 PR PBB SHADOW E&M-EST. PATIENT-LVL III: CPT | Mod: PBBFAC,,, | Performed by: INTERNAL MEDICINE

## 2019-02-19 PROCEDURE — 3078F DIAST BP <80 MM HG: CPT | Mod: CPTII,S$GLB,, | Performed by: INTERNAL MEDICINE

## 2019-02-19 PROCEDURE — 99215 OFFICE O/P EST HI 40 MIN: CPT | Mod: S$GLB,,, | Performed by: INTERNAL MEDICINE

## 2019-02-19 PROCEDURE — 1101F PR PT FALLS ASSESS DOC 0-1 FALLS W/OUT INJ PAST YR: ICD-10-PCS | Mod: CPTII,S$GLB,, | Performed by: INTERNAL MEDICINE

## 2019-02-19 PROCEDURE — 3077F SYST BP >= 140 MM HG: CPT | Mod: CPTII,S$GLB,, | Performed by: INTERNAL MEDICINE

## 2019-02-19 PROCEDURE — 1101F PT FALLS ASSESS-DOCD LE1/YR: CPT | Mod: CPTII,S$GLB,, | Performed by: INTERNAL MEDICINE

## 2019-02-19 NOTE — PROGRESS NOTES
Subjective:    Patient ID:  Esteban Mike is a 68 y.o. male who presents for evaluation of No chief complaint on file.      HPI    68 year old male here for  Follow up of Carotid artery stenting.   Patient has history of CAD S/P CABG 2008 proceeded by right CEA 2006 and left ICA stent 7/26/2007. Other PMH significant for DM, HTN, HLD. Patient has been doing well with no TIA or CVA symptoms. Denied Dizziness, loss of consciousness, denied focal weakness/ tingling or numbness.   He underwent Carotid US that revealed 40-49% right ICA / EDV 41.  Prior Carotid US 4/2017 with / EDV 34  Patient denied chest pain, denied claudications.     Review of Systems   Constitution: Negative for decreased appetite, weight gain and weight loss.   HENT: Negative.    Eyes: Negative.    Cardiovascular: Negative for chest pain, dyspnea on exertion, irregular heartbeat, orthopnea, palpitations and paroxysmal nocturnal dyspnea.   Respiratory: Negative for cough and wheezing.    Gastrointestinal: Negative for bloating, melena, nausea and vomiting.   Genitourinary: Negative.    Neurological: Negative.         Objective:    Physical Exam   Constitutional: He is oriented to person, place, and time. He appears well-developed and well-nourished. No distress.   HENT:   Head: Normocephalic.   Neck: No JVD present.   Cardiovascular: Normal rate, regular rhythm, normal heart sounds and intact distal pulses. Exam reveals no gallop and no friction rub.   No murmur heard.  Pulmonary/Chest: Effort normal and breath sounds normal. No respiratory distress. He has no wheezes. He has no rales. He exhibits no tenderness.   Abdominal: Soft. Bowel sounds are normal. There is no tenderness.   Musculoskeletal: Normal range of motion. He exhibits no edema, tenderness or deformity.   Neurological: He is alert and oriented to person, place, and time.   Skin: Skin is warm. He is not diaphoretic.         Vitals:    02/19/19 1410   BP: (!)  140/69   Pulse:        Assessment:       1. PVD (peripheral vascular disease)    2. Internal carotid artery occlusion, right    3. Left carotid artery stenosis    4. Hypertension, essential    5. Coronary artery disease involving autologous vein coronary bypass graft without angina pectoris         Plan:         -S/P Left Carotid Artery stenting 2007:  Carotid US revealed JULIUS / EDV 41 and occluded LICA.  Patient is asymptomatic with no CVA/ TIA symptoms.   He is on ASA and Atorvastatin, blood pressure and diabetes well controlled.   Given his remote intervention > 10 years ago, his risk of carotid restenosis is minimal.   Recommend regular follow up with Dr Hartley.    -CAD S/P CABG with no angina:  Continue with Aspirin and Atorvastatin.   Recent PET stress with no evidence of ischemia.     RTC PRN.    Fernando Sanchez MD  Cardiology Fellow   I have personally taken the history and examined this patient. I have discussed and agree with the resident's findings and plan as documented in the resident's note.  Blaine Case

## 2019-02-24 ENCOUNTER — DOCUMENTATION ONLY (OUTPATIENT)
Dept: GASTROENTEROLOGY | Facility: CLINIC | Age: 69
End: 2019-02-24

## 2019-02-24 ENCOUNTER — PATIENT MESSAGE (OUTPATIENT)
Dept: GASTROENTEROLOGY | Facility: CLINIC | Age: 69
End: 2019-02-24

## 2019-02-24 ENCOUNTER — TELEPHONE (OUTPATIENT)
Dept: GASTROENTEROLOGY | Facility: CLINIC | Age: 69
End: 2019-02-24

## 2019-02-24 DIAGNOSIS — E55.9 VITAMIN D INSUFFICIENCY: Primary | ICD-10-CM

## 2019-02-24 RX ORDER — ERGOCALCIFEROL 1.25 MG/1
50000 CAPSULE ORAL
Qty: 12 CAPSULE | Refills: 0 | Status: SHIPPED | OUTPATIENT
Start: 2019-02-24 | End: 2019-05-13

## 2019-02-24 RX ORDER — ACETAMINOPHEN 500 MG
1 TABLET ORAL DAILY
Qty: 90 CAPSULE | Refills: 3 | COMMUNITY
Start: 2019-02-24 | End: 2020-02-24

## 2019-02-24 NOTE — PROGRESS NOTES
Received copies of his [facility] lab results collected on 02/07/2019.    His vitamin D level is slightly low at 28, normal range is .  His   magnesium is normal at 2.1.  His vitamin B12 is normal at 341.  His TTG IgA is   normal at 1.  Recommend patient take vitamin D.      SEC/HN  dd: 02/24/2019 08:35:47 (CST)  td: 02/24/2019 10:46:20 (CST)  Doc ID   #[id number]  Job ID #[id number]    CC:

## 2019-02-27 ENCOUNTER — OFFICE VISIT (OUTPATIENT)
Dept: CARDIOLOGY | Facility: CLINIC | Age: 69
End: 2019-02-27
Payer: COMMERCIAL

## 2019-02-27 VITALS
BODY MASS INDEX: 29.5 KG/M2 | HEART RATE: 60 BPM | WEIGHT: 188.38 LBS | DIASTOLIC BLOOD PRESSURE: 71 MMHG | SYSTOLIC BLOOD PRESSURE: 118 MMHG

## 2019-02-27 DIAGNOSIS — I25.810 CORONARY ARTERY DISEASE INVOLVING AUTOLOGOUS VEIN CORONARY BYPASS GRAFT WITHOUT ANGINA PECTORIS: Primary | ICD-10-CM

## 2019-02-27 DIAGNOSIS — I65.21 INTERNAL CAROTID ARTERY OCCLUSION, RIGHT: ICD-10-CM

## 2019-02-27 DIAGNOSIS — E11.8 TYPE 2 DIABETES MELLITUS WITH COMPLICATION, WITHOUT LONG-TERM CURRENT USE OF INSULIN: ICD-10-CM

## 2019-02-27 DIAGNOSIS — E78.2 MIXED HYPERLIPIDEMIA: ICD-10-CM

## 2019-02-27 DIAGNOSIS — I73.9 PVD (PERIPHERAL VASCULAR DISEASE): ICD-10-CM

## 2019-02-27 DIAGNOSIS — I10 ESSENTIAL HYPERTENSION: ICD-10-CM

## 2019-02-27 DIAGNOSIS — I65.22 LEFT CAROTID ARTERY STENOSIS: ICD-10-CM

## 2019-02-27 PROCEDURE — 99999 PR PBB SHADOW E&M-EST. PATIENT-LVL III: ICD-10-PCS | Mod: PBBFAC,,, | Performed by: INTERNAL MEDICINE

## 2019-02-27 PROCEDURE — 3074F PR MOST RECENT SYSTOLIC BLOOD PRESSURE < 130 MM HG: ICD-10-PCS | Mod: CPTII,S$GLB,, | Performed by: INTERNAL MEDICINE

## 2019-02-27 PROCEDURE — 3045F PR MOST RECENT HEMOGLOBIN A1C LEVEL 7.0-9.0%: CPT | Mod: CPTII,S$GLB,, | Performed by: INTERNAL MEDICINE

## 2019-02-27 PROCEDURE — 3078F DIAST BP <80 MM HG: CPT | Mod: CPTII,S$GLB,, | Performed by: INTERNAL MEDICINE

## 2019-02-27 PROCEDURE — 3045F PR MOST RECENT HEMOGLOBIN A1C LEVEL 7.0-9.0%: ICD-10-PCS | Mod: CPTII,S$GLB,, | Performed by: INTERNAL MEDICINE

## 2019-02-27 PROCEDURE — 1101F PR PT FALLS ASSESS DOC 0-1 FALLS W/OUT INJ PAST YR: ICD-10-PCS | Mod: CPTII,S$GLB,, | Performed by: INTERNAL MEDICINE

## 2019-02-27 PROCEDURE — 99214 OFFICE O/P EST MOD 30 MIN: CPT | Mod: S$GLB,,, | Performed by: INTERNAL MEDICINE

## 2019-02-27 PROCEDURE — 1101F PT FALLS ASSESS-DOCD LE1/YR: CPT | Mod: CPTII,S$GLB,, | Performed by: INTERNAL MEDICINE

## 2019-02-27 PROCEDURE — 3078F PR MOST RECENT DIASTOLIC BLOOD PRESSURE < 80 MM HG: ICD-10-PCS | Mod: CPTII,S$GLB,, | Performed by: INTERNAL MEDICINE

## 2019-02-27 PROCEDURE — 99214 PR OFFICE/OUTPT VISIT, EST, LEVL IV, 30-39 MIN: ICD-10-PCS | Mod: S$GLB,,, | Performed by: INTERNAL MEDICINE

## 2019-02-27 PROCEDURE — 3074F SYST BP LT 130 MM HG: CPT | Mod: CPTII,S$GLB,, | Performed by: INTERNAL MEDICINE

## 2019-02-27 PROCEDURE — 99999 PR PBB SHADOW E&M-EST. PATIENT-LVL III: CPT | Mod: PBBFAC,,, | Performed by: INTERNAL MEDICINE

## 2019-02-27 NOTE — PROGRESS NOTES
"Subjective:    Patient ID:  Esteban Mike is a 68 y.o. male who presents for follow-up of CAD    HPI     67 year old male last seen 8/7/17 is followed with CAD post CABG 2008, post right CEA 2006 and left ICA stent 2007. He is followed by [facility] medicine. On his last clinic visit 7/18/18  he reported several months of occasional non exertional "tightness" which radiated to his jaw. A PET was ordered and was normal. He reports no chest pain or CHAU. He is followed by Dr Case for carotid disease      CONCLUSIONS: NORMAL MYOCARDIAL PERFUSION PET STRESS TEST  1. The perfusion scan is free of evidence for myocardial ischemia or injury.   2. Resting wall motion is physiologic. Stress wall motion is physiologic.   3. LV function is normal at rest and stress.  (normal is >= 51%)  4. The ventricular volumes are normal at rest and stress.   5. The extracardiac distribution of radioactivity is normal.   6. There was no previous study available to compare.          This document has been electronically    SIGNED BY: Troy Harrison MD On: 08/08/2018 14:33  Lab Results   Component Value Date     10/03/2018    K 4.5 10/03/2018     10/03/2018    CO2 27 10/03/2018    BUN 18 10/03/2018    CREATININE 0.96 10/03/2018     (H) 10/03/2018    HGBA1C 7.8 (H) 10/03/2018    MG 2.6 05/06/2008    AST 23 10/03/2018    ALT 50 (H) 10/03/2018    ALBUMIN 4.2 10/03/2018    PROT 6.5 10/03/2018    BILITOT 0.5 10/03/2018    WBC 10.3 04/11/2018    HGB 14.2 04/11/2018    HCT 42.3 04/11/2018    MCV 90.8 04/11/2018     04/11/2018    INR 1.0 06/25/2014    PSA 0.28 05/20/2015    TSH 0.69 04/11/2018         Lab Results   Component Value Date    CHOL 125 10/03/2018    HDL 36 (L) 10/03/2018    TRIG 135 10/03/2018       Lab Results   Component Value Date    LDLCALC 67 10/03/2018       Past Medical History:   Diagnosis Date    Aortic valve disease     Bilateral carotid artery stenosis 7/18/2018    CAD (coronary artery " disease)     Carotid artery occlusion      LICA, 70-80% JULIUS    Diabetes mellitus     Diabetes mellitus type II     Elevated LFTs     GERD (gastroesophageal reflux disease)     Hyperlipidemia     Hypertension     PVD (peripheral vascular disease)        Current Outpatient Medications:     aspirin 81 MG chewable tablet, Take 81 mg by mouth once daily.  , Disp: , Rfl:     atorvastatin (LIPITOR) 80 MG tablet, TAKE ONE TABLET BY MOUTH DAILY after supper FOR CHOLESTEROL, Disp: 90 tablet, Rfl: 3    blood sugar diagnostic Strp, To check BG 3 times daily, to use with insurance preferred meter, Disp: 200 strip, Rfl: 11    blood-glucose meter kit, To check BG 3 times daily, to use with insurance preferred meter, Disp: 1 each, Rfl: 0    carvedilol (COREG) 3.125 MG tablet, TAKE ONE TABLET BY MOUTH TWICE DAILY WITH MEALS (STOP ATENOLOL), Disp: 180 tablet, Rfl: 1    cholecalciferol, vitamin D3, (VITAMIN D3) 2,000 unit Cap, Take 1 capsule (2,000 Units total) by mouth once daily., Disp: 90 capsule, Rfl: 3    ergocalciferol (ERGOCALCIFEROL) 50,000 unit Cap, Take 1 capsule (50,000 Units total) by mouth every 7 days. for 12 doses, Disp: 12 capsule, Rfl: 0    fish oil-omega-3 fatty acids 300-1,000 mg capsule, Take 2 g by mouth once daily. 3 tablets  , Disp: , Rfl:     glipiZIDE (GLUCOTROL) 5 MG tablet, Take 1 tablet (5 mg total) by mouth 2 (two) times daily with meals., Disp: 180 tablet, Rfl: 3    hydroCHLOROthiazide (HYDRODIURIL) 25 MG tablet, Take 1 tablet (25 mg total) by mouth once daily., Disp: 90 tablet, Rfl: 3    linagliptin (TRADJENTA) 5 mg Tab tablet, Take 1 tablet (5 mg total) by mouth once daily., Disp: 90 tablet, Rfl: 3    metFORMIN (GLUCOPHAGE-XR) 500 MG 24 hr tablet, TAKE TWO TABLETS BY MOUTH TWICE DAILY WITH MEALS, Disp: 360 tablet, Rfl: 3    multivitamin capsule, Take 1 capsule by mouth once daily., Disp: , Rfl:     valsartan (DIOVAN) 320 MG tablet, TAKE ONE TABLET BY MOUTH DAILY FOR BLOOD  PRESSURE, Disp: 90 tablet, Rfl: 1    vit C-vit E-lutein-min-om-3 (OCUVITE) 399-80-9-150 mg-unit-mg-mg Cap, Take 1 tablet by mouth once daily., Disp: , Rfl:     lancets Misc, To check BG 3 times daily, to use with insurance preferred meter, Disp: 200 each, Rfl: 11          Review of Systems   Constitution: Negative for decreased appetite, diaphoresis, fever, weakness, malaise/fatigue, weight gain and weight loss.   HENT: Negative for congestion, ear discharge, ear pain and nosebleeds.    Eyes: Negative for blurred vision, double vision and visual disturbance.   Cardiovascular: Negative for chest pain, claudication, cyanosis, dyspnea on exertion, irregular heartbeat, leg swelling, near-syncope, orthopnea, palpitations, paroxysmal nocturnal dyspnea and syncope.   Respiratory: Negative for cough, hemoptysis, shortness of breath, sleep disturbances due to breathing, snoring, sputum production and wheezing.    Endocrine: Negative for polydipsia, polyphagia and polyuria.   Hematologic/Lymphatic: Negative for adenopathy and bleeding problem. Does not bruise/bleed easily.   Skin: Negative for color change, nail changes, poor wound healing and rash.   Musculoskeletal: Negative for muscle cramps and muscle weakness.   Gastrointestinal: Negative for abdominal pain, anorexia, change in bowel habit, hematochezia, nausea and vomiting.   Genitourinary: Negative for dysuria, frequency and hematuria.   Neurological: Negative for brief paralysis, difficulty with concentration, excessive daytime sleepiness, dizziness, focal weakness, headaches, light-headedness, seizures and vertigo.   Psychiatric/Behavioral: Negative for altered mental status and depression.   Allergic/Immunologic: Negative for persistent infections.        Objective:/71 (BP Location: Left arm, Patient Position: Sitting, BP Method: Medium (Automatic))   Pulse 60   Wt 85.5 kg (188 lb 6.1 oz)   BMI 29.50 kg/m²             Physical Exam   Constitutional: He is  oriented to person, place, and time. He appears well-developed and well-nourished.   HENT:   Head: Normocephalic.       Right Ear: External ear normal.   Left Ear: External ear normal.   Nose: Nose normal.   Inspection of lips, teeth and gums normal   Eyes: EOM are normal. Pupils are equal, round, and reactive to light. No scleral icterus.   Neck: Normal range of motion. Neck supple. No JVD present. No tracheal deviation present. No thyromegaly present.   Cardiovascular: Normal rate, regular rhythm and intact distal pulses. Exam reveals no gallop and no friction rub.   No murmur heard.  Pulses:       Dorsalis pedis pulses are 2+ on the right side, and 2+ on the left side.   Pulmonary/Chest: Effort normal and breath sounds normal.       Abdominal: Bowel sounds are normal. He exhibits no distension. There is no hepatosplenomegaly. There is no tenderness. There is no guarding.   Musculoskeletal: Normal range of motion. He exhibits no edema or tenderness.   Lymphadenopathy:   Palpation of neck and groin lymph nodes normal   Neurological: He is alert and oriented to person, place, and time. No cranial nerve deficit. He exhibits normal muscle tone. Coordination normal.   Skin: Skin is dry.   Palpation of skin normal   Psychiatric: His behavior is normal. Judgment and thought content normal.         Assessment:       1. Coronary artery disease involving autologous vein coronary bypass graft without angina pectoris    2. Essential hypertension    3. Mixed hyperlipidemia    4. Left carotid artery stenosis    5. Internal carotid artery occlusion, right    6. PVD (peripheral vascular disease)    7. Type 2 diabetes mellitus with complication, without long-term current use of insulin         Plan:       Esteban was seen today for follow-up.    Diagnoses and all orders for this visit:    Coronary artery disease involving autologous vein coronary bypass graft without angina pectoris  -     Lipid panel; Future; Expected date:  02/27/2020    Essential hypertension  -     Comprehensive metabolic panel; Future; Expected date: 02/27/2020  -     CBC auto differential; Future; Expected date: 02/27/2020    Mixed hyperlipidemia  -     Lipid panel; Future; Expected date: 02/27/2020    Left carotid artery stenosis    Internal carotid artery occlusion, right    PVD (peripheral vascular disease)    Type 2 diabetes mellitus with complication, without long-term current use of insulin  -     Hemoglobin A1c; Future; Expected date: 02/27/2020

## 2019-02-27 NOTE — LETTER
February 27, 2019      Ned Hartley MD  [address]  [facility] [address]           [facility]  [address], [facility] [address]  [facility] [address]  Phone: [phone]  Fax: [phone]          Patient: Esteban Mike   MR Number: [id number]   YOB: 1950   Date of Visit: 2/27/2019       Dear Dr. Ned Hartley:    Thank you for referring Esteban Mike to me for evaluation. Attached you will find relevant portions of my assessment and plan of care.    If you have questions, please do not hesitate to call me. I look forward to following Esteban Mike along with you.    Sincerely,    Jose E Grajeda MD    Enclosure  CC:  No Recipients    If you would like to receive this communication electronically, please contact [email or web address] or [phone] to request more information on [facility] access.    For providers and/or their staff who would like to refer a patient to [facility], please contact us through our one-stop-shop provider referral line, [facility], at [phone].    If you feel you have received this communication in error or would no longer like to receive these types of communications, please e-mail [email or web address]

## 2019-03-14 ENCOUNTER — PATIENT OUTREACH (OUTPATIENT)
Dept: OTHER | Facility: OTHER | Age: 69
End: 2019-03-14

## 2019-03-14 NOTE — PROGRESS NOTES
"Last 5 Patient Entered Readings                                      Current 30 Day Average: 134/71     Recent Readings 3/9/2019 3/9/2019 2/28/2019 2/28/2019 2/26/2019    SBP (mmHg) - 149 108 - 124    DBP (mmHg) - 78 59 - 65    Pulse 61 61 64 64 57          Last 6 Patient Entered Readings                                          Most Recent A1c: 7.8% on 10/3/2018  (Goal: 8%)     Recent Readings 3/13/2019 3/8/2019 3/4/2019 3/2/2019 2/28/2019    Blood Glucose (mg/dL) 153 135 203 111 152        Digital Medicine: Health  Follow Up    Lifestyle Modifications:    1.Dietary Modifications (Sodium intake <2,000mg/day, food labels, dining out): Deferred    2.Physical Activity: Deferred    3.Medication Therapy: Patient has been compliant with the medication regimen.    4.Patient has the following medication side effects/concerns: None  (Frequency/Alleviating factors/Precipitating factors, etc.)     Follow up with Mr. Esteban Mike completed. Mr. Mike is doing okay. He reports having a bad cold (for over a week now), and may go to the clinic tomorrow to be tested for the flu. Encouraged him to do so. Also, patient states that he had his A1c drawn last month at [facility]". Encouraged patient to continue to submit frequent BP and BG readings. No further questions or concerns. Will continue to follow up to achieve health goals.      "

## 2019-03-18 ENCOUNTER — PATIENT OUTREACH (OUTPATIENT)
Dept: OTHER | Facility: OTHER | Age: 69
End: 2019-03-18

## 2019-03-18 NOTE — PROGRESS NOTES
Last 5 Patient Entered Readings                                      Current 30 Day Average: 129/68     Recent Readings 3/18/2019 3/18/2019 3/9/2019 3/9/2019 2/28/2019    SBP (mmHg) - 134 - 149 108    DBP (mmHg) - 71 - 78 59    Pulse 55 55 61 61 64          Last 6 Patient Entered Readings                                          Most Recent A1c: 7.8% on 10/3/2018  (Goal: 8%)     Recent Readings 3/17/2019 3/16/2019 3/15/2019 3/14/2019 3/13/2019    Blood Glucose (mg/dL) 148 53 124 127 153        Patient states that low BG recorded on 3/16 was an error. He denies any hypoglycemic symptoms, which is why he feels it is inaccurate. Will delete.    Mr. Mike is feeling better, but still may schedule an appointment to be seen in the clinic. He mentioned slightly elevated BP this morning, but recalls eating two deli meat sandwiches yesterday. Encouraged sodium restriction. No questions. Will follow up in 4 weeks.

## 2019-04-01 ENCOUNTER — ANESTHESIA EVENT (OUTPATIENT)
Dept: ENDOSCOPY | Facility: HOSPITAL | Age: 69
End: 2019-04-01
Payer: COMMERCIAL

## 2019-04-01 ENCOUNTER — TELEPHONE (OUTPATIENT)
Dept: ENDOSCOPY | Facility: HOSPITAL | Age: 69
End: 2019-04-01

## 2019-04-01 ENCOUNTER — HOSPITAL ENCOUNTER (OUTPATIENT)
Facility: HOSPITAL | Age: 69
Discharge: HOME | End: 2019-04-01
Attending: INTERNAL MEDICINE | Admitting: INTERNAL MEDICINE
Payer: COMMERCIAL

## 2019-04-01 ENCOUNTER — ANESTHESIA (OUTPATIENT)
Dept: ENDOSCOPY | Facility: HOSPITAL | Age: 69
End: 2019-04-01
Payer: COMMERCIAL

## 2019-04-01 VITALS
BODY MASS INDEX: 28.25 KG/M2 | OXYGEN SATURATION: 96 % | HEIGHT: 67 IN | HEART RATE: 55 BPM | RESPIRATION RATE: 18 BRPM | TEMPERATURE: 98 F | WEIGHT: 180 LBS | DIASTOLIC BLOOD PRESSURE: 72 MMHG | SYSTOLIC BLOOD PRESSURE: 139 MMHG

## 2019-04-01 DIAGNOSIS — K20.90 ESOPHAGITIS: ICD-10-CM

## 2019-04-01 DIAGNOSIS — K22.10 ESOPHAGITIS, EROSIVE: Primary | ICD-10-CM

## 2019-04-01 LAB — POCT GLUCOSE: 192 MG/DL (ref 70–110)

## 2019-04-01 PROCEDURE — 43239 EGD BIOPSY SINGLE/MULTIPLE: CPT | Mod: ,,, | Performed by: INTERNAL MEDICINE

## 2019-04-01 PROCEDURE — 88305 TISSUE SPECIMEN TO PATHOLOGY - SURGERY: ICD-10-PCS | Mod: 26,,, | Performed by: PATHOLOGY

## 2019-04-01 PROCEDURE — 82962 GLUCOSE BLOOD TEST: CPT | Performed by: INTERNAL MEDICINE

## 2019-04-01 PROCEDURE — E9220 PRA ENDO ANESTHESIA: HCPCS | Mod: ,,, | Performed by: NURSE ANESTHETIST, CERTIFIED REGISTERED

## 2019-04-01 PROCEDURE — 37000009 HC ANESTHESIA EA ADD 15 MINS: Performed by: INTERNAL MEDICINE

## 2019-04-01 PROCEDURE — E9220 PRA ENDO ANESTHESIA: ICD-10-PCS | Mod: ,,, | Performed by: NURSE ANESTHETIST, CERTIFIED REGISTERED

## 2019-04-01 PROCEDURE — 43239 PR EGD, FLEX, W/BIOPSY, SGL/MULTI: ICD-10-PCS | Mod: ,,, | Performed by: INTERNAL MEDICINE

## 2019-04-01 PROCEDURE — 88305 TISSUE EXAM BY PATHOLOGIST: CPT | Mod: 26,,, | Performed by: PATHOLOGY

## 2019-04-01 PROCEDURE — 27201012 HC FORCEPS, HOT/COLD, DISP: Performed by: INTERNAL MEDICINE

## 2019-04-01 PROCEDURE — 63600175 PHARM REV CODE 636 W HCPCS: Performed by: NURSE ANESTHETIST, CERTIFIED REGISTERED

## 2019-04-01 PROCEDURE — 37000008 HC ANESTHESIA 1ST 15 MINUTES: Performed by: INTERNAL MEDICINE

## 2019-04-01 PROCEDURE — 88305 TISSUE EXAM BY PATHOLOGIST: CPT | Performed by: PATHOLOGY

## 2019-04-01 PROCEDURE — 43239 EGD BIOPSY SINGLE/MULTIPLE: CPT | Performed by: INTERNAL MEDICINE

## 2019-04-01 PROCEDURE — 88342 TISSUE SPECIMEN TO PATHOLOGY - SURGERY: ICD-10-PCS | Mod: 26,,, | Performed by: PATHOLOGY

## 2019-04-01 PROCEDURE — 25000003 PHARM REV CODE 250: Performed by: NURSE ANESTHETIST, CERTIFIED REGISTERED

## 2019-04-01 PROCEDURE — 88342 IMHCHEM/IMCYTCHM 1ST ANTB: CPT | Mod: 26,,, | Performed by: PATHOLOGY

## 2019-04-01 PROCEDURE — 25000003 PHARM REV CODE 250: Performed by: INTERNAL MEDICINE

## 2019-04-01 PROCEDURE — 88342 IMHCHEM/IMCYTCHM 1ST ANTB: CPT | Performed by: PATHOLOGY

## 2019-04-01 RX ORDER — PROPOFOL 10 MG/ML
VIAL (ML) INTRAVENOUS CONTINUOUS PRN
Status: DISCONTINUED | OUTPATIENT
Start: 2019-04-01 | End: 2019-04-01

## 2019-04-01 RX ORDER — LIDOCAINE HCL/PF 100 MG/5ML
SYRINGE (ML) INTRAVENOUS
Status: DISCONTINUED | OUTPATIENT
Start: 2019-04-01 | End: 2019-04-01

## 2019-04-01 RX ORDER — PANTOPRAZOLE SODIUM 40 MG/1
40 TABLET, DELAYED RELEASE ORAL
Qty: 90 TABLET | Refills: 3 | Status: ON HOLD | OUTPATIENT
Start: 2019-04-01 | End: 2020-12-04 | Stop reason: SDUPTHER

## 2019-04-01 RX ORDER — SODIUM CHLORIDE 9 MG/ML
INJECTION, SOLUTION INTRAVENOUS CONTINUOUS
Status: DISCONTINUED | OUTPATIENT
Start: 2019-04-01 | End: 2019-04-01 | Stop reason: HOSPADM

## 2019-04-01 RX ORDER — GLYCOPYRROLATE 0.2 MG/ML
INJECTION INTRAMUSCULAR; INTRAVENOUS
Status: DISCONTINUED | OUTPATIENT
Start: 2019-04-01 | End: 2019-04-01

## 2019-04-01 RX ORDER — SODIUM CHLORIDE 0.9 % (FLUSH) 0.9 %
10 SYRINGE (ML) INJECTION
Status: DISCONTINUED | OUTPATIENT
Start: 2019-04-01 | End: 2019-04-01 | Stop reason: HOSPADM

## 2019-04-01 RX ORDER — LINAGLIPTIN 5 MG/1
TABLET, FILM COATED ORAL
Qty: 90 TABLET | Refills: 0 | Status: SHIPPED | OUTPATIENT
Start: 2019-04-01 | End: 2019-08-15 | Stop reason: ALTCHOICE

## 2019-04-01 RX ORDER — PROPOFOL 10 MG/ML
VIAL (ML) INTRAVENOUS
Status: DISCONTINUED | OUTPATIENT
Start: 2019-04-01 | End: 2019-04-01

## 2019-04-01 RX ADMIN — LIDOCAINE HYDROCHLORIDE 60 MG: 20 INJECTION, SOLUTION INTRAVENOUS at 07:04

## 2019-04-01 RX ADMIN — SODIUM CHLORIDE: 0.9 INJECTION, SOLUTION INTRAVENOUS at 07:04

## 2019-04-01 RX ADMIN — GLYCOPYRROLATE 0.2 MG: 0.2 INJECTION, SOLUTION INTRAMUSCULAR; INTRAVENOUS at 07:04

## 2019-04-01 RX ADMIN — PROPOFOL 200 MCG/KG/MIN: 10 INJECTION, EMULSION INTRAVENOUS at 07:04

## 2019-04-01 RX ADMIN — PROPOFOL 100 MG: 10 INJECTION, EMULSION INTRAVENOUS at 07:04

## 2019-04-01 NOTE — PROVATION PATIENT INSTRUCTIONS
Discharge Summary/Instructions after an Endoscopic Procedure  Patient Name: Esteban Mike  Patient MRN: [id number]  Patient YOB: 1950 Monday, April 01, 2019  William Holman MD  RESTRICTIONS:  During your procedure today, you received medications for sedation.  These   medications may affect your judgment, balance and coordination.  Therefore,   for 24 hours, you have the following restrictions:   - DO NOT drive a car, operate machinery, make legal/financial decisions,   sign important papers or drink alcohol.    ACTIVITY:  Today: no heavy lifting, straining or running due to procedural   sedation/anesthesia.  The following day: return to full activity including work.  DIET:  Eat and drink normally unless instructed otherwise.     TREATMENT FOR COMMON SIDE EFFECTS:  - Mild abdominal pain, nausea, belching, bloating or excessive gas:  rest,   eat lightly and use a heating pad.  - Sore Throat: treat with throat lozenges and/or gargle with warm salt   water.  - Because air was used during the procedure, expelling large amounts of air   from your rectum or belching is normal.  - If a bowel prep was taken, you may not have a bowel movement for 1-3 days.    This is normal.  SYMPTOMS TO WATCH FOR AND REPORT TO YOUR PHYSICIAN:  1. Abdominal pain or bloating, other than gas cramps.  2. Chest pain.  3. Back pain.  4. Signs of infection such as: chills or fever occurring within 24 hours   after the procedure.  5. Rectal bleeding, which would show as bright red, maroon, or black stools.   (A tablespoon of blood from the rectum is not serious, especially if   hemorrhoids are present.)  6. Vomiting.  7. Weakness or dizziness.  GO DIRECTLY TO THE NEAREST EMERGENCY ROOM IF YOU HAVE ANY OF THE FOLLOWING:      Difficulty breathing              Chills and/or fever over 101 F   Persistent vomiting and/or vomiting blood   Severe abdominal pain   Severe chest pain   Black, tarry stools   Bleeding- more than one  tablespoon   Any other symptom or condition that you feel may need urgent attention  Your doctor recommends these additional instructions:  If any biopsies were taken, your doctors clinic will contact you in 1 to 2   weeks with any results.  - Discharge patient to home.   - Follow an antireflux regimen indefinitely.   - Await pathology results.   - Telephone endoscopist for pathology results in 2 weeks.   - Repeat upper endoscopy in 12 weeks to check healing.   - The findings and recommendations were discussed with the patient.   - Return to primary care physician.   - Use Protonix 40 mg once daily (or any other full strength proton pump   inhibitor) - best taken 45 minutes before your first protein containing   meal.  For questions, problems or results please call your physician - William Holman MD at Work:  [phone].  [facility], EMERGENCY ROOM PHONE NUMBER: [phone]  IF A COMPLICATION OR EMERGENCY SITUATION ARISES AND YOU ARE UNABLE TO REACH   YOUR PHYSICIAN - GO DIRECTLY TO THE EMERGENCY ROOM.  William Holman MD  4/1/2019 7:40:22 AM  This report has been verified and signed electronically.  PROVATION

## 2019-04-01 NOTE — ANESTHESIA POSTPROCEDURE EVALUATION
Anesthesia Post Evaluation    Patient: Esteban Mike    Procedure(s) Performed: Procedure(s) (LRB):  EGD (ESOPHAGOGASTRODUODENOSCOPY) (N/A)    Final Anesthesia Type: general  Patient location during evaluation: GI PACU  Patient participation: Yes- Able to Participate  Level of consciousness: awake and alert and oriented  Post-procedure vital signs: reviewed and stable  Pain management: adequate  Airway patency: patent  PONV status at discharge: No PONV  Anesthetic complications: no      Cardiovascular status: blood pressure returned to baseline and hemodynamically stable  Respiratory status: unassisted, spontaneous ventilation and room air  Hydration status: euvolemic  Follow-up not needed.          Vitals Value Taken Time   /72 4/1/2019  8:10 AM   Temp 36.9 °C (98.4 °F) 4/1/2019  7:43 AM   Pulse 55 4/1/2019  8:10 AM   Resp 18 4/1/2019  8:10 AM   SpO2 96 % 4/1/2019  8:10 AM         Event Time     Out of Recovery 08:11:39          Pain/Chano Score: Chano Score: 10 (4/1/2019  7:44 AM)

## 2019-04-01 NOTE — H&P
[facility]  History & Physical    Subjective:      Chief Complaint/Reason for Admission:    EGD    Esteban Mike is a 68 y.o. male.    Past Medical History:   Diagnosis Date    Aortic valve disease     Bilateral carotid artery stenosis 7/18/2018    CAD (coronary artery disease)     Carotid artery occlusion      LICA, 70-80% JULIUS    Diabetes mellitus     Diabetes mellitus type II     Elevated LFTs     GERD (gastroesophageal reflux disease)     Hyperlipidemia     Hypertension     PVD (peripheral vascular disease)      Past Surgical History:   Procedure Laterality Date    BIOPSY LIVER AND ULTRASOUND N/A 6/25/2014    Performed by [facility] at [facility] OR 2ND FLR    CARDIAC CATHETERIZATION      carotid      CEA      COLONOSCOPY N/A 1/23/2013    Performed by Dominik Howard MD at [facility] ENDO (4TH FLR)    CORONARY ARTERY BYPASS GRAFT  5/2/08    X4    EGD (ESOPHAGOGASTRODUODENOSCOPY) N/A 2/18/2013    Performed by Dontrell Grant MD at [facility] ENDO (4TH FLR)    HERNIA REPAIR      ica stent      TONSILLECTOMY       Family History   Problem Relation Age of Onset    Diabetes Mother     Heart disease Mother     Heart disease Father     Heart disease Maternal Grandfather     Heart disease Paternal Grandfather     Cirrhosis Paternal Grandfather     Colon cancer Neg Hx     Inflammatory bowel disease Neg Hx     Stomach cancer Neg Hx     Skin cancer Neg Hx      Social History     Tobacco Use    Smoking status: Never Smoker    Smokeless tobacco: Never Used   Substance Use Topics    Alcohol use: Yes     Comment: Socially     Drug use: No       PTA Medications   Medication Sig    aspirin 81 MG chewable tablet Take 81 mg by mouth once daily.      atorvastatin (LIPITOR) 80 MG tablet TAKE ONE TABLET BY MOUTH DAILY after supper FOR CHOLESTEROL    blood sugar diagnostic Strp To check BG 3 times daily, to use with insurance preferred meter    blood-glucose meter kit  To check BG 3 times daily, to use with insurance preferred meter    carvedilol (COREG) 3.125 MG tablet TAKE ONE TABLET BY MOUTH TWICE DAILY WITH MEALS (STOP ATENOLOL)    cholecalciferol, vitamin D3, (VITAMIN D3) 2,000 unit Cap Take 1 capsule (2,000 Units total) by mouth once daily.    ergocalciferol (ERGOCALCIFEROL) 50,000 unit Cap Take 1 capsule (50,000 Units total) by mouth every 7 days. for 12 doses    fish oil-omega-3 fatty acids 300-1,000 mg capsule Take 2 g by mouth once daily. 3 tablets       glipiZIDE (GLUCOTROL) 5 MG tablet Take 1 tablet (5 mg total) by mouth 2 (two) times daily with meals.    hydroCHLOROthiazide (HYDRODIURIL) 25 MG tablet Take 1 tablet (25 mg total) by mouth once daily.    linagliptin (TRADJENTA) 5 mg Tab tablet Take 1 tablet (5 mg total) by mouth once daily.    metFORMIN (GLUCOPHAGE-XR) 500 MG 24 hr tablet TAKE TWO TABLETS BY MOUTH TWICE DAILY WITH MEALS    multivitamin capsule Take 1 capsule by mouth once daily.    valsartan (DIOVAN) 320 MG tablet TAKE ONE TABLET BY MOUTH DAILY FOR BLOOD PRESSURE    vit C-vit E-lutein-min-om-3 (OCUVITE) 703-77-2-150 mg-unit-mg-mg Cap Take 1 tablet by mouth once daily.    lancets Misc To check BG 3 times daily, to use with insurance preferred meter     Review of patient's allergies indicates:  No Known Allergies     Review of Systems   Constitutional: Negative for chills, fever and weight loss.   Respiratory: Negative for shortness of breath and wheezing.    Cardiovascular: Negative for chest pain.   Gastrointestinal: Positive for heartburn. Negative for abdominal pain.       Objective:      Vital Signs (Most Recent)       Vital Signs Range (Last 24H):       Physical Exam   Constitutional: He appears well-developed and well-nourished.   Cardiovascular: Normal rate.   Pulmonary/Chest: Effort normal.   Abdominal: Soft.   Skin: Skin is warm and dry.   Psychiatric: He has a normal mood and affect. His behavior is normal. Judgment and thought  content normal.           Assessment:      Active Hospital Problems    Diagnosis  POA    Esophagitis [K20.9]  Yes      Resolved Hospital Problems   No resolved problems to display.       Plan:    EGD for follow up erosive esophagitis

## 2019-04-01 NOTE — TRANSFER OF CARE
"Anesthesia Transfer of Care Note    Patient: Esteban Mike    Procedure(s) Performed: Procedure(s) (LRB):  EGD (ESOPHAGOGASTRODUODENOSCOPY) (N/A)    Patient location: PACU    Anesthesia Type: general    Transport from OR: Transported from OR on 2-3 L/min O2 by NC with adequate spontaneous ventilation    Post pain: adequate analgesia    Post assessment: no apparent anesthetic complications    Post vital signs: stable    Level of consciousness: awake, alert and oriented    Nausea/Vomiting: no nausea/vomiting    Complications: none    Transfer of care protocol was followed      Last vitals:   Visit Vitals  BP (!) 105/55 (BP Location: Left arm, Patient Position: Lying)   Pulse 62   Temp 36.9 °C (98.4 °F) (Temporal)   Resp 18   Ht 5' 7" (1.702 m)   Wt 81.6 kg (180 lb)   SpO2 96%   BMI 28.19 kg/m²     "

## 2019-04-01 NOTE — ADDENDUM NOTE
Addendum  created 04/01/19 1337 by Abigail Patterson, CRNA    Attestation recorded in Intraprocedure, Intraprocedure Attestations deleted, Intraprocedure Attestations filed

## 2019-04-02 ENCOUNTER — PATIENT MESSAGE (OUTPATIENT)
Dept: ADMINISTRATIVE | Facility: OTHER | Age: 69
End: 2019-04-02

## 2019-04-08 ENCOUNTER — TELEPHONE (OUTPATIENT)
Dept: ENDOSCOPY | Facility: HOSPITAL | Age: 69
End: 2019-04-08

## 2019-04-15 ENCOUNTER — PATIENT OUTREACH (OUTPATIENT)
Dept: OTHER | Facility: OTHER | Age: 69
End: 2019-04-15

## 2019-04-15 NOTE — PROGRESS NOTES
Last 5 Patient Entered Readings                                      Current 30 Day Average: 144/75     Recent Readings 3/29/2019 3/29/2019 3/27/2019 3/27/2019 3/18/2019    SBP (mmHg) - 134 164 - -    DBP (mmHg) - 63 92 - -    Pulse 57 57 57 57 55          Last 6 Patient Entered Readings                                          Most Recent A1c: 7.8% on 10/3/2018  (Goal: 8%)     Recent Readings 3/31/2019 3/27/2019 3/27/2019 3/21/2019 3/20/2019    Blood Glucose (mg/dL) 178 172 63 129 111            Digital Medicine: Health  Follow Up    Lifestyle Modifications:    1.Dietary Modifications (Sodium intake <2,000mg/day, food labels, dining out): Deferred    2.Physical Activity: Deferred    3.Medication Therapy: Patient has been compliant with the medication regimen.    4.Patient has the following medication side effects/concerns: None  (Frequency/Alleviating factors/Precipitating factors, etc.)     Follow up with Mr. Esteban Mike completed. Mr. Mike is doing okay. Patient reports that he has been testing his BG using a different glucometer because his test strips are . Mr. Mike has five tubes of  test strips. He states that he is checking his BG once a day, when he is supposed to be testing 3 times a day. He will visit the [facility] to exchange his strips, and will check his BP today. No further questions or concerns. Will continue to follow up to achieve health goals.

## 2019-04-22 ENCOUNTER — PATIENT OUTREACH (OUTPATIENT)
Dept: OTHER | Facility: OTHER | Age: 69
End: 2019-04-22

## 2019-04-22 NOTE — PROGRESS NOTES
"Last 5 Patient Entered Readings                                      Current 30 Day Average: 154/80     Recent Readings 4/22/2019 4/19/2019 3/29/2019 3/29/2019 3/27/2019    SBP (mmHg) 173 146 - 134 164    DBP (mmHg) 87 76 - 63 92    Pulse 52 61 57 57 57          Last 6 Patient Entered Readings                                          Most Recent A1c: 7.8% on 10/3/2018  (Goal: 8%)     Recent Readings 4/7/2019 3/31/2019 3/27/2019 3/27/2019 3/21/2019    Blood Glucose (mg/dL) 143 178 172 (No Data)  129        Digital Medicine: Health  Follow Up    Lifestyle Modifications:    1.Dietary Modifications (Sodium intake <2,000mg/day, food labels, dining out): Patient reports eating pizza and caesar salad today for lunch. He reports being in "a mood" where he doesn't really care about what he's eating. Encouraged sodium restriction.     2.Physical Activity: Mr. Mike continues to cut grass. He manages a few different yards. He is also working on his condo, as he is wanting to sell it before retiring in June 2019.    3.Medication Therapy: Patient has been compliant with the medication regimen.    4.Patient has the following medication side effects/concerns: None  (Frequency/Alleviating factors/Precipitating factors, etc.)     Follow up with Mr. Esteban Mike completed. Mr. Mike is doing okay. He cannot say what caused his BP to be elevated this morning. Yesterday, he reports eating a couple of hot dogs and cut grass for most of the day. He will recheck his BP this afternoon. Provided contact information to [facility] for patient to reorder testing supplies for ealth glucometer. He will call today. No further questions or concerns. Will continue to follow up to achieve health goals.              "

## 2019-05-11 DIAGNOSIS — I10 ESSENTIAL HYPERTENSION: ICD-10-CM

## 2019-05-11 RX ORDER — HYDROCHLOROTHIAZIDE 25 MG/1
TABLET ORAL
Qty: 90 TABLET | Refills: 0 | Status: SHIPPED | OUTPATIENT
Start: 2019-05-11 | End: 2020-02-13

## 2019-05-11 RX ORDER — VALSARTAN 320 MG/1
TABLET ORAL
Qty: 90 TABLET | Refills: 0 | Status: SHIPPED | OUTPATIENT
Start: 2019-05-11 | End: 2020-01-09 | Stop reason: SDUPTHER

## 2019-05-13 ENCOUNTER — PATIENT OUTREACH (OUTPATIENT)
Dept: OTHER | Facility: OTHER | Age: 69
End: 2019-05-13

## 2019-05-13 NOTE — PROGRESS NOTES
Last 5 Patient Entered Readings                                      Current 30 Day Average: 154/76     Recent Readings 5/8/2019 5/8/2019 4/25/2019 4/22/2019 4/19/2019    SBP (mmHg) 132 132 163 173 146    DBP (mmHg) 70 70 76 87 76    Pulse 55 55 55 52 61        Per 30 day average, 154/76 mmHg, patient's BP is not at goal.     Mr. Mike has not been sending in BP readings regularly. Asked that he start to send readings in more consistently, especially since readings were elevated at the end of April. He verbalized understanding.    Will continue to monitor regularly. Will follow up in 4-6 weeks, sooner if BP begins to trend upward or downward.    Asked patient to call or message with questions or concerns.     Current HTN regimen:  Hypertension Medications             carvedilol (COREG) 3.125 MG tablet TAKE ONE TABLET BY MOUTH TWICE DAILY WITH MEALS (STOP ATENOLOL)    hydroCHLOROthiazide (HYDRODIURIL) 25 MG tablet TAKE ONE TABLET BY MOUTH DAILY FOR BLOOD PRESSURE    valsartan (DIOVAN) 320 MG tablet TAKE ONE TABLET BY MOUTH DAILY FOR BLOOD PRESSURE              Last 6 Patient Entered Readings                                          Most Recent A1c: 7.8% on 10/3/2018  (Goal: 8%)     Recent Readings 5/9/2019 5/8/2019 5/1/2019 5/1/2019 4/30/2019    Blood Glucose (mg/dL) 198 221 254 130 156        Mr. Mike informed me that he wakes up at night and drinks powerade and eats late at night because he goes to bed late. Advised he drink water if he wakes up thirsty. Also advised he avoid eating within 2 hours of bedtime. He will send more blood sugar readings as well.     I will continue to monitor regularly and will follow up in  4-6 weeks, sooner if there are any concerning blood glucose readings.     Asked patient to contact me with any concerns or clinical changes.     Diabetes Medications             glipiZIDE (GLUCOTROL) 5 MG tablet Take 1 tablet (5 mg total) by mouth 2 (two) times daily with meals.     metFORMIN (GLUCOPHAGE-XR) 500 MG 24 hr tablet TAKE TWO TABLETS BY MOUTH TWICE DAILY WITH MEALS    TRADJENTA 5 mg Tab tablet TAKE ONE TABLET BY MOUTH DAILY

## 2019-06-04 ENCOUNTER — PATIENT OUTREACH (OUTPATIENT)
Dept: OTHER | Facility: OTHER | Age: 69
End: 2019-06-04

## 2019-06-04 NOTE — PROGRESS NOTES
Last 5 Patient Entered Readings                                      Current 30 Day Average: 146/72     Recent Readings 6/2/2019 5/22/2019 5/8/2019 5/8/2019 4/25/2019    SBP (mmHg) 151 156 132 132 163    DBP (mmHg) 68 79 70 70 76    Pulse 55 52 55 55 55          Last 6 Patient Entered Readings                                          Most Recent A1c: 7.8% on 10/3/2018  (Goal: 8%)     Recent Readings 6/4/2019 6/3/2019 5/30/2019 5/29/2019 5/28/2019    Blood Glucose (mg/dL) 131 126 162 154 213        Digital Medicine: Health  Follow Up    Lifestyle Modifications:    1.Dietary Modifications (Sodium intake <2,000mg/day, food labels, dining out): Patient reports that he has been eating better, which he attributes to improved BG readings. Encouragement provided.     2.Physical Activity: Mr. Mike is staying busy. He attributes this to spikes in BP. He is not resting prior to monitoring. Encouraged patient to rest before checking his BP.    3.Medication Therapy: Patient has been compliant with the medication regimen.    4.Patient has the following medication side effects/concerns: None  (Frequency/Alleviating factors/Precipitating factors, etc.)     Follow up with Mr. Esteban Mike completed. Mr. Mike is doing okay. He will retire from the [employer] this month. No further questions or concerns. Will continue to follow up to achieve health goals.

## 2019-06-20 ENCOUNTER — PATIENT OUTREACH (OUTPATIENT)
Dept: OTHER | Facility: OTHER | Age: 69
End: 2019-06-20

## 2019-06-20 NOTE — PROGRESS NOTES
Last 5 Patient Entered Readings                                      Current 30 Day Average: 150/71     Recent Readings 6/20/2019 6/20/2019 6/5/2019 6/2/2019 5/22/2019    SBP (mmHg) 124 190 142 151 156    DBP (mmHg) 66 103 67 68 79    Pulse 56 64 54 55 52        Mr. Mike will work on checking BP more frequently. Elevated BP reading today was due to laughing while checking his BP. He repeated it and it was at goal range.    Per 30 day average, 150/71 mmHg, patient's BP is not at goal.     Will continue to monitor regularly. Will follow up in 4-6 weeks, sooner if BP begins to trend upward or downward.    Asked patient to call or message with questions or concerns.     Current HTN regimen:  Hypertension Medications             carvedilol (COREG) 3.125 MG tablet TAKE ONE TABLET BY MOUTH TWICE DAILY WITH MEALS (STOP ATENOLOL)    hydroCHLOROthiazide (HYDRODIURIL) 25 MG tablet TAKE ONE TABLET BY MOUTH DAILY FOR BLOOD PRESSURE    valsartan (DIOVAN) 320 MG tablet TAKE ONE TABLET BY MOUTH DAILY FOR BLOOD PRESSURE                  Last 6 Patient Entered Readings                                          Most Recent A1c: 7.8% on 10/3/2018  (Goal: 8%)     Recent Readings 6/19/2019 6/13/2019 6/12/2019 6/12/2019 6/10/2019    Blood Glucose (mg/dL) 212 193 158 216 277        Mr. Mike's BG readings have been elevated. Discussed starting Trulicity and stopping Tradjenta and glipizide. He would like to work more on his diet before making changes to his medications. He is hesitant to start an injectable at this time.     I will continue to monitor regularly and will follow up in 4-6 weeks, sooner if there are any concerning blood glucose readings.     Asked patient to contact me with any concerns or clinical changes.     Diabetes Medications             glipiZIDE (GLUCOTROL) 5 MG tablet Take 1 tablet (5 mg total) by mouth 2 (two) times daily with meals.    metFORMIN (GLUCOPHAGE-XR) 500 MG 24 hr tablet TAKE TWO TABLETS BY  MOUTH TWICE DAILY WITH MEALS    TRADJENTA 5 mg Tab tablet TAKE ONE TABLET BY MOUTH DAILY

## 2019-07-08 LAB
CHOL/HDLC RATIO: 4.3
CHOLESTEROL, TOTAL: 196
HBA1C MFR BLD: 9.7 %
HDLC SERPL-MCNC: 46 MG/DL
LDLC SERPL CALC-MCNC: 110 MG/DL

## 2019-07-09 ENCOUNTER — PATIENT OUTREACH (OUTPATIENT)
Dept: OTHER | Facility: OTHER | Age: 69
End: 2019-07-09

## 2019-07-09 ENCOUNTER — DOCUMENTATION ONLY (OUTPATIENT)
Dept: INTERNAL MEDICINE | Facility: CLINIC | Age: 69
End: 2019-07-09

## 2019-07-09 NOTE — PROGRESS NOTES
Last 5 Patient Entered Readings                                      Current 30 Day Average: 143/81     Recent Readings 7/8/2019 6/26/2019 6/20/2019 6/20/2019 6/5/2019    SBP (mmHg) 164 140 124 190 142    DBP (mmHg) 81 73 66 103 67    Pulse 51 57 56 64 54          Last 6 Patient Entered Readings                                          Most Recent A1c: 7.8% on 10/3/2018  (Goal: 8%)     Recent Readings 7/9/2019 6/19/2019 6/13/2019 6/12/2019 6/12/2019    Blood Glucose (mg/dL) 250 212 193 158 216        Digital Medicine: Health  Follow Up    Lifestyle Modifications:    1.Dietary Modifications (Sodium intake <2,000mg/day, food labels, dining out): Patient last told PharmD, ALBER Connell, that he would work on improving dietary habits to help with BG control. However, patient has not done so. Mr. Mike admits to dietary indiscretions yesterday and today to cause elevated BG. For lunch yesterday he had red beans and rice with sausage and bread. For dinner, he had a salad with tomatoes, ham, a slice of american cheese, black olives and dressing. He thought by eating salad for dinner last night that his BG would be lower this morning. However, his BG was 250. Today, he feels he ate a little better because he had a hamburger and half an order of fries, and only 2 onion rings. He plans to have the same salad he had last night for dinner tonight. Mr. Mike reports seeing a CDE in the past, which is how he knows what foods he should and should not be eating. However, he states getting in moods where he doesn't care about what he eats. Patient expressed interest in diabetes support groups or education classes to help him with healthy eating. He plans to discuss this with an upcoming appointment he will make with Dr. Hartley, as this was suggested to him after her [facility] screening. Briefly discussed CHO today. Offered resources for continuing education of DM management including diabetes education and diabetes  "empowerment sessions. Patient declines diabetes education referral, but only because he said that he has done this before. Will send dietary resources and diabetes information via email today.     2.Physical Activity: Patient continues to cut grass some days. Otherwise, he is not active. Encouraged patient to increase physical activity to help with BG and BP control.     3.Medication Therapy: Patient has been compliant with the medication regimen. Patient states that he is no longer taking glipizide. He says that this medication was discontinued because when he went to request refills [facility] the drug was "faded out". Mr. Mike asked about any other oral medications that could help with lower BG. He is open to discussing an injectable, but does not want to stick more than once a week. He states that he is not comfortable with sticking himself, but may do it if its his only option.     4.Patient has the following medication side effects/concerns: None  (Frequency/Alleviating factors/Precipitating factors, etc.)     Follow up with Mr. Esteban Gomes Cee completed. No further questions or concerns. Will continue to follow up to achieve health goals.        "

## 2019-07-16 ENCOUNTER — CLINICAL SUPPORT (OUTPATIENT)
Dept: OPHTHALMOLOGY | Facility: CLINIC | Age: 69
End: 2019-07-16
Attending: FAMILY MEDICINE
Payer: COMMERCIAL

## 2019-07-16 DIAGNOSIS — E11.51 TYPE 2 DIABETES, WITH PERIPHERAL CIRCULATORY DISORDER NOT AT GOAL: ICD-10-CM

## 2019-07-16 PROCEDURE — 92250 DIABETIC EYE SCREENING PHOTO: ICD-10-PCS | Mod: 26,S$GLB,, | Performed by: OPHTHALMOLOGY

## 2019-07-16 PROCEDURE — 99999 PR PBB SHADOW E&M-EST. PATIENT-LVL I: CPT | Mod: PBBFAC,,,

## 2019-07-16 PROCEDURE — 99999 PR PBB SHADOW E&M-EST. PATIENT-LVL I: ICD-10-PCS | Mod: PBBFAC,,,

## 2019-07-16 PROCEDURE — 92250 FUNDUS PHOTOGRAPHY W/I&R: CPT | Mod: TC,S$GLB,, | Performed by: FAMILY MEDICINE

## 2019-07-16 PROCEDURE — 92250 DIABETIC EYE SCREENING PHOTO: ICD-10-PCS | Mod: TC,S$GLB,, | Performed by: FAMILY MEDICINE

## 2019-07-16 PROCEDURE — 92250 FUNDUS PHOTOGRAPHY W/I&R: CPT | Mod: 26,S$GLB,, | Performed by: OPHTHALMOLOGY

## 2019-07-25 ENCOUNTER — PATIENT OUTREACH (OUTPATIENT)
Dept: OTHER | Facility: OTHER | Age: 69
End: 2019-07-25

## 2019-07-25 DIAGNOSIS — E11.51 TYPE 2 DIABETES, WITH PERIPHERAL CIRCULATORY DISORDER NOT AT GOAL: Primary | ICD-10-CM

## 2019-07-25 NOTE — PROGRESS NOTES
Last 5 Patient Entered Readings                                      Current 30 Day Average: 142/75     Recent Readings 7/22/2019 7/8/2019 6/26/2019 6/20/2019 6/20/2019    SBP (mmHg) 121 164 140 124 190    DBP (mmHg) 72 81 73 66 103    Pulse 58 51 57 56 64        LVM to discuss BP and blood glucose readings and medications.       Last 6 Patient Entered Readings                                          Most Recent A1c: 7.8% on 10/3/2018  (Goal: 8%)     Recent Readings 7/23/2019 7/12/2019 7/10/2019 7/9/2019 6/19/2019    Blood Glucose (mg/dL) 193 262 274 250 212

## 2019-07-31 ENCOUNTER — IMMUNIZATION (OUTPATIENT)
Dept: PHARMACY | Facility: CLINIC | Age: 69
End: 2019-07-31

## 2019-07-31 ENCOUNTER — IMMUNIZATION (OUTPATIENT)
Dept: PHARMACY | Facility: CLINIC | Age: 69
End: 2019-07-31
Payer: MEDICARE

## 2019-07-31 ENCOUNTER — OFFICE VISIT (OUTPATIENT)
Dept: INTERNAL MEDICINE | Facility: CLINIC | Age: 69
End: 2019-07-31
Attending: FAMILY MEDICINE
Payer: COMMERCIAL

## 2019-07-31 VITALS
HEART RATE: 65 BPM | WEIGHT: 185.31 LBS | TEMPERATURE: 98 F | BODY MASS INDEX: 29.09 KG/M2 | SYSTOLIC BLOOD PRESSURE: 130 MMHG | HEIGHT: 67 IN | DIASTOLIC BLOOD PRESSURE: 60 MMHG | OXYGEN SATURATION: 95 %

## 2019-07-31 DIAGNOSIS — E78.5 HYPERLIPIDEMIA, UNSPECIFIED HYPERLIPIDEMIA TYPE: ICD-10-CM

## 2019-07-31 DIAGNOSIS — E11.9 NON-INSULIN DEPENDENT TYPE 2 DIABETES MELLITUS: Chronic | ICD-10-CM

## 2019-07-31 DIAGNOSIS — E11.42 DIABETIC POLYNEUROPATHY ASSOCIATED WITH TYPE 2 DIABETES MELLITUS: ICD-10-CM

## 2019-07-31 DIAGNOSIS — E11.51 TYPE 2 DIABETES, WITH PERIPHERAL CIRCULATORY DISORDER NOT AT GOAL: Primary | ICD-10-CM

## 2019-07-31 DIAGNOSIS — I65.22 LEFT CAROTID ARTERY STENOSIS: ICD-10-CM

## 2019-07-31 DIAGNOSIS — I65.21 INTERNAL CAROTID ARTERY OCCLUSION, RIGHT: ICD-10-CM

## 2019-07-31 DIAGNOSIS — I25.810 CORONARY ARTERY DISEASE INVOLVING AUTOLOGOUS VEIN CORONARY BYPASS GRAFT WITHOUT ANGINA PECTORIS: ICD-10-CM

## 2019-07-31 DIAGNOSIS — I10 HYPERTENSION, ESSENTIAL: ICD-10-CM

## 2019-07-31 DIAGNOSIS — K76.0 NAFLD (NONALCOHOLIC FATTY LIVER DISEASE): ICD-10-CM

## 2019-07-31 PROCEDURE — 1101F PR PT FALLS ASSESS DOC 0-1 FALLS W/OUT INJ PAST YR: ICD-10-PCS | Mod: CPTII,S$GLB,, | Performed by: FAMILY MEDICINE

## 2019-07-31 PROCEDURE — 99999 PR PBB SHADOW E&M-EST. PATIENT-LVL IV: CPT | Mod: PBBFAC,,, | Performed by: FAMILY MEDICINE

## 2019-07-31 PROCEDURE — 3078F DIAST BP <80 MM HG: CPT | Mod: CPTII,S$GLB,, | Performed by: FAMILY MEDICINE

## 2019-07-31 PROCEDURE — 1101F PT FALLS ASSESS-DOCD LE1/YR: CPT | Mod: CPTII,S$GLB,, | Performed by: FAMILY MEDICINE

## 2019-07-31 PROCEDURE — 3045F PR MOST RECENT HEMOGLOBIN A1C LEVEL 7.0-9.0%: ICD-10-PCS | Mod: CPTII,S$GLB,, | Performed by: FAMILY MEDICINE

## 2019-07-31 PROCEDURE — 99999 PR PBB SHADOW E&M-EST. PATIENT-LVL IV: ICD-10-PCS | Mod: PBBFAC,,, | Performed by: FAMILY MEDICINE

## 2019-07-31 PROCEDURE — 3075F PR MOST RECENT SYSTOLIC BLOOD PRESS GE 130-139MM HG: ICD-10-PCS | Mod: CPTII,S$GLB,, | Performed by: FAMILY MEDICINE

## 2019-07-31 PROCEDURE — 3075F SYST BP GE 130 - 139MM HG: CPT | Mod: CPTII,S$GLB,, | Performed by: FAMILY MEDICINE

## 2019-07-31 PROCEDURE — 99214 PR OFFICE/OUTPT VISIT, EST, LEVL IV, 30-39 MIN: ICD-10-PCS | Mod: 25,S$GLB,, | Performed by: FAMILY MEDICINE

## 2019-07-31 PROCEDURE — 3078F PR MOST RECENT DIASTOLIC BLOOD PRESSURE < 80 MM HG: ICD-10-PCS | Mod: CPTII,S$GLB,, | Performed by: FAMILY MEDICINE

## 2019-07-31 PROCEDURE — 99214 OFFICE O/P EST MOD 30 MIN: CPT | Mod: 25,S$GLB,, | Performed by: FAMILY MEDICINE

## 2019-07-31 PROCEDURE — 3045F PR MOST RECENT HEMOGLOBIN A1C LEVEL 7.0-9.0%: CPT | Mod: CPTII,S$GLB,, | Performed by: FAMILY MEDICINE

## 2019-07-31 RX ORDER — GLIPIZIDE 5 MG/1
5 TABLET ORAL 2 TIMES DAILY WITH MEALS
Qty: 180 TABLET | Refills: 1 | Status: SHIPPED | OUTPATIENT
Start: 2019-07-31 | End: 2019-11-09 | Stop reason: SDUPTHER

## 2019-07-31 NOTE — PROGRESS NOTES
Subjective:       Patient ID: Esteban Mike is a 68 y.o. male.    Chief Complaint: Follow-up    HPI  Review of Systems   Constitutional: Negative for chills, fatigue, fever and unexpected weight change.   HENT: Negative for congestion and trouble swallowing.    Eyes: Negative for redness and visual disturbance.   Respiratory: Negative for cough, chest tightness and shortness of breath.    Cardiovascular: Negative for chest pain, palpitations and leg swelling.   Gastrointestinal: Negative for abdominal pain and blood in stool.   Genitourinary: Negative for difficulty urinating and hematuria.   Musculoskeletal: Negative for arthralgias, back pain, gait problem, joint swelling, myalgias and neck pain.   Skin: Negative for color change and rash.   Neurological: Negative for tremors, speech difficulty, weakness, numbness and headaches.   Hematological: Negative for adenopathy. Does not bruise/bleed easily.   Psychiatric/Behavioral: Negative for behavioral problems, confusion and sleep disturbance. The patient is not nervous/anxious.        Objective:      Physical Exam   Constitutional: He is oriented to person, place, and time. He appears well-developed and well-nourished.   HENT:   Head: Normocephalic and atraumatic.   Eyes: Conjunctivae are normal. No scleral icterus.   Neck: Normal range of motion. Neck supple. Carotid bruit is not present.   Cardiovascular: Normal rate, regular rhythm and intact distal pulses. Exam reveals no gallop and no friction rub.   Murmur heard.  Pulmonary/Chest: Effort normal and breath sounds normal. No respiratory distress. He has no wheezes. He has no rales.   Abdominal: He exhibits no distension. There is no tenderness.   Musculoskeletal: He exhibits no edema or deformity.   Neurological: He is alert and oriented to person, place, and time. He displays no tremor. No cranial nerve deficit. Coordination and gait normal.   Skin: Skin is warm and dry. No rash noted. He is not  diaphoretic. No erythema.   Psychiatric: He has a normal mood and affect. His behavior is normal. Judgment and thought content normal.   Nursing note and vitals reviewed.      Assessment:       1. Type 2 diabetes, with peripheral circulatory disorder not at goal    2. Non-insulin dependent type 2 diabetes mellitus    3. Hypertension, essential    4. Hyperlipidemia, unspecified hyperlipidemia type    5. Coronary artery disease involving autologous vein coronary bypass graft without angina pectoris    6. Diabetic polyneuropathy associated with type 2 diabetes mellitus    7. Left carotid artery stenosis    8. Internal carotid artery occlusion, right    9. NAFLD (nonalcoholic fatty liver disease)        Plan:     Medication List with Changes/Refills   Current Medications    ASPIRIN 81 MG CHEWABLE TABLET    Take 81 mg by mouth once daily.      ATORVASTATIN (LIPITOR) 80 MG TABLET    TAKE ONE TABLET BY MOUTH DAILY after supper FOR CHOLESTEROL    BLOOD SUGAR DIAGNOSTIC STRP    To check BG 3 times daily, to use with insurance preferred meter    BLOOD-GLUCOSE METER KIT    To check BG 3 times daily, to use with insurance preferred meter    CARVEDILOL (COREG) 3.125 MG TABLET    TAKE ONE TABLET BY MOUTH TWICE DAILY WITH MEALS (STOP ATENOLOL)    CHOLECALCIFEROL, VITAMIN D3, (VITAMIN D3) 2,000 UNIT CAP    Take 1 capsule (2,000 Units total) by mouth once daily.    FISH OIL-OMEGA-3 FATTY ACIDS 300-1,000 MG CAPSULE    Take 2 g by mouth once daily. 3 tablets       HYDROCHLOROTHIAZIDE (HYDRODIURIL) 25 MG TABLET    TAKE ONE TABLET BY MOUTH DAILY FOR BLOOD PRESSURE    LANCETS MISC    To check BG 3 times daily, to use with insurance preferred meter    METFORMIN (GLUCOPHAGE-XR) 500 MG 24 HR TABLET    TAKE TWO TABLETS BY MOUTH TWICE DAILY WITH MEALS    MULTIVITAMIN CAPSULE    Take 1 capsule by mouth once daily.    PANTOPRAZOLE (PROTONIX) 40 MG TABLET    Take 1 tablet (40 mg total) by mouth before breakfast.    TRADJENTA 5 MG TAB TABLET     TAKE ONE TABLET BY MOUTH DAILY    VALSARTAN (DIOVAN) 320 MG TABLET    TAKE ONE TABLET BY MOUTH DAILY FOR BLOOD PRESSURE    VIT C-VIT E-LUTEIN-MIN-OM-3 (OCUVITE) [id number] MG-UNIT-MG-MG CAP    Take 1 tablet by mouth once daily.   Changed and/or Refilled Medications    Modified Medication Previous Medication    GLIPIZIDE (GLUCOTROL) 5 MG TABLET glipiZIDE (GLUCOTROL) 5 MG tablet       Take 1 tablet (5 mg total) by mouth 2 (two) times daily with meals.    Take 1 tablet (5 mg total) by mouth 2 (two) times daily with meals.     Esteban was seen today for follow-up.    Diagnoses and all orders for this visit:    Type 2 diabetes, with peripheral circulatory disorder not at goal    Non-insulin dependent type 2 diabetes mellitus  -     Ambulatory Referral to Diabetes Education    Hypertension, essential    Hyperlipidemia, unspecified hyperlipidemia type    Coronary artery disease involving autologous vein coronary bypass graft without angina pectoris    Diabetic polyneuropathy associated with type 2 diabetes mellitus    Left carotid artery stenosis    Internal carotid artery occlusion, right    NAFLD (nonalcoholic fatty liver disease)  -     Ambulatory Referral to Hepatology    Other orders  -     glipiZIDE (GLUCOTROL) 5 MG tablet; Take 1 tablet (5 mg total) by mouth 2 (two) times daily with meals.      See meds, orders, follow up, routing and instructions sections of encounter.  The patient is in for followup.  He had [facility] labs, which were transmitted to   us by fax, fortunately we got that.  His A1c was up to 9.7, LFTs were   significantly elevated with an ALT slightly above 100 and AST in the 50s.  His   LDL was 110.  These were dated 07/08/2019.  We will scan those into his medical   record.  We discussed possibly switching to Trulicity shot.  He brought to our   attention that the glipizide had never been refilled.  He has lost to follow up   with Hepatology and diabetes nurse practitioner.    RECOMMENDATIONS:  Add  back glipizide.  Recheck laboratory in six months.    Referral back to diabetes education.  Continue his Tradjenta and metformin.    Diet and exercise stress.      SONU/RAGHAVENDRA  dd: 07/31/2019 14:11:45 (CDT)  td: 08/01/2019 02:52:17 (CDT)  Doc ID   #[id number]  Job ID #[id number]    CC:

## 2019-07-31 NOTE — PATIENT INSTRUCTIONS
Pneumococcal 23 vaccine today    Shingrix vaccine today (this is number 2).      Information about cholesterol, high blood pressure and healthy diet and activity recommendations can be found at the following links on the Internet:    http://www.nhlbi.nih.gov/health/health-topics/topics/hbc  http://www.nhlbi.nih.gov/health/educational/lose_wt/index.htm  Http://www.nhlbi.nih.gov/files/docs/public/heart/hbp_low.pdf  http://www.heart.org/HEARTORG/  http://diabetes.org/  https://www.cdc.gov/  Https://healthfinder.gov/  https://health.gov/dietaryguidelines/2015/guidelines/  https://health.gov/paguidelines/second-edition/pdf/Physical_Activity_Guidelines_2nd_edition.pdf

## 2019-08-01 ENCOUNTER — PATIENT OUTREACH (OUTPATIENT)
Dept: ADMINISTRATIVE | Facility: HOSPITAL | Age: 69
End: 2019-08-01

## 2019-08-01 NOTE — PROGRESS NOTES
Last 5 Patient Entered Readings                                      Current 30 Day Average: 143/77     Recent Readings 7/22/2019 7/8/2019 6/26/2019 6/20/2019 6/20/2019    SBP (mmHg) 121 164 140 124 190    DBP (mmHg) 72 81 73 66 103    Pulse 58 51 57 56 64        LVM to discuss BP and blood glucose readings and medications.     Last 6 Patient Entered Readings                                          Most Recent A1c: 9.7% on 7/8/2019  (Goal: 8%)     Recent Readings 7/23/2019 7/12/2019 7/10/2019 7/9/2019 6/19/2019    Blood Glucose (mg/dL) 193 262 274 250 212

## 2019-08-01 NOTE — PROGRESS NOTES
External lab results rcvd- A1c & lipid, entered & linked to health maintenance. Chart review completed. Immunizations reconciled, HM modifiers updated, HM duplicate entries deleted, care team updated, old orders deleted. Pt due for foot exam.

## 2019-08-15 NOTE — PROGRESS NOTES
Last 5 Patient Entered Readings                                      Current 30 Day Average: 128/76     Recent Readings 8/7/2019 7/22/2019 7/8/2019 6/26/2019 6/20/2019    SBP (mmHg) 135 121 164 140 124    DBP (mmHg) 80 72 81 73 66    Pulse 57 58 51 57 56        Per 30 day average, 128/76 mmHg, patient's BP is currently at goal.     Patient's health , Diana Nolasco, will be following up. I will continue to monitor regularly and will follow up in 2-3 months, sooner if BP begins to trend upward or downward.    Asked patient to call or message with questions or concerns.     Current HTN regimen:  Hypertension Medications             carvedilol (COREG) 3.125 MG tablet TAKE ONE TABLET BY MOUTH TWICE DAILY WITH MEALS (STOP ATENOLOL)    hydroCHLOROthiazide (HYDRODIURIL) 25 MG tablet TAKE ONE TABLET BY MOUTH DAILY FOR BLOOD PRESSURE    valsartan (DIOVAN) 320 MG tablet TAKE ONE TABLET BY MOUTH DAILY FOR BLOOD PRESSURE          Last 6 Patient Entered Readings                                          Most Recent A1c: 9.7% on 7/8/2019  (Goal: 8%)     Recent Readings 8/12/2019 8/7/2019 8/5/2019 7/25/2019 7/23/2019    Blood Glucose (mg/dL) 314 235 190 83 193        Mr. Mike's A1C is above goal. Home blood sugar readings correlate and are elevated as well. He has restarted glipizide 5 mg BID as of 2-3 weeks ago. He confirms he is taking metformin 1000 mg BID and Tradjenta 5 mg QD. Will stop Tradjenta and start Trulicity with intent on stopping glipizide once he is on treatment dose of Trulicity. He will be seeing the diabetes educator next week and will bring Trulicity with him for teaching. He understands to remain on Tradjenta until he starts Trulicity next week. Side effects of Trulicity were discussed.     I will continue to monitor regularly and will follow up in 1-2 weeks, sooner if there are any concerning blood glucose readings.     Asked patient to contact me with any concerns or clinical changes.     Diabetes  Medications             dulaglutide (TRULICITY) 0.75 mg/0.5 mL PnIj Inject 0.5 mLs (0.75 mg total) into the skin every 7 days.    glipiZIDE (GLUCOTROL) 5 MG tablet Take 1 tablet (5 mg total) by mouth 2 (two) times daily with meals.    metFORMIN (GLUCOPHAGE-XR) 500 MG 24 hr tablet TAKE TWO TABLETS BY MOUTH TWICE DAILY WITH MEALS

## 2019-08-21 ENCOUNTER — CLINICAL SUPPORT (OUTPATIENT)
Dept: DIABETES | Facility: CLINIC | Age: 69
End: 2019-08-21
Attending: FAMILY MEDICINE
Payer: COMMERCIAL

## 2019-08-21 DIAGNOSIS — E11.9 NON-INSULIN DEPENDENT TYPE 2 DIABETES MELLITUS: Chronic | ICD-10-CM

## 2019-08-21 PROCEDURE — G0108 PR DIAB MANAGE TRN  PER INDIV: ICD-10-PCS | Mod: S$GLB,,, | Performed by: INTERNAL MEDICINE

## 2019-08-21 PROCEDURE — G0108 DIAB MANAGE TRN  PER INDIV: HCPCS | Mod: S$GLB,,, | Performed by: INTERNAL MEDICINE

## 2019-08-21 NOTE — PROGRESS NOTES
Diabetes Education  Author: Kalani Holman RN  Date: 8/21/2019    Diabetes Care Management Summary  Diabetes Education Record Assessment/Progress: Initial(Patient presents with Trulicity shot- states that Digital [facility] prescribed for patient and ask pt to bring trulicity for education)  Current Diabetes Risk Level: Moderate     Last A1c:   Lab Results   Component Value Date    HGBA1C 9.7 07/08/2019     Last visit with Diabetes Educator: : 08/21/2019  Diabetes Type  Diabetes Type : Type II    Diabetes History  Diabetes Diagnosis: >10 years  Current Treatment: Oral Medication  Reviewed Problem List with Patient: Yes    Health Maintenance was reviewed today with patient. Discussed with patient importance of routine eye exams, foot exams/foot care, blood work (i.e.: A1c, microalbumin, and lipid), dental visits, yearly flu vaccine, and pneumonia vaccine as indicated by PCP. Patient verbalized understanding.     Health Maintenance Topics with due status: Not Due       Topic Last Completion Date    Colonoscopy 01/23/2013    TETANUS VACCINE 10/12/2018    Influenza Vaccine 10/12/2018    Lipid Panel 07/08/2019    Hemoglobin A1c 07/08/2019    Eye Exam 07/17/2019    High Dose Statin 07/31/2019    Aspirin/Antiplatelet Therapy 07/31/2019     Health Maintenance Due   Topic Date Due    Foot Exam  08/15/2019       Nutrition  Meal Planning: 3 meals per day, diet drinks, artificial sweeteners, water, snacks between meal(Eats out often)  What type of sweetener do you use?: Splenda  What type of beverages do you drink?: other (see comments), water, diet soda/tea, sport drinks(coffee with splenda)  Meal Plan 24 Hour Recall - Breakfast: biscuit, coffee with splenda  Meal Plan 24 Hour Recall - Lunch: white beans and rice, sausage, corn bread, water  Meal Plan 24 Hour Recall - Dinner: ham sandwich, orange slices- candy, blue berry muffin, gatoraid  Meal Plan 24 Hour Recall - Snack: candy, chips    Monitoring   Monitoring: Other  Self  Monitoring : SMBG: does not check blood sugar often-- ranging 180-200  Blood Glucose Logs: No  Do you use a personal continuous glucose monitor?: No  In the last month, how often have you had a low blood sugar reaction?: never  Can you tell when your blood sugar is too high?: no    Exercise   Exercise Type: walking  Intensity: Moderate(cuts grass- 12 yards per week)  Frequency: 3-5 Times per week  Duration: 1 hour    Current Diabetes Treatment   Current Treatment: Oral Medication    Social History  Preferred Learning Method: Face to Face, Demonstration, Reading Materials  Primary Support: Family  Smoking Status: Never a Smoker  Alcohol Use: Never    Barriers to Change  Barriers to Change: None  Learning Challenges : None    Readiness to Learn   Readiness to Learn : Acceptance    Cultural Influences  Cultural Influences: None    Diabetes Education Assessment/Progress    Diabetes Disease Process (diabetes disease process and treatment options): Instructed, Discussion, Individual Session, Written Materials Provided, Comprehends Key Points  Discussed qualifying parameters of diabetes dx. Reviewed/Instructed on disease process and pt's most likely causes of recently 9.7 A1c. Discussed current treatment options, especially dietary and lifestyle changes as well as medication additions and/or changes. Patient verbalizes understanding of received information/education.     Nutrition (Incorporating nutritional management into one's lifestyle): Discussion, Instructed, Individual Session, Written Materials Provided, Comprehends Key Points  Emphasized importance of eliminating all SSB. Discussed SF drink options. Discussed carb vs non-carb foods and reviewed appropriate amounts of carbs to have at meals vs snacks. Recommended 45-60 gm carb at meals and 15 gm carb at snacks. Instructed on appropriate label reading and serving sizes of specific carb containing foods. Reviewed need to limit total/saturated fats despite lesser  effect on BG increase. Encouraged carb sources primarily from whole grains, fresh/frozen fruits, and low-fat milk and yogurt. Discussed meal plans and snack ideas amenable to pt. Patient verbalizes understanding of received information/education.     Physical Activity (incorporating physical activity into one's lifestyle): Discussion, Instructed, Individual Session, Written Materials Provided, Comprehends Key Points  Discussed goals and benefits of regular Physical Activity. Reviewed difference between active lifestyle and structured physical activity. Encouraged Physical Activity with increased heart rate for sustained duration as tolerated. Reviewed Physical Activity goals of >150 minutes weekly. Patient verbalizes understanding of received information/education.     Medications (states correct name, dose, onset, peak, duration, side effects & timing of meds): Discussion, Instructed, Individual Session, Written Materials Provided, Comprehends Key Points  Discussed mechanism of action of oral diabetic medication metformin and glipizide. Reviewed signs and symptoms of hypoglycemia and treatment with Rule of 15. Discussed general vs severe hyperglycemia and risk of DKA. Patient verbalizes understanding of received information/education.   Advised patient that to decrease chances of developing lactic acidosis, if undergoing a procedure that uses a contrast medium patient should discuss the need to temporarily stop taking metformin with physician.    Patient thinks he is supposed to stop glipizide with the start of trulicity- IM message to Favian asked her to call pt to let pt know if he should in fact stop glipizide.  Mechanism of action of GLP-1 injection explained. Discussed possible side effects. Reviewed need for rotation of injection sites, appropriate storage, expiration date, safe disposal of used sharps and insulin pen preparation and use. Education provided on how to give Trulicity injection- pt was  instructed to bring his Trulicity shot to class to learn how to give shot. Patient injected trulicity 0.75mg to right upper abd. Patient verbalizes understanding of received information/education.      Monitoring (monitoring blood glucose/other parameters & using results): Discussion, Instructed, Individual Session, Written Materials Provided, Comprehends Key Points  Discussed goal BGs for different times of day and in relation to meals. Patient admits to not checking blood sugar regularly. Patient has not checked blood sugar in over 1 week. Advised that if patient at ase checks blood sugar once a day at different intervals of the day- the MD will be able to gather information regarding his Blood sugar trends.  Instructed pt to test BG at least once per day: fasting and 2-hours after any meal. Provided blood sugar logs and advised to return to MD for review. Reviewed need for updated BG logs for all endo, PCP, and education appts. Patient verbalizes understanding of received information/education.     Acute Complications (preventing, detecting, and treating acute complications): Discussion, Instructed, Individual Session, Written Materials Provided, Comprehends Key Points  Discussed hypoglycemia vs hyperglycemia symptoms and discussed appropriate treatments for each. Reviewed that pt is at low risk of hypo with current medication regimen. Discussed general vs severe hyperglycemia and risk of DKA.    Chronic Complications (preventing, detecting, and treating chronic complications): Discussion, Instructed, Individual Session, Written Materials Provided, Comprehends Key Points  Reviewed annual diabetes care schedule and patient priorities. Patient verbalizes understanding of received information/education.     Clinical (diabetes, other pertinent medical history, and relevant comorbidities reviewed during visit): Discussion, Individual Session  Reviewed current medical history including co-morbidities.    Cognitive  (knowledge of self-management skills, functional health literacy): Discussion, Individual Session  Arrives with adequate health management knowledge - adequate specific knowledge of diabetes management. Leaves with increased knowledge base - will benefit from f/u in as needed.    Psychosocial (emotional response to diabetes): Not Covered/Deferred(Unable to complete due to Time Constraints)    Diabetes Distress and Support Systems: Not Covered/Deferred(Unable to complete due to Time Constraints)    Behavioral (readiness for change, lifestyle practices, self-care behaviors): Discussion, Individual Session  Appears  motivated to make recommended changes.    Goals  Patient has selected/evaluated goals during today's session: Yes, selected  Healthy Eating: Set(Will measure food and read food labels. Will count carbohydrates to equal 45-60 gm per meal.)  Met Percentage : 0%  Start Date: 08/21/19  Target Date: 11/21/19  Physical Activity: % Met  Met Percentage : 50%  Monitoring: % Met(Will monitor and log blood sugar at least once per day.)  Met Percentage : 50%  Start Date: 08/21/19  Target Date: 11/21/19  Medications: % Met  Met Percentage : 100%  Problem Solving: % Met  Met Percentage : 100%  Healthy Coping: % Met  Met Percentage : 100%  Reducing Risks: % Met  Met Percentage : 75%    Diabetes Care Plan/Intervention  Education Plan/Intervention: Individual Follow-Up DSMT    Diabetes Meal Plan  Restrictions: Restricted Carbohydrate  Carbohydrate Per Meal: 45-60g  Carbohydrate Per Snack : 7-15g    Today's Self-Management Care Plan was developed with the patient's input and is based on barriers identified during today's assessment.    The long and short-term goals in the care plan were written with the patient/caregiver's input. The patient has agreed to work toward these goals to improve his overall diabetes control.      The patient received a copy of today's self-management plan and verbalized understanding of the care  plan, goals, and all of today's instructions.      The patient was encouraged to communicate with his physician and care team regarding his condition(s) and treatment.  I provided the patient with my contact information today and encouraged him to contact me via phone or patient portal as needed.     Education Units of Time   Time Spent: 60 min

## 2019-08-22 ENCOUNTER — PATIENT OUTREACH (OUTPATIENT)
Dept: OTHER | Facility: OTHER | Age: 69
End: 2019-08-22

## 2019-08-22 NOTE — PROGRESS NOTES
Last 5 Patient Entered Readings                                      Current 30 Day Average: 140/74     Recent Readings 8/17/2019 8/7/2019 7/22/2019 7/8/2019 6/26/2019    SBP (mmHg) 144 135 121 164 140    DBP (mmHg) 67 80 72 81 73    Pulse 62 57 58 51 57        Did not address BP on this encounter.     Last 6 Patient Entered Readings                                          Most Recent A1c: 9.7% on 7/8/2019  (Goal: 8%)     Recent Readings 8/22/2019 8/14/2019 8/12/2019 8/7/2019 8/5/2019    Blood Glucose (mg/dL) 215 228 314 235 190        Mr. Mike saw the diabetes educator yesterday and injected his first dose of Trulicity. He confirms he has stopped Tradjenta. He will remain on glipizide at this time. If blood sugar begins to drop, will reduce glipizide dose to 5 mg QD.    I will continue to monitor regularly and will follow up in 1-2 weeks, sooner if there are any concerning blood glucose readings.     Asked patient to contact me with any concerns or clinical changes.     Diabetes Medications             dulaglutide (TRULICITY) 0.75 mg/0.5 mL PnIj Inject 0.5 mLs (0.75 mg total) into the skin every 7 days.    glipiZIDE (GLUCOTROL) 5 MG tablet Take 1 tablet (5 mg total) by mouth 2 (two) times daily with meals.    metFORMIN (GLUCOPHAGE-XR) 500 MG 24 hr tablet TAKE TWO TABLETS BY MOUTH TWICE DAILY WITH MEALS

## 2019-08-30 ENCOUNTER — PATIENT OUTREACH (OUTPATIENT)
Dept: OTHER | Facility: OTHER | Age: 69
End: 2019-08-30

## 2019-08-30 RX ORDER — ATORVASTATIN CALCIUM 80 MG/1
TABLET, FILM COATED ORAL
Qty: 90 TABLET | Refills: 0 | Status: SHIPPED | OUTPATIENT
Start: 2019-08-30 | End: 2020-01-06

## 2019-08-30 NOTE — PROGRESS NOTES
Last 5 Patient Entered Readings                                      Current 30 Day Average: 140/74     Recent Readings 8/17/2019 8/7/2019 7/22/2019 7/8/2019 6/26/2019    SBP (mmHg) 144 135 121 164 140    DBP (mmHg) 67 80 72 81 73    Pulse 62 57 58 51 57        Mr. Mike has not submitted any recent BP readings. He will send one later today.     Per 30 day average, 140/74 mmHg, patient's BP is not at goal.     Will continue to monitor regularly. Will follow up in 2-3 weeks, sooner if BP begins to trend upward or downward.    Asked patient to call or message with questions or concerns.     Current HTN regimen:  Hypertension Medications             carvedilol (COREG) 3.125 MG tablet TAKE ONE TABLET BY MOUTH TWICE DAILY WITH MEALS (STOP ATENOLOL)    hydroCHLOROthiazide (HYDRODIURIL) 25 MG tablet TAKE ONE TABLET BY MOUTH DAILY FOR BLOOD PRESSURE    valsartan (DIOVAN) 320 MG tablet TAKE ONE TABLET BY MOUTH DAILY FOR BLOOD PRESSURE              Last 6 Patient Entered Readings                                          Most Recent A1c: 9.7% on 7/8/2019  (Goal: 8%)     Recent Readings 8/29/2019 8/22/2019 8/14/2019 8/12/2019 8/7/2019    Blood Glucose (mg/dL) 178 215 228 314 235        Mr. Mike has now taken 2 doses of Trulicity. He denies any side effects. He has not been checking blood glucose. Asked that he be more diligent with checking blood glucose.    Patient's health , Diana Nolasco, will be following up. I will continue to monitor regularly and will follow up in 2-3 weeks, sooner if there are any concerning blood glucose readings.     Asked patient to contact me with any concerns or clinical changes.     Diabetes Medications             dulaglutide (TRULICITY) 0.75 mg/0.5 mL PnIj Inject 0.5 mLs (0.75 mg total) into the skin every 7 days.    glipiZIDE (GLUCOTROL) 5 MG tablet Take 1 tablet (5 mg total) by mouth 2 (two) times daily with meals.    metFORMIN (GLUCOPHAGE-XR) 500 MG 24 hr tablet TAKE TWO  TABLETS BY MOUTH TWICE DAILY WITH MEALS

## 2019-09-03 ENCOUNTER — PATIENT OUTREACH (OUTPATIENT)
Dept: OTHER | Facility: OTHER | Age: 69
End: 2019-09-03

## 2019-09-03 NOTE — PROGRESS NOTES
"Last 5 Patient Entered Readings                                      Current 30 Day Average: 140/74     Recent Readings 8/17/2019 8/7/2019 7/22/2019 7/8/2019 6/26/2019    SBP (mmHg) 144 135 121 164 140    DBP (mmHg) 67 80 72 81 73    Pulse 62 57 58 51 57          Last 6 Patient Entered Readings                                          Most Recent A1c: 9.7% on 7/8/2019  (Goal: 8%)     Recent Readings 9/3/2019 8/29/2019 8/22/2019 8/14/2019 8/12/2019    Blood Glucose (mg/dL) 24 178 215 228 314        Digital Medicine: Health  Follow Up    Lifestyle Modifications:    1.Dietary Modifications (Sodium intake <2,000mg/day, food labels, dining out): Deferred    2.Physical Activity: Mr. Mike is still cutting grass on weekends.     3.Medication Therapy: Patient has been compliant with the medication regimen. Mr. Mike pushed his lab work back to October 9 to "give my liver a break from taking all that extra Lipitor". Patient was taking additional Lipitor as he had mistaken it for metformin.     4.Patient has the following medication side effects/concerns: None  (Frequency/Alleviating factors/Precipitating factors, etc.)     Follow up with Mr. Esteban Mike completed. Mr. Mike is doing okay. Addressed low BG recorded today (24). Patient states that the reading is not accurate, but can't explain how it got it. Encouraged him to make sure that he has enough blood on the strip when monitoring and to retest if he isn't sure about whether the reading is accurate or not. Will delete low BG. Also encouraged patient to submit regular BP readings. He will take on tomorrow. No further questions or concerns. Will continue to follow up to achieve health goals.    "

## 2019-09-16 DIAGNOSIS — E11.51 TYPE 2 DIABETES, WITH PERIPHERAL CIRCULATORY DISORDER NOT AT GOAL: ICD-10-CM

## 2019-09-16 RX ORDER — DULAGLUTIDE 0.75 MG/.5ML
INJECTION, SOLUTION SUBCUTANEOUS
Qty: 2 ML | Refills: 1 | Status: SHIPPED | OUTPATIENT
Start: 2019-09-16 | End: 2019-09-18

## 2019-09-18 ENCOUNTER — PATIENT OUTREACH (OUTPATIENT)
Dept: OTHER | Facility: OTHER | Age: 69
End: 2019-09-18

## 2019-09-18 DIAGNOSIS — E11.51 TYPE 2 DIABETES, WITH PERIPHERAL CIRCULATORY DISORDER NOT AT GOAL: ICD-10-CM

## 2019-09-18 NOTE — PROGRESS NOTES
Digital Medicine: Clinician Follow-Up    Mr. Mike has not been checking BP and blood glucose regularly.         Follow Up  Follow-up reason(s): reading review          Assessment/Plan        PLAN  additional monitoring needed    Per 30 day average, 150/83 mmHg, patient's BP is not at goal. No recent blood glucose readings to address.    Will follow up once there is more data to review. Asked for readings 3-4 times/week. If blood glucose remains elevated, will increase Trulicity.     Will continue to monitor regularly. Will follow up in 3-4 weeks, sooner if BP begins to trend upward or downward.    Asked patient to call or message with questions or concerns.      Last 5 Patient Entered Readings                                      Current 30 Day Average: 150/83     Recent Readings 9/16/2019 9/5/2019 9/4/2019 8/17/2019 8/7/2019    SBP (mmHg) 149 158 142 144 135    DBP (mmHg) 102 79 69 67 80    Pulse 60 67 56 62 57        Last 6 Patient Entered Readings                                          Most Recent A1c: 9.7% on 7/8/2019  (Goal: 8%)     Recent Readings 9/5/2019 9/4/2019 9/3/2019 8/29/2019 8/22/2019    Blood Glucose (mg/dL) 282 167 (No Data)  178 215           Hypertension Medications             carvedilol (COREG) 3.125 MG tablet TAKE ONE TABLET BY MOUTH TWICE DAILY WITH MEALS (STOP ATENOLOL)    hydroCHLOROthiazide (HYDRODIURIL) 25 MG tablet TAKE ONE TABLET BY MOUTH DAILY FOR BLOOD PRESSURE    valsartan (DIOVAN) 320 MG tablet TAKE ONE TABLET BY MOUTH DAILY FOR BLOOD PRESSURE        Diabetes Medications             dulaglutide (TRULICITY) 0.75 mg/0.5 mL PnIj Inject 0.5 mLs (0.75 mg total) into the skin every 7 days.    glipiZIDE (GLUCOTROL) 5 MG tablet Take 1 tablet (5 mg total) by mouth 2 (two) times daily with meals.    metFORMIN (GLUCOPHAGE-XR) 500 MG 24 hr tablet TAKE TWO TABLETS BY MOUTH TWICE DAILY WITH MEALS

## 2019-09-25 DIAGNOSIS — I10 HYPERTENSION, ESSENTIAL: ICD-10-CM

## 2019-09-25 RX ORDER — CARVEDILOL 3.12 MG/1
TABLET ORAL
Qty: 180 TABLET | Refills: 1 | Status: SHIPPED | OUTPATIENT
Start: 2019-09-25 | End: 2020-04-27

## 2019-10-05 ENCOUNTER — PATIENT OUTREACH (OUTPATIENT)
Dept: ADMINISTRATIVE | Facility: OTHER | Age: 69
End: 2019-10-05

## 2019-10-09 ENCOUNTER — PROCEDURE VISIT (OUTPATIENT)
Dept: HEPATOLOGY | Facility: CLINIC | Age: 69
End: 2019-10-09
Attending: INTERNAL MEDICINE
Payer: COMMERCIAL

## 2019-10-09 ENCOUNTER — OFFICE VISIT (OUTPATIENT)
Dept: HEPATOLOGY | Facility: CLINIC | Age: 69
End: 2019-10-09
Attending: FAMILY MEDICINE
Payer: COMMERCIAL

## 2019-10-09 VITALS
SYSTOLIC BLOOD PRESSURE: 153 MMHG | OXYGEN SATURATION: 95 % | WEIGHT: 187.19 LBS | BODY MASS INDEX: 29.38 KG/M2 | HEART RATE: 61 BPM | HEIGHT: 67 IN | DIASTOLIC BLOOD PRESSURE: 76 MMHG

## 2019-10-09 DIAGNOSIS — K76.0 NAFLD (NONALCOHOLIC FATTY LIVER DISEASE): ICD-10-CM

## 2019-10-09 DIAGNOSIS — K76.0 NAFLD (NONALCOHOLIC FATTY LIVER DISEASE): Primary | ICD-10-CM

## 2019-10-09 PROCEDURE — 91200 LIVER ELASTOGRAPHY: CPT | Mod: S$GLB,,, | Performed by: INTERNAL MEDICINE

## 2019-10-09 PROCEDURE — 99204 OFFICE O/P NEW MOD 45 MIN: CPT | Mod: S$GLB,,, | Performed by: INTERNAL MEDICINE

## 2019-10-09 PROCEDURE — 3078F PR MOST RECENT DIASTOLIC BLOOD PRESSURE < 80 MM HG: ICD-10-PCS | Mod: CPTII,S$GLB,, | Performed by: INTERNAL MEDICINE

## 2019-10-09 PROCEDURE — 99999 PR PBB SHADOW E&M-EST. PATIENT-LVL IV: CPT | Mod: PBBFAC,,, | Performed by: INTERNAL MEDICINE

## 2019-10-09 PROCEDURE — 99204 PR OFFICE/OUTPT VISIT, NEW, LEVL IV, 45-59 MIN: ICD-10-PCS | Mod: S$GLB,,, | Performed by: INTERNAL MEDICINE

## 2019-10-09 PROCEDURE — 3077F SYST BP >= 140 MM HG: CPT | Mod: CPTII,S$GLB,, | Performed by: INTERNAL MEDICINE

## 2019-10-09 PROCEDURE — 99999 PR PBB SHADOW E&M-EST. PATIENT-LVL IV: ICD-10-PCS | Mod: PBBFAC,,, | Performed by: INTERNAL MEDICINE

## 2019-10-09 PROCEDURE — 1101F PR PT FALLS ASSESS DOC 0-1 FALLS W/OUT INJ PAST YR: ICD-10-PCS | Mod: CPTII,S$GLB,, | Performed by: INTERNAL MEDICINE

## 2019-10-09 PROCEDURE — 3077F PR MOST RECENT SYSTOLIC BLOOD PRESSURE >= 140 MM HG: ICD-10-PCS | Mod: CPTII,S$GLB,, | Performed by: INTERNAL MEDICINE

## 2019-10-09 PROCEDURE — 1101F PT FALLS ASSESS-DOCD LE1/YR: CPT | Mod: CPTII,S$GLB,, | Performed by: INTERNAL MEDICINE

## 2019-10-09 PROCEDURE — 91200 PR LIVER ELASTOGRAPHY W/OUT IMAG W/INTERP & REPORT: ICD-10-PCS | Mod: S$GLB,,, | Performed by: INTERNAL MEDICINE

## 2019-10-09 PROCEDURE — 3078F DIAST BP <80 MM HG: CPT | Mod: CPTII,S$GLB,, | Performed by: INTERNAL MEDICINE

## 2019-10-09 NOTE — PROCEDURES
Procedures   Fibroscan Procedure     Name: Esteban Mike  Date of Procedure : 10/09/2019   :: Vladimir Degroot MD  Diagnosis: NAFLD    Probe: XL    Fibroscan readin KPa    Fibrosis:F 0-1     CAP readin dB/m    Steatosis: :S3

## 2019-10-09 NOTE — PROGRESS NOTES
If more [facility] spots become available can you move her apt up.  She is overdue for an in office check.  I have her karen for 2/14/18 apt as of now   Subjective:       Patient ID: Esteban Mike is a 69 y.o. male.    Chief Complaint: Fatty Liver      HPI:   I saw this 69 year old  man back in the hepatology clinic for investigation of his abnormal LFTs which have been present for at least 10 years.     I last saw him in  and had in fact done a liver biopsy at the time that showed no liver fibrosis.      He feels well and has no abdominal symptoms although he describes fatigue for years and has dificulty sleeping at night due to stress.   He has a strong history off CAD and cerebrovascular disease with a carotid endarterectomy and a CABG despite his non smoking history. I also note that he is a type 2 diabetic with hypertension and is overweight.    I note that his grandfather  of alcohol related cirrhosis in his 60s.   Mr Mike drinks alcohol rarely and has no other risk factors for liver disease other than the metabolic syndrome.    Our previous investigations have shown that he is HBV and HCV negative, has a normal ferritin and ceruloplasmin as well as an alpha 1 AT phenotype and his autoimmune screen is unremarkable.    Fibroscan today showed F0-1 but stage 3 steatosis.    Abdominal US: 2014  Large fatty liver.  Two cysts in the left kidney, one of which has a thin septation      Liver biopsy: 2014  MACROSTEATOSIS, 5%  MICROSTEATOSIS, 10%  NO FIBROSIS  NO IRON  NO MANSI  Body mass index is 29.32 kg/m².    A1C around 9     PMH:  DM  CABG-   Carotid endarterectomy + stents    SH:  Works a physical job for the [employer]  Occasional alcohol    FH:  Grandfather- cirrhosis- alcohol     Lab Results   Component Value Date    ALT 50 (H) 10/03/2018    AST 23 10/03/2018    GGT 19 2014    ALKPHOS 63 10/03/2018    BILITOT 0.5 10/03/2018     Past Medical History:   Diagnosis Date    Aortic valve disease     Bilateral carotid artery stenosis 2018    CAD (coronary artery disease)     Carotid artery  occlusion      LICA, 70-80% JULIUS    Diabetes mellitus     Diabetes mellitus type II     Elevated LFTs     GERD (gastroesophageal reflux disease)     Hyperlipidemia     Hypertension     PVD (peripheral vascular disease)      Past Surgical History:   Procedure Laterality Date    CARDIAC CATHETERIZATION      carotid      CEA      CORONARY ARTERY BYPASS GRAFT  5/2/08    X4    ESOPHAGOGASTRODUODENOSCOPY N/A 4/1/2019    Procedure: EGD (ESOPHAGOGASTRODUODENOSCOPY);  Surgeon: William Holman MD;  Location: [address]);  Service: Endoscopy;  Laterality: N/A;    HERNIA REPAIR      ica stent      TONSILLECTOMY       Current Outpatient Medications   Medication Sig    aspirin 81 MG chewable tablet Take 81 mg by mouth once daily.      atorvastatin (LIPITOR) 80 MG tablet TAKE ONE TABLET BY MOUTH DAILY AFTER SUPPER FOR CHOLESTEROL    blood sugar diagnostic Strp To check BG 3 times daily, to use with insurance preferred meter    blood-glucose meter kit To check BG 3 times daily, to use with insurance preferred meter    carvedilol (COREG) 3.125 MG tablet TAKE ONE TABLET BY MOUTH TWICE DAILY WITH MEALS AND (STOP ATENOLOL)    cholecalciferol, vitamin D3, (VITAMIN D3) 2,000 unit Cap Take 1 capsule (2,000 Units total) by mouth once daily.    dulaglutide (TRULICITY) 0.75 mg/0.5 mL PnIj Inject 0.5 mLs (0.75 mg total) into the skin every 7 days.    fish oil-omega-3 fatty acids 300-1,000 mg capsule Take 2 g by mouth once daily. 3 tablets       flu vacc [id number],65yr up,PF (FLUZONE HIGH-DOSE 2019-20, PF,) 180 mcg/0.5 mL Syrg Inject into the skin for one dose    glipiZIDE (GLUCOTROL) 5 MG tablet Take 1 tablet (5 mg total) by mouth 2 (two) times daily with meals.    hydroCHLOROthiazide (HYDRODIURIL) 25 MG tablet TAKE ONE TABLET BY MOUTH DAILY FOR BLOOD PRESSURE    lancets Misc To check BG 3 times daily, to use with insurance preferred meter    metFORMIN (GLUCOPHAGE-XR) 500 MG 24 hr tablet TAKE TWO  TABLETS BY MOUTH TWICE DAILY WITH MEALS    multivitamin capsule Take 1 capsule by mouth once daily.    pantoprazole (PROTONIX) 40 MG tablet Take 1 tablet (40 mg total) by mouth before breakfast.    valsartan (DIOVAN) 320 MG tablet TAKE ONE TABLET BY MOUTH DAILY FOR BLOOD PRESSURE    vit C-vit E-lutein-min-om-3 (OCUVITE) [id number] mg-unit-mg-mg Cap Take 1 tablet by mouth once daily.     No current facility-administered medications for this visit.        Review of Systems   Constitutional: Negative for activity change, appetite change, chills, fatigue, fever and unexpected weight change.   HENT: Negative for hearing loss.    Eyes: Negative for discharge and visual disturbance.   Respiratory: Negative for cough, chest tightness, shortness of breath and wheezing.    Cardiovascular: Negative for chest pain, palpitations and leg swelling.   Gastrointestinal: Negative for abdominal distention, abdominal pain, constipation, diarrhea and nausea.   Genitourinary: Negative for dysuria and frequency.   Musculoskeletal: Negative for arthralgias and back pain.   Skin: Negative for pallor and rash.   Neurological: Negative for dizziness, tremors, speech difficulty and headaches.   Hematological: Negative for adenopathy.   Psychiatric/Behavioral: Negative for agitation and confusion.         Objective:      Physical Exam   Constitutional: He is oriented to person, place, and time. He appears well-developed and well-nourished.   HENT:   Head: Normocephalic and atraumatic.   Eyes: Pupils are equal, round, and reactive to light. Conjunctivae are normal. No scleral icterus.   Neck: Normal range of motion. No thyromegaly present.   Cardiovascular: Normal rate, regular rhythm and normal heart sounds.   No murmur heard.  Pulmonary/Chest: Effort normal and breath sounds normal. He has no wheezes.   Abdominal: Soft. Bowel sounds are normal. He exhibits no distension and no mass. There is no tenderness.   Musculoskeletal: He exhibits  no edema.   Lymphadenopathy:     He has no cervical adenopathy.   Neurological: He is alert and oriented to person, place, and time.   Skin: Skin is warm. No rash noted. No erythema.   Psychiatric: He has a normal mood and affect. His behavior is normal.       Assessment:       1. NAFLD (nonalcoholic fatty liver disease)        Plan:   His liver biopsy showed simple steatosis with no steatohepatitis or fibrosis which is reassuring.  His fibroscan today showed similarl findings.    He has a good long term prognosis in terms of his liver disease.  Reiterated advice to lose weight and control his Glu + lipids.  .  1. Lipid control- lipitor  2. Weight loss- target of10% of bdy weight in 6 months  3. BP control  4. DM control    All of the above are managed by his PCP and endocrinologist.    Given his reassuring liver biopsy and current reassuring fibroscan, I don't think we need to see him in our clinic unless he develops new symptoms,

## 2019-10-09 NOTE — LETTER
October 9, 2019      Ned Hartley MD  [address]  [place] [address]           [facility] - Hepatology  [address]  [facility] [address]  Phone: [phone]  Fax: [phone]          Patient: Esteban Mike   MR Number: [id number]   YOB: 1950   Date of Visit: 10/9/2019       Dear Dr. Ned Hartley:    Thank you for referring Esteban Mike to me for evaluation. Attached you will find relevant portions of my assessment and plan of care.    If you have questions, please do not hesitate to call me. I look forward to following Esteban Mike along with you.    Sincerely,    Vladimir Degroot MD    Enclosure  CC:  No Recipients    If you would like to receive this communication electronically, please contact [email or web address] or [phone] to request more information on [employer] access.    For providers and/or their staff who would like to refer a patient to [facility], please contact us through our one-stop-shop provider referral line, [facility], at [phone].    If you feel you have received this communication in error or would no longer like to receive these types of communications, please e-mail [email or web address]

## 2019-10-10 ENCOUNTER — IMMUNIZATION (OUTPATIENT)
Dept: PHARMACY | Facility: CLINIC | Age: 69
End: 2019-10-10
Payer: MEDICARE

## 2019-10-16 ENCOUNTER — PATIENT OUTREACH (OUTPATIENT)
Dept: OTHER | Facility: OTHER | Age: 69
End: 2019-10-16

## 2019-11-09 DIAGNOSIS — E11.51 TYPE 2 DIABETES, WITH PERIPHERAL CIRCULATORY DISORDER NOT AT GOAL: ICD-10-CM

## 2019-11-09 RX ORDER — GLIPIZIDE 5 MG/1
TABLET ORAL
Qty: 180 TABLET | Refills: 0 | Status: SHIPPED | OUTPATIENT
Start: 2019-11-09 | End: 2020-06-11 | Stop reason: SDUPTHER

## 2019-11-09 RX ORDER — DULAGLUTIDE 0.75 MG/.5ML
INJECTION, SOLUTION SUBCUTANEOUS
Qty: 2 ML | Refills: 0 | Status: SHIPPED | OUTPATIENT
Start: 2019-11-09 | End: 2020-01-09 | Stop reason: DRUGHIGH

## 2019-11-21 ENCOUNTER — PATIENT OUTREACH (OUTPATIENT)
Dept: OTHER | Facility: OTHER | Age: 69
End: 2019-11-21

## 2019-11-21 NOTE — PROGRESS NOTES
"Digital Medicine: Health  Follow-Up    Introduced myself as pt's temporary HC and encouraged him to save my number and reach out if he has questions or concerns.    Pt was going into a meeting, so call was cut short and we were not able to discuss all lifestyle factors or set goals at this encounter.         Follow Up  Follow-up reason(s): reading review and routine education    Pt attributes lack of BG readings to "just not taking the time to do it". Pt states that he noticed that readings have been higher and attributes this to poor eating habits. He states that he will start taking readings regularly today.     INTERVENTION(S)  recommended diet modifications and encouragement/support    PLAN  continue monitoring    Encouraged pt to check BG and BP regularly. Also discussed the effects of carbs on BG and the importance of portion control and moderation as opposed to complete elimination.         Topic    Hemoglobin A1C        Last 5 Patient Entered Readings                                      Current 30 Day Average: 153/84     Recent Readings 11/18/2019 10/31/2019 10/25/2019 10/11/2019 10/8/2019    SBP (mmHg) 144 159 155 143 159    DBP (mmHg) 84 85 82 74 80    Pulse 73 60 67 55 61        Last 6 Patient Entered Readings                                          Most Recent A1c: 9.7% on 7/8/2019  (Goal: 8%)     Recent Readings 10/31/2019 10/25/2019 10/8/2019 9/30/2019 9/19/2019    Blood Glucose (mg/dL) 239 258 202 240 215             Diet Screening       Pt states that "eating right" is difficult. He has made some dietary changes in the last couple of years, but still finds it difficult to eat modest portions of ice cream and candy.     Intervention(s): portion control    Assigning the following patient goals: reduce carbs    SDOH: Deferred.   "

## 2019-12-03 ENCOUNTER — TELEPHONE (OUTPATIENT)
Dept: INTERNAL MEDICINE | Facility: CLINIC | Age: 69
End: 2019-12-03

## 2019-12-03 NOTE — TELEPHONE ENCOUNTER
"----- Message from Tiara Oneil sent at 12/3/2019 12:22 PM CST -----  Prior Authorization Needed    Rx: dulaglutide (TRULICITY) 0.75 mg/0.5 mL Donita    To submit the PA:    1: Go to " [email or web address] " and click "Enter a Key"    2. Enter the patient's last name and date of birth and the key.      KEY: [id number]    3. Complete the forms and click "send to Plan"    Note chart when prior authorization has been submitted.    Please notify pharmacy when prior authorization has been approved.    Thank You    "

## 2019-12-19 ENCOUNTER — PATIENT OUTREACH (OUTPATIENT)
Dept: OTHER | Facility: OTHER | Age: 69
End: 2019-12-19

## 2019-12-30 ENCOUNTER — OFFICE VISIT (OUTPATIENT)
Dept: INTERNAL MEDICINE | Facility: CLINIC | Age: 69
End: 2019-12-30
Payer: COMMERCIAL

## 2019-12-30 VITALS
WEIGHT: 186.94 LBS | OXYGEN SATURATION: 97 % | HEART RATE: 62 BPM | HEIGHT: 67 IN | BODY MASS INDEX: 29.34 KG/M2 | SYSTOLIC BLOOD PRESSURE: 152 MMHG | DIASTOLIC BLOOD PRESSURE: 82 MMHG

## 2019-12-30 DIAGNOSIS — M54.9 BACK PAIN, UNSPECIFIED BACK LOCATION, UNSPECIFIED BACK PAIN LATERALITY, UNSPECIFIED CHRONICITY: Primary | ICD-10-CM

## 2019-12-30 PROCEDURE — 3288F PR FALLS RISK ASSESSMENT DOCUMENTED: ICD-10-PCS | Mod: CPTII,S$GLB,, | Performed by: PHYSICIAN ASSISTANT

## 2019-12-30 PROCEDURE — 1159F PR MEDICATION LIST DOCUMENTED IN MEDICAL RECORD: ICD-10-PCS | Mod: S$GLB,,, | Performed by: PHYSICIAN ASSISTANT

## 2019-12-30 PROCEDURE — 3079F DIAST BP 80-89 MM HG: CPT | Mod: CPTII,S$GLB,, | Performed by: PHYSICIAN ASSISTANT

## 2019-12-30 PROCEDURE — 99999 PR PBB SHADOW E&M-EST. PATIENT-LVL IV: CPT | Mod: PBBFAC,,, | Performed by: PHYSICIAN ASSISTANT

## 2019-12-30 PROCEDURE — 1100F PR PT FALLS ASSESS DOC 2+ FALLS/FALL W/INJURY/YR: ICD-10-PCS | Mod: CPTII,S$GLB,, | Performed by: PHYSICIAN ASSISTANT

## 2019-12-30 PROCEDURE — 3288F FALL RISK ASSESSMENT DOCD: CPT | Mod: CPTII,S$GLB,, | Performed by: PHYSICIAN ASSISTANT

## 2019-12-30 PROCEDURE — 99213 PR OFFICE/OUTPT VISIT, EST, LEVL III, 20-29 MIN: ICD-10-PCS | Mod: S$GLB,,, | Performed by: PHYSICIAN ASSISTANT

## 2019-12-30 PROCEDURE — 1100F PTFALLS ASSESS-DOCD GE2>/YR: CPT | Mod: CPTII,S$GLB,, | Performed by: PHYSICIAN ASSISTANT

## 2019-12-30 PROCEDURE — 99999 PR PBB SHADOW E&M-EST. PATIENT-LVL IV: ICD-10-PCS | Mod: PBBFAC,,, | Performed by: PHYSICIAN ASSISTANT

## 2019-12-30 PROCEDURE — 1125F AMNT PAIN NOTED PAIN PRSNT: CPT | Mod: S$GLB,,, | Performed by: PHYSICIAN ASSISTANT

## 2019-12-30 PROCEDURE — 1159F MED LIST DOCD IN RCRD: CPT | Mod: S$GLB,,, | Performed by: PHYSICIAN ASSISTANT

## 2019-12-30 PROCEDURE — 1125F PR PAIN SEVERITY QUANTIFIED, PAIN PRESENT: ICD-10-PCS | Mod: S$GLB,,, | Performed by: PHYSICIAN ASSISTANT

## 2019-12-30 PROCEDURE — 3077F PR MOST RECENT SYSTOLIC BLOOD PRESSURE >= 140 MM HG: ICD-10-PCS | Mod: CPTII,S$GLB,, | Performed by: PHYSICIAN ASSISTANT

## 2019-12-30 PROCEDURE — 3079F PR MOST RECENT DIASTOLIC BLOOD PRESSURE 80-89 MM HG: ICD-10-PCS | Mod: CPTII,S$GLB,, | Performed by: PHYSICIAN ASSISTANT

## 2019-12-30 PROCEDURE — 99213 OFFICE O/P EST LOW 20 MIN: CPT | Mod: S$GLB,,, | Performed by: PHYSICIAN ASSISTANT

## 2019-12-30 PROCEDURE — 3077F SYST BP >= 140 MM HG: CPT | Mod: CPTII,S$GLB,, | Performed by: PHYSICIAN ASSISTANT

## 2019-12-30 RX ORDER — TIZANIDINE 2 MG/1
2 TABLET ORAL NIGHTLY PRN
Qty: 30 TABLET | Refills: 0 | Status: SHIPPED | OUTPATIENT
Start: 2019-12-30 | End: 2019-12-31

## 2019-12-30 RX ORDER — MELOXICAM 7.5 MG/1
7.5 TABLET ORAL DAILY
Qty: 30 TABLET | Refills: 0 | Status: SHIPPED | OUTPATIENT
Start: 2019-12-30 | End: 2020-03-12 | Stop reason: ALTCHOICE

## 2019-12-30 NOTE — PROGRESS NOTES
Subjective:       Patient ID: Esteban Mike is a 69 y.o. male.    Chief Complaint: Back Pain    Patient presents with a 3 days history of low left sided back pain that extends into his hip.  He states he has had this pain before and is usually very active.  He denies any new injury or fall.  He has been using Aspercreame with no relief.  He states the pain is present on all movements. No loss of bowel or bladder control.      Review of Systems   Constitutional: Negative for activity change, appetite change, fatigue and fever.   Respiratory: Negative for cough, chest tightness and shortness of breath.    Cardiovascular: Negative for chest pain and leg swelling.   Musculoskeletal: Positive for arthralgias and back pain. Negative for gait problem, joint swelling, myalgias, neck pain and neck stiffness.   Neurological: Negative for numbness.       Objective:      Physical Exam   Constitutional: He is oriented to person, place, and time. He appears well-developed and well-nourished. No distress.   HENT:   Head: Normocephalic and atraumatic.   Eyes: Pupils are equal, round, and reactive to light. Conjunctivae are normal.   Neck: Normal range of motion. Neck supple. No JVD present. No thyromegaly present.   Cardiovascular: Normal rate and regular rhythm. Exam reveals no gallop and no friction rub.   No murmur heard.  Pulmonary/Chest: Effort normal. No respiratory distress. He has no wheezes. He has no rales. He exhibits no tenderness.   Musculoskeletal:        Lumbar back: He exhibits pain and spasm. He exhibits normal range of motion, no tenderness, no bony tenderness, no swelling, no edema, no deformity and no laceration.   Neurological: He is alert and oriented to person, place, and time.   Skin: He is not diaphoretic.       Assessment:       1. Back pain, unspecified back location, unspecified back pain laterality, unspecified chronicity        Plan:       Esteban was seen today for back pain.    Diagnoses  and all orders for this visit:    Back pain, unspecified back location, unspecified back pain laterality, unspecified chronicity    Other orders  -     meloxicam (MOBIC) 7.5 MG tablet; Take 1 tablet (7.5 mg total) by mouth once daily. For back pain  -     tiZANidine (ZANAFLEX) 2 MG tablet; Take 1 tablet (2 mg total) by mouth nightly as needed.    Apply moist heat to the back.  No heavy lifting above 10 lbs.  Rest.  Take medications as prescribed.  Back pain can take as long as 6 weeks to resolve, but will resolve quicker the more you can rest.  Call if symptoms worsen, if new symptoms develop or if the pain has not started to improve with in 3-4 weeks.

## 2019-12-31 ENCOUNTER — OFFICE VISIT (OUTPATIENT)
Dept: PRIMARY CARE CLINIC | Facility: CLINIC | Age: 69
End: 2019-12-31
Payer: COMMERCIAL

## 2019-12-31 ENCOUNTER — HOSPITAL ENCOUNTER (OUTPATIENT)
Dept: RADIOLOGY | Facility: HOSPITAL | Age: 69
Discharge: HOME | End: 2019-12-31
Attending: INTERNAL MEDICINE
Payer: COMMERCIAL

## 2019-12-31 VITALS
OXYGEN SATURATION: 95 % | RESPIRATION RATE: 18 BRPM | HEART RATE: 65 BPM | HEIGHT: 67 IN | TEMPERATURE: 98 F | BODY MASS INDEX: 29.58 KG/M2 | SYSTOLIC BLOOD PRESSURE: 144 MMHG | DIASTOLIC BLOOD PRESSURE: 80 MMHG | WEIGHT: 188.5 LBS
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
## 2019-12-31 DIAGNOSIS — M25.552 LEFT HIP PAIN: Primary | ICD-10-CM

## 2019-12-31 DIAGNOSIS — M25.552 LEFT HIP PAIN: ICD-10-CM

## 2019-12-31 PROCEDURE — 73521 X-RAY EXAM HIPS BI 2 VIEWS: CPT | Mod: TC,PN

## 2019-12-31 PROCEDURE — 3079F PR MOST RECENT DIASTOLIC BLOOD PRESSURE 80-89 MM HG: ICD-10-PCS | Mod: CPTII,S$GLB,, | Performed by: INTERNAL MEDICINE

## 2019-12-31 PROCEDURE — 99213 PR OFFICE/OUTPT VISIT, EST, LEVL III, 20-29 MIN: ICD-10-PCS | Mod: S$GLB,,, | Performed by: INTERNAL MEDICINE

## 2019-12-31 PROCEDURE — 3077F SYST BP >= 140 MM HG: CPT | Mod: CPTII,S$GLB,, | Performed by: INTERNAL MEDICINE

## 2019-12-31 PROCEDURE — 1159F MED LIST DOCD IN RCRD: CPT | Mod: S$GLB,,, | Performed by: INTERNAL MEDICINE

## 2019-12-31 PROCEDURE — 73521 X-RAY EXAM HIPS BI 2 VIEWS: CPT | Mod: 26,,, | Performed by: RADIOLOGY

## 2019-12-31 PROCEDURE — 73521 XR HIPS BILATERAL 2 VIEW INCL AP PELVIS: ICD-10-PCS | Mod: 26,,, | Performed by: RADIOLOGY

## 2019-12-31 PROCEDURE — 1101F PT FALLS ASSESS-DOCD LE1/YR: CPT | Mod: CPTII,S$GLB,, | Performed by: INTERNAL MEDICINE

## 2019-12-31 PROCEDURE — 99999 PR PBB SHADOW E&M-EST. PATIENT-LVL V: CPT | Mod: PBBFAC,,, | Performed by: INTERNAL MEDICINE

## 2019-12-31 PROCEDURE — 3079F DIAST BP 80-89 MM HG: CPT | Mod: CPTII,S$GLB,, | Performed by: INTERNAL MEDICINE

## 2019-12-31 PROCEDURE — 1159F PR MEDICATION LIST DOCUMENTED IN MEDICAL RECORD: ICD-10-PCS | Mod: S$GLB,,, | Performed by: INTERNAL MEDICINE

## 2019-12-31 PROCEDURE — 99213 OFFICE O/P EST LOW 20 MIN: CPT | Mod: S$GLB,,, | Performed by: INTERNAL MEDICINE

## 2019-12-31 PROCEDURE — 3077F PR MOST RECENT SYSTOLIC BLOOD PRESSURE >= 140 MM HG: ICD-10-PCS | Mod: CPTII,S$GLB,, | Performed by: INTERNAL MEDICINE

## 2019-12-31 PROCEDURE — 99999 PR PBB SHADOW E&M-EST. PATIENT-LVL V: ICD-10-PCS | Mod: PBBFAC,,, | Performed by: INTERNAL MEDICINE

## 2019-12-31 PROCEDURE — 1101F PR PT FALLS ASSESS DOC 0-1 FALLS W/OUT INJ PAST YR: ICD-10-PCS | Mod: CPTII,S$GLB,, | Performed by: INTERNAL MEDICINE

## 2019-12-31 PROCEDURE — 1125F PR PAIN SEVERITY QUANTIFIED, PAIN PRESENT: ICD-10-PCS | Mod: S$GLB,,, | Performed by: INTERNAL MEDICINE

## 2019-12-31 PROCEDURE — 1125F AMNT PAIN NOTED PAIN PRSNT: CPT | Mod: S$GLB,,, | Performed by: INTERNAL MEDICINE

## 2019-12-31 RX ORDER — TRAMADOL HYDROCHLORIDE 50 MG/1
50 TABLET ORAL EVERY 12 HOURS PRN
Qty: 14 TABLET | Refills: 0 | Status: SHIPPED | OUTPATIENT
Start: 2019-12-31 | End: 2021-05-03 | Stop reason: ALTCHOICE

## 2019-12-31 NOTE — PROGRESS NOTES
[facility] Note    Chief Complaint      Chief Complaint   Patient presents with    Back Pain       History of Present Illness      Esteban Mike is a 69 y.o. WM with DM - II with Neurologic Manifestations , HTN, Fatty Liver, HLD, CAD, Left Carotid Artery stenosis presents with c/o back pain.     Left flank/low back pain x 3 days- He had a fall 3 wks ago.  He was carrying a tall extension ladder while walking and the top of the ladder hit his back and he fell to the ground and hit his right side on the cement and the ladder hit his left low back where he c/o pain. Pt saw NP yesterday and got new Rx for Mobic 7.5 mg/d and Tizanidine 2 mg QHS.  He took this last night with no improvement. He c/o pain in his back when he takes a deep breath  X 3 days.  Sometimes his leg wants to give out which just started 3 days ago. 10 /10 in severity - comes and goes.  Asper cream no help. He points to pain at the superior portion of his iliac crest in the Left midaxillary region. No numbness/tingling. Will give 7 day supply of tramadol prn pain.  If persists he will fu with Ortho or Dr. Hartley.     [facility]- PCP - Dr. Hartley    Past Medical History:  Past Medical History:   Diagnosis Date    Aortic valve disease     Bilateral carotid artery stenosis 7/18/2018    CAD (coronary artery disease)     Carotid artery occlusion      LICA, 70-80% JULIUS    Diabetes mellitus     Diabetes mellitus type II     Elevated LFTs     GERD (gastroesophageal reflux disease)     Hyperlipidemia     Hypertension     PVD (peripheral vascular disease)        Past Surgical History:   has a past surgical history that includes carotid; ica stent; Hernia repair; Tonsillectomy; Cardiac catheterization; Coronary artery bypass graft (5/2/08); CEA; and Esophagogastroduodenoscopy (N/A, 4/1/2019).    Family History:  family history includes Cirrhosis in his paternal grandfather; Diabetes in his mother; Heart disease in his  father, maternal grandfather, mother, and paternal grandfather.     Social History:  Social History     Tobacco Use    Smoking status: Never Smoker    Smokeless tobacco: Never Used   Substance Use Topics    Alcohol use: Yes     Comment: Socially     Drug use: No       Review of Systems   Respiratory: Negative for shortness of breath.    Cardiovascular: Negative for chest pain.   Gastrointestinal: Negative for abdominal pain, heartburn, nausea and vomiting.   Genitourinary: Negative for dysuria and hematuria.   Musculoskeletal: Positive for back pain and joint pain.        Left hip/flank pain   Neurological: Positive for focal weakness. Negative for dizziness, tingling and headaches.        Leg leg gives out but he catches himself before he falls        Medications:  Outpatient Encounter Medications as of 12/31/2019   Medication Sig Dispense Refill    aspirin 81 MG chewable tablet Take 81 mg by mouth once daily.        atorvastatin (LIPITOR) 80 MG tablet TAKE ONE TABLET BY MOUTH DAILY AFTER SUPPER FOR CHOLESTEROL 90 tablet 0    blood sugar diagnostic Strp To check BG 3 times daily, to use with insurance preferred meter 200 strip 11    blood-glucose meter kit To check BG 3 times daily, to use with insurance preferred meter 1 each 0    carvedilol (COREG) 3.125 MG tablet TAKE ONE TABLET BY MOUTH TWICE DAILY WITH MEALS AND (STOP ATENOLOL) 180 tablet 1    cholecalciferol, vitamin D3, (VITAMIN D3) 2,000 unit Cap Take 1 capsule (2,000 Units total) by mouth once daily. 90 capsule 3    dulaglutide (TRULICITY) 0.75 mg/0.5 mL PnIj Inject 0.5 mLs (0.75 mg total) into the skin every 7 days. 2 mL 1    fish oil-omega-3 fatty acids 300-1,000 mg capsule Take 2 g by mouth once daily. 3 tablets         glipiZIDE (GLUCOTROL) 5 MG tablet TAKE ONE TABLET BY MOUTH TWICE DAILY WITH MEALS FOR DIABETES 180 tablet 0    hydroCHLOROthiazide (HYDRODIURIL) 25 MG tablet TAKE ONE TABLET BY MOUTH DAILY FOR BLOOD PRESSURE 90 tablet 0     lancets Misc To check BG 3 times daily, to use with insurance preferred meter 200 each 11    meloxicam (MOBIC) 7.5 MG tablet Take 1 tablet (7.5 mg total) by mouth once daily. For back pain 30 tablet 0    metFORMIN (GLUCOPHAGE-XR) 500 MG 24 hr tablet TAKE TWO TABLETS BY MOUTH TWICE DAILY WITH MEALS 360 tablet 3    multivitamin capsule Take 1 capsule by mouth once daily.      pantoprazole (PROTONIX) 40 MG tablet Take 1 tablet (40 mg total) by mouth before breakfast. 90 tablet 3    TRULICITY 0.75 mg/0.5 mL PnIj INJECT 1.5ML SUBCUTANEOUSLY EVERY 7 DAYS 2 mL 0    valsartan (DIOVAN) 320 MG tablet TAKE ONE TABLET BY MOUTH DAILY FOR BLOOD PRESSURE 90 tablet 0    vit C-vit E-lutein-min-om-3 (OCUVITE) 338-76-3-150 mg-unit-mg-mg Cap Take 1 tablet by mouth once daily.      [DISCONTINUED] tiZANidine (ZANAFLEX) 2 MG tablet Take 1 tablet (2 mg total) by mouth nightly as needed. 30 tablet 0    flu vacc [id number],65yr up,PF (FLUZONE HIGH-DOSE 2019-20, PF,) 180 mcg/0.5 mL Syrg Inject into the skin for one dose 0.5 mL 0    traMADol (ULTRAM) 50 mg tablet Take 1 tablet (50 mg total) by mouth every 12 (twelve) hours as needed for Pain. 14 tablet 0     No facility-administered encounter medications on file as of 12/31/2019.        Current Outpatient Medications:     aspirin 81 MG chewable tablet, Take 81 mg by mouth once daily.  , Disp: , Rfl:     atorvastatin (LIPITOR) 80 MG tablet, TAKE ONE TABLET BY MOUTH DAILY AFTER SUPPER FOR CHOLESTEROL, Disp: 90 tablet, Rfl: 0    blood sugar diagnostic Strp, To check BG 3 times daily, to use with insurance preferred meter, Disp: 200 strip, Rfl: 11    blood-glucose meter kit, To check BG 3 times daily, to use with insurance preferred meter, Disp: 1 each, Rfl: 0    carvedilol (COREG) 3.125 MG tablet, TAKE ONE TABLET BY MOUTH TWICE DAILY WITH MEALS AND (STOP ATENOLOL), Disp: 180 tablet, Rfl: 1    cholecalciferol, vitamin D3, (VITAMIN D3) 2,000 unit Cap, Take 1 capsule (2,000 Units  total) by mouth once daily., Disp: 90 capsule, Rfl: 3    dulaglutide (TRULICITY) 0.75 mg/0.5 mL PnIj, Inject 0.5 mLs (0.75 mg total) into the skin every 7 days., Disp: 2 mL, Rfl: 1    fish oil-omega-3 fatty acids 300-1,000 mg capsule, Take 2 g by mouth once daily. 3 tablets  , Disp: , Rfl:     glipiZIDE (GLUCOTROL) 5 MG tablet, TAKE ONE TABLET BY MOUTH TWICE DAILY WITH MEALS FOR DIABETES, Disp: 180 tablet, Rfl: 0    hydroCHLOROthiazide (HYDRODIURIL) 25 MG tablet, TAKE ONE TABLET BY MOUTH DAILY FOR BLOOD PRESSURE, Disp: 90 tablet, Rfl: 0    lancets Misc, To check BG 3 times daily, to use with insurance preferred meter, Disp: 200 each, Rfl: 11    meloxicam (MOBIC) 7.5 MG tablet, Take 1 tablet (7.5 mg total) by mouth once daily. For back pain, Disp: 30 tablet, Rfl: 0    metFORMIN (GLUCOPHAGE-XR) 500 MG 24 hr tablet, TAKE TWO TABLETS BY MOUTH TWICE DAILY WITH MEALS, Disp: 360 tablet, Rfl: 3    multivitamin capsule, Take 1 capsule by mouth once daily., Disp: , Rfl:     pantoprazole (PROTONIX) 40 MG tablet, Take 1 tablet (40 mg total) by mouth before breakfast., Disp: 90 tablet, Rfl: 3    TRULICITY 0.75 mg/0.5 mL PnIj, INJECT 1.5ML SUBCUTANEOUSLY EVERY 7 DAYS, Disp: 2 mL, Rfl: 0    valsartan (DIOVAN) 320 MG tablet, TAKE ONE TABLET BY MOUTH DAILY FOR BLOOD PRESSURE, Disp: 90 tablet, Rfl: 0    vit C-vit E-lutein-min-om-3 (OCUVITE) 654-82-3-150 mg-unit-mg-mg Cap, Take 1 tablet by mouth once daily., Disp: , Rfl:     flu vacc [id number],65yr up,PF (FLUZONE HIGH-DOSE 2019-20, PF,) 180 mcg/0.5 mL Syrg, Inject into the skin for one dose, Disp: 0.5 mL, Rfl: 0    traMADol (ULTRAM) 50 mg tablet, Take 1 tablet (50 mg total) by mouth every 12 (twelve) hours as needed for Pain., Disp: 14 tablet, Rfl: 0    Allergies:  Review of patient's allergies indicates:  No Known Allergies    Health Maintenance:  Immunization History   Administered Date(s) Administered    Hepatitis A, Adult 09/12/2005, 04/27/2006    Hepatitis B,  "Adult 09/12/2005, 10/10/2005, 04/27/2006    Influenza 09/26/2013, 10/07/2014    Influenza - High Dose - PF (65 years and older) 10/02/2017, 10/12/2018, 10/09/2019    Influenza - Trivalent - Adjuvanted - PF 10/04/2018    Pneumococcal Conjugate - 13 Valent 11/02/2017    Pneumococcal Conjugate - 7 Valent 10/10/2013    Pneumococcal Polysaccharide - 23 Valent 07/31/2019    Td (ADULT) 09/12/2005, 05/18/2009    Tdap 10/12/2018, 10/12/2018    Zoster 09/26/2013    Zoster Recombinant 10/12/2018, 10/12/2018, 07/31/2019      Health Maintenance   Topic Date Due    Foot Exam  08/15/2019    PROSTATE-SPECIFIC ANTIGEN  10/03/2019    Hemoglobin A1c  10/08/2019    Colonoscopy  01/23/2020    Lipid Panel  07/08/2020    Eye Exam  07/17/2020    High Dose Statin  12/31/2020    Aspirin/Antiplatelet Therapy  12/31/2020    TETANUS VACCINE  10/12/2028    Hepatitis C Screening  Completed    Pneumococcal Vaccine (65+ Low/Medium Risk)  Completed      Objective:      Vital Signs  Temp: 98.1 °F (36.7 °C)  Temp src: Oral  Pulse: 65  Resp: 18  SpO2: 95 %  BP: (!) 144/80  BP Location: Right arm  Patient Position: Sitting  Pain Score: 10-Worst pain ever  Height and Weight  Height: 5' 7" (170.2 cm)  Weight: 85.5 kg (188 lb 7.9 oz)  BSA (Calculated - sq m): 2.01 sq meters  BMI (Calculated): 29.5  Weight in (lb) to have BMI = 25: 159.3]    Laboratory:  CBC:  Recent Labs   Lab 04/11/18  0734   WBC 10.3   RBC 4.66   Hemoglobin 14.2   Hematocrit 42.3   Platelets 277   Mean Corpuscular Volume 90.8   Mean Corpuscular Hemoglobin 30.5   Mean Corpuscular Hemoglobin Conc 33.6       CMP:  Recent Labs   Lab 11/02/17  1519  04/11/18  0734 10/03/18  1130   Glucose 413 H   < > 172 H 168 H   Calcium 10.4 H  --  9.9 10.1   Albumin 4.4  --  4.4 4.2   Total Protein 6.9  --  6.7 6.5   Sodium 135  --  141 140   Potassium 4.6  --  4.3 4.5   CO2 30  --  25 27   Chloride 95 L  --  105 103   BUN, Bld 16  --  23 18   Creatinine 1.05  --  0.90 0.96   eGFR if "  86  --  104 95   eGFR if non  74  --  89 82   Alkaline Phosphatase 121 H   < > 64 63   ALT 75 H  --  41 50 H   AST 29  --  17 23   Total Bilirubin 0.9  --  0.6 0.5    < > = values in this interval not displayed.       URINALYSIS:  Recent Labs   Lab 11/02/17  1517   Color, UA YELLOW   Specific Gravity, UA > OR = 1.030   pH, UA 5.5   Protein, UA NEGATIVE   Glucose, UA 3+ A   Bacteria, UA NONE SEEN      Recent Labs   Lab 11/02/17  1517   Nitrite, UA NEGATIVE   Leukocytes, UA NEGATIVE   Hyaline Casts, UA NONE SEEN        LIPIDS:  Recent Labs   Lab 11/02/17  1519 04/11/18  0734 10/03/18  1130 07/08/19   TSH  --  0.69  --   --    HDL 32 L 44 36 L 46   Cholesterol 137 145 125  --    Triglycerides 304 H 101 135  --    LDL Cholesterol 68 82 67 110   Hdl/Cholesterol Ratio 4.3 3.3 3.5  --    Non HDL Chol. (LDL+VLDL) 105 101 89  --        TSH:  Recent Labs   Lab 04/11/18  0734   TSH 0.69       A1C:  Recent Labs   Lab 07/20/17  0801 10/03/18  1130 07/08/19   Hemoglobin A1C 8.2 H 7.8 H 9.7       Urine Microalbumin/Cr:  Lab Results   Component Value Date    MICALBCREAT 6.1 12/19/2017       Other:   Lab Results   Component Value Date    GRMBJJVM96KW 30 09/03/2016    FERRITIN 101 01/04/2013    IRON 91 12/18/2012    TIBC 353.0 12/18/2012    FESATURATED 26 12/18/2012     Lab Results   Component Value Date    PSA 0.28 05/20/2015    PSA 0.27 05/15/2015    HEPCAB Negative 01/04/2013       Physical Exam   Constitutional: He appears well-developed and well-nourished. No distress.   HENT:   Head: Normocephalic and atraumatic.   Cardiovascular: Normal rate, regular rhythm and intact distal pulses.   Murmur heard.  II/VI MARYJANE at LUSB   Pulmonary/Chest: Effort normal and breath sounds normal. No respiratory distress.   Abdominal: Soft. Bowel sounds are normal. He exhibits no distension. There is no guarding.   Musculoskeletal:   Able to walk without assistance.  No pain over Brice greater trochanters;  No visible  or palpable deformities over flank, low back. + TTP over Left iliac crest to post axillary line;  No pain over spine.  No TTP over paraspinal muscles.  + Pain on passive extension of Left leg and on int rotation of Left Hip.  Strength 5/5 BLE   Neurological:   2+Patellar DTR's partha   Skin: He is not diaphoretic.   Nursing note and vitals reviewed.          Assessment:       1. Left hip pain    2. Fall, subsequent encounter        Esteban Mike is a 69 y.o.male with:    1. Left hip pain  - X-Ray Hips Bilateral 2 View Incl AP Pelvis; Future  - traMADol (ULTRAM) 50 mg tablet; Take 1 tablet (50 mg total) by mouth every 12 (twelve) hours as needed for Pain.  Dispense: 14 tablet; Refill: 0  - Xray hips - no evid of fracture.  +Mild  DJD.  Will Rx tramadol prn severe pain. He will not take Tizanidine. If weakness persists/worsens he will alert MD.  On today's exam, the pain appears to be coming from his hip rather than his L -spine. If weakness worsens may need to consider L-Spine MRI.      2. Fall, subsequent encounter  - X-Ray Hips Bilateral 2 View Incl AP Pelvis; Future  Xray hips - no evid of fracture.  + Mild DJD.  Will Rx tramadol prn severe pain. He will not take Tizanidine.        Chronic conditions status updated as per HPI.  Other than changes above, cont current medications and maintain follow up with specialists.  Return to clinic as prev sched or sooner if needed    Sindhu Capone MD  [facility]

## 2019-12-31 NOTE — PROGRESS NOTES
I d/w pt -   Mild DJD.  No fracture or dislocation.  No bone destruction identified. Vascular calcifications noted.  I d/w pt and if he cont to have pain or weakness he will fu for further eval.      Dr. BRUNO

## 2020-01-06 RX ORDER — ATORVASTATIN CALCIUM 80 MG/1
TABLET, FILM COATED ORAL
Qty: 90 TABLET | Refills: 0 | Status: SHIPPED | OUTPATIENT
Start: 2020-01-06 | End: 2020-06-11 | Stop reason: SDUPTHER

## 2020-01-06 NOTE — PROGRESS NOTES
Refill Authorization Note     is requesting a refill authorization.    Brief assessment and rationale for refill: APPROVE: needs labs     Medication-related problems identified: Requires labs    Medication Therapy Plan: NTBO(CMP); Lipid 7/19; FOVS 1/20, Will defer labs to PCP at visit                               Comments:     Requested Prescriptions   Signed Prescriptions Disp Refills    atorvastatin (LIPITOR) 80 MG tablet 90 tablet 0     Sig: TAKE ONE TABLET BY MOUTH DAILY AFTER SUPPER FOR CHOLESTEROL       Cardiovascular:  Antilipid - Statins Failed - 1/4/2020  2:04 PM        Failed - ALT is 94 or below and within 360 days     ALT   Date Value Ref Range Status   10/03/2018 50 (H) 9 - 46 U/L Final   04/11/2018 41 9 - 46 U/L Final   11/02/2017 75 (H) 9 - 46 U/L Final     ALT (SGPT)   Date Value Ref Range Status   05/21/2014 49 0 - 55 IU/L Final     Comment:     Interpretations:  Quantitative results of 6 biochemical tests are analyzed  using a computational algorithm to provide a quantitative  surrogate marker (0.0-1.0) for liver fibrosis (METAVIR  F0-F4) and for necroinflammatory activity (METAVIR A0-A3).  Fibrosis Scoring:  <0.21 = Stage F0 - No fibrosis  0.21 - 0.27 = Stage F0 - F1  0.27 - 0.31 = Stage F1 - Portal fibrosis  0.31 - 0.48 = Stage F1 - F2  0.48 - 0.58 = Stage F2 - Bridging fibrosis with few septa  0.58 - 0.72 = Stage F3 - Bridging fibrosis with many septa  0.72 - 0.74 = Stage F3 - F4  >0.74 = Stage F4 - Cirrhosis  Necroinflamm Activity Scoring:  <0.17 = Grade A0 - No Activity  0.17 - 0.29 = Grade A0 - A1  0.29 - 0.36 = Grade A1 - Minimal activity  0.36 - 0.52 = Grade A1 - A2  0.52 - 0.60 = Grade A2 - Moderate activity  0.60 - 0.62 = Grade A2 - A3  >0.62 = Grade A3 - Severe activity  Limitations:  The negative predictive value of a Fibrotest score <0.31  (absence of clinically significant fibrosis) was 85% when  compared to liver biopsy in 1,270 HCV infected patients  with a 38%  prevalence of significant liver fibrosis (F2, 3  or 4). The positive predictive value of a Fibrotest score  >0.48 (F2, 3, 4) was 61% in that same patient cohort.  HCV FibroSURE is not recommended in patients with Gilbert  Disease, acute hemolysis (e.g. HCV ribavirin therapy  mediated hemolysis), acute hepatitis of the liver, extra-  hepatic cholestasis, transplant patients, and/or renal  insufficiency patients. Any of these clinical situations  may lead to inaccurate quantitative predictions  of fibrosis and necroinflammatory activity in the liver.  Comment:  The performance characteristics of this test have been  determined by [facility]. This test has not been cleared or  approved by the U.S. Food and Drug Administration (FDA).  The FDA has determined that such clearance or approval is  not currently required. [facility] is regulated under the  Clinical Laboratory Improvement Amendments of 1988 (CLIA)  and is certified to perform high complexity testing.  Results and Interpretation Provided by:  [facility]  [address] [place] [address]  Test Performed by:  [facility] [address] [address]     01/04/2013 81 (H) 0 - 55 IU/L Final     Comment:     ALT (SGPT):  Interpretations:  Quantitative results of 6 biochemical tests are analyzed  using a computational algorithm to provide a quantitative  surrogate marker (0.0-1.0) for liver fibrosis (METAVIR  F0-F4) and for necroinflammatory activity (METAVIR A0-A3).  Fibrosis Scoring:  <0.21 = Stage F0 - No fibrosis  0.21 - 0.27 = Stage F0 - F1  0.27 - 0.31 = Stage F1 - Portal fibrosis  0.31 - 0.48 = Stage F1 - F2  0.48 - 0.58 = Stage F2 - Bridging fibrosis with few septa  0.58 - 0.72 = Stage F3 - Bridging fibrosis with many septa  0.72 - 0.74 = Stage F3 - F4  >0.74 = Stage F4 - Cirrhosis  Necroinflamm Activity Scoring:  <0.17 = Grade A0 - No Activity  0.17 - 0.29 = Grade A0 - A1  0.29 - 0.36 = Grade A1 - Minimal activity  0.36  - 0.52 = Grade A1 - A2  0.52 - 0.60 = Grade A2 - Moderate activity  0.60 - 0.62 = Grade A2 - A3  >0.62 = Grade A3 - Severe activity  Limitations:  The negative predictive value of a Fibrotest score <0.31  (absence of clinically significant fibrosis) was 85% when  compared to liver biopsy in 1,270 HCV infected patients  with a 38% prevalence of significant liver fibrosis (F2, 3  or 4). The positive predictive value of a Fibrotest score  >0.48 (F2, 3, 4) was 61% in that same patient cohort.  HCV FibroSURE is not recommended in patients with Gilbert  Disease, acute hemolysis (e.g. HCV ribavirin therapy  mediated hemolysis), acute hepatitis of the liver, extra-  hepatic cholestasis, transplant patients, and/or renal  insufficiency patients. Any of these clinical situations  may lead to inaccurate quantitative predictions  of fibrosis and necroinflammatory activity in the liver.  Comment:  The performance characteristics of this test have been  determined by [facility]. This test has not been cleared or  approved by the U.S. Food and Drug Administration (FDA).  The FDA has determined that such clearance or approval is  not currently required. [facility] is regulated under the  Clinical Laboratory Improvement Amendments of 1988 (CLIA)  and is certified to perform high complexity testing.  Results and Interpretation Provided by:  [facility]  [address] [place] [address]              Failed - AST is 54 or below and within 360 days     AST   Date Value Ref Range Status   10/03/2018 23 10 - 35 U/L Final   04/11/2018 17 10 - 35 U/L Final   11/02/2017 29 10 - 35 U/L Final              Passed - Patient is at least 18 years old        Passed - Office visit in past 12 months or future 90 days     Recent Outpatient Visits            6 days ago Left hip pain    [facility] - Primary care Sindhu Capone MD    1 week ago Back pain, unspecified back location, unspecified back pain laterality, unspecified  chronicity    [facility] - Internal Medicine CHAGO Smith    2 months ago NAFLD (nonalcoholic fatty liver disease)    [facility] - Hepatology Vladimir Degroot MD    5 months ago Type 2 diabetes, with peripheral circulatory disorder not at goal    [facility] - Internal Medicine Ned Hartley MD    10 months ago Coronary artery disease involving autologous vein coronary bypass graft without angina pectoris    [facility] - Cardiology Jose E Grajeda MD          Future Appointments              In 3 days MD [facility] - Internal Medicine, [facility]                Passed - Lipid Panel completed in last 360 days     Lab Results   Component Value Date    CHOL 125 10/03/2018    HDL 46 07/08/2019    LDLCALC 110 07/08/2019    TRIG 135 10/03/2018

## 2020-01-07 NOTE — PROGRESS NOTES
Digital Medicine: Health  Follow-Up      Follow Up  Follow-up reason(s): reading review and routine education      Routine Education Topics: meal planning  Pt attributes elevated BP and BG readings to dietary indiscretions and back pain from a fall he had a few weeks ago. Pt will be seeing his PCP on 1/9 for this issue and states that this is his main priority right now.     Pt also noted that he needed a refill for Valsartan.    INTERVENTION(S)  recommended diet modifications and encouragement/support    PLAN  continue monitoring    Dicussed easy meal options, sodium reduction, and appropriate carb intake. Encouraged more frequent BP and BG monitoring. Placed task for PharmD to refill Valsartan.        Topic    Hemoglobin A1C        Last 5 Patient Entered Readings                                      Current 30 Day Average: 160/86     Recent Readings 1/3/2020 12/29/2019 12/17/2019 12/12/2019 12/5/2019    SBP (mmHg) 160 169 149 162 115    DBP (mmHg) 91 89 78 85 72    Pulse 64 57 64 61 67        Last 6 Patient Entered Readings                                          Most Recent A1c: 9.7% on 7/8/2019  (Goal: 8%)     Recent Readings 12/29/2019 12/12/2019 12/11/2019 12/5/2019 12/4/2019    Blood Glucose (mg/dL) 246 193 163 159 213            Diet Screening   Patient reports eating or drinking the following: fast food and restaurant food    Pt lives alone and does not like to cook. He eats out for every meal. He will occasionally eat canned red beans and rice that he makes at home.     Intervention(s): meal planning, carb reduction and reducing sodium intake    SDOH: Deferred.

## 2020-01-09 ENCOUNTER — DOCUMENTATION ONLY (OUTPATIENT)
Dept: INTERNAL MEDICINE | Facility: CLINIC | Age: 70
End: 2020-01-09

## 2020-01-09 ENCOUNTER — PATIENT OUTREACH (OUTPATIENT)
Dept: OTHER | Facility: OTHER | Age: 70
End: 2020-01-09

## 2020-01-09 ENCOUNTER — OFFICE VISIT (OUTPATIENT)
Dept: INTERNAL MEDICINE | Facility: CLINIC | Age: 70
End: 2020-01-09
Attending: FAMILY MEDICINE
Payer: COMMERCIAL

## 2020-01-09 VITALS
OXYGEN SATURATION: 99 % | SYSTOLIC BLOOD PRESSURE: 136 MMHG | HEART RATE: 71 BPM | WEIGHT: 187.19 LBS | BODY MASS INDEX: 29.38 KG/M2 | TEMPERATURE: 98 F | DIASTOLIC BLOOD PRESSURE: 66 MMHG | HEIGHT: 67 IN

## 2020-01-09 DIAGNOSIS — I65.21 INTERNAL CAROTID ARTERY OCCLUSION, RIGHT: ICD-10-CM

## 2020-01-09 DIAGNOSIS — E78.5 HYPERLIPIDEMIA, UNSPECIFIED HYPERLIPIDEMIA TYPE: ICD-10-CM

## 2020-01-09 DIAGNOSIS — M54.50 LOW BACK PAIN, UNSPECIFIED BACK PAIN LATERALITY, UNSPECIFIED CHRONICITY, UNSPECIFIED WHETHER SCIATICA PRESENT: ICD-10-CM

## 2020-01-09 DIAGNOSIS — Z12.5 PROSTATE CANCER SCREENING: ICD-10-CM

## 2020-01-09 DIAGNOSIS — E11.9 NON-INSULIN DEPENDENT TYPE 2 DIABETES MELLITUS: Chronic | ICD-10-CM

## 2020-01-09 DIAGNOSIS — G47.33 OSA (OBSTRUCTIVE SLEEP APNEA): ICD-10-CM

## 2020-01-09 DIAGNOSIS — I10 ESSENTIAL HYPERTENSION: ICD-10-CM

## 2020-01-09 DIAGNOSIS — K76.0 NAFLD (NONALCOHOLIC FATTY LIVER DISEASE): ICD-10-CM

## 2020-01-09 DIAGNOSIS — I73.9 PVD (PERIPHERAL VASCULAR DISEASE): ICD-10-CM

## 2020-01-09 DIAGNOSIS — I65.22 LEFT CAROTID ARTERY STENOSIS: ICD-10-CM

## 2020-01-09 DIAGNOSIS — I25.810 CORONARY ARTERY DISEASE INVOLVING AUTOLOGOUS VEIN CORONARY BYPASS GRAFT WITHOUT ANGINA PECTORIS: ICD-10-CM

## 2020-01-09 DIAGNOSIS — Z00.00 ANNUAL PHYSICAL EXAM: Primary | ICD-10-CM

## 2020-01-09 DIAGNOSIS — I10 HYPERTENSION, ESSENTIAL: ICD-10-CM

## 2020-01-09 PROCEDURE — 3078F DIAST BP <80 MM HG: CPT | Mod: CPTII,S$GLB,, | Performed by: FAMILY MEDICINE

## 2020-01-09 PROCEDURE — 99397 PR PREVENTIVE VISIT,EST,65 & OVER: ICD-10-PCS | Mod: S$GLB,,, | Performed by: FAMILY MEDICINE

## 2020-01-09 PROCEDURE — 3078F PR MOST RECENT DIASTOLIC BLOOD PRESSURE < 80 MM HG: ICD-10-PCS | Mod: CPTII,S$GLB,, | Performed by: FAMILY MEDICINE

## 2020-01-09 PROCEDURE — 99999 PR PBB SHADOW E&M-EST. PATIENT-LVL V: ICD-10-PCS | Mod: PBBFAC,,, | Performed by: FAMILY MEDICINE

## 2020-01-09 PROCEDURE — 99397 PER PM REEVAL EST PAT 65+ YR: CPT | Mod: S$GLB,,, | Performed by: FAMILY MEDICINE

## 2020-01-09 PROCEDURE — 3075F PR MOST RECENT SYSTOLIC BLOOD PRESS GE 130-139MM HG: ICD-10-PCS | Mod: CPTII,S$GLB,, | Performed by: FAMILY MEDICINE

## 2020-01-09 PROCEDURE — 99999 PR PBB SHADOW E&M-EST. PATIENT-LVL V: CPT | Mod: PBBFAC,,, | Performed by: FAMILY MEDICINE

## 2020-01-09 PROCEDURE — 3046F PR MOST RECENT HEMOGLOBIN A1C LEVEL > 9.0%: ICD-10-PCS | Mod: CPTII,S$GLB,, | Performed by: FAMILY MEDICINE

## 2020-01-09 PROCEDURE — 3075F SYST BP GE 130 - 139MM HG: CPT | Mod: CPTII,S$GLB,, | Performed by: FAMILY MEDICINE

## 2020-01-09 PROCEDURE — 3046F HEMOGLOBIN A1C LEVEL >9.0%: CPT | Mod: CPTII,S$GLB,, | Performed by: FAMILY MEDICINE

## 2020-01-09 RX ORDER — VALSARTAN 320 MG/1
320 TABLET ORAL DAILY
Qty: 90 TABLET | Refills: 1 | Status: SHIPPED | OUTPATIENT
Start: 2020-01-09 | End: 2020-01-16 | Stop reason: RX

## 2020-01-09 NOTE — PROGRESS NOTES
"Digital Medicine: Clinician Follow-Up        Follow Up  Follow-up reason(s): reading review and medication change      Medication Change: dose increase  Patient's BP has been fluctuating. He saw PCP today and BP in clinic was 136/66.     SMBG readings are elevated. He does not check BG regularly. He states he needs to work on his diet. PCP has referred him to endocrinology and diabetes education.           Per 30 day average, 154/83 mmHg, patient's BP is not at goal. SMBG and A1C are not at goal.    No changes to antihypertensive medications at this time. Asked patient to submit more readings. Will increase carvedilol if BP remains elevated.     Will increase Truclicity to 1.5 mg weekly. He has "a couple of 0.75 mg pens left", so he will start higher dose in about 2 weeks.     Will continue to monitor regularly.    Asked patient to call or message with questions or concerns.         Last 5 Patient Entered Readings                                      Current 30 Day Average: 154/83     Recent Readings 1/9/2020 1/3/2020 12/29/2019 12/17/2019 12/12/2019    SBP (mmHg) 131 160 169 149 162    DBP (mmHg) 71 91 89 78 85    Pulse 60 64 57 64 61        Last 6 Patient Entered Readings                                          Most Recent A1c: 9.7% on 7/8/2019  (Goal: 8%)     Recent Readings 1/9/2020 1/3/2020 12/29/2019 12/12/2019 12/11/2019    Blood Glucose (mg/dL) 312 258 246 193 163           Hypertension Medications             carvedilol (COREG) 3.125 MG tablet TAKE ONE TABLET BY MOUTH TWICE DAILY WITH MEALS AND (STOP ATENOLOL)    hydroCHLOROthiazide (HYDRODIURIL) 25 MG tablet TAKE ONE TABLET BY MOUTH DAILY FOR BLOOD PRESSURE    valsartan (DIOVAN) 320 MG tablet Take 1 tablet (320 mg total) by mouth once daily.        Diabetes Medications             glipiZIDE (GLUCOTROL) 5 MG tablet TAKE ONE TABLET BY MOUTH TWICE DAILY WITH MEALS FOR DIABETES    metFORMIN (GLUCOPHAGE-XR) 500 MG 24 hr tablet TAKE TWO TABLETS BY MOUTH TWICE " DAILY WITH MEALS    dulaglutide (TRULICITY) 1.5 mg/0.5 mL PnIj Inject 1.5 mg into the skin every 7 days.

## 2020-01-09 NOTE — PROGRESS NOTES
Fax done today to   [facility] [address] [place] [address]  Phone: [phone]  [phone]

## 2020-01-09 NOTE — PROGRESS NOTES
Subjective:       Patient ID: Esteban Mike is a 69 y.o. male.    Chief Complaint: Follow-up    Established patient for an annual wellness check/physical exam and also chronic disease management. Specific complaints - see dictation and please see ROS.  P, S, Fm, Soc Hx's; Meds, allergies reviewed and reconciled.  Health maintenance file reviewed and addressed items due.    Interval hx of intermittent LBP.    Diabetes   He presents for his follow-up diabetic visit. He has type 2 diabetes mellitus. His disease course has been worsening. Pertinent negatives for hypoglycemia include no confusion, headaches, nervousness/anxiousness, speech difficulty or tremors. Pertinent negatives for diabetes include no chest pain, no fatigue and no weakness. Risk factors for coronary artery disease include diabetes mellitus, dyslipidemia, hypertension, male sex and sedentary lifestyle. Current diabetic treatment includes oral agent (dual therapy) and oral agent (triple therapy). He is compliant with treatment most of the time. He has not had a previous visit with a dietitian. He participates in exercise intermittently. He sees a podiatrist.Eye exam is not current.     Review of Systems   Constitutional: Negative for chills, fatigue, fever and unexpected weight change.   HENT: Negative for congestion and trouble swallowing.    Eyes: Negative for redness and visual disturbance.   Respiratory: Negative for cough, chest tightness and shortness of breath.    Cardiovascular: Negative for chest pain, palpitations and leg swelling.   Gastrointestinal: Negative for abdominal pain and blood in stool.   Genitourinary: Negative for difficulty urinating and hematuria.   Musculoskeletal: Positive for back pain. Negative for arthralgias, gait problem, joint swelling, myalgias and neck pain.   Skin: Negative for color change and rash.   Neurological: Positive for numbness. Negative for tremors, speech difficulty, weakness and headaches.    Hematological: Negative for adenopathy. Does not bruise/bleed easily.   Psychiatric/Behavioral: Negative for behavioral problems, confusion and sleep disturbance. The patient is not nervous/anxious.        Objective:      Physical Exam   Constitutional: He appears well-developed and well-nourished.   HENT:   Head: Normocephalic.   Right Ear: Tympanic membrane, external ear and ear canal normal.   Left Ear: Tympanic membrane, external ear and ear canal normal.   Mouth/Throat: Oropharynx is clear and moist.   Eyes: Pupils are equal, round, and reactive to light.   Neck: Normal range of motion. Neck supple. Carotid bruit is not present. No thyromegaly present.   Cardiovascular: Normal rate, regular rhythm and intact distal pulses. Exam reveals no gallop and no friction rub.   Murmur heard.   Systolic murmur is present with a grade of 2/6.  Pulmonary/Chest: Effort normal and breath sounds normal.   Abdominal: Soft. He exhibits no distension and no mass. There is no tenderness. There is no rebound and no guarding.   Musculoskeletal: Normal range of motion. He exhibits no edema or tenderness.   Lymphadenopathy:     He has no cervical adenopathy.   Neurological: He is alert. He has normal strength. He displays normal reflexes. No cranial nerve deficit or sensory deficit. Coordination and gait normal.   Skin: Skin is warm and dry.   Psychiatric: He has a normal mood and affect. His behavior is normal. Judgment and thought content normal.   Nursing note and vitals reviewed.      Assessment:       1. Annual physical exam    2. Non-insulin dependent type 2 diabetes mellitus    3. Hypertension, essential    4. Hyperlipidemia, unspecified hyperlipidemia type    5. Coronary artery disease involving autologous vein coronary bypass graft without angina pectoris    6. PVD (peripheral vascular disease)    7. Internal carotid artery occlusion, right    8. Left carotid artery stenosis    9. NAFLD (nonalcoholic fatty liver disease)     10. NIDIA (obstructive sleep apnea)    11. Prostate cancer screening    12. Low back pain, unspecified back pain laterality, unspecified chronicity, unspecified whether sciatica present    13. Essential hypertension        Plan:     Medication List with Changes/Refills   Current Medications    ASPIRIN 81 MG CHEWABLE TABLET    Take 81 mg by mouth once daily.      ATORVASTATIN (LIPITOR) 80 MG TABLET    TAKE ONE TABLET BY MOUTH DAILY AFTER SUPPER FOR CHOLESTEROL    BLOOD SUGAR DIAGNOSTIC STRP    To check BG 3 times daily, to use with insurance preferred meter    BLOOD-GLUCOSE METER KIT    To check BG 3 times daily, to use with insurance preferred meter    CARVEDILOL (COREG) 3.125 MG TABLET    TAKE ONE TABLET BY MOUTH TWICE DAILY WITH MEALS AND (STOP ATENOLOL)    CHOLECALCIFEROL, VITAMIN D3, (VITAMIN D3) 2,000 UNIT CAP    Take 1 capsule (2,000 Units total) by mouth once daily.    DULAGLUTIDE (TRULICITY) 0.75 MG/0.5 ML PNIJ    Inject 0.5 mLs (0.75 mg total) into the skin every 7 days.    FISH OIL-OMEGA-3 FATTY ACIDS 300-1,000 MG CAPSULE    Take 2 g by mouth once daily. 3 tablets       GLIPIZIDE (GLUCOTROL) 5 MG TABLET    TAKE ONE TABLET BY MOUTH TWICE DAILY WITH MEALS FOR DIABETES    HYDROCHLOROTHIAZIDE (HYDRODIURIL) 25 MG TABLET    TAKE ONE TABLET BY MOUTH DAILY FOR BLOOD PRESSURE    LANCETS MISC    To check BG 3 times daily, to use with insurance preferred meter    MELOXICAM (MOBIC) 7.5 MG TABLET    Take 1 tablet (7.5 mg total) by mouth once daily. For back pain    METFORMIN (GLUCOPHAGE-XR) 500 MG 24 HR TABLET    TAKE TWO TABLETS BY MOUTH TWICE DAILY WITH MEALS    MULTIVITAMIN CAPSULE    Take 1 capsule by mouth once daily.    PANTOPRAZOLE (PROTONIX) 40 MG TABLET    Take 1 tablet (40 mg total) by mouth before breakfast.    TRAMADOL (ULTRAM) 50 MG TABLET    Take 1 tablet (50 mg total) by mouth every 12 (twelve) hours as needed for Pain.    TRULICITY 0.75 MG/0.5 ML PNIJ    INJECT 1.5ML SUBCUTANEOUSLY EVERY 7 DAYS    VIT  C-VIT E-LUTEIN-MIN-OM-3 (OCUVITE) 708-14-8-150 MG-UNIT-MG-MG CAP    Take 1 tablet by mouth once daily.   Changed and/or Refilled Medications    Modified Medication Previous Medication    VALSARTAN (DIOVAN) 320 MG TABLET valsartan (DIOVAN) 320 MG tablet       Take 1 tablet (320 mg total) by mouth once daily.    TAKE ONE TABLET BY MOUTH DAILY FOR BLOOD PRESSURE   Discontinued Medications    FLU VACC RC6360-14,65YR UP,PF (FLUZONE HIGH-DOSE 2019-20, PF,) 180 MCG/0.5 ML SYRG    Inject into the skin for one dose     Esteban was seen today for follow-up.    Diagnoses and all orders for this visit:    Annual physical exam  -     Hemoglobin A1c; Standing  -     Comprehensive metabolic panel; Standing  -     Lipid panel; Standing  -     Microalbumin/creatinine urine ratio; Standing  -     PSA, Screening; Standing  -     Ambulatory referral to Optometry    Non-insulin dependent type 2 diabetes mellitus  -     Hemoglobin A1c; Standing  -     Comprehensive metabolic panel; Standing  -     Microalbumin/creatinine urine ratio; Standing  -     Ambulatory referral to Optometry  -     Ambulatory Referral to Diabetes Education; Future  -     Ambulatory referral/consult to Endocrinology; Future    Hypertension, essential    Hyperlipidemia, unspecified hyperlipidemia type  -     Lipid panel; Standing    Coronary artery disease involving autologous vein coronary bypass graft without angina pectoris  -     Ambulatory referral to Cardiology    PVD (peripheral vascular disease)  -     Ambulatory referral to Cardiology    Internal carotid artery occlusion, right  -     CV Ultrasound Bilateral Doppler Carotid; Future    Left carotid artery stenosis  -     CV Ultrasound Bilateral Doppler Carotid; Future    NAFLD (nonalcoholic fatty liver disease)    NIDIA (obstructive sleep apnea)    Prostate cancer screening  -     PSA, Screening; Standing    Low back pain, unspecified back pain laterality, unspecified chronicity, unspecified whether sciatica  present  -     Ambulatory Consult to [facility]    Essential hypertension  -     valsartan (DIOVAN) 320 MG tablet; Take 1 tablet (320 mg total) by mouth once daily.      See meds, orders, follow up, routing and instructions sections of encounter and AVS. Discussed with patient and provided on AVS.    Discussed diet and exercise and links provided on AVS for detailed information.

## 2020-01-09 NOTE — PATIENT INSTRUCTIONS
Please forward external lab orders per patient request (fax or provide to patient). Please call and verify that any faxes sent were received. Thank you.    Please advise patient that with labs and tests done outside of [facility], that there is no guarantee that the report will get to us. Advise patient to call us 1 week after testing to verify that we have received a report, and that they should also request their own copy. Thank you.      Information about cholesterol, high blood pressure and healthy diet and activity recommendations can be found at the following links on the Internet:    http://www.nhlbi.nih.gov/health/health-topics/topics/hbc  http://www.nhlbi.nih.gov/health/educational/lose_wt/index.htm  Http://www.nhlbi.nih.gov/files/docs/public/heart/hbp_low.pdf  http://www.heart.org/HEARTORG/  http://diabetes.org/  https://www.cdc.gov/  Https://healthfinder.gov/  https://health.gov/dietaryguidelines/2015/guidelines/  https://health.gov/paguidelines/second-edition/pdf/Physical_Activity_Guidelines_2nd_edition.pdf

## 2020-01-10 NOTE — PROGRESS NOTES
Subjective:      Patient ID: Esteban Mike is a 69 y.o. male.    Chief Complaint:  Diabetes    History of Present Illness  Esteban Mike with presents today for follow up of Type 2 DM. Previous patient of KEYONA De La Rosa NP. Last seen in 12/2017. This is his first visit with me.     In the past, he was not checking BGs and eating whatever he wanted.   He had labs done with his PCP yesterday; we do not have results.     With regards to the diabetes:    Diagnosed with Type 2 DM in 2008  Family History of Type 2 DM in mother and father, brother  Known complications:  DKA/HHS:   RN: up to date; Mild NPDR OU per Dr. Lagunas note in Aug 2018  PN: +   Nephropathy: -; had UMCr done yesterday  CAD: CABG x4 in 2008, post right CEA 2006 and left ICA stent 2007.   PVD: +    NAFLD. 06/2014 liver Bx: simple steatosis with no steatohepatitis or fibrosis     Current regimen:  Trulicity 1.5 mg weekly -started around Aug 2019  Metformin XR 1000mg BID  Glipizide 5mg twice daily    Missed doses? -sometimes he misses 2nd dose of medication    Other medications tried:   Tradjenta 5 mg daily -taken off to put on Trulicity  MDI after CABG      1 # times a weekly testing  Log reviewed: yes, on cell phone noreen-BG [id number], most 250  Hypoglycemic event? None   Yes, did not need assistance   Knows how to correct with 15 grams of carbs- juice, coke, or a peppermint.     Dietary recall: He feels that he is not following diabetic diet. He states that he can try to follow diabetic diet.   Eats 3 meals a day.   B: biscuit and cup of coffee - splenda  L: out to eat  D: out to eat  Snacks: ice cream   Drinks: water and diet drinks    Exercise - tried to stay active - No formal exercise. He cuts grass during the summer.     Education - last visit: 8/21/19 KEYONA Holman RN    Has a Medic alert tag?  Glucagon/Baqsimi-  Lives alone    Diabetes Management Status  Statin: Taking  ACE/ARB: Taking    Lab Results   Component Value Date    HGBA1C 9.7  "07/08/2019    HGBA1C 7.8 (H) 10/03/2018    HGBA1C 8.2 (H) 07/20/2017     Screening or Prevention Patient's value Goal Complete/Controlled?   HgA1C Testing and Control   Lab Results   Component Value Date    HGBA1C 9.7 07/08/2019      Annually/Less than 8% No   Lipid profile : 07/08/2019 Annually Yes   LDL control Lab Results   Component Value Date    LDLCALC 110 07/08/2019    Annually/Less than 100 mg/dl  No   Nephropathy screening Lab Results   Component Value Date    LABMICR 9.0 12/19/2017     Lab Results   Component Value Date    PROTEINUA NEGATIVE 11/02/2017    Annually No   Blood pressure BP Readings from Last 1 Encounters:   01/14/20 124/80    Less than 140/90 Yes   Dilated retinal exam : 07/17/2019 Annually Yes   Foot exam   : 01/14/2020 Annually No       Review of Systems   Constitutional: Negative for fatigue.   Eyes: Negative for visual disturbance.   Respiratory: Negative for shortness of breath.    Cardiovascular: Negative for chest pain.   Gastrointestinal: Negative for abdominal pain.   Musculoskeletal: Negative for arthralgias.   Skin: Negative for wound.   Neurological: Negative for headaches.   Hematological: Does not bruise/bleed easily.   Psychiatric/Behavioral: Negative for sleep disturbance.       Objective:   Physical Exam   Constitutional: He appears well-developed.   Neck: No thyromegaly present.   Cardiovascular: Normal rate.   Pulmonary/Chest: Effort normal.   Abdominal: Soft.   Vitals reviewed.  injection sites are without edema or erythema. No lipo hypertropthy or atrophy  Diabetes Foot Exam:   No sores or lesions to bilateral feet  Shoes appropriate  sensation intact to vibration and monofilament    Visit Vitals  /80   Pulse 77   Ht 5' 7" (1.702 m)   Wt 85.5 kg (188 lb 6.1 oz)   BMI 29.50 kg/m²        Lab Review:   Lab Results   Component Value Date    HGBA1C 9.7 07/08/2019    HGBA1C 7.8 (H) 10/03/2018    HGBA1C 8.2 (H) 07/20/2017      Lab Results   Component Value Date    CHOL 125 " 10/03/2018    HDL 46 07/08/2019    LDLCALC 110 07/08/2019    TRIG 135 10/03/2018    CHOLHDL 3.5 10/03/2018     Lab Results   Component Value Date     10/03/2018    K 4.5 10/03/2018     10/03/2018    CO2 27 10/03/2018     (H) 10/03/2018    BUN 18 10/03/2018    CREATININE 0.96 10/03/2018    CALCIUM 10.1 10/03/2018    PROT 6.5 10/03/2018    ALBUMIN 4.2 10/03/2018    BILITOT 0.5 10/03/2018    ALKPHOS 63 10/03/2018    AST 23 10/03/2018    ALT 50 (H) 10/03/2018    ANIONGAP 7 (L) 09/03/2016    ESTGFRAFRICA 95 10/03/2018    EGFRNONAA 82 10/03/2018    TSH 0.69 04/11/2018     Vit D, 25-Hydroxy   Date Value Ref Range Status   09/03/2016 30 30 - 96 ng/mL Final     Comment:     Vitamin D deficiency.........<10 ng/mL                              Vitamin D insufficiency......10-29 ng/mL       Vitamin D sufficiency........> or equal to 30 ng/mL  Vitamin D toxicity............>100 ng/mL       Assessment and Plan     1. Non-insulin dependent type 2 diabetes mellitus  Ambulatory referral/consult to Endocrinology    Ambulatory Referral to Diabetes Education    Comprehensive metabolic panel    Hemoglobin A1c    Comprehensive metabolic panel    Hemoglobin A1c   2. Diabetic polyneuropathy associated with type 2 diabetes mellitus  Ambulatory Referral to Diabetes Education    Comprehensive metabolic panel    Hemoglobin A1c    Comprehensive metabolic panel    Hemoglobin A1c   3. Mixed hyperlipidemia     4. Hypertension, essential     5. Noncompliance         Diabetic neuropathy associated with type 2 diabetes mellitus  -- Reviewed goals of therapy are to get the best control we can without hypoglycemia  -- Refer to diabetes education  Medication changes:   Continue current medications  -- Discussed according to his BG logs, he requires more medications. He would like to wait until his labs done yesterday result. Discussed that according to his BG log, I recommend insulin. He reports he would like to wait on insulin and  find out his most recent A1c. Discussed possible treatment with SGLT2, will await kidney function. Discussed DM is controlled in 3 ways: diet, exercise, and medications. Discussed he is not following diabetic diet nor exercising. He agrees to work hard on diet and exercise to avoid additional medications.  -- Advised frequent self blood glucose monitoring.  Patient encouraged to document glucose results and bring them to every clinic visit    -- Hypoglycemia precautions discussed. Instructed on precautions before driving.    -- Call for Bg repeatedly < 90 or > 180.   -- Close adherence to lifestyle changes recommended.   -- Periodic follow ups for eye evaluations, foot care and dental care suggested.  -- Encouraged exercise  -- Given information on low carb snacks and DM drinks       Hyperlipidemia  Controlled   On statin per ADA recommendations      Hypertension, essential  -- on ARB  -- Controlled  -- Blood pressure goals discussed with patient      Noncompliance  -- Encouraged lifestyle modifications   -- Encouraged to check BG at least BID       Follow up in about 3 months (around 4/14/2020).  Labs prior to next appointment

## 2020-01-12 ENCOUNTER — PATIENT OUTREACH (OUTPATIENT)
Dept: ADMINISTRATIVE | Facility: OTHER | Age: 70
End: 2020-01-12

## 2020-01-14 ENCOUNTER — TELEPHONE (OUTPATIENT)
Dept: INTERNAL MEDICINE | Facility: CLINIC | Age: 70
End: 2020-01-14

## 2020-01-14 ENCOUNTER — OFFICE VISIT (OUTPATIENT)
Dept: ENDOCRINOLOGY | Facility: CLINIC | Age: 70
End: 2020-01-14
Attending: FAMILY MEDICINE
Payer: COMMERCIAL

## 2020-01-14 VITALS
WEIGHT: 188.38 LBS | HEIGHT: 67 IN | HEART RATE: 77 BPM | DIASTOLIC BLOOD PRESSURE: 80 MMHG | BODY MASS INDEX: 29.57 KG/M2 | SYSTOLIC BLOOD PRESSURE: 124 MMHG

## 2020-01-14 DIAGNOSIS — Z91.199 NONCOMPLIANCE: ICD-10-CM

## 2020-01-14 DIAGNOSIS — E11.42 DIABETIC POLYNEUROPATHY ASSOCIATED WITH TYPE 2 DIABETES MELLITUS: ICD-10-CM

## 2020-01-14 DIAGNOSIS — E11.9 NON-INSULIN DEPENDENT TYPE 2 DIABETES MELLITUS: Chronic | ICD-10-CM

## 2020-01-14 DIAGNOSIS — I10 HYPERTENSION, ESSENTIAL: ICD-10-CM

## 2020-01-14 DIAGNOSIS — E78.2 MIXED HYPERLIPIDEMIA: ICD-10-CM

## 2020-01-14 LAB
ALBUMIN SERPL-MCNC: 4.5 G/DL (ref 3.6–5.1)
ALBUMIN/CREAT UR: 11 MCG/MG CREAT
ALBUMIN/GLOB SERPL: 2 (CALC) (ref 1–2.5)
ALP SERPL-CCNC: 90 U/L (ref 40–115)
ALT SERPL-CCNC: 117 U/L (ref 9–46)
AST SERPL-CCNC: 46 U/L (ref 10–35)
BILIRUB SERPL-MCNC: 0.7 MG/DL (ref 0.2–1.2)
BUN SERPL-MCNC: 15 MG/DL (ref 7–25)
BUN/CREAT SERPL: ABNORMAL (CALC) (ref 6–22)
CALCIUM SERPL-MCNC: 10.4 MG/DL (ref 8.6–10.3)
CHLORIDE SERPL-SCNC: 99 MMOL/L (ref 98–110)
CHOLEST SERPL-MCNC: 127 MG/DL
CHOLEST/HDLC SERPL: 3.3 (CALC)
CO2 SERPL-SCNC: 29 MMOL/L (ref 20–32)
CREAT SERPL-MCNC: 0.91 MG/DL (ref 0.7–1.25)
CREAT UR-MCNC: 175 MG/DL (ref 20–320)
GFRSERPLBLD MDRD-ARVRAT: 87 ML/MIN/1.73M2
GLOBULIN SER CALC-MCNC: 2.2 G/DL (CALC) (ref 1.9–3.7)
GLUCOSE SERPL-MCNC: 163 MG/DL (ref 65–99)
HBA1C MFR BLD: 10.3 % OF TOTAL HGB
HDLC SERPL-MCNC: 38 MG/DL
LDLC SERPL CALC-MCNC: 69 MG/DL (CALC)
MICROALBUMIN UR-MCNC: 2 MG/DL
NONHDLC SERPL-MCNC: 89 MG/DL (CALC)
POTASSIUM SERPL-SCNC: 4.7 MMOL/L (ref 3.5–5.3)
PROT SERPL-MCNC: 6.7 G/DL (ref 6.1–8.1)
SODIUM SERPL-SCNC: 138 MMOL/L (ref 135–146)
TRIGL SERPL-MCNC: 118 MG/DL

## 2020-01-14 PROCEDURE — 1126F PR PAIN SEVERITY QUANTIFIED, NO PAIN PRESENT: ICD-10-PCS | Mod: S$GLB,,, | Performed by: NURSE PRACTITIONER

## 2020-01-14 PROCEDURE — 1159F PR MEDICATION LIST DOCUMENTED IN MEDICAL RECORD: ICD-10-PCS | Mod: S$GLB,,, | Performed by: NURSE PRACTITIONER

## 2020-01-14 PROCEDURE — 3046F PR MOST RECENT HEMOGLOBIN A1C LEVEL > 9.0%: ICD-10-PCS | Mod: CPTII,S$GLB,, | Performed by: NURSE PRACTITIONER

## 2020-01-14 PROCEDURE — 3079F DIAST BP 80-89 MM HG: CPT | Mod: CPTII,S$GLB,, | Performed by: NURSE PRACTITIONER

## 2020-01-14 PROCEDURE — 1159F MED LIST DOCD IN RCRD: CPT | Mod: S$GLB,,, | Performed by: NURSE PRACTITIONER

## 2020-01-14 PROCEDURE — 3288F FALL RISK ASSESSMENT DOCD: CPT | Mod: CPTII,S$GLB,, | Performed by: NURSE PRACTITIONER

## 2020-01-14 PROCEDURE — 3046F HEMOGLOBIN A1C LEVEL >9.0%: CPT | Mod: CPTII,S$GLB,, | Performed by: NURSE PRACTITIONER

## 2020-01-14 PROCEDURE — 99214 OFFICE O/P EST MOD 30 MIN: CPT | Mod: S$GLB,,, | Performed by: NURSE PRACTITIONER

## 2020-01-14 PROCEDURE — 3074F SYST BP LT 130 MM HG: CPT | Mod: CPTII,S$GLB,, | Performed by: NURSE PRACTITIONER

## 2020-01-14 PROCEDURE — 3079F PR MOST RECENT DIASTOLIC BLOOD PRESSURE 80-89 MM HG: ICD-10-PCS | Mod: CPTII,S$GLB,, | Performed by: NURSE PRACTITIONER

## 2020-01-14 PROCEDURE — 3288F PR FALLS RISK ASSESSMENT DOCUMENTED: ICD-10-PCS | Mod: CPTII,S$GLB,, | Performed by: NURSE PRACTITIONER

## 2020-01-14 PROCEDURE — 1100F PTFALLS ASSESS-DOCD GE2>/YR: CPT | Mod: CPTII,S$GLB,, | Performed by: NURSE PRACTITIONER

## 2020-01-14 PROCEDURE — 99999 PR PBB SHADOW E&M-EST. PATIENT-LVL IV: CPT | Mod: PBBFAC,,, | Performed by: NURSE PRACTITIONER

## 2020-01-14 PROCEDURE — 1100F PR PT FALLS ASSESS DOC 2+ FALLS/FALL W/INJURY/YR: ICD-10-PCS | Mod: CPTII,S$GLB,, | Performed by: NURSE PRACTITIONER

## 2020-01-14 PROCEDURE — 1126F AMNT PAIN NOTED NONE PRSNT: CPT | Mod: S$GLB,,, | Performed by: NURSE PRACTITIONER

## 2020-01-14 PROCEDURE — 99214 PR OFFICE/OUTPT VISIT, EST, LEVL IV, 30-39 MIN: ICD-10-PCS | Mod: S$GLB,,, | Performed by: NURSE PRACTITIONER

## 2020-01-14 PROCEDURE — 99999 PR PBB SHADOW E&M-EST. PATIENT-LVL IV: ICD-10-PCS | Mod: PBBFAC,,, | Performed by: NURSE PRACTITIONER

## 2020-01-14 PROCEDURE — 3074F PR MOST RECENT SYSTOLIC BLOOD PRESSURE < 130 MM HG: ICD-10-PCS | Mod: CPTII,S$GLB,, | Performed by: NURSE PRACTITIONER

## 2020-01-14 NOTE — TELEPHONE ENCOUNTER
----- Message from Desmond Olivo MA sent at 1/14/2020 11:31 AM CST -----  Hi. Patient was seen this morning in Endocrinology. We ordered labs and sent to [facility] but nothing has resulted. Did the patient by any chance have lab work done in your office?  Thanks

## 2020-01-14 NOTE — PATIENT INSTRUCTIONS
Snacks can be an important part of a balanced, healthy meal plan. They allow you to eat more frequently, feeling full and satisfied throughout the day. Also, they allow you to spread carbohydrates evenly, which may stabilize blood sugars.  Plus, snacks are enjoyable!     The amount of carbohydrate needed at snacks varies. Generally, about 15-30 grams of carbohydrate per snack is recommended.  Below you will find some tasty treats.       0-5 gm carb   Crystal Light   Vitamin Water Zero   Herbal tea, unsweetened   2 tsp peanut butter on celery   1./2 cup sugar-free jell-o   1 sugar-free popsicle   ¼ cup blueberries   8oz Blue Armida unsweetened almond milk   5 baby carrots & celery sticks, cucumbers, bell peppers dipped in ¼ cup salsa, 2Tbsp light ranch dressing or 2Tbsp plain Greek yogurt   10 Goldfish crackers   ½ oz low-fat cheese or string cheese   1 closed handful of nuts, unsalted   1 Tbsp of sunflower seeds, unsalted   1 cup Smart Pop popcorn   1 whole grain brown rice cake        15 gm carb   1 small piece of fruit or ½ banana or 1/2 cup lite canned fruit   3 gila cracker squares   3 cups Smart Pop popcorn, top spray butter, Coreas lite salt or cinnamon and Truvia   5 Vanilla Wafers   ½ cup low fat, no added sugar ice cream or frozen yogurt (Blue bell, Blue Bunny, Weight Watchers, Skinny Cow)   ½ turkey, ham, or chicken sandwich   ½ c fruit with ½ c Cottage cheese   4-6 unsalted wheat crackers with 1 oz low fat cheese or 1 tbsp peanut butter    30-45 goldfish crackers (depending on flavor)    7-8 Bahai mini brown rice cakes (caramel, apple cinnamon, chocolate)    12 Bahai mini brown rice cakes (cheddar, bbq, ranch)    1/3 cup hummus dip with raw veg   1/2 whole wheat deb, 1Tbsp hummus   Mini Pizza (1/2 whole wheat English muffin, low-fat  cheese, tomato sauce)   100 calorie snack pack (Oreo, Chips Ahoy, Ritz Mix, Baked Cheetos)   4-6 oz. light or Greek Style yogurt  (Ny, Pamela, Best, [facility])   ½ cup sugar-free pudding     6 in. wheat tortilla or deb oven toasted chips (topped with spray butter flavoring, cinnamon, Truvia OR spray butter, garlic powder, chili powder)    18 BBQ Popchips (available at [employer], [employer], Fresh Market)       Diabetic drinks we recommend:   Water -BEST  Crystal light sugar free packets  Gatorade zero   Powerade zero  Tea without sweetener    Diabetic drinks we do not recommend:  Coke or any sugary regular soft drink  Coffee with sugar  Milk  Juice  Beer  Liquor    Sweeteners we recommend:  Stevia   Trrachelle Li    Note: Caffeine can increase your blood sugar

## 2020-01-14 NOTE — ASSESSMENT & PLAN NOTE
-- Reviewed goals of therapy are to get the best control we can without hypoglycemia  -- Refer to diabetes education  Medication changes:   Continue current medications  -- Discussed according to his BG logs, he requires more medications. He would like to wait until his labs done yesterday result. Discussed that according to his BG log, I recommend insulin. He reports he would like to wait on insulin and find out his most recent A1c. Discussed possible treatment with SGLT2, will await kidney function. Discussed DM is controlled in 3 ways: diet, exercise, and medications. Discussed he is not following diabetic diet nor exercising. He agrees to work hard on diet and exercise to avoid additional medications.  -- Advised frequent self blood glucose monitoring.  Patient encouraged to document glucose results and bring them to every clinic visit    -- Hypoglycemia precautions discussed. Instructed on precautions before driving.    -- Call for Bg repeatedly < 90 or > 180.   -- Close adherence to lifestyle changes recommended.   -- Periodic follow ups for eye evaluations, foot care and dental care suggested.  -- Encouraged exercise  -- Given information on low carb snacks and DM drinks

## 2020-01-14 NOTE — LETTER
January 14, 2020      Ned Hartley MD  [address]  [facility] [address]           [facility] [place]  [address]  [facility] [address]  Phone: [phone]  Fax: [phone]          Patient: Esteban Mike   MR Number: [id number]   YOB: 1950   Date of Visit: 1/14/2020       Dear Dr. Ned Hartley:    Thank you for referring Esteban Mike to me for evaluation. Attached you will find relevant portions of my assessment and plan of care.    If you have questions, please do not hesitate to call me. I look forward to following Esteban Mike along with you.    Sincerely,    Joan Saab, BLAYNE    Enclosure  CC:  No Recipients    If you would like to receive this communication electronically, please contact [email or web address] or [phone] to request more information on [employer] Link access.    For providers and/or their staff who would like to refer a patient to [facility], please contact us through our one-stop-shop provider referral line, [facility], at [phone].    If you feel you have received this communication in error or would no longer like to receive these types of communications, please e-mail [email or web address]

## 2020-01-15 ENCOUNTER — CLINICAL SUPPORT (OUTPATIENT)
Dept: DIABETES | Facility: CLINIC | Age: 70
End: 2020-01-15
Attending: FAMILY MEDICINE
Payer: COMMERCIAL

## 2020-01-15 ENCOUNTER — CLINICAL SUPPORT (OUTPATIENT)
Dept: CARDIOLOGY | Facility: CLINIC | Age: 70
End: 2020-01-15
Attending: FAMILY MEDICINE
Payer: COMMERCIAL

## 2020-01-15 DIAGNOSIS — E11.9 NON-INSULIN DEPENDENT TYPE 2 DIABETES MELLITUS: Chronic | ICD-10-CM

## 2020-01-15 DIAGNOSIS — I65.21 INTERNAL CAROTID ARTERY OCCLUSION, RIGHT: ICD-10-CM

## 2020-01-15 DIAGNOSIS — I65.22 LEFT CAROTID ARTERY STENOSIS: ICD-10-CM

## 2020-01-15 DIAGNOSIS — E11.42 DIABETIC POLYNEUROPATHY ASSOCIATED WITH TYPE 2 DIABETES MELLITUS: ICD-10-CM

## 2020-01-15 LAB
LEFT CBA DIAS: 16 CM/S
LEFT CBA SYS: 58 CM/S
LEFT CCA DIST DIAS: 23 CM/S
LEFT CCA DIST SYS: 66 CM/S
LEFT CCA MID DIAS: 22 CM/S
LEFT CCA MID SYS: 71 CM/S
LEFT CCA PROX DIAS: 14 CM/S
LEFT CCA PROX SYS: 58 CM/S
LEFT ECA DIAS: 100 CM/S
LEFT ECA SYS: 584 CM/S
LEFT ICA DIST DIAS: 35 CM/S
LEFT ICA DIST SYS: 187 CM/S
LEFT ICA MID DIAS: 32 CM/S
LEFT ICA MID SYS: 131 CM/S
LEFT ICA PROX DIAS: 22 CM/S
LEFT ICA PROX SYS: 79 CM/S
LEFT VERTEBRAL DIAS: 20 CM/S
LEFT VERTEBRAL SYS: 84 CM/S
OHS CV CAROTID RIGHT ICA EDV HIGHEST: 10
OHS CV CAROTID ULTRASOUND LEFT ICA/CCA RATIO: 2.63
OHS CV CAROTID ULTRASOUND RIGHT ICA/CCA RATIO: 2.79
OHS CV PV CAROTID LEFT HIGHEST CCA: 71
OHS CV PV CAROTID LEFT HIGHEST ICA: 187
OHS CV PV CAROTID RIGHT HIGHEST CCA: 24
OHS CV PV CAROTID RIGHT HIGHEST ICA: 67
OHS CV US CAROTID LEFT HIGHEST EDV: 35
RIGHT ARM DIASTOLIC BLOOD PRESSURE: 80 MMHG
RIGHT ARM SYSTOLIC BLOOD PRESSURE: 124 MMHG
RIGHT CBA DIAS: 6 CM/S
RIGHT CBA SYS: 41 CM/S
RIGHT CCA DIST DIAS: 0 CM/S
RIGHT CCA DIST SYS: 14 CM/S
RIGHT CCA MID DIAS: 0 CM/S
RIGHT CCA MID SYS: 24 CM/S
RIGHT CCA PROX DIAS: 0 CM/S
RIGHT CCA PROX SYS: 24 CM/S
RIGHT ECA DIAS: 14 CM/S
RIGHT ECA SYS: 111 CM/S
RIGHT ICA DIST DIAS: 10 CM/S
RIGHT ICA DIST SYS: 65 CM/S
RIGHT ICA MID DIAS: 10 CM/S
RIGHT ICA MID SYS: 59 CM/S
RIGHT ICA PROX DIAS: 9 CM/S
RIGHT ICA PROX SYS: 67 CM/S
RIGHT VERTEBRAL DIAS: 12 CM/S
RIGHT VERTEBRAL SYS: 72 CM/S

## 2020-01-15 PROCEDURE — 93880 CV US DOPPLER CAROTID (CUPID ONLY): ICD-10-PCS | Mod: S$GLB,,, | Performed by: INTERNAL MEDICINE

## 2020-01-15 PROCEDURE — 99999 PR PBB SHADOW E&M-EST. PATIENT-LVL III: CPT | Mod: PBBFAC,,, | Performed by: DIETITIAN, REGISTERED

## 2020-01-15 PROCEDURE — 93880 EXTRACRANIAL BILAT STUDY: CPT | Mod: S$GLB,,, | Performed by: INTERNAL MEDICINE

## 2020-01-15 PROCEDURE — G0108 PR DIAB MANAGE TRN  PER INDIV: ICD-10-PCS | Mod: S$GLB,,, | Performed by: DIETITIAN, REGISTERED

## 2020-01-15 PROCEDURE — G0108 DIAB MANAGE TRN  PER INDIV: HCPCS | Mod: S$GLB,,, | Performed by: DIETITIAN, REGISTERED

## 2020-01-15 PROCEDURE — 99999 PR PBB SHADOW E&M-EST. PATIENT-LVL III: ICD-10-PCS | Mod: PBBFAC,,, | Performed by: DIETITIAN, REGISTERED

## 2020-01-15 NOTE — PROGRESS NOTES
Diabetes Education  Author: Brenda Hood RD, CDE  Date: 1/15/2020    Diabetes Care Management Summary  Diabetes Education Record Assessment/Progress: Comprehensive/Group(Last seen for DE 8/21/19; also follows with digital medicine)     Last A1c:   Lab Results   Component Value Date    HGBA1C 10.3 (H) 01/13/2020    HGBA1C 9.7 07/08/2019     Last visit with Diabetes Educator: : 01/15/2020    Diabetes Type  Diabetes Type : Type II    Diabetes History  Current Treatment: Oral Medication, Injectable(Sometimes misses doses of medication)    Health Maintenance was reviewed today with patient. Discussed with patient importance of routine eye exams, foot exams/foot care, blood work (i.e.: A1c, microalbumin, and lipid), dental visits, yearly flu vaccine, and pneumonia vaccine as indicated by PCP. Patient verbalized understanding.     Health Maintenance Topics with due status: Not Due       Topic Last Completion Date    Colonoscopy 01/23/2013    TETANUS VACCINE 10/12/2018    Eye Exam 07/17/2019    Lipid Panel 01/13/2020    Hemoglobin A1c 01/13/2020    High Dose Statin 01/14/2020    Aspirin/Antiplatelet Therapy 01/14/2020    Foot Exam 01/14/2020     Health Maintenance Due   Topic Date Due    PROSTATE-SPECIFIC ANTIGEN  10/03/2019     Nutrition  Meal Planning: 3 meals per day, snacks between meal, diet drinks, water, artificial sweeteners, eats out often(Some water)  What type of sweetener do you use?: Splenda  What type of beverages do you drink?: other (see comments), juice, water, sport drinks, diet soda/tea(Coffee)    Monitoring   Self Monitoring : (Checked it this AM - BG was 174; follows with digital medicine - has checked BG 12 times since 10/18/19 - Lowest reading 159; Highest reading 312)  Blood Glucose Logs: No  Do you use a personal continuous glucose monitor?: No  In the last month, how often have you had a low blood sugar reaction?: never  What are your symptoms of low blood sugar?: (N/A)  How do you treat low blood  sugar?: (N/A)  Can you tell when your blood sugar is too high?: yes(Tired; frequent urination)  How do you treat high blood sugar?: (None)    Exercise   Exercise Type: none    Current Diabetes Treatment   Current Treatment: Oral Medication, Injectable(Sometimes misses doses of medication)    Social History  Preferred Learning Method: Face to Face  Primary Support: Self  Smoking Status: Never a Smoker  Alcohol Use: Rarely    Barriers to Change  Barriers to Change: Functional limitation(Back pain - referred to healthy back)  Learning Challenges : None    Readiness to Learn   Readiness to Learn : Acceptance    Cultural Influences  Cultural Influences: No    Diabetes Education Assessment/Progress  Diabetes Disease Process (diabetes disease process and treatment options): Instructed, Discussion, Individual Session, Written Materials Provided, Demonstrates Understanding/Competency(verbalizes/demonstrates)  Nutrition (Incorporating nutritional management into one's lifestyle): Instructed, Discussion, Individual Session, Written Materials Provided, Demonstrates Understanding/Competency (verbalizes/demonstrates)(Reviewed general nutrition guidelines and plate method; patient states not likely to count carbs or read food labels)  Physical Activity (incorporating physical activity into one's lifestyle): Instructed, Discussion, Individual Session, Written Materials Provided, Demonstrates Understanding/Competency (verbalizes/demonstrates)(Reviewed goals and benefits; has elliptical and outside bike at home; also discussed use of foot/arm cycle)  Medications (states correct name, dose, onset, peak, duration, side effects & timing of meds): Instructed, Discussion, Individual Session, Written Materials Provided, Demonstrates Understanding/Competency(verbalizes/demonstrates)(Reviewed medication regimen)  Monitoring (monitoring blood glucose/other parameters & using results): Instructed, Discussion, Individual Session, Written  Materials Provided, Demonstrates Understanding/Competency (verbalizes/demonstrates)(Reviewed SMBG schedule and BG goals; interested in [place]; advised to check with digital medicine to see if he could use it and still follow with their program)  Acute Complications (preventing, detecting, and treating acute complications): Instructed, Discussion, Individual Session, Written Materials Provided, Demonstrates Understanding/Competency (verbalizes/demonstrates)(Reviewed s/s and treatment of hypoglycemia)  Chronic Complications (preventing, detecting, and treating chronic complications): Instructed, Discussion, Individual Session, Written Materials Provided, Demonstrates Understanding/Competency (verbalizes/demonstrates)(Reviewed standards of care; scheduled for eye exam)  Clinical (diabetes, other pertinent medical history, and relevant comorbidities reviewed during visit): Discussion, Comprehends Key Points  Cognitive (knowledge of self-management skills, functional health literacy): Discussion, Comprehends Key Points  Psychosocial (emotional response to diabetes): Discussion, Comprehends Key Points  Diabetes Distress and Support Systems: Not Covered/Deferred(Time constraints)  Behavioral (readiness for change, lifestyle practices, self-care behaviors): Discussion, Comprehends Key Points    Goals  Patient has selected/evaluated goals during today's session: Yes, selected  Physical Activity: Set(Increase physical activity)    Diabetes Care Plan/Intervention  Education Plan/Intervention: Individual Follow-Up DSMT    Diabetes Meal Plan  Restrictions: Restricted Carbohydrate    Today's Self-Management Care Plan was developed with the patient's input and is based on barriers identified during today's assessment.    The long and short-term goals in the care plan were written with the patient/caregiver's input. The patient has agreed to work toward these goals to improve his overall diabetes control.      The patient received a  copy of today's self-management plan and verbalized understanding of the care plan, goals, and all of today's instructions.      The patient was encouraged to communicate with his physician and care team regarding his condition(s) and treatment.  I provided the patient with my contact information today and encouraged him to contact me via phone or patient portal as needed.     Education Units of Time   Time Spent: 60 min

## 2020-01-16 ENCOUNTER — PATIENT OUTREACH (OUTPATIENT)
Dept: OTHER | Facility: OTHER | Age: 70
End: 2020-01-16

## 2020-01-16 RX ORDER — VALSARTAN 320 MG/1
320 TABLET ORAL DAILY
Qty: 90 TABLET | Refills: 1
Start: 2020-01-16 | End: 2020-03-26

## 2020-01-16 NOTE — PROGRESS NOTES
"Digital Medicine: Clinician Follow-Up        Follow Up  Follow-up reason(s): reading review    Patient's A1C came back at 10.3%. He confirms adherence to metformin, glipizide, and Trulicity. He states he saw the diabetes educator and was given 3 months to "get my act together" or he would have to start insulin again. Last 2 SMBG readings are much improved. He states he has stopped eating ice cream at night and is monitoring his diet more closely.    Patient's recent BP readings are elevated. He ran out of valsartan and was told it was on back order. Contacted his local pharmacy and they are able to get a shipment in tomorrow.       INTERVENTION(S)  encouragement/support    PLAN  Health  follow up    Per 30 day average, 158/84 mmHg, patient's BP is not at goal. Patient's A1C has   increased above goal.     Patient will resume valsartan tomorrow.     Patient is working on diet to get BG better controlled. Encouraged him to be diligent and explained that long term elevations in BG can cause complications like CKD, trouble with vision, and cardiac complications.     Will continue to monitor regularly.     Asked patient to call or message with questions or concerns.         Last 5 Patient Entered Readings                                      Current 30 Day Average: 158/84     Recent Readings 1/16/2020 1/15/2020 1/9/2020 1/3/2020 12/29/2019    SBP (mmHg) 158 181 131 160 169    DBP (mmHg) 81 96 71 91 89    Pulse 56 61 60 64 57        Last 6 Patient Entered Readings                                          Most Recent A1c: 10.3% on 1/13/2020  (Goal: 8%)     Recent Readings 1/16/2020 1/15/2020 1/9/2020 1/3/2020 12/29/2019    Blood Glucose (mg/dL) 120 174 312 258 246           Hypertension Medications             carvedilol (COREG) 3.125 MG tablet TAKE ONE TABLET BY MOUTH TWICE DAILY WITH MEALS AND (STOP ATENOLOL)    hydroCHLOROthiazide (HYDRODIURIL) 25 MG tablet TAKE ONE TABLET BY MOUTH DAILY FOR BLOOD PRESSURE    " valsartan (DIOVAN) 320 MG tablet Take 1 tablet (320 mg total) by mouth once daily.        Diabetes Medications             dulaglutide (TRULICITY) 1.5 mg/0.5 mL PnIj Inject 1.5 mg into the skin every 7 days.    glipiZIDE (GLUCOTROL) 5 MG tablet TAKE ONE TABLET BY MOUTH TWICE DAILY WITH MEALS FOR DIABETES    metFORMIN (GLUCOPHAGE-XR) 500 MG 24 hr tablet TAKE TWO TABLETS BY MOUTH TWICE DAILY WITH MEALS               Screenings

## 2020-01-22 ENCOUNTER — DOCUMENTATION ONLY (OUTPATIENT)
Dept: INTERNAL MEDICINE | Facility: CLINIC | Age: 70
End: 2020-01-22

## 2020-01-22 ENCOUNTER — TELEPHONE (OUTPATIENT)
Dept: INTERNAL MEDICINE | Facility: CLINIC | Age: 70
End: 2020-01-22

## 2020-01-22 DIAGNOSIS — E11.69 TYPE 2 DIABETES MELLITUS WITH OTHER SPECIFIED COMPLICATION, WITHOUT LONG-TERM CURRENT USE OF INSULIN: Primary | ICD-10-CM

## 2020-01-22 DIAGNOSIS — Z12.5 PROSTATE CANCER SCREENING: ICD-10-CM

## 2020-01-22 DIAGNOSIS — I65.22 LEFT CAROTID ARTERY STENOSIS: ICD-10-CM

## 2020-01-22 DIAGNOSIS — I65.21 INTERNAL CAROTID ARTERY OCCLUSION, RIGHT: ICD-10-CM

## 2020-01-22 DIAGNOSIS — K76.0 NAFLD (NONALCOHOLIC FATTY LIVER DISEASE): ICD-10-CM

## 2020-01-22 NOTE — PROGRESS NOTES
Fax done today to   [facility] [address] [place] [address]  Phone: [phone]  Fax: [phone]

## 2020-01-22 NOTE — TELEPHONE ENCOUNTER
Called patient and discussed labs and or test results. Patient expressed understanding and had the opportunity to ask questions. Any questions were answered. See meds, orders, follow up and instructions sections of encounter.    Specific issues include:  A1c was elevated at 10.3.  He did see the diabetic health  and educator.  He states he will get on a better diet.  Plan to recheck in 3 months.    LFTs still elevated.  He has seen hepatology recently.  Likely diet related.  Continue to monitor.    Repeat carotid ultrasound in 1 year.    Has several upcoming appointments.  See me again in July.

## 2020-01-22 NOTE — TELEPHONE ENCOUNTER
Spoke to pt and scheduled the US appt    Fax done today to   [employer] [facility]  [facility] [address] [place] [address]  Phone: [phone]  Fax: [phone]

## 2020-01-23 ENCOUNTER — PATIENT OUTREACH (OUTPATIENT)
Dept: ADMINISTRATIVE | Facility: OTHER | Age: 70
End: 2020-01-23

## 2020-01-23 ENCOUNTER — OFFICE VISIT (OUTPATIENT)
Dept: OPTOMETRY | Facility: CLINIC | Age: 70
End: 2020-01-23
Attending: FAMILY MEDICINE
Payer: COMMERCIAL

## 2020-01-23 DIAGNOSIS — H25.13 NUCLEAR SCLEROTIC CATARACT OF BOTH EYES: ICD-10-CM

## 2020-01-23 DIAGNOSIS — E11.9 TYPE 2 DIABETES MELLITUS WITHOUT RETINOPATHY: Primary | ICD-10-CM

## 2020-01-23 DIAGNOSIS — H01.004 BLEPHARITIS OF UPPER EYELIDS OF BOTH EYES, UNSPECIFIED TYPE: ICD-10-CM

## 2020-01-23 DIAGNOSIS — H01.001 BLEPHARITIS OF UPPER EYELIDS OF BOTH EYES, UNSPECIFIED TYPE: ICD-10-CM

## 2020-01-23 DIAGNOSIS — Z98.890 HISTORY OF REPAIR OF RETINAL DEFECT BY LASER PHOTOCOAGULATION: ICD-10-CM

## 2020-01-23 PROCEDURE — 92014 COMPRE OPH EXAM EST PT 1/>: CPT | Mod: S$GLB,,, | Performed by: OPTOMETRIST

## 2020-01-23 PROCEDURE — 99999 PR PBB SHADOW E&M-EST. PATIENT-LVL II: CPT | Mod: PBBFAC,,, | Performed by: OPTOMETRIST

## 2020-01-23 PROCEDURE — 99999 PR PBB SHADOW E&M-EST. PATIENT-LVL II: ICD-10-PCS | Mod: PBBFAC,,, | Performed by: OPTOMETRIST

## 2020-01-23 PROCEDURE — 92014 PR EYE EXAM, EST PATIENT,COMPREHESV: ICD-10-PCS | Mod: S$GLB,,, | Performed by: OPTOMETRIST

## 2020-01-23 NOTE — LETTER
January 23, 2020      Ned Hartley MD  [address]  [facility] [address]           [facility]  [address]  [facility] [address]  Phone: [phone]          Patient: Esteban Mike   MR Number: [id number]   YOB: 1950   Date of Visit: 1/23/2020       Dear Dr. Ned Hartley:    Thank you for referring Esteban Mike to me for evaluation. Attached you will find relevant portions of my assessment and plan of care.    If you have questions, please do not hesitate to call me. I look forward to following Esteban Mike along with you.    Sincerely,    Stephani Braun, OD    Enclosure  CC:  No Recipients    If you would like to receive this communication electronically, please contact [email or web address] or [phone] to request more information on [employer] access.    For providers and/or their staff who would like to refer a patient to [facility], please contact us through our one-stop-shop provider referral line, [facility], at [phone].    If you feel you have received this communication in error or would no longer like to receive these types of communications, please e-mail [email or web address]

## 2020-01-23 NOTE — PROGRESS NOTES
HPI     68 y/o male is here for a diabetic eye exam  DLS 8/28/18 Dr Lagunas  Pt. States he is happy with vision uses +3.25 otc reading glasses  No flashes or floaters  No itching burning or tearing    [facility] RD in the future. See   previous notes from Dr. Lagunas. Records were sent from retina.     Hemoglobin A1C       Date                     Value               Ref Range             Status                01/13/2020               10.3 (H)            <5.7 % of tota*       Final                      07/08/2019               9.7                 %                     Final                 10/03/2018               7.8 (H)             <5.7 % of tota*       Final                       07/20/2017               8.2 (H)             4.8 - 5.6 %           Final                   Last edited by Stephani Braun, OD on 1/23/2020  9:24 AM. (History)            Assessment /Plan     For exam results, see Encounter Report.    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 Educated pt on findings. No retinopathy noted, OU. However increased A1C, discussed with pt the importance of lowering BS and the risk of LOV/blindness if BS continues to be uncontrolled. Recommend screening eyes separately at home, if change noted RTC. Monitor yearly with DFE unless changes noted sooner.     2. Moderate NS OU. Not visually significant to pt. Monitor yearly.     3. 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 (-)holes, tears, detachments OU. (-)new f/f. Educated on s/s of RD and to RTC ASAP if occur. Monitor yearly with DFE.     4. Educated pt. Recommend J&J baby shampoo along eyelid margin Q day + ATs prn. Monitor.      RTC in 1 year for annual DM eye exam or sooner if needed.

## 2020-01-29 ENCOUNTER — PATIENT OUTREACH (OUTPATIENT)
Dept: ADMINISTRATIVE | Facility: OTHER | Age: 70
End: 2020-01-29

## 2020-01-29 DIAGNOSIS — Z12.11 ENCOUNTER FOR FIT (FECAL IMMUNOCHEMICAL TEST) SCREENING: Primary | ICD-10-CM

## 2020-01-29 NOTE — PROGRESS NOTES
Care Everywhere: n/a  Immunization: n/a  Health Maintenance: n/a  Media Review: reviewed for possible outside colonoscopy report  Legacy Review: n/a  Order placed: fit kit   Upcoming appts:n/a

## 2020-01-30 ENCOUNTER — TELEPHONE (OUTPATIENT)
Dept: CARDIOLOGY | Facility: CLINIC | Age: 70
End: 2020-01-30

## 2020-01-30 DIAGNOSIS — R00.2 PALPITATIONS: Primary | ICD-10-CM

## 2020-02-03 ENCOUNTER — TELEPHONE (OUTPATIENT)
Dept: SPINE | Facility: CLINIC | Age: 70
End: 2020-02-03

## 2020-02-03 DIAGNOSIS — M54.9 BACK PAIN, UNSPECIFIED BACK LOCATION, UNSPECIFIED BACK PAIN LATERALITY, UNSPECIFIED CHRONICITY: Primary | ICD-10-CM

## 2020-02-04 ENCOUNTER — PATIENT MESSAGE (OUTPATIENT)
Dept: ENDOCRINOLOGY | Facility: CLINIC | Age: 70
End: 2020-02-04

## 2020-02-04 ENCOUNTER — PATIENT OUTREACH (OUTPATIENT)
Dept: OTHER | Facility: OTHER | Age: 70
End: 2020-02-04

## 2020-02-04 DIAGNOSIS — E11.9 NON-INSULIN DEPENDENT TYPE 2 DIABETES MELLITUS: Primary | ICD-10-CM

## 2020-02-04 LAB
ALBUMIN SERPL-MCNC: 4.6 G/DL (ref 3.6–5.1)
ALBUMIN/GLOB SERPL: 2.2 (CALC) (ref 1–2.5)
ALP SERPL-CCNC: 83 U/L (ref 35–144)
ALT SERPL-CCNC: 97 U/L (ref 9–46)
AST SERPL-CCNC: 39 U/L (ref 10–35)
BILIRUB SERPL-MCNC: 0.5 MG/DL (ref 0.2–1.2)
BUN SERPL-MCNC: 18 MG/DL (ref 7–25)
BUN/CREAT SERPL: ABNORMAL (CALC) (ref 6–22)
CALCIUM SERPL-MCNC: 10.9 MG/DL (ref 8.6–10.3)
CHLORIDE SERPL-SCNC: 102 MMOL/L (ref 98–110)
CO2 SERPL-SCNC: 31 MMOL/L (ref 20–32)
CREAT SERPL-MCNC: 0.95 MG/DL (ref 0.7–1.25)
GFRSERPLBLD MDRD-ARVRAT: 82 ML/MIN/1.73M2
GLOBULIN SER CALC-MCNC: 2.1 G/DL (CALC) (ref 1.9–3.7)
GLUCOSE SERPL-MCNC: 131 MG/DL (ref 65–99)
HBA1C MFR BLD: 9.3 % OF TOTAL HGB
POTASSIUM SERPL-SCNC: 5 MMOL/L (ref 3.5–5.3)
PROT SERPL-MCNC: 6.7 G/DL (ref 6.1–8.1)
SODIUM SERPL-SCNC: 142 MMOL/L (ref 135–146)

## 2020-02-04 NOTE — PROGRESS NOTES
"Digital Medicine: Clinician Follow-Up    Called patient for hypertension and diabetes follow-up. He reports that two elevated before breakfast readings were due to "eating something that I shouldn't have." He attributes higher blood pressure readings to not waiting to relax before measuring blood pressure. "I'll come in from running around and sit right down to measure blood pressure." Both devices have a full battery.    The history is provided by the patient. No  was used.     Follow Up  Follow-up reason(s): reading review      Readings are trending down due to lifestyle change.        INTERVENTION(S)  reviewed monitoring technique and encouragement/support    PLAN  patient verbalizes understanding and continue monitoring    Hypertension:  Current 30-day BP avg of 147/78 is uncontrolled, does not meet goal of <130/80.  Reviewed readings: SBP/DBP is trending downwards where DBP is occasionally controlled and SBP is consistently elevated.   Discussed BP measuring technique: Encouraged patient to relax 2-3 minutes before pressing start button in order to obtain best BP reading possible.     Continue current BP regimen.    Diabetes:  Last A1c from 2/2020 of 9.3% does not meet goal of less than 7%.  Reviewed readings: BG readings have improved with only two elevated readings over the past month.  Discussed the need to continue to be mindful of diet to prevent elevated BG readings above 180 after meals or 130 before breakfast.  Discussed with patient that nighttime insulin will likely be restarted if before breakfast BG continues to be elevated above goal. Patient expressed understanding.     Continue current BG regimen.    Follow-up in 4 weeks.       There are no preventive care reminders to display for this patient.    Last 5 Patient Entered Readings                                      Current 30 Day Average: 147/78     Recent Readings 1/28/2020 1/21/2020 1/18/2020 1/16/2020 1/15/2020    SBP " (mmHg) 133 152 125 158 181    DBP (mmHg) 75 81 66 81 96    Pulse 63 68 57 56 61        Last 6 Patient Entered Readings                                          Most Recent A1c: 9.3% on 2/3/2020  (Goal: 8%)     Recent Readings 2/2/2020 2/1/2020 1/31/2020 1/30/2020 1/29/2020    Blood Glucose (mg/dL) 137 145 114 245 104           Hypertension Medications             carvedilol (COREG) 3.125 MG tablet TAKE ONE TABLET BY MOUTH TWICE DAILY WITH MEALS AND (STOP ATENOLOL)    hydroCHLOROthiazide (HYDRODIURIL) 25 MG tablet TAKE ONE TABLET BY MOUTH DAILY FOR BLOOD PRESSURE    valsartan (DIOVAN) 320 MG tablet Take 1 tablet (320 mg total) by mouth once daily.        Diabetes Medications             dulaglutide (TRULICITY) 1.5 mg/0.5 mL PnIj Inject 1.5 mg into the skin every 7 days.    empagliflozin (JARDIANCE) 10 mg Tab Take 10 mg by mouth once daily for 14 days, THEN 25 mg once daily.    glipiZIDE (GLUCOTROL) 5 MG tablet TAKE ONE TABLET BY MOUTH TWICE DAILY WITH MEALS FOR DIABETES    metFORMIN (GLUCOPHAGE-XR) 500 MG 24 hr tablet TAKE TWO TABLETS BY MOUTH TWICE DAILY WITH MEALS               Screenings

## 2020-02-11 ENCOUNTER — PATIENT OUTREACH (OUTPATIENT)
Dept: ADMINISTRATIVE | Facility: OTHER | Age: 70
End: 2020-02-11

## 2020-02-11 DIAGNOSIS — I10 ESSENTIAL HYPERTENSION: ICD-10-CM

## 2020-02-12 ENCOUNTER — PATIENT MESSAGE (OUTPATIENT)
Dept: INTERNAL MEDICINE | Facility: CLINIC | Age: 70
End: 2020-02-12

## 2020-02-12 ENCOUNTER — PATIENT MESSAGE (OUTPATIENT)
Dept: ENDOCRINOLOGY | Facility: CLINIC | Age: 70
End: 2020-02-12

## 2020-02-12 NOTE — TELEPHONE ENCOUNTER
Please call patient to schedule for a follow up appointment with me in 1 month for pressure check. Thank you.

## 2020-02-12 NOTE — PROGRESS NOTES
Chart reviewed.   Immunizations: Triggered Imm Registry     Orders placed: n/a  Upcoming appts to satisfy KOMAL topics: n/a

## 2020-02-13 ENCOUNTER — OFFICE VISIT (OUTPATIENT)
Dept: CARDIOLOGY | Facility: CLINIC | Age: 70
End: 2020-02-13
Payer: COMMERCIAL

## 2020-02-13 VITALS
DIASTOLIC BLOOD PRESSURE: 70 MMHG | SYSTOLIC BLOOD PRESSURE: 132 MMHG | BODY MASS INDEX: 28.96 KG/M2 | OXYGEN SATURATION: 97 % | WEIGHT: 184.5 LBS | HEART RATE: 65 BPM | HEIGHT: 67 IN

## 2020-02-13 DIAGNOSIS — G47.33 OSA (OBSTRUCTIVE SLEEP APNEA): ICD-10-CM

## 2020-02-13 DIAGNOSIS — K76.0 NAFLD (NONALCOHOLIC FATTY LIVER DISEASE): ICD-10-CM

## 2020-02-13 DIAGNOSIS — R00.2 PALPITATIONS: ICD-10-CM

## 2020-02-13 DIAGNOSIS — I65.22 LEFT CAROTID ARTERY STENOSIS: ICD-10-CM

## 2020-02-13 DIAGNOSIS — I25.810 CORONARY ARTERY DISEASE INVOLVING AUTOLOGOUS VEIN CORONARY BYPASS GRAFT WITHOUT ANGINA PECTORIS: Primary | ICD-10-CM

## 2020-02-13 DIAGNOSIS — E11.9 TYPE 2 DIABETES MELLITUS WITHOUT COMPLICATION, WITHOUT LONG-TERM CURRENT USE OF INSULIN: ICD-10-CM

## 2020-02-13 DIAGNOSIS — E78.2 MIXED HYPERLIPIDEMIA: ICD-10-CM

## 2020-02-13 DIAGNOSIS — I10 HYPERTENSION, ESSENTIAL: ICD-10-CM

## 2020-02-13 PROCEDURE — 3078F PR MOST RECENT DIASTOLIC BLOOD PRESSURE < 80 MM HG: ICD-10-PCS | Mod: CPTII,S$GLB,, | Performed by: INTERNAL MEDICINE

## 2020-02-13 PROCEDURE — 1159F PR MEDICATION LIST DOCUMENTED IN MEDICAL RECORD: ICD-10-PCS | Mod: S$GLB,,, | Performed by: INTERNAL MEDICINE

## 2020-02-13 PROCEDURE — 93000 ELECTROCARDIOGRAM COMPLETE: CPT | Mod: S$GLB,,, | Performed by: INTERNAL MEDICINE

## 2020-02-13 PROCEDURE — 93000 EKG 12-LEAD: ICD-10-PCS | Mod: S$GLB,,, | Performed by: INTERNAL MEDICINE

## 2020-02-13 PROCEDURE — 3046F PR MOST RECENT HEMOGLOBIN A1C LEVEL > 9.0%: ICD-10-PCS | Mod: CPTII,S$GLB,, | Performed by: INTERNAL MEDICINE

## 2020-02-13 PROCEDURE — 1101F PR PT FALLS ASSESS DOC 0-1 FALLS W/OUT INJ PAST YR: ICD-10-PCS | Mod: CPTII,S$GLB,, | Performed by: INTERNAL MEDICINE

## 2020-02-13 PROCEDURE — 3078F DIAST BP <80 MM HG: CPT | Mod: CPTII,S$GLB,, | Performed by: INTERNAL MEDICINE

## 2020-02-13 PROCEDURE — 99999 PR PBB SHADOW E&M-EST. PATIENT-LVL III: ICD-10-PCS | Mod: PBBFAC,,, | Performed by: INTERNAL MEDICINE

## 2020-02-13 PROCEDURE — 3046F HEMOGLOBIN A1C LEVEL >9.0%: CPT | Mod: CPTII,S$GLB,, | Performed by: INTERNAL MEDICINE

## 2020-02-13 PROCEDURE — 1101F PT FALLS ASSESS-DOCD LE1/YR: CPT | Mod: CPTII,S$GLB,, | Performed by: INTERNAL MEDICINE

## 2020-02-13 PROCEDURE — 1159F MED LIST DOCD IN RCRD: CPT | Mod: S$GLB,,, | Performed by: INTERNAL MEDICINE

## 2020-02-13 PROCEDURE — 99214 PR OFFICE/OUTPT VISIT, EST, LEVL IV, 30-39 MIN: ICD-10-PCS | Mod: S$GLB,,, | Performed by: INTERNAL MEDICINE

## 2020-02-13 PROCEDURE — 3075F SYST BP GE 130 - 139MM HG: CPT | Mod: CPTII,S$GLB,, | Performed by: INTERNAL MEDICINE

## 2020-02-13 PROCEDURE — 99214 OFFICE O/P EST MOD 30 MIN: CPT | Mod: S$GLB,,, | Performed by: INTERNAL MEDICINE

## 2020-02-13 PROCEDURE — 99999 PR PBB SHADOW E&M-EST. PATIENT-LVL III: CPT | Mod: PBBFAC,,, | Performed by: INTERNAL MEDICINE

## 2020-02-13 PROCEDURE — 3075F PR MOST RECENT SYSTOLIC BLOOD PRESS GE 130-139MM HG: ICD-10-PCS | Mod: CPTII,S$GLB,, | Performed by: INTERNAL MEDICINE

## 2020-02-13 RX ORDER — HYDROCHLOROTHIAZIDE 25 MG/1
TABLET ORAL
Qty: 90 TABLET | Refills: 1 | Status: SHIPPED | OUTPATIENT
Start: 2020-02-13 | End: 2020-05-07

## 2020-02-13 NOTE — PROGRESS NOTES
Subjective:    Patient ID:  Esteban Mike is a 69 y.o. male who presents for follow-up of Coronary Artery Disease      HPI     69 year old male last seen 8/7/17 is followed with CAD post CABG 2008, post right CEA 2006 and left ICA stent 2007. He is followed by digital medicine will good BP control. He denies chest pain or CHAU. He is very active and mow 16 yards during the summer. EKG is normal.  Lab Results   Component Value Date     02/03/2020    K 5.0 02/03/2020     02/03/2020    CO2 31 02/03/2020    BUN 18 02/03/2020    CREATININE 0.95 02/03/2020     (H) 02/03/2020    HGBA1C 9.3 (H) 02/03/2020    HGBA1C 9.7 07/08/2019    MG 2.6 05/06/2008    AST 39 (H) 02/03/2020    ALT 97 (H) 02/03/2020    ALBUMIN 4.6 02/03/2020    PROT 6.7 02/03/2020    BILITOT 0.5 02/03/2020    WBC 10.3 04/11/2018    HGB 14.2 04/11/2018    HCT 42.3 04/11/2018    MCV 90.8 04/11/2018     04/11/2018    INR 1.0 06/25/2014    PSA 0.28 05/20/2015    TSH 0.69 04/11/2018         Lab Results   Component Value Date    CHOL 127 01/13/2020    HDL 38 (L) 01/13/2020    HDL 46 07/08/2019    TRIG 118 01/13/2020       Lab Results   Component Value Date    LDLCALC 69 01/13/2020       Past Medical History:   Diagnosis Date    Aortic valve disease     Bilateral carotid artery stenosis 7/18/2018    CAD (coronary artery disease)     Carotid artery occlusion      LICA, 70-80% JULIUS    Diabetes mellitus     Diabetes mellitus type II     Elevated LFTs     GERD (gastroesophageal reflux disease)     Hyperlipidemia     Hypertension     PVD (peripheral vascular disease)        Current Outpatient Medications:     aspirin 81 MG chewable tablet, Take 81 mg by mouth once daily.  , Disp: , Rfl:     atorvastatin (LIPITOR) 80 MG tablet, TAKE ONE TABLET BY MOUTH DAILY AFTER SUPPER FOR CHOLESTEROL, Disp: 90 tablet, Rfl: 0    blood sugar diagnostic Strp, To check BG 3 times daily, to use with insurance preferred meter, Disp: 200  strip, Rfl: 11    blood-glucose meter kit, To check BG 3 times daily, to use with insurance preferred meter, Disp: 1 each, Rfl: 0    carvedilol (COREG) 3.125 MG tablet, TAKE ONE TABLET BY MOUTH TWICE DAILY WITH MEALS AND (STOP ATENOLOL), Disp: 180 tablet, Rfl: 1    cholecalciferol, vitamin D3, (VITAMIN D3) 2,000 unit Cap, Take 1 capsule (2,000 Units total) by mouth once daily., Disp: 90 capsule, Rfl: 3    dulaglutide (TRULICITY) 1.5 mg/0.5 mL PnIj, Inject 1.5 mg into the skin every 7 days., Disp: 2 mL, Rfl: 3    empagliflozin (JARDIANCE) 10 mg Tab, Take 10 mg by mouth once daily for 14 days, THEN 25 mg once daily., Disp: 30 tablet, Rfl: 4    fish oil-omega-3 fatty acids 300-1,000 mg capsule, Take 2 g by mouth once daily. 3 tablets  , Disp: , Rfl:     glipiZIDE (GLUCOTROL) 5 MG tablet, TAKE ONE TABLET BY MOUTH TWICE DAILY WITH MEALS FOR DIABETES, Disp: 180 tablet, Rfl: 0    hydroCHLOROthiazide (HYDRODIURIL) 25 MG tablet, TAKE ONE TABLET BY MOUTH DAILY FOR BLOOD PRESSURE, Disp: 90 tablet, Rfl: 0    lancets Misc, To check BG 3 times daily, to use with insurance preferred meter, Disp: 200 each, Rfl: 11    meloxicam (MOBIC) 7.5 MG tablet, Take 1 tablet (7.5 mg total) by mouth once daily. For back pain, Disp: 30 tablet, Rfl: 0    metFORMIN (GLUCOPHAGE-XR) 500 MG 24 hr tablet, TAKE TWO TABLETS BY MOUTH TWICE DAILY WITH MEALS, Disp: 360 tablet, Rfl: 3    multivitamin capsule, Take 1 capsule by mouth once daily., Disp: , Rfl:     pantoprazole (PROTONIX) 40 MG tablet, Take 1 tablet (40 mg total) by mouth before breakfast., Disp: 90 tablet, Rfl: 3    traMADol (ULTRAM) 50 mg tablet, Take 1 tablet (50 mg total) by mouth every 12 (twelve) hours as needed for Pain., Disp: 14 tablet, Rfl: 0    valsartan (DIOVAN) 320 MG tablet, Take 1 tablet (320 mg total) by mouth once daily., Disp: 90 tablet, Rfl: 1    vit C-vit E-lutein-min-om-3 (OCUVITE) [id number] mg-unit-mg-mg Cap, Take 1 tablet by mouth once daily., Disp: ,  "Rfl:           Review of Systems   Constitution: Negative for decreased appetite, diaphoresis, fever, malaise/fatigue, weight gain and weight loss.   HENT: Negative for congestion, ear discharge, ear pain and nosebleeds.    Eyes: Negative for blurred vision, double vision and visual disturbance.   Cardiovascular: Negative for chest pain, claudication, cyanosis, dyspnea on exertion, irregular heartbeat, leg swelling, near-syncope, orthopnea, palpitations, paroxysmal nocturnal dyspnea and syncope.   Respiratory: Negative for cough, hemoptysis, shortness of breath, sleep disturbances due to breathing, snoring, sputum production and wheezing.    Endocrine: Negative for polydipsia, polyphagia and polyuria.   Hematologic/Lymphatic: Negative for adenopathy and bleeding problem. Does not bruise/bleed easily.   Skin: Negative for color change, nail changes, poor wound healing and rash.   Musculoskeletal: Negative for muscle cramps and muscle weakness.   Gastrointestinal: Negative for abdominal pain, anorexia, change in bowel habit, hematochezia, nausea and vomiting.   Genitourinary: Negative for dysuria, frequency and hematuria.   Neurological: Negative for brief paralysis, difficulty with concentration, excessive daytime sleepiness, dizziness, focal weakness, headaches, light-headedness, seizures, vertigo and weakness.   Psychiatric/Behavioral: Negative for altered mental status and depression.   Allergic/Immunologic: Negative for persistent infections.        Objective:/70 (BP Location: Left arm, Patient Position: Sitting, BP Method: Medium (Manual))   Pulse 65   Ht 5' 7" (1.702 m)   Wt 83.7 kg (184 lb 8.4 oz)   SpO2 97%   BMI 28.90 kg/m²             Physical Exam   Constitutional: He is oriented to person, place, and time. He appears well-developed and well-nourished.   HENT:   Head: Normocephalic.   Right Ear: External ear normal.   Left Ear: External ear normal.   Nose: Nose normal.   Inspection of lips, " teeth and gums normal   Eyes: Pupils are equal, round, and reactive to light. EOM are normal. No scleral icterus.   Neck: Normal range of motion. Neck supple. No JVD present. No tracheal deviation present. No thyromegaly present.       Cardiovascular: Normal rate, regular rhythm and intact distal pulses. Exam reveals no gallop and no friction rub.   No murmur heard.  Pulses:       Carotid pulses are 2+ on the right side, and 2+ on the left side.       Dorsalis pedis pulses are 2+ on the right side, and 2+ on the left side.        Posterior tibial pulses are 2+ on the right side, and 2+ on the left side.   Pulmonary/Chest: Effort normal and breath sounds normal.       Abdominal: Bowel sounds are normal. He exhibits no distension. There is no hepatosplenomegaly. There is no tenderness. There is no guarding.   Musculoskeletal: Normal range of motion. He exhibits no edema or tenderness.   Lymphadenopathy:   Palpation of neck and groin lymph nodes normal   Neurological: He is alert and oriented to person, place, and time. No cranial nerve deficit. He exhibits normal muscle tone. Coordination normal.   Skin: Skin is dry.   Palpation of skin normal   Psychiatric: His behavior is normal. Judgment and thought content normal.         Assessment:       1. Coronary artery disease involving autologous vein coronary bypass graft without angina pectoris    2. Mixed hyperlipidemia    3. Hypertension, essential    4. Left carotid artery stenosis    5. Type 2 diabetes mellitus without complication, without long-term current use of insulin    6. NAFLD (nonalcoholic fatty liver disease)    7. NIDIA (obstructive sleep apnea)         Plan:       Esteban was seen today for coronary artery disease.    Diagnoses and all orders for this visit:    Coronary artery disease involving autologous vein coronary bypass graft without angina pectoris    Mixed hyperlipidemia  -     Lipid panel; Future; Expected date: 02/12/2021    Hypertension,  essential  -     CBC auto differential; Future; Expected date: 02/12/2021  -     Comprehensive metabolic panel; Future; Expected date: 02/12/2021    Left carotid artery stenosis    Type 2 diabetes mellitus without complication, without long-term current use of insulin  -     Comprehensive metabolic panel; Future; Expected date: 02/12/2021  -     Hemoglobin A1c; Future; Expected date: 02/12/2021    NAFLD (nonalcoholic fatty liver disease)    NIDIA (obstructive sleep apnea)

## 2020-02-13 NOTE — PROGRESS NOTES
Refill Authorization Note     is requesting a refill authorization.    Brief assessment and rationale for refill: APPROVE: prr                                         Comments:   Requested Prescriptions   Pending Prescriptions Disp Refills    hydroCHLOROthiazide (HYDRODIURIL) 25 MG tablet [Pharmacy Med Name: HYDROCHLOROTHIAZIDE 25 MG TABLET] 90 tablet 1     Sig: TAKE ONE TABLET BY MOUTH DAILY FOR BLOOD PRESSURE       Cardiovascular: Diuretics - Thiazide Failed - 2/13/2020  3:15 PM        Failed - Ca in normal range and within 360 days     Calcium   Date Value Ref Range Status   02/03/2020 10.9 (H) 8.6 - 10.3 mg/dL Final   01/13/2020 10.4 (H) 8.6 - 10.3 mg/dL Final   10/03/2018 10.1 8.6 - 10.3 mg/dL Final              Passed - Patient is at least 18 years old        Passed - Last BP in normal range within 360 days.     BP Readings from Last 3 Encounters:   02/13/20 132/70   01/14/20 124/80   01/09/20 136/66              Passed - Office visit in past 12 months or future 90 days.     Recent Outpatient Visits            Today Coronary artery disease involving autologous vein coronary bypass graft without angina pectoris    [facility] - Cardiology Jose E Grajeda MD    3 weeks ago Type 2 diabetes mellitus without retinopathy    [facility] Stephani Braun, BHARGAV    1 month ago Non-insulin dependent type 2 diabetes mellitus    [facility] Endocr[facility]ology [address] [place] Joan Saab NP    1 month ago Annual physical exam    [facility] Ned Hartley MD    1 month ago Left hip pain    [facility] - Primary care Sindhu Capone MD          Future Appointments              In 4 weeks MD [facility], [facility]    In 4 months MD [facility] - Internal Medicine, [facility]    In 11 months VASCULAR, [facility] - Cardiology, [place]                Passed - Cr is 1.3 or below and within 180 days      Creatinine   Date Value Ref Range Status   02/03/2020 0.95 0.70 - 1.25 mg/dL Final     Comment:     For patients >49 years of age, the reference limit  for Creatinine is approximately 13% higher for people  identified as -American.        01/13/2020 0.91 0.70 - 1.25 mg/dL Final     Comment:     For patients >49 years of age, the reference limit  for Creatinine is approximately 13% higher for people  identified as -American.        10/03/2018 0.96 0.70 - 1.25 mg/dL Final     Comment:     For patients >49 years of age, the reference limit  for Creatinine is approximately 13% higher for people  identified as -American.                   Passed - K in normal range and within 180 days     Potassium   Date Value Ref Range Status   02/03/2020 5.0 3.5 - 5.3 mmol/L Final   01/13/2020 4.7 3.5 - 5.3 mmol/L Final   10/03/2018 4.5 3.5 - 5.3 mmol/L Final              Passed - Na is between 130 and 148 and within 180 days     Sodium   Date Value Ref Range Status   02/03/2020 142 135 - 146 mmol/L Final   01/13/2020 138 135 - 146 mmol/L Final   10/03/2018 140 135 - 146 mmol/L Final              Passed - eGFR within 180 days     eGFR if non    Date Value Ref Range Status   02/03/2020 82 > OR = 60 mL/min/1.73m2 Final   01/13/2020 87 > OR = 60 mL/min/1.73m2 Final   10/03/2018 82 > OR = 60 mL/min/1.73m2 Final     eGFR if    Date Value Ref Range Status   02/03/2020 96 > OR = 60 mL/min/1.73m2 Final   01/13/2020 101 > OR = 60 mL/min/1.73m2 Final   10/03/2018 95 > OR = 60 mL/min/1.73m2 Final               Appointments  past 12m or future 3m with PCP    Date Provider   Last Visit   1/9/2020 eNd Hartley MD   Next Visit   3/12/2020 Ned Hartley MD   .  ED visits in past 90 days: 0       Note composed:3:18 PM 02/13/2020

## 2020-02-20 ENCOUNTER — PATIENT OUTREACH (OUTPATIENT)
Dept: OTHER | Facility: OTHER | Age: 70
End: 2020-02-20

## 2020-02-20 ENCOUNTER — PATIENT MESSAGE (OUTPATIENT)
Dept: CARDIOLOGY | Facility: CLINIC | Age: 70
End: 2020-02-20

## 2020-02-20 DIAGNOSIS — N52.9 ERECTILE DYSFUNCTION, UNSPECIFIED ERECTILE DYSFUNCTION TYPE: Primary | ICD-10-CM

## 2020-02-20 DIAGNOSIS — E11.51 TYPE 2 DIABETES, WITH PERIPHERAL CIRCULATORY DISORDER NOT AT GOAL: Primary | ICD-10-CM

## 2020-02-20 RX ORDER — SILDENAFIL 100 MG/1
TABLET, FILM COATED ORAL
Qty: 6 TABLET | Refills: 5 | Status: SHIPPED | OUTPATIENT
Start: 2020-02-20 | End: 2020-09-15 | Stop reason: ALTCHOICE

## 2020-02-20 NOTE — PROGRESS NOTES
Digital Medicine: Health  Follow-Up    The history is provided by the patient.     Follow Up  Follow-up reason(s): reading review and routine education      Alert received.   Care Team received low BG alert.  Patient is not experiencing symptoms.  Reading was invalid because Not enough blood on the strip. Patient retested. Will delete erroneous reading.   Routine Education Topics: eating patterns and macronutrient distribution      Mr. Mike is feeling well. He is pleased to see his BP and BG levels trend downward after making changes to his diet. Patient contacted [facility] this morning to order new testing supplies. He said they told him that they needed authorization from [facility] before sending.     INTERVENTION(S)  encouragement/support and denied questions    PLAN  continue monitoring    There are no preventive care reminders to display for this patient.    Last 5 Patient Entered Readings                                      Current 30 Day Average: 135/74     Recent Readings 2/20/2020 2/16/2020 2/12/2020 2/10/2020 2/6/2020    SBP (mmHg) 130 150 121 136 124    DBP (mmHg) 73 78 77 66 70    Pulse 57 58 63 63 59        Last 6 Patient Entered Readings                                          Most Recent A1c: 9.3% on 2/3/2020  (Goal: 8%)     Recent Readings 2/20/2020 2/19/2020 2/18/2020 2/17/2020 2/16/2020    Blood Glucose (mg/dL) 103 145 190 183 120               Diet Screening   He has the following dietary restrictions: low sodium diet and diabeticHe does not skip meals regularly.  He has no standard fasting periods.      Patient did not have times when they could not afford food or ran out.  He cooks for self and utilizes a .    Patient does the shopping for groceries.  He gets groceries from the grocery store.      Patient reports cutting back on specific foods like ice cream and bread. He is also eating smaller portions. Encouraged patient to continue to limit high CHO and sugar foods. He has no  further questions on improving dietary habits currently.    Intervention(s): portion control    Physical Activity Screening   When asked if exercising, patient responded: yesHis level of intensity when exercising is moderate.    Patient reports that he is does a lot of walking throughout the day. He is also starting to cut grass again during the week.

## 2020-02-27 ENCOUNTER — PATIENT OUTREACH (OUTPATIENT)
Dept: ADMINISTRATIVE | Facility: HOSPITAL | Age: 70
End: 2020-02-27

## 2020-03-03 NOTE — PROGRESS NOTES
3/3/20: BP and BG readings are beginning to trend downwards with diet changes. Before breakfast is still above goal but improved. Allow patient 4 more weeks to work on diet.    Last 5 Patient Entered Readings                                      Current 30 Day Average: 132/72     Recent Readings 2/28/2020 2/20/2020 2/16/2020 2/12/2020 2/10/2020    SBP (mmHg) 131 130 150 121 136    DBP (mmHg) 67 73 78 77 66    Pulse 59 57 58 63 63        Hypertension Medications             carvedilol (COREG) 3.125 MG tablet TAKE ONE TABLET BY MOUTH TWICE DAILY WITH MEALS AND (STOP ATENOLOL)    hydroCHLOROthiazide (HYDRODIURIL) 25 MG tablet TAKE ONE TABLET BY MOUTH DAILY FOR BLOOD PRESSURE    valsartan (DIOVAN) 320 MG tablet Take 1 tablet (320 mg total) by mouth once daily.        Last 6 Patient Entered Readings                                          Most Recent A1c: 9.3% on 2/3/2020  (Goal: 8%)     Recent Readings 3/3/2020 3/2/2020 2/29/2020 2/28/2020 2/27/2020    Blood Glucose (mg/dL) 177 165 152 229 133        Diabetes Medications             dulaglutide (TRULICITY) 1.5 mg/0.5 mL PnIj Inject 1.5 mg into the skin every 7 days.    empagliflozin (JARDIANCE) 10 mg Tab Take 10 mg by mouth once daily for 14 days, THEN 25 mg once daily.    glipiZIDE (GLUCOTROL) 5 MG tablet TAKE ONE TABLET BY MOUTH TWICE DAILY WITH MEALS FOR DIABETES    metFORMIN (GLUCOPHAGE-XR) 500 MG 24 hr tablet TAKE TWO TABLETS BY MOUTH TWICE DAILY WITH MEALS

## 2020-03-10 ENCOUNTER — PATIENT OUTREACH (OUTPATIENT)
Dept: OTHER | Facility: OTHER | Age: 70
End: 2020-03-10

## 2020-03-10 NOTE — PROGRESS NOTES
"Digital Medicine: Clinician Follow-Up    Called patient for hypertension and diabetes follow-up. He reports that things are going well. He admits that elevated blood glucose readings are due to having ice cream and other foods that he shouldn't have. He is working on better controlling his diet. He reports not taking Jardiance for diabetes management. "I didn't know I was supposed to take it. I never started it."     The history is provided by the patient. No  was used.     Follow Up  Follow-up reason(s): reading review      Readings are trending down due to medication adherence.        INTERVENTION(S)  encouragement/support    PLAN  patient verbalizes understanding, await MD intervention and continue monitoring    Hypertension:  Current 30-day BP avg of 133/73 is close to goal of <130/80.  Reviewed readings: SBP/DBP is trending downwards. DBP is well-controlled. SBP is elevated above goal.     Continue current regimen.    Diabetes:  Last A1c of 9.3% from 2/2020 is improved from A1c of 10.3% from 1/2020.  Emailed Joan Saab to clarify if Jardiance was to be started by patient in February. Will f/u on Jardiance use after response from BLAYNE Saab.   If no Jardiance use, consider increasing glipizide for better BG control.  Encouraged patient to continue to monitor diet for better BG control.     Follow-up in 2-4 weeks or after feedback from Endocrinology.         There are no preventive care reminders to display for this patient.    Last 5 Patient Entered Readings                                      Current 30 Day Average: 133/73     Recent Readings 3/5/2020 3/4/2020 2/28/2020 2/20/2020 2/16/2020    SBP (mmHg) 146 116 131 130 150    DBP (mmHg) 85 67 67 73 78    Pulse 60 61 59 57 58        Last 6 Patient Entered Readings                                          Most Recent A1c: 9.3% on 2/3/2020  (Goal: 8%)     Recent Readings 3/10/2020 3/9/2020 3/8/2020 3/7/2020 3/6/2020    Blood Glucose (mg/dL) " 103 157 261 122 162           Hypertension Medications             carvedilol (COREG) 3.125 MG tablet TAKE ONE TABLET BY MOUTH TWICE DAILY WITH MEALS AND (STOP ATENOLOL)    hydroCHLOROthiazide (HYDRODIURIL) 25 MG tablet TAKE ONE TABLET BY MOUTH DAILY FOR BLOOD PRESSURE    valsartan (DIOVAN) 320 MG tablet Take 1 tablet (320 mg total) by mouth once daily.        Diabetes Medications             dulaglutide (TRULICITY) 1.5 mg/0.5 mL PnIj Inject 1.5 mg into the skin every 7 days.    empagliflozin (JARDIANCE) 10 mg Tab Take 10 mg by mouth once daily for 14 days, THEN 25 mg once daily.    glipiZIDE (GLUCOTROL) 5 MG tablet TAKE ONE TABLET BY MOUTH TWICE DAILY WITH MEALS FOR DIABETES    metFORMIN (GLUCOPHAGE-XR) 500 MG 24 hr tablet TAKE TWO TABLETS BY MOUTH TWICE DAILY WITH MEALS               Screenings

## 2020-03-11 ENCOUNTER — TELEPHONE (OUTPATIENT)
Dept: ENDOCRINOLOGY | Facility: CLINIC | Age: 70
End: 2020-03-11

## 2020-03-11 NOTE — TELEPHONE ENCOUNTER
----- Message from Nathaniel CampbellD sent at 3/10/2020  5:07 PM CDT -----  Jl Franco,    I spoke with Mr. Esteban Mike today. There seems to be some confusion with his diabetes regimen. He reports not taking Jardiance - Take 10 mg by mouth once daily for 14 days, THEN 25 mg once daily. He states he didn't realize he was supposed to be taking it so he never started it.     Please advise if patient was to start Jardiance.    Kind regards,  Aundrea Barnett, PharmD   Digital Medicine Clinical Pharmacist  [phone]

## 2020-03-12 ENCOUNTER — TELEPHONE (OUTPATIENT)
Dept: ENDOCRINOLOGY | Facility: CLINIC | Age: 70
End: 2020-03-12

## 2020-03-12 ENCOUNTER — OFFICE VISIT (OUTPATIENT)
Dept: INTERNAL MEDICINE | Facility: CLINIC | Age: 70
End: 2020-03-12
Attending: FAMILY MEDICINE
Payer: COMMERCIAL

## 2020-03-12 VITALS
WEIGHT: 180.75 LBS | BODY MASS INDEX: 28.37 KG/M2 | TEMPERATURE: 98 F | DIASTOLIC BLOOD PRESSURE: 70 MMHG | OXYGEN SATURATION: 99 % | HEART RATE: 66 BPM | HEIGHT: 67 IN | SYSTOLIC BLOOD PRESSURE: 128 MMHG

## 2020-03-12 DIAGNOSIS — Z12.5 PROSTATE CANCER SCREENING: ICD-10-CM

## 2020-03-12 DIAGNOSIS — Z12.11 COLON CANCER SCREENING: ICD-10-CM

## 2020-03-12 DIAGNOSIS — I10 HYPERTENSION, ESSENTIAL: Primary | ICD-10-CM

## 2020-03-12 DIAGNOSIS — E11.65 TYPE 2 DIABETES MELLITUS WITH HYPERGLYCEMIA, WITHOUT LONG-TERM CURRENT USE OF INSULIN: ICD-10-CM

## 2020-03-12 DIAGNOSIS — I25.810 CORONARY ARTERY DISEASE INVOLVING AUTOLOGOUS VEIN CORONARY BYPASS GRAFT WITHOUT ANGINA PECTORIS: ICD-10-CM

## 2020-03-12 DIAGNOSIS — E11.51 TYPE 2 DIABETES, WITH PERIPHERAL CIRCULATORY DISORDER NOT AT GOAL: ICD-10-CM

## 2020-03-12 DIAGNOSIS — E78.5 HYPERLIPIDEMIA, UNSPECIFIED HYPERLIPIDEMIA TYPE: ICD-10-CM

## 2020-03-12 PROCEDURE — 1101F PT FALLS ASSESS-DOCD LE1/YR: CPT | Mod: CPTII,S$GLB,, | Performed by: FAMILY MEDICINE

## 2020-03-12 PROCEDURE — 3046F HEMOGLOBIN A1C LEVEL >9.0%: CPT | Mod: CPTII,S$GLB,, | Performed by: FAMILY MEDICINE

## 2020-03-12 PROCEDURE — 99213 OFFICE O/P EST LOW 20 MIN: CPT | Mod: S$GLB,,, | Performed by: FAMILY MEDICINE

## 2020-03-12 PROCEDURE — 3078F DIAST BP <80 MM HG: CPT | Mod: CPTII,S$GLB,, | Performed by: FAMILY MEDICINE

## 2020-03-12 PROCEDURE — 3074F SYST BP LT 130 MM HG: CPT | Mod: CPTII,S$GLB,, | Performed by: FAMILY MEDICINE

## 2020-03-12 PROCEDURE — 1159F PR MEDICATION LIST DOCUMENTED IN MEDICAL RECORD: ICD-10-PCS | Mod: S$GLB,,, | Performed by: FAMILY MEDICINE

## 2020-03-12 PROCEDURE — 3046F PR MOST RECENT HEMOGLOBIN A1C LEVEL > 9.0%: ICD-10-PCS | Mod: CPTII,S$GLB,, | Performed by: FAMILY MEDICINE

## 2020-03-12 PROCEDURE — 3074F PR MOST RECENT SYSTOLIC BLOOD PRESSURE < 130 MM HG: ICD-10-PCS | Mod: CPTII,S$GLB,, | Performed by: FAMILY MEDICINE

## 2020-03-12 PROCEDURE — 99999 PR PBB SHADOW E&M-EST. PATIENT-LVL III: ICD-10-PCS | Mod: PBBFAC,,, | Performed by: FAMILY MEDICINE

## 2020-03-12 PROCEDURE — 99213 PR OFFICE/OUTPT VISIT, EST, LEVL III, 20-29 MIN: ICD-10-PCS | Mod: S$GLB,,, | Performed by: FAMILY MEDICINE

## 2020-03-12 PROCEDURE — 1101F PR PT FALLS ASSESS DOC 0-1 FALLS W/OUT INJ PAST YR: ICD-10-PCS | Mod: CPTII,S$GLB,, | Performed by: FAMILY MEDICINE

## 2020-03-12 PROCEDURE — 3078F PR MOST RECENT DIASTOLIC BLOOD PRESSURE < 80 MM HG: ICD-10-PCS | Mod: CPTII,S$GLB,, | Performed by: FAMILY MEDICINE

## 2020-03-12 PROCEDURE — 1159F MED LIST DOCD IN RCRD: CPT | Mod: S$GLB,,, | Performed by: FAMILY MEDICINE

## 2020-03-12 PROCEDURE — 99999 PR PBB SHADOW E&M-EST. PATIENT-LVL III: CPT | Mod: PBBFAC,,, | Performed by: FAMILY MEDICINE

## 2020-03-12 NOTE — PATIENT INSTRUCTIONS
See standing or future lab orders and please draw A1C and PSA - in 3 months, thank you. These are external orders for [facility].    Please forward external lab orders per patient request (fax or provide to patient). Please call and verify that any faxes sent were received. Thank you.

## 2020-03-12 NOTE — PROGRESS NOTES
"  [facility]  Progress Note    Subjective:      Patient ID: Esteban Mike is a 69 y.o. male with PMHx of CAD, [id number], GERD, HTN, HLD    Chief Complaint: BP check    HPI:    No complaints today. Pt enrolled in digital medicine program.     From PharmD note 3/10/2020: "Current 30-day BP avg of 133/73 is close to goal of <130/80".    Checking blood glucose 1x/day. Average 159.    Confused about medications today.  Not sure why needing to stop HCTZ. Advised to continue.    Spoke with endocrinology today: "Discussed that patient did not read patient portal message from Feb 2020 with instructions from his labs to stop HCTZ and start Jardiance 10 mg daily for 2 weeks and increase to 25 mg daily. Discussed importance of reading patient portal messages. Patient states that he received phone call from pharmacy to start taking Jardiance yesterday. He will stop HCTZ today. He agrees to make BP log for PCP who he is seeing today. Encouraged to go back and read patient portal message. Will make him a follow up appt with us for 3 months since his last appt. All questions were answered"    Recent diabetic eye exam 1/23/2020, RTC in 1 yr  Last diabetic foot exam 8/15/2018,      PMHx: CAD s/p CABG 2008, [id number], GERD, HTN, HLD    PSHx: hernia repair, CABG, endarectomy.    FHx: T2DM in mother and father, brother.  Liver cirrhosis 2/2 to alcohol in grandfather    Social Hx: Never smoker, social EtOH, denies illicit drug use. Living alone.  Not sexually active.  Still working at the [employer].  Stays active, cuts grass. Diet consists of mostly eating out.     Allergies: NKDA    Medications:  Reviewed  No longer taking meloxicam, tramadol as back pain resolved    ROS  Constitutional: No wt changes, fatigue, fevers, N+V  HEENT: Denies dizziness, vision changes  Cardiac:  Neg chest pain  Resp: Neg SOB  GI: Neg abd pain, diarrhea, constipation  : Neg urinary changes or difficulty with stream  Endocrine: Neg " "for heat/old intolerance  MSK: resolved back and hip pain  Neuro: neg for confusion  Skin: neg for skin changes    Objective:   Blood pressure 128/70, pulse 66, temperature 97.8 °F (36.6 °C), height 5' 7" (1.702 m), weight 82 kg (180 lb 12.4 oz), SpO2 99 %.     Physical Exam  Consitutional:  Well-developed, alert, orientated  HEENT: AT/NC  Neck: supple, no cervical lymphadenopathy, thyroid wnl, no carotid bruits.  CEA scar on R side.  Cardiac: RRR, S1/S2 normal, no murmurs, rubs, gallops.  Median sternotomy scar  Resp: clear breath sounds bilaterally  GI: soft, non-tender, non-distended BSx4  Extremities - no swelling    Labs:  2/3/2020  HbA1c 9.3    1/13/2020  CMP:  AST 39 (H), ALT 97 (H), Calcium 10.9 (H)  Lipid: HDL 38 (L)  Microalbumin/creatinine ratio: 11 (wnl, < 30)    Imaging/ Pathology:  4/1/2019 EGD:  Neg for H. Pylori.  3 cm hiatl hernia.  06/2014 Liver Bx: simple steatosis with no steatohepatitis or fibrosis      Assessment/Plan     Esteban Mike is a 69 y.o. male seen today for blood pressure check    [id number]  - followed by endocrinology, last seen 1/14/2020  - annual eye exam 1/23/2020, RTC in 1 year  - last saw podiatry 8/15/2018 - need to schedule  - F/u appt in 3 months since last appt    NAFLD (nonalcoholic fatty liver diease)  - last seen by hepatology 10/9/2019  - reassuring liver Bx and fibroscan  - no need to RTC unless new Sx develop    CAD  - followed by cardiology, next appt 1/22/2021  -repeat carotid U/S in 1 yr    GERD  - Upper GI endoscopy 4/1/2019 with normal findings.  - cont PPI regimen    Preventative Health:  colonoscopy 2013 - Hx of colonic polyps. Benign exam.  Repeat in 7 years - due this year  PSA labs      Fransisco [facility], MS-4  "

## 2020-03-12 NOTE — PROGRESS NOTES
Subjective:       Patient ID: Esteban Mike is a 69 y.o. male.    Chief Complaint: Follow-up (BP check, talk about meds)   Patient requested to come in for a blood pressure follow-up.  Additionally, his recent A1c was 9.3.  He is followed in the digital programs for hypertension and diabetes.  He was unclear on his medications, did not check his patient portal messages, and did not start his recent jardiance.  Additionally was told to stop his hydrochlorothiazide, reasoning unclear.  This is present in both might, and his cardiologist's last notes.  Some dietary indiscretion, but he is trying to lose weight.  No decompensated cardiovascular complaints at this time.    HPI  Review of Systems   Constitutional: Negative for chills, fatigue, fever and unexpected weight change.   HENT: Negative for congestion and trouble swallowing.    Eyes: Negative for redness and visual disturbance.   Respiratory: Negative for cough, chest tightness and shortness of breath.    Cardiovascular: Negative for chest pain, palpitations and leg swelling.   Gastrointestinal: Negative for abdominal pain and blood in stool.   Genitourinary: Negative for difficulty urinating and hematuria.   Musculoskeletal: Positive for arthralgias and back pain. Negative for gait problem, joint swelling, myalgias and neck pain.   Skin: Negative for color change and rash.   Neurological: Negative for tremors, speech difficulty, weakness, numbness and headaches.   Hematological: Negative for adenopathy. Does not bruise/bleed easily.   Psychiatric/Behavioral: Negative for behavioral problems, confusion and sleep disturbance. The patient is not nervous/anxious.        Objective:      Physical Exam   Constitutional: He is oriented to person, place, and time. He appears well-developed and well-nourished. No distress.   Neck: Neck supple.   Pulmonary/Chest: Effort normal.   Musculoskeletal: He exhibits no edema.        Right lower leg: He exhibits no  edema.        Left lower leg: He exhibits no edema.   Neurological: He is alert and oriented to person, place, and time.   Skin: Skin is warm and dry. No rash noted.   Psychiatric: He has a normal mood and affect. His behavior is normal. Judgment and thought content normal.   Nursing note and vitals reviewed.      Assessment:       1. Hypertension, essential    2. Type 2 diabetes, with peripheral circulatory disorder not at goal    3. Type 2 diabetes mellitus with hyperglycemia, without long-term current use of insulin    4. Hyperlipidemia, unspecified hyperlipidemia type    5. Coronary artery disease involving autologous vein coronary bypass graft without angina pectoris    6. Prostate cancer screening    7. Colon cancer screening        Plan:     Medication List with Changes/Refills   Current Medications    ASPIRIN 81 MG CHEWABLE TABLET    Take 81 mg by mouth once daily.      ATORVASTATIN (LIPITOR) 80 MG TABLET    TAKE ONE TABLET BY MOUTH DAILY AFTER SUPPER FOR CHOLESTEROL    BLOOD SUGAR DIAGNOSTIC STRP    To check BG 3 times daily, to use with insurance preferred meter    BLOOD-GLUCOSE METER KIT    To check BG 3 times daily, to use with insurance preferred meter    CARVEDILOL (COREG) 3.125 MG TABLET    TAKE ONE TABLET BY MOUTH TWICE DAILY WITH MEALS AND (STOP ATENOLOL)    DULAGLUTIDE (TRULICITY) 1.5 MG/0.5 ML PNIJ    Inject 1.5 mg into the skin every 7 days.    EMPAGLIFLOZIN (JARDIANCE) 10 MG TAB    Take 10 mg by mouth once daily for 14 days, THEN 25 mg once daily.    FISH OIL-OMEGA-3 FATTY ACIDS 300-1,000 MG CAPSULE    Take 2 g by mouth once daily. 3 tablets       GLIPIZIDE (GLUCOTROL) 5 MG TABLET    TAKE ONE TABLET BY MOUTH TWICE DAILY WITH MEALS FOR DIABETES    HYDROCHLOROTHIAZIDE (HYDRODIURIL) 25 MG TABLET    TAKE ONE TABLET BY MOUTH DAILY FOR BLOOD PRESSURE    LANCETS MISC    To check BG 3 times daily, to use with insurance preferred meter    METFORMIN (GLUCOPHAGE-XR) 500 MG 24 HR TABLET    TAKE TWO TABLETS  BY MOUTH TWICE DAILY WITH MEALS    MULTIVITAMIN CAPSULE    Take 1 capsule by mouth once daily.    PANTOPRAZOLE (PROTONIX) 40 MG TABLET    Take 1 tablet (40 mg total) by mouth before breakfast.    SILDENAFIL (VIAGRA) 100 MG TABLET    Take 1/2 tab prior to coitus    TRAMADOL (ULTRAM) 50 MG TABLET    Take 1 tablet (50 mg total) by mouth every 12 (twelve) hours as needed for Pain.    VALSARTAN (DIOVAN) 320 MG TABLET    Take 1 tablet (320 mg total) by mouth once daily.    VIT C-VIT E-LUTEIN-MIN-OM-3 (OCUVITE) 694-10-0-150 MG-UNIT-MG-MG CAP    Take 1 tablet by mouth once daily.   Discontinued Medications    MELOXICAM (MOBIC) 7.5 MG TABLET    Take 1 tablet (7.5 mg total) by mouth once daily. For back pain     Esteban was seen today for follow-up.    Diagnoses and all orders for this visit:    Hypertension, essential    Type 2 diabetes, with peripheral circulatory disorder not at goal    Type 2 diabetes mellitus with hyperglycemia, without long-term current use of insulin  -     Hemoglobin A1c; Future  -     Hemoglobin A1c    Hyperlipidemia, unspecified hyperlipidemia type    Coronary artery disease involving autologous vein coronary bypass graft without angina pectoris    Prostate cancer screening  -     PSA, Screening; Future  -     PSA, Screening    Colon cancer screening  -     Case request GI: COLONOSCOPY      See meds, orders, follow up, routing and instructions sections of encounter and AVS. Discussed with patient and provided on AVS.    Discussed diet and exercise and links provided on AVS for detailed information.    Continue hydrochlorothiazide.  Digital hypertension and diabetes results were reviewed.  Laboratory with me in 3 months.  Follow-up in July.

## 2020-03-12 NOTE — TELEPHONE ENCOUNTER
Discussed that patient did not read patient portal message from Feb 2020 with instructions from his labs to stop HCTZ and start Jardiance 10 mg daily for 2 weeks and increase to 25 mg daily. Discussed importance of reading patient portal messages. Patient states that he received phone call from pharmacy to start taking Jardiance yesterday. He will stop HCTZ today. He agrees to make BP log for PCP who he is seeing today. Encouraged to go back and read patient portal message. Will make him a follow up appt with us for 3 months since his last appt. All questions were answered.

## 2020-03-24 ENCOUNTER — PATIENT OUTREACH (OUTPATIENT)
Dept: OTHER | Facility: OTHER | Age: 70
End: 2020-03-24
Payer: MEDICARE

## 2020-03-26 RX ORDER — VALSARTAN 320 MG/1
TABLET ORAL
Qty: 90 TABLET | Refills: 1 | Status: SHIPPED | OUTPATIENT
Start: 2020-03-26 | End: 2020-06-11 | Stop reason: SDUPTHER

## 2020-04-06 DIAGNOSIS — E11.51 TYPE 2 DIABETES, WITH PERIPHERAL CIRCULATORY DISORDER NOT AT GOAL: Primary | ICD-10-CM

## 2020-04-09 NOTE — PROGRESS NOTES
Refill Authorization Note     is requesting a refill authorization.    Brief assessment and rationale for refill: APPROVE: PRR          Medication Therapy Plan: NTBS(A1C); Lab requirement waived at this time; Approve 3 more months    Medication reconciliation completed: No                         Comments:   Refill Center Care Gap Closure protocols temporarily suspended.   Requested Prescriptions   Pending Prescriptions Disp Refills    TRULICITY 1.5 mg/0.5 mL PnIj [Pharmacy Med Name: Trulicity 1.5 mg/0.5 mL subcutaneous pen injector] 6 mL 0     Sig: INJECT 1.5MG INTO SKIN EVERY SEVEN DAYS       Endocrinology:  Diabetes - GLP-1 Receptor Agonists Failed - 4/9/2020  2:13 PM        Failed - HBA1C is 7.9 or below and within 180 days     A1c   Date Value Ref Range Status   04/11/2018 6.8 (H) <5.7 % of total Hgb Final     Comment:     For someone without known diabetes, a hemoglobin A1c  value of 6.5% or greater indicates that they may have   diabetes and this should be confirmed with a follow-up   test.     For someone with known diabetes, a value <7% indicates   that their diabetes is well controlled and a value   greater than or equal to 7% indicates suboptimal   control. A1c targets should be individualized based on   duration of diabetes, age, comorbid conditions, and   other considerations.     Currently, no consensus exists regarding use of  hemoglobin A1c for diagnosis of diabetes for children.         11/06/2017 12.2 (H) <5.7 % of total Hgb Final     Comment:     For someone without known diabetes, a hemoglobin A1c  value of 6.5% or greater indicates that they may have   diabetes and this should be confirmed with a follow-up   test.     For someone with known diabetes, a value <7% indicates   that their diabetes is well controlled and a value   greater than or equal to 7% indicates suboptimal   control. A1c targets should be individualized based on   duration of diabetes, age, comorbid conditions,  and   other considerations.     Currently, no consensus exists regarding use of  hemoglobin A1c for diagnosis of diabetes for children.           Hemoglobin A1C   Date Value Ref Range Status   02/03/2020 9.3 (H) <5.7 % of total Hgb Final     Comment:     For someone without known diabetes, a hemoglobin A1c  value of 6.5% or greater indicates that they may have   diabetes and this should be confirmed with a follow-up   test.     For someone with known diabetes, a value <7% indicates   that their diabetes is well controlled and a value   greater than or equal to 7% indicates suboptimal   control. A1c targets should be individualized based on   duration of diabetes, age, comorbid conditions, and   other considerations.     Currently, no consensus exists regarding use of  hemoglobin A1c for diagnosis of diabetes for children.         01/13/2020 10.3 (H) <5.7 % of total Hgb Final     Comment:     For someone without known diabetes, a hemoglobin A1c  value of 6.5% or greater indicates that they may have   diabetes and this should be confirmed with a follow-up   test.     For someone with known diabetes, a value <7% indicates   that their diabetes is well controlled and a value   greater than or equal to 7% indicates suboptimal   control. A1c targets should be individualized based on   duration of diabetes, age, comorbid conditions, and   other considerations.     Currently, no consensus exists regarding use of  hemoglobin A1c for diagnosis of diabetes for children.         07/08/2019 9.7 % Final   10/03/2018 7.8 (H) <5.7 % of total Hgb Final     Comment:     For someone without known diabetes, a hemoglobin A1c  value of 6.5% or greater indicates that they may have   diabetes and this should be confirmed with a follow-up   test.     For someone with known diabetes, a value <7% indicates   that their diabetes is well controlled and a value   greater than or equal to 7% indicates suboptimal   control. A1c targets should be  individualized based on   duration of diabetes, age, comorbid conditions, and   other considerations.     Currently, no consensus exists regarding use of  hemoglobin A1c for diagnosis of diabetes for children.                    Passed - Patient is at least 18 years old        Passed - Office visit in past 12 months or future 90 days.     Recent Outpatient Visits            4 weeks ago Hypertension, essential    [facility] Ned Hartley MD    1 month ago Coronary artery disease involving autologous vein coronary bypass graft without angina pectoris    [facility] - Cardiology Jose E Grajeda MD    2 months ago Type 2 diabetes mellitus without retinopathy    [facility] Stephani Braun, BHARGAV    2 months ago Non-insulin dependent type 2 diabetes mellitus    [facility] - Endocrinology [address] Joan Saab NP    3 months ago Annual physical exam    [facility] Ned Hartlye MD          Future Appointments              In 3 months MD [facility] - [facility] Medicine, [facility]    In 9 months VASCULAR, [facility] - Cardiology, [place]                 Appointments  past 12m or future 3m with PCP    Date Provider   Last Visit   3/12/2020 Ned Hartley MD   Next Visit   7/9/2020 Ned Hartley MD   .  ED visits in past 90 days: 0       Note composed:2:42 PM 04/09/2020

## 2020-04-10 RX ORDER — DULAGLUTIDE 1.5 MG/.5ML
INJECTION, SOLUTION SUBCUTANEOUS
Qty: 6 ML | Refills: 0 | Status: SHIPPED | OUTPATIENT
Start: 2020-04-10 | End: 2020-07-22 | Stop reason: SDUPTHER

## 2020-04-15 ENCOUNTER — PATIENT OUTREACH (OUTPATIENT)
Dept: OTHER | Facility: OTHER | Age: 70
End: 2020-04-15

## 2020-04-15 NOTE — PROGRESS NOTES
Digital Medicine: Health  Follow-Up    The history is provided by the patient.     Follow Up  Follow-up reason(s): reading review and goal follow-up      Mr. Mike is doing okay. Patient attributes improved BP and BG readings to eating better. He also continues to cut several yards.     Praise and encouragement provided. Patient thanked me for calling.     INTERVENTION(S)  encouragement/support and denied questions    PLAN  continue monitoring    There are no preventive care reminders to display for this patient.    Last 5 Patient Entered Readings                                      Current 30 Day Average: 139/73     Recent Readings 4/13/2020 4/12/2020 4/10/2020 4/8/2020 4/7/2020    SBP (mmHg) 121 133 133 136 152    DBP (mmHg) 72 78 69 70 75    Pulse 61 58 60 64 62        Last 6 Patient Entered Readings                                          Most Recent A1c: 9.3% on 2/3/2020  (Goal: 8%)     Recent Readings 4/15/2020 4/14/2020 4/13/2020 4/12/2020 4/11/2020    Blood Glucose (mg/dL) 150 136 173 115 136             Diet Screening       Making improvements.    Physical Activity Screening   When asked if exercising, patient responded: yesHis level of intensity when exercising is low.    Patient participates in the following activities: yard/housework    Medication Adherence Screening   He did not miss a dose this month.    Patient is wondering how bad metformin is for you (non urgent - defer to PharmD). He is also considering a new pharmacy (mail order-[facility] or another local).

## 2020-04-22 DIAGNOSIS — I10 HYPERTENSION, ESSENTIAL: ICD-10-CM

## 2020-04-27 RX ORDER — CARVEDILOL 3.12 MG/1
TABLET ORAL
Qty: 180 TABLET | Refills: 3 | Status: SHIPPED | OUTPATIENT
Start: 2020-04-27 | End: 2021-07-14 | Stop reason: SDUPTHER

## 2020-05-06 DIAGNOSIS — I10 ESSENTIAL HYPERTENSION: ICD-10-CM

## 2020-05-07 RX ORDER — HYDROCHLOROTHIAZIDE 25 MG/1
TABLET ORAL
Qty: 90 TABLET | Refills: 1 | Status: SHIPPED | OUTPATIENT
Start: 2020-05-07 | End: 2020-08-21 | Stop reason: ALTCHOICE

## 2020-05-07 NOTE — PROGRESS NOTES
Refill Authorization Note     is requesting a refill authorization.    Brief assessment and rationale for refill: APPROVE: prr          Medication Therapy Plan: Labs WNL    Medication reconciliation completed: No                         Comments:   Automatic Epic Protocol Generated Data:    Requested Prescriptions   Signed Prescriptions Disp Refills    hydroCHLOROthiazide (HYDRODIURIL) 25 MG tablet 90 tablet 1     Sig: TAKE ONE TABLET BY MOUTH DAILY FOR BLOOD PRESSURE       Cardiovascular: Diuretics - Thiazide Failed - 5/6/2020 10:12 AM        Failed - Ca in normal range and within 360 days     Calcium   Date Value Ref Range Status   02/03/2020 10.9 (H) 8.6 - 10.3 mg/dL Final   01/13/2020 10.4 (H) 8.6 - 10.3 mg/dL Final   10/03/2018 10.1 8.6 - 10.3 mg/dL Final              Passed - Patient is at least 18 years old        Passed - Last BP in normal range within 360 days.     BP Readings from Last 3 Encounters:   03/12/20 128/70   02/13/20 132/70   01/14/20 124/80              Passed - Office visit in past 12 months or future 90 days.     Recent Outpatient Visits            1 month ago Hypertension, essential    [facility] Ned Hartley MD    2 months ago Coronary artery disease involving autologous vein coronary bypass graft without angina pectoris    [facility] - Cardiology Jose E Grajeda MD    3 months ago Type 2 diabetes mellitus without retinopathy    [facility] Stephani Braun, OD    3 months ago Non-insulin dependent type 2 diabetes mellitus    [facility] End[facility] [address] Joan Saab NP    3 months ago Annual physical exam    [facility] Ned Hartley MD          Future Appointments              In 2 months MD [facility] - Internal Medicine, [facility]    In 8 months VASCULAR, [facility] - Cardiology, [place]                Passed - Cr is 1.3 or below and within 180 days     Creatinine    Date Value Ref Range Status   02/03/2020 0.95 0.70 - 1.25 mg/dL Final     Comment:     For patients >49 years of age, the reference limit  for Creatinine is approximately 13% higher for people  identified as -American.        01/13/2020 0.91 0.70 - 1.25 mg/dL Final     Comment:     For patients >49 years of age, the reference limit  for Creatinine is approximately 13% higher for people  identified as -American.        10/03/2018 0.96 0.70 - 1.25 mg/dL Final     Comment:     For patients >49 years of age, the reference limit  for Creatinine is approximately 13% higher for people  identified as -American.                   Passed - K in normal range and within 180 days     Potassium   Date Value Ref Range Status   02/03/2020 5.0 3.5 - 5.3 mmol/L Final   01/13/2020 4.7 3.5 - 5.3 mmol/L Final   10/03/2018 4.5 3.5 - 5.3 mmol/L Final              Passed - Na is between 130 and 148 and within 180 days     Sodium   Date Value Ref Range Status   02/03/2020 142 135 - 146 mmol/L Final   01/13/2020 138 135 - 146 mmol/L Final   10/03/2018 140 135 - 146 mmol/L Final              Passed - eGFR within 180 days     eGFR if non    Date Value Ref Range Status   02/03/2020 82 > OR = 60 mL/min/1.73m2 Final   01/13/2020 87 > OR = 60 mL/min/1.73m2 Final   10/03/2018 82 > OR = 60 mL/min/1.73m2 Final     eGFR if    Date Value Ref Range Status   02/03/2020 96 > OR = 60 mL/min/1.73m2 Final   01/13/2020 101 > OR = 60 mL/min/1.73m2 Final   10/03/2018 95 > OR = 60 mL/min/1.73m2 Final                 Appointments  past 12m or future 3m with PCP    Date Provider   Last Visit   3/12/2020 Ned Hartley MD   Next Visit   7/9/2020 Ned Hartley MD   ED visits in past 90 days: 0     Note composed:7:53 AM 05/07/2020

## 2020-05-19 ENCOUNTER — PATIENT MESSAGE (OUTPATIENT)
Dept: ENDOCRINOLOGY | Facility: CLINIC | Age: 70
End: 2020-05-19

## 2020-05-20 ENCOUNTER — PATIENT OUTREACH (OUTPATIENT)
Dept: OTHER | Facility: OTHER | Age: 70
End: 2020-05-20

## 2020-05-22 DIAGNOSIS — E11.65 TYPE 2 DIABETES MELLITUS WITH HYPERGLYCEMIA, WITHOUT LONG-TERM CURRENT USE OF INSULIN: ICD-10-CM

## 2020-05-25 RX ORDER — METFORMIN HYDROCHLORIDE 500 MG/1
TABLET, EXTENDED RELEASE ORAL
Qty: 360 TABLET | Refills: 0 | Status: SHIPPED | OUTPATIENT
Start: 2020-05-25 | End: 2020-11-02 | Stop reason: SDUPTHER

## 2020-05-25 NOTE — TELEPHONE ENCOUNTER
Refill Authorization Note    is requesting a refill authorization.    Brief assessment and rationale for refill: APPROVE: prr               Medication reconciliation completed: No                         Comments:      Requested Prescriptions   Signed Prescriptions Disp Refills    metFORMIN (GLUCOPHAGE-XR) 500 MG XR 24hr tablet 360 tablet 0     Sig: TAKE TWO TABLETS BY MOUTH TWICE DAILY WITH MEALS FOR DIABETES       Endocrinology:  Diabetes - Biguanides Failed - 5/22/2020 12:06 PM        Failed - HBA1C is 7.9 or below and within 180 days     A1c   Date Value Ref Range Status   04/11/2018 6.8 (H) <5.7 % of total Hgb Final     Comment:     For someone without known diabetes, a hemoglobin A1c  value of 6.5% or greater indicates that they may have   diabetes and this should be confirmed with a follow-up   test.     For someone with known diabetes, a value <7% indicates   that their diabetes is well controlled and a value   greater than or equal to 7% indicates suboptimal   control. A1c targets should be individualized based on   duration of diabetes, age, comorbid conditions, and   other considerations.     Currently, no consensus exists regarding use of  hemoglobin A1c for diagnosis of diabetes for children.         11/06/2017 12.2 (H) <5.7 % of total Hgb Final     Comment:     For someone without known diabetes, a hemoglobin A1c  value of 6.5% or greater indicates that they may have   diabetes and this should be confirmed with a follow-up   test.     For someone with known diabetes, a value <7% indicates   that their diabetes is well controlled and a value   greater than or equal to 7% indicates suboptimal   control. A1c targets should be individualized based on   duration of diabetes, age, comorbid conditions, and   other considerations.     Currently, no consensus exists regarding use of  hemoglobin A1c for diagnosis of diabetes for children.           Hemoglobin A1C   Date Value Ref Range Status    02/03/2020 9.3 (H) <5.7 % of total Hgb Final     Comment:     For someone without known diabetes, a hemoglobin A1c  value of 6.5% or greater indicates that they may have   diabetes and this should be confirmed with a follow-up   test.     For someone with known diabetes, a value <7% indicates   that their diabetes is well controlled and a value   greater than or equal to 7% indicates suboptimal   control. A1c targets should be individualized based on   duration of diabetes, age, comorbid conditions, and   other considerations.     Currently, no consensus exists regarding use of  hemoglobin A1c for diagnosis of diabetes for children.         01/13/2020 10.3 (H) <5.7 % of total Hgb Final     Comment:     For someone without known diabetes, a hemoglobin A1c  value of 6.5% or greater indicates that they may have   diabetes and this should be confirmed with a follow-up   test.     For someone with known diabetes, a value <7% indicates   that their diabetes is well controlled and a value   greater than or equal to 7% indicates suboptimal   control. A1c targets should be individualized based on   duration of diabetes, age, comorbid conditions, and   other considerations.     Currently, no consensus exists regarding use of  hemoglobin A1c for diagnosis of diabetes for children.         07/08/2019 9.7 % Final   10/03/2018 7.8 (H) <5.7 % of total Hgb Final     Comment:     For someone without known diabetes, a hemoglobin A1c  value of 6.5% or greater indicates that they may have   diabetes and this should be confirmed with a follow-up   test.     For someone with known diabetes, a value <7% indicates   that their diabetes is well controlled and a value   greater than or equal to 7% indicates suboptimal   control. A1c targets should be individualized based on   duration of diabetes, age, comorbid conditions, and   other considerations.     Currently, no consensus exists regarding use of  hemoglobin A1c for diagnosis of  diabetes for children.                    Passed - Patient is at least 18 years old        Passed - Office visit in past 12 months or future 90 days.     Recent Outpatient Visits            2 months ago Hypertension, essential    [facility] Ned Hartley MD    3 months ago Coronary artery disease involving autologous vein coronary bypass graft without angina pectoris    [facility] - Cardiology Jose E Grajeda MD    4 months ago Type 2 diabetes mellitus without retinopathy    [facility] Stehpani Braun, BHARGAV    4 months ago Non-insulin dependent type 2 diabetes mellitus    [facility] - Endocrinology [address] Joan Saab NP    4 months ago Annual physical exam    [facility] Ned Hartley MD          Future Appointments              In 1 month MD [facility] - Internal Medicine, [facility]    In 8 months VASCULAR, [facility] - Cardiology, [place]                Passed - Cr is 1.3 or below and within 360 days     Creatinine   Date Value Ref Range Status   02/03/2020 0.95 0.70 - 1.25 mg/dL Final     Comment:     For patients >49 years of age, the reference limit  for Creatinine is approximately 13% higher for people  identified as -American.        01/13/2020 0.91 0.70 - 1.25 mg/dL Final     Comment:     For patients >49 years of age, the reference limit  for Creatinine is approximately 13% higher for people  identified as -American.        10/03/2018 0.96 0.70 - 1.25 mg/dL Final     Comment:     For patients >49 years of age, the reference limit  for Creatinine is approximately 13% higher for people  identified as -American.                   Passed - eGFR is 30 or above and within 360 days     eGFR if non    Date Value Ref Range Status   02/03/2020 82 > OR = 60 mL/min/1.73m2 Final   01/13/2020 87 > OR = 60 mL/min/1.73m2 Final   10/03/2018 82 > OR = 60 mL/min/1.73m2 Final     eGFR if     Date Value Ref Range Status   02/03/2020 96 > OR = 60 mL/min/1.73m2 Final   01/13/2020 101 > OR = 60 mL/min/1.73m2 Final   10/03/2018 95 > OR = 60 mL/min/1.73m2 Final               Appointments  past 12m or future 3m with PCP    Date Provider   Last Visit   3/12/2020 Ned Hartley MD   Next Visit   7/9/2020 Ned Hartley MD   ED visits in past 90 days: [unfilled]     Note composed:10:55 AM 05/25/2020

## 2020-06-05 LAB
HBA1C MFR BLD: 7.2 % OF TOTAL HGB
PSA SERPL-MCNC: 0.3 NG/ML

## 2020-06-10 DIAGNOSIS — E78.5 HYPERLIPIDEMIA, UNSPECIFIED HYPERLIPIDEMIA TYPE: ICD-10-CM

## 2020-06-10 DIAGNOSIS — I10 HYPERTENSION, ESSENTIAL: Primary | ICD-10-CM

## 2020-06-10 DIAGNOSIS — E11.65 TYPE 2 DIABETES MELLITUS WITH HYPERGLYCEMIA, WITHOUT LONG-TERM CURRENT USE OF INSULIN: ICD-10-CM

## 2020-06-10 NOTE — TELEPHONE ENCOUNTER
----- Message from Joan Ramirez sent at 6/10/2020  4:24 PM CDT -----  Contact: Uriel with [employer]   Pharmacy is calling to clarify an RX.  RX name:  valsartan (DIOVAN) 320 MG tablet  What do they need to clarify:  On back order   Comments:     Requesting an RX refill or new RX.  Is this a refill or new RX:  new  RX name and strength: atorvastatin (LIPITOR) 80 MG tablet  Directions (copy/paste from chart):  TAKE ONE TABLET BY MOUTH DAILY AFTER SUPPER FOR CHOLESTEROL  Is this a 30 day or 90 day RX:  90  Local pharmacy or mail order pharmacy:  local  Pharmacy name and phone # (copy/paste from chart):   "[facility]    Fax   Comments:  Uriel states the pt just transferred to this pharmacy location.    Requesting an RX refill or new RX.  Is this a refill or new RX:  new  RX name and strength: glipiZIDE (GLUCOTROL) 5 MG tablet  Directions (copy/paste from chart):  TAKE ONE TABLET BY MOUTH TWICE DAILY WITH MEALS FOR DIABETES  Is this a 30 day or 90 day RX:  90  Local pharmacy or mail order pharmacy:  local  Pharmacy name and phone # (copy/paste from chart):   "[facility]    Fax   Comments:

## 2020-06-11 RX ORDER — VALSARTAN 320 MG/1
320 TABLET ORAL DAILY
Qty: 90 TABLET | Refills: 2 | Status: SHIPPED | OUTPATIENT
Start: 2020-06-11 | End: 2020-06-23

## 2020-06-11 RX ORDER — GLIPIZIDE 5 MG/1
TABLET ORAL
Qty: 180 TABLET | Refills: 1 | Status: SHIPPED | OUTPATIENT
Start: 2020-06-11 | End: 2020-11-04 | Stop reason: SDUPTHER

## 2020-06-11 RX ORDER — ATORVASTATIN CALCIUM 80 MG/1
TABLET, FILM COATED ORAL
Qty: 90 TABLET | Refills: 2 | Status: SHIPPED | OUTPATIENT
Start: 2020-06-11 | End: 2021-01-26 | Stop reason: SDUPTHER

## 2020-06-11 NOTE — TELEPHONE ENCOUNTER
Contact: [employer] Judiannalisa Elizabeth [phone]  Pharmacy is calling to clarify an RX.  RX name:  valsartan (DIOVAN) 320 MG table  What do they need to clarify:  Medication is on back order.  Please advise  Comments:     Please advise

## 2020-06-11 NOTE — PROGRESS NOTES
Refill Routing Note    Medication(s) are not appropriate for processing by [facility]:       Drug on back order        Medication Therapy Plan: ALT elevated but improved: pharmacy stated valsartan on back order, please advice; approve 9 on atorvastatin and 6 on glipizide  Medication reconciliation completed: No      Automatic Epic Protocol Generated Data:    Requested Prescriptions   Pending Prescriptions Disp Refills    valsartan (DIOVAN) 320 MG tablet 90 tablet 2     Sig: Take 1 tablet (320 mg total) by mouth once daily.       Cardiovascular:  Angiotensin Receptor Blockers Passed - 6/11/2020  7:55 AM        Passed - Patient is at least 18 years old        Passed - Last BP in normal range within 360 days.     BP Readings from Last 3 Encounters:   03/12/20 128/70   02/13/20 132/70   01/14/20 124/80              Passed - Office visit in past 12 months or future 90 days.     Recent Outpatient Visits            3 months ago Hypertension, essential    [facility] Ned Hartley MD    3 months ago Coronary artery disease involving autologous vein coronary bypass graft without angina pectoris    [facility] - Cardiology Jose E Grajeda MD    4 months ago Type 2 diabetes mellitus without retinopathy    [facility] Stephani Braun, BHARGAV    4 months ago Non-insulin dependent type 2 diabetes mellitus    [facility] End[facility] 6[facility] [place] Joan Saab NP    5 months ago Annual physical exam    [facility] Ned Hartley MD          Future Appointments              In 4 weeks MD [facility], [facility]    In 7 months VASCULAR, [facility] - Cardiology, [place]                Passed - Cr is 1.3 or below and within 360 days     Creatinine   Date Value Ref Range Status   02/03/2020 0.95 0.70 - 1.25 mg/dL Final     Comment:     For patients >49 years of age, the reference limit  for Creatinine is  approximately 13% higher for people  identified as -American.        01/13/2020 0.91 0.70 - 1.25 mg/dL Final     Comment:     For patients >49 years of age, the reference limit  for Creatinine is approximately 13% higher for people  identified as -American.        10/03/2018 0.96 0.70 - 1.25 mg/dL Final     Comment:     For patients >49 years of age, the reference limit  for Creatinine is approximately 13% higher for people  identified as -American.                   Passed - K in normal range and within 360 days     Potassium   Date Value Ref Range Status   02/03/2020 5.0 3.5 - 5.3 mmol/L Final   01/13/2020 4.7 3.5 - 5.3 mmol/L Final   10/03/2018 4.5 3.5 - 5.3 mmol/L Final              Passed - eGFR within 360 days     eGFR if non    Date Value Ref Range Status   02/03/2020 82 > OR = 60 mL/min/1.73m2 Final   01/13/2020 87 > OR = 60 mL/min/1.73m2 Final   10/03/2018 82 > OR = 60 mL/min/1.73m2 Final     eGFR if    Date Value Ref Range Status   02/03/2020 96 > OR = 60 mL/min/1.73m2 Final   01/13/2020 101 > OR = 60 mL/min/1.73m2 Final   10/03/2018 95 > OR = 60 mL/min/1.73m2 Final            Signed Prescriptions Disp Refills    glipiZIDE (GLUCOTROL) 5 MG tablet 180 tablet 1     Sig: TAKE ONE TABLET BY MOUTH TWICE DAILY WITH MEALS FOR DIABETES       Endocrinology:  Diabetes - Sulfonylureas Passed - 6/11/2020  7:55 AM        Passed - Patient is at least 18 years old        Passed - Office visit in past 12 months or future 90 days.     Recent Outpatient Visits            3 months ago Hypertension, essential    [facility] Ned Hartley MD    3 months ago Coronary artery disease involving autologous vein coronary bypass graft without angina pectoris    [facility] - Cardiology Jose E Grajeda MD    4 months ago Type 2 diabetes mellitus without retinopathy    [facility] Stephani Braun, BHARGAV    4 months ago Non-insulin dependent  type 2 diabetes mellitus    [facility] Endocr[facility]ology 6th [place] Joan Saab NP    5 months ago Annual physical exam    [facility] Ned Hartley MD          Future Appointments              In 4 weeks MD [facility] - [facility] Medicine, [facility]    In 7 months VASCULAR, [facility] - Cardiology, [place]                Passed - HBA1C is 7.9 or below and within 180 days     A1c   Date Value Ref Range Status   04/11/2018 6.8 (H) <5.7 % of total Hgb Final     Comment:     For someone without known diabetes, a hemoglobin A1c  value of 6.5% or greater indicates that they may have   diabetes and this should be confirmed with a follow-up   test.     For someone with known diabetes, a value <7% indicates   that their diabetes is well controlled and a value   greater than or equal to 7% indicates suboptimal   control. A1c targets should be individualized based on   duration of diabetes, age, comorbid conditions, and   other considerations.     Currently, no consensus exists regarding use of  hemoglobin A1c for diagnosis of diabetes for children.         11/06/2017 12.2 (H) <5.7 % of total Hgb Final     Comment:     For someone without known diabetes, a hemoglobin A1c  value of 6.5% or greater indicates that they may have   diabetes and this should be confirmed with a follow-up   test.     For someone with known diabetes, a value <7% indicates   that their diabetes is well controlled and a value   greater than or equal to 7% indicates suboptimal   control. A1c targets should be individualized based on   duration of diabetes, age, comorbid conditions, and   other considerations.     Currently, no consensus exists regarding use of  hemoglobin A1c for diagnosis of diabetes for children.           Hemoglobin A1C   Date Value Ref Range Status   06/04/2020 7.2 (H) <5.7 % of total Hgb Final     Comment:     For someone without known diabetes, a hemoglobin A1c  value of 6.5% or  greater indicates that they may have   diabetes and this should be confirmed with a follow-up   test.     For someone with known diabetes, a value <7% indicates   that their diabetes is well controlled and a value   greater than or equal to 7% indicates suboptimal   control. A1c targets should be individualized based on   duration of diabetes, age, comorbid conditions, and   other considerations.     Currently, no consensus exists regarding use of  hemoglobin A1c for diagnosis of diabetes for children.         02/03/2020 9.3 (H) <5.7 % of total Hgb Final     Comment:     For someone without known diabetes, a hemoglobin A1c  value of 6.5% or greater indicates that they may have   diabetes and this should be confirmed with a follow-up   test.     For someone with known diabetes, a value <7% indicates   that their diabetes is well controlled and a value   greater than or equal to 7% indicates suboptimal   control. A1c targets should be individualized based on   duration of diabetes, age, comorbid conditions, and   other considerations.     Currently, no consensus exists regarding use of  hemoglobin A1c for diagnosis of diabetes for children.         01/13/2020 10.3 (H) <5.7 % of total Hgb Final     Comment:     For someone without known diabetes, a hemoglobin A1c  value of 6.5% or greater indicates that they may have   diabetes and this should be confirmed with a follow-up   test.     For someone with known diabetes, a value <7% indicates   that their diabetes is well controlled and a value   greater than or equal to 7% indicates suboptimal   control. A1c targets should be individualized based on   duration of diabetes, age, comorbid conditions, and   other considerations.     Currently, no consensus exists regarding use of  hemoglobin A1c for diagnosis of diabetes for children.         07/08/2019 9.7 % Final              Passed - Cr is 1.3 or below and within 360 days     Creatinine   Date Value Ref Range Status    02/03/2020 0.95 0.70 - 1.25 mg/dL Final     Comment:     For patients >49 years of age, the reference limit  for Creatinine is approximately 13% higher for people  identified as -American.        01/13/2020 0.91 0.70 - 1.25 mg/dL Final     Comment:     For patients >49 years of age, the reference limit  for Creatinine is approximately 13% higher for people  identified as -American.        10/03/2018 0.96 0.70 - 1.25 mg/dL Final     Comment:     For patients >49 years of age, the reference limit  for Creatinine is approximately 13% higher for people  identified as -American.                   Passed - eGFR is 30 or above and within 360 days     eGFR if non    Date Value Ref Range Status   02/03/2020 82 > OR = 60 mL/min/1.73m2 Final   01/13/2020 87 > OR = 60 mL/min/1.73m2 Final   10/03/2018 82 > OR = 60 mL/min/1.73m2 Final     eGFR if    Date Value Ref Range Status   02/03/2020 96 > OR = 60 mL/min/1.73m2 Final   01/13/2020 101 > OR = 60 mL/min/1.73m2 Final   10/03/2018 95 > OR = 60 mL/min/1.73m2 Final             atorvastatin (LIPITOR) 80 MG tablet 90 tablet 2     Sig: TAKE ONE TABLET BY MOUTH DAILY AFTER SUPPER FOR CHOLESTEROL       Cardiovascular:  Antilipid - Statins Failed - 6/11/2020  7:55 AM        Failed - ALT is 94 or below and within 360 days     ALT   Date Value Ref Range Status   02/03/2020 97 (H) 9 - 46 U/L Final   01/13/2020 117 (H) 9 - 46 U/L Final   10/03/2018 50 (H) 9 - 46 U/L Final     ALT (SGPT)   Date Value Ref Range Status   05/21/2014 49 0 - 55 IU/L Final     Comment:     Interpretations:  Quantitative results of 6 biochemical tests are analyzed  using a computational algorithm to provide a quantitative  surrogate marker (0.0-1.0) for liver fibrosis (METAVIR  F0-F4) and for necroinflammatory activity (METAVIR A0-A3).  Fibrosis Scoring:  <0.21 = Stage F0 - No fibrosis  0.21 - 0.27 = Stage F0 - F1  0.27 - 0.31 = Stage F1 - Portal fibrosis  0.31  - 0.48 = Stage F1 - F2  0.48 - 0.58 = Stage F2 - Bridging fibrosis with few septa  0.58 - 0.72 = Stage F3 - Bridging fibrosis with many septa  0.72 - 0.74 = Stage F3 - F4  >0.74 = Stage F4 - Cirrhosis  Necroinflamm Activity Scoring:  <0.17 = Grade A0 - No Activity  0.17 - 0.29 = Grade A0 - A1  0.29 - 0.36 = Grade A1 - Minimal activity  0.36 - 0.52 = Grade A1 - A2  0.52 - 0.60 = Grade A2 - Moderate activity  0.60 - 0.62 = Grade A2 - A3  >0.62 = Grade A3 - Severe activity  Limitations:  The negative predictive value of a Fibrotest score <0.31  (absence of clinically significant fibrosis) was 85% when  compared to liver biopsy in 1,270 HCV infected patients  with a 38% prevalence of significant liver fibrosis (F2, 3  or 4). The positive predictive value of a Fibrotest score  >0.48 (F2, 3, 4) was 61% in that same patient cohort.  HCV FibroSURE is not recommended in patients with Gilbert  Disease, acute hemolysis (e.g. HCV ribavirin therapy  mediated hemolysis), acute hepatitis of the liver, extra-  hepatic cholestasis, transplant patients, and/or renal  insufficiency patients. Any of these clinical situations  may lead to inaccurate quantitative predictions  of fibrosis and necroinflammatory activity in the liver.  Comment:  The performance characteristics of this test have been  determined by [facility]. This test has not been cleared or  approved by the U.S. Food and Drug Administration (FDA).  The FDA has determined that such clearance or approval is  not currently required. [facility] is regulated under the  Clinical Laboratory Improvement Amendments of 1988 (CLIA)  and is certified to perform high complexity testing.  Results and Interpretation Provided by:  [facility]  [address] [place] [address]  Test Performed by:  [facility] [address] [address]     01/04/2013 81 (H) 0 - 55 IU/L Final     Comment:     ALT (SGPT):  Interpretations:  Quantitative results of 6  biochemical tests are analyzed  using a computational algorithm to provide a quantitative  surrogate marker (0.0-1.0) for liver fibrosis (METAVIR  F0-F4) and for necroinflammatory activity (METAVIR A0-A3).  Fibrosis Scoring:  <0.21 = Stage F0 - No fibrosis  0.21 - 0.27 = Stage F0 - F1  0.27 - 0.31 = Stage F1 - Portal fibrosis  0.31 - 0.48 = Stage F1 - F2  0.48 - 0.58 = Stage F2 - Bridging fibrosis with few septa  0.58 - 0.72 = Stage F3 - Bridging fibrosis with many septa  0.72 - 0.74 = Stage F3 - F4  >0.74 = Stage F4 - Cirrhosis  Necroinflamm Activity Scoring:  <0.17 = Grade A0 - No Activity  0.17 - 0.29 = Grade A0 - A1  0.29 - 0.36 = Grade A1 - Minimal activity  0.36 - 0.52 = Grade A1 - A2  0.52 - 0.60 = Grade A2 - Moderate activity  0.60 - 0.62 = Grade A2 - A3  >0.62 = Grade A3 - Severe activity  Limitations:  The negative predictive value of a Fibrotest score <0.31  (absence of clinically significant fibrosis) was 85% when  compared to liver biopsy in 1,270 HCV infected patients  with a 38% prevalence of significant liver fibrosis (F2, 3  or 4). The positive predictive value of a Fibrotest score  >0.48 (F2, 3, 4) was 61% in that same patient cohort.  HCV FibroSURE is not recommended in patients with Gilbert  Disease, acute hemolysis (e.g. HCV ribavirin therapy  mediated hemolysis), acute hepatitis of the liver, extra-  hepatic cholestasis, transplant patients, and/or renal  insufficiency patients. Any of these clinical situations  may lead to inaccurate quantitative predictions  of fibrosis and necroinflammatory activity in the liver.  Comment:  The performance characteristics of this test have been  determined by [facility]. This test has not been cleared or  approved by the U.S. Food and Drug Administration (FDA).  The FDA has determined that such clearance or approval is  not currently required. [facility] is regulated under the  Clinical Laboratory Improvement Amendments of 1988 (CLIA)  and is certified to perform  high complexity testing.  Results and Interpretation Provided by:  [facility]  [address] [place] [address]              Passed - Patient is at least 18 years old        Passed - Office visit in past 12 months or future 90 days.     Recent Outpatient Visits            3 months ago Hypertension, essential    [facility] Ned Hartley MD    3 months ago Coronary artery disease involving autologous vein coronary bypass graft without angina pectoris    [facility] - Cardiology Jose E Grajeda MD    4 months ago Type 2 diabetes mellitus without retinopathy    [facility] Stephani Braun, BHARGAV    4 months ago Non-insulin dependent type 2 diabetes mellitus    [facility] [place] Joan Saab NP    5 months ago Annual physical exam    [facility] Ned Hartley MD          Future Appointments              In 4 weeks MD [facility], [facility]    In 7 months VASCULAR, [facility] - Cardiology, [place]                Passed - Lipid Panel completed in last 360 days     Lab Results   Component Value Date    CHOL 127 01/13/2020    HDL 38 (L) 01/13/2020    LDLCALC 69 01/13/2020    TRIG 118 01/13/2020             Passed - AST is 54 or below and within 360 days     AST   Date Value Ref Range Status   02/03/2020 39 (H) 10 - 35 U/L Final   01/13/2020 46 (H) 10 - 35 U/L Final   10/03/2018 23 10 - 35 U/L Final                 Appointments  past 12m or future 3m with PCP    Date Provider   Last Visit   3/12/2020 Ned Hartley MD   Next Visit   7/9/2020 Ned Hartley MD   ED visits in past 90 days: 0     Note composed:10:17 AM 06/11/2020

## 2020-06-23 ENCOUNTER — TELEPHONE (OUTPATIENT)
Dept: INTERNAL MEDICINE | Facility: CLINIC | Age: 70
End: 2020-06-23

## 2020-06-23 DIAGNOSIS — I10 HYPERTENSION, ESSENTIAL: ICD-10-CM

## 2020-06-23 RX ORDER — IRBESARTAN 300 MG/1
300 TABLET ORAL NIGHTLY
Qty: 90 TABLET | Refills: 1 | Status: SHIPPED | OUTPATIENT
Start: 2020-06-23 | End: 2020-11-04 | Stop reason: SDUPTHER

## 2020-06-23 RX ORDER — VALSARTAN 320 MG/1
320 TABLET ORAL DAILY
Qty: 90 TABLET | Refills: 2 | Status: CANCELLED | OUTPATIENT
Start: 2020-06-23

## 2020-06-23 NOTE — TELEPHONE ENCOUNTER
Irbesartan submitted to pharmacy as a substitute for recalled Valsartan.     Please advise patient that I sent a substitute medication to pharmacy, called Irbesartan, at a similar dose to recalled Valsartan. Patient should call and confirm prior to picking up. They should keep a close eye on blood pressure and schedule with us in the next couple of months to check blood pressure to make sure the new medication is effective.     Irbesartan is a close substitute should have about the same effectiveness.    Thank you.

## 2020-06-23 NOTE — TELEPHONE ENCOUNTER
-----  Contact: Miracle with [facility] [phone]  Pharmacy is calling to clarify an RX.  RX name:  valsartan (DIOVAN) 320 MG tablet  What do they need to clarify:  On Back order. Please call in a New Rx  Comments:      Please order alternative medication due to Valsartan is on back order

## 2020-06-23 NOTE — TELEPHONE ENCOUNTER
LM for pt to return a call to us on mobile phone and spoke to him on the home phone about the change to Irbesartan and he was pleased. Instructed pt per PCP request to check his bp daily and will call him on Friday for an up date . He will call if there are any significant changes in the meantime. Pt uses [employer] equipment.

## 2020-06-27 ENCOUNTER — TELEPHONE (OUTPATIENT)
Dept: INTERNAL MEDICINE | Facility: CLINIC | Age: 70
End: 2020-06-27

## 2020-06-27 NOTE — TELEPHONE ENCOUNTER
Please ask patient to take his pressure and send us some results.  Likewise we can get him in with our nurse practitioner Brandy this week if he is having significant problems with the machine.  Thank you

## 2020-06-27 NOTE — TELEPHONE ENCOUNTER
Reached out to pt to ask for a BP log and he doesn't have one and hasn't used his ihealth product. Encouraged him to do so.

## 2020-06-29 ENCOUNTER — PATIENT OUTREACH (OUTPATIENT)
Dept: OTHER | Facility: OTHER | Age: 70
End: 2020-06-29

## 2020-06-29 NOTE — PROGRESS NOTES
Digital [facility]: Health  Follow-Up    The history is provided by the patient.   Follow Up  Follow-up reason(s): reading review      Readings are trending up   Mr. Mike is doing okay. He attributes elevated BP to not resting prior to monitoring. He was pleased with his reading this morning, and said that he checked his BP before getting up and walking around the house.    Mary states that someone told him not to take HCTZ, but Dr. Hartley told him that he should still be taking it. He admits to missing other medication, but he has been using a new service at his pharmacy ([facility]) where they prepackage his medications for the week.    Patient admits to eating more salt than usual. He's been eating a lot of seafood, mostly broiled. He continues to cut grass for exercise.       INTERVENTION(S)  recommended diet modifications, encouragement/support and denied questions    PLAN  continue monitoring    Encouraged patient to check his BP everyday for the next week. Also to limit sodium intake. Patient verbalized understanding.Will follow up in 1 week.    There are no preventive care reminders to display for this patient.    Last 5 Patient Entered Readings                                      Current 30 Day Average: 157/83     Recent Readings 6/29/2020 6/28/2020 6/24/2020 6/15/2020 6/11/2020    SBP (mmHg) 143 162 160 134 143    DBP (mmHg) 77 80 82 77 82    Pulse 56 56 59 60 57        Last 6 Patient Entered Readings                                          Most Recent A1c: 7.2% on 6/4/2020  (Goal: 8%)     Recent Readings 6/28/2020 6/24/2020 6/18/2020 6/15/2020 6/12/2020    Blood Glucose (mg/dL) 152 156 118 139 141

## 2020-06-29 NOTE — TELEPHONE ENCOUNTER
LM for pt to return a call to us w/ BP readings. Still no bp readings since 05/20/20. Reached out to Case management/ Health .

## 2020-07-02 ENCOUNTER — TELEPHONE (OUTPATIENT)
Dept: INTERNAL MEDICINE | Facility: CLINIC | Age: 70
End: 2020-07-02

## 2020-07-02 NOTE — TELEPHONE ENCOUNTER
Patient referred by Dr. Hartley for Health coaching (DM, HTN, lifestyle changes).  Called patient and gave an explanation about Health Coaching program and invited participation.   Patient states he would like to participate.  Set appointment for 7.8.20 at 130 pm.   Gave direct contact number to call if he has any questions [phone].   Dana Johnson RN  Health

## 2020-07-02 NOTE — TELEPHONE ENCOUNTER
Patient referred by Dr. Hartley for Health coaching (DM, HTN, lifestyle changes).  Called patient and gave an explanation about Health Coaching program and invited participation.   Patient states he would like to participate.  Set appointment for 7.8.20 at 130 pm.   Gave direct contact number to call if he has any questions [phone].   Dana Johnson RN  Health

## 2020-07-06 ENCOUNTER — OFFICE VISIT (OUTPATIENT)
Dept: URGENT CARE | Facility: CLINIC | Age: 70
End: 2020-07-06
Payer: COMMERCIAL

## 2020-07-06 VITALS
WEIGHT: 180 LBS | OXYGEN SATURATION: 98 % | BODY MASS INDEX: 28.25 KG/M2 | HEIGHT: 67 IN | TEMPERATURE: 98 F | SYSTOLIC BLOOD PRESSURE: 152 MMHG | HEART RATE: 64 BPM | RESPIRATION RATE: 18 BRPM | DIASTOLIC BLOOD PRESSURE: 84 MMHG

## 2020-07-06 DIAGNOSIS — M54.31 SCIATICA OF RIGHT SIDE: ICD-10-CM

## 2020-07-06 DIAGNOSIS — M79.604 PAIN OF RIGHT LOWER EXTREMITY: Primary | ICD-10-CM

## 2020-07-06 DIAGNOSIS — M54.9 DORSALGIA, UNSPECIFIED: ICD-10-CM

## 2020-07-06 PROCEDURE — 72100 X-RAY EXAM L-S SPINE 2/3 VWS: CPT | Mod: S$GLB,,, | Performed by: RADIOLOGY

## 2020-07-06 PROCEDURE — 99214 PR OFFICE/OUTPT VISIT, EST, LEVL IV, 30-39 MIN: ICD-10-PCS | Mod: S$GLB,,, | Performed by: FAMILY MEDICINE

## 2020-07-06 PROCEDURE — 72100 XR LUMBAR SPINE 2 OR 3 VIEWS: ICD-10-PCS | Mod: S$GLB,,, | Performed by: RADIOLOGY

## 2020-07-06 PROCEDURE — 99214 OFFICE O/P EST MOD 30 MIN: CPT | Mod: S$GLB,,, | Performed by: FAMILY MEDICINE

## 2020-07-06 NOTE — PATIENT INSTRUCTIONS
PLEASE READ YOUR DISCHARGE INSTRUCTIONS ENTIRELY AS IT CONTAINS IMPORTANT INFORMATION.    Please return here or go to the Emergency Department for any concerns or worsening of condition.      If not allergic, please take over the counter Tylenol (Acetaminophen) and/or Motrin (Ibuprofen) as directed for control of pain and/or fever.  Please follow up with your primary care doctor or specialist as needed.  Soak wound as discussed and apply warm compresses frequently      If you smoke, please stop smoking.    Please return or see your primary care doctor if you develop new or worsening symptoms.     Please arrange follow up with your primary medical clinic as soon as possible. You must understand that you've received an Urgent Care treatment only and that you may be released before all of your medical problems are known or treated. You, the patient, will arrange for follow up as instructed. If your symptoms worsen or fail to improve you should go to the Emergency Room.  Understanding Lumbar Radiculopathy    Lumbar radiculopathy is irritation or inflammation of a nerve root in the low back. It causes symptoms that spread out from the back down one or both legs. To understand this condition, it helps to understand the parts of the spine:  · Vertebrae. These are bones that stack to form the spine. The lumbar spine contains the 5 bottom vertebrae.  · Disks. These are soft pads of tissue between the vertebrae. They act as shock absorbers for the spine.  · Spinal canal. This is a tunnel formed within the stacked vertebrae. In the lumbar spine, nerves run through this canal.  · Nerves. These branch off and leave the spinal canal, traveling out to parts of the body. As they leave the spinal canal, nerves pass through openings between the vertebrae. The nerve root is the part of the nerve that is closest to the spinal canal.  · Sciatic nerve. This is a large nerve formed from several nerve roots in the low back. This nerve  extends down the back of the leg to the foot.  With lumbar radiculopathy, nerve roots in the low back become irritated. This leads to pain and symptoms. The sciatic nerve is commonly involved, so the condition is often called sciatica.  What causes lumbar radiculopathy?  Aging, injury, poor posture, extra body weight, and other issues can lead to problems in the low back. These problems may then irritate nerve roots. They include:  · Damage to a disk in the lumbar spine. The damaged disk may then press on nearby nerve roots.  · Degeneration from wear and tear, and aging. This can lead to narrowing (stenosis) of the openings between the vertebrae. The narrowed openings press on nerve roots as they leave the spinal canal.  · Unstable spine. This is when a vertebra slips forward. It can then press on a nerve root.  Other, less common things can put pressure on nerves in the low back. These include diabetes, infection, or a tumor.  Symptoms of lumbar radiculopathy  These include:  · Pain in the low back  · Pain, numbness, tingling, or weakness that travels into the buttocks, hip, groin, or leg  · Muscle spasms  Treatment for lumbar radiculopathy  In most cases, your healthcare provider will first try treatments that help relieve symptoms. These may include:  · Prescription and over-the-counter pain medicines. These help relieve pain, swelling, and irritation.  · Limits on positions and activities that increase pain. But lying in bed or avoiding all movement is only recommended for a short period of time.  · Physical therapy, including exercises and stretches. This helps decrease pain and increase movement and function.  · Steroid shots into the lower back. This may help relieve symptoms for a time.  · Weight-loss program. If you are overweight, losing extra pounds may help relieve symptoms.  In some cases, you may need surgery to fix the underlying problem. This depends on the cause, the symptoms, and how long the pain  has lasted.  Possible complications  Over time, an irritated and inflamed nerve may become damaged. This may lead to long-lasting (permanent) numbness or weakness in your legs and feet. If symptoms change suddenly or get worse, be sure to let your healthcare provider know.  When to call your healthcare provider  Call your healthcare provider right away if you have any of these:  · New pain or pain that gets worse  · New or increasing weakness, tingling, or numbness in your leg or foot  · Problems controlling your bladder or bowel   Date Last Reviewed: 3/10/2016  © 9779-4732 [employer]. [address] [place] [address]. All rights reserved. This information is not intended as a substitute for professional medical care. Always follow your healthcare professional's instructions.        Muscle Strain in the Extremities  A muscle strain is a stretching and tearing of muscle fibers. This causes pain, especially when you move that muscle. There may also be some swelling and bruising.  Home care  · Keep the hurt area raised to reduce pain and swelling. This is especially important during the first 48 hours.  · Apply an ice pack over the injured area for 15 to 20 minutes every 3 to 6 hours. You should do this for the first 24 to 48 hours. You can make an ice pack by filling a plastic bag that seals at the top with ice cubes and then wrapping it with a thin towel. Be careful not to injure your skin with the ice treatments. Ice should never be applied directly to skin. Continue the use of ice packs for relief of pain and swelling as needed. After 48 hours, apply heat (warm shower or warm bath) for 15 to 20 minutes several times a day, or alternate ice and heat.  · You may use over-the-counter pain medicine to control pain, unless another medicine was prescribed. If you have chronic liver or kidney disease or ever had a stomach ulcer or GI bleeding, talk with your healthcare provider before using these  medicines.  · For leg strains: If crutches have been recommended, dont put full weight on the hurt leg until you can do so without pain. You can return to sports when you are able to hop and run on the injured leg without pain.  Follow-up care  Follow up with your healthcare provider, or as advised.  When to seek medical advice  Call your healthcare provider right away if any of these occur:  · The toes of the injured leg become swollen, cold, blue, numb, or tingly  · Pain or swelling increases  Date Last Reviewed: 11/19/2015  © 1191-1120 [employer]. [address] [place] [address]. All rights reserved. This information is not intended as a substitute for professional medical care. Always follow your healthcare professional's instructions.

## 2020-07-06 NOTE — PROGRESS NOTES
"Subjective:       Patient ID: Esteban Mike is a 69 y.o. male.    Vitals:  height is 5' 7" (1.702 m) and weight is 81.6 kg (180 lb). His temperature is 97.5 °F (36.4 °C). His blood pressure is 152/84 (abnormal) and his pulse is 64. His respiration is 18 and oxygen saturation is 98%.     Chief Complaint: Leg Pain (R Leg)    70 y/o M with PMH of DDD of lower back come with c/o right leg pain for 2 weeks. Pain is mild to moderate, diffuse, sometimes radiated down from buttock, intermittent, increase with activity and movements Pt states the pain starts in the morning and comes and goes throughout the day. Denies back pain at present. Denies injury. Denies fever, chills, weakness, loss of sensation, loss of bladder/bowel.    Leg Pain   The incident occurred 5 to 7 days ago. The incident occurred at home. The injury mechanism is unknown. The pain is present in the right ankle. The quality of the pain is described as shooting. The pain is moderate. The pain has been constant since onset. Associated symptoms include an inability to bear weight and muscle weakness. He reports no foreign bodies present. The symptoms are aggravated by palpation, movement and weight bearing. He has tried ice and heat for the symptoms. The treatment provided mild relief.       Constitution: Negative for fatigue.   HENT: Negative for facial swelling and facial trauma.    Neck: Negative for neck stiffness.   Cardiovascular: Negative for chest trauma.   Eyes: Negative for eye trauma, double vision and blurred vision.   Gastrointestinal: Negative for abdominal trauma, abdominal pain and rectal bleeding.   Genitourinary: Negative for hematuria, genital trauma and pelvic pain.   Musculoskeletal: Positive for pain, joint pain and pain with walking. Negative for trauma, joint swelling and abnormal ROM of joint.   Skin: Negative for color change, wound, abrasion and laceration.   Neurological: Negative for dizziness, history of vertigo, " light-headedness, coordination disturbances, altered mental status and loss of consciousness.   Hematologic/Lymphatic: Negative for history of bleeding disorder.   Psychiatric/Behavioral: Negative for altered mental status.       Objective:      Physical Exam   Constitutional: He is oriented to person, place, and time. He appears well-developed. He is cooperative.  Non-toxic appearance. He does not appear ill. No distress.   HENT:   Head: Normocephalic and atraumatic.   Right Ear: Hearing, tympanic membrane, external ear and ear canal normal.   Left Ear: Hearing, tympanic membrane, external ear and ear canal normal.   Nose: Nose normal. No mucosal edema, rhinorrhea or nasal deformity. No epistaxis. Right sinus exhibits no maxillary sinus tenderness and no frontal sinus tenderness. Left sinus exhibits no maxillary sinus tenderness and no frontal sinus tenderness.   Mouth/Throat: Uvula is midline, oropharynx is clear and moist and mucous membranes are normal. No trismus in the jaw. Normal dentition. No uvula swelling. No posterior oropharyngeal erythema.   Eyes: Conjunctivae and lids are normal. Right eye exhibits no discharge. Left eye exhibits no discharge. No scleral icterus.   Neck: Trachea normal, normal range of motion, full passive range of motion without pain and phonation normal. Neck supple.   Cardiovascular: Normal rate, regular rhythm, normal heart sounds and normal pulses.   Pulmonary/Chest: Effort normal and breath sounds normal. No respiratory distress.   Abdominal: Soft. Normal appearance and bowel sounds are normal. He exhibits no distension, no pulsatile midline mass and no mass. There is no abdominal tenderness.   Musculoskeletal: Normal range of motion.         General: No deformity.      Comments: Back; No TTP. No redness, swelling. Good ROM. No sensory motor deficit,.    Right leg: No redness or swelling. No calf redness or swelling. No significant TTP. SLR +Ve. No sensory motor deficit.     Neurological: He is alert and oriented to person, place, and time. He exhibits normal muscle tone. Coordination normal.   Skin: Skin is warm, dry, intact, not diaphoretic and not pale.   Psychiatric: His speech is normal and behavior is normal. Judgment and thought content normal.   Nursing note and vitals reviewed.        Assessment:       1. Pain of right lower extremity    2. Sciatica of right side    3. Dorsalgia, unspecified        Plan:         Pain of right lower extremity  -     Ambulatory referral/consult to Orthopedics    Sciatica of right side  -     X-Ray Lumbar Spine 2 Or 3 Views; Future; Expected date: 07/06/2020    Dorsalgia, unspecified  -     X-Ray Lumbar Spine 2 Or 3 Views; Future; Expected date: 07/06/2020        PLEASE READ YOUR DISCHARGE INSTRUCTIONS ENTIRELY AS IT CONTAINS IMPORTANT INFORMATION.    Please return here or go to the Emergency Department for any concerns or worsening of condition.      If not allergic, please take over the counter Tylenol (Acetaminophen) and/or Motrin (Ibuprofen) as directed for control of pain and/or fever.  Please follow up with your primary care doctor or specialist as needed.  Soak wound as discussed and apply warm compresses frequently      If you smoke, please stop smoking.    Please return or see your primary care doctor if you develop new or worsening symptoms.     Please arrange follow up with your primary medical clinic as soon as possible. You must understand that you've received an Urgent Care treatment only and that you may be released before all of your medical problems are known or treated. You, the patient, will arrange for follow up as instructed. If your symptoms worsen or fail to improve you should go to the Emergency Room.  Understanding Lumbar Radiculopathy    Lumbar radiculopathy is irritation or inflammation of a nerve root in the low back. It causes symptoms that spread out from the back down one or both legs. To understand this condition,  it helps to understand the parts of the spine:  · Vertebrae. These are bones that stack to form the spine. The lumbar spine contains the 5 bottom vertebrae.  · Disks. These are soft pads of tissue between the vertebrae. They act as shock absorbers for the spine.  · Spinal canal. This is a tunnel formed within the stacked vertebrae. In the lumbar spine, nerves run through this canal.  · Nerves. These branch off and leave the spinal canal, traveling out to parts of the body. As they leave the spinal canal, nerves pass through openings between the vertebrae. The nerve root is the part of the nerve that is closest to the spinal canal.  · Sciatic nerve. This is a large nerve formed from several nerve roots in the low back. This nerve extends down the back of the leg to the foot.  With lumbar radiculopathy, nerve roots in the low back become irritated. This leads to pain and symptoms. The sciatic nerve is commonly involved, so the condition is often called sciatica.  What causes lumbar radiculopathy?  Aging, injury, poor posture, extra body weight, and other issues can lead to problems in the low back. These problems may then irritate nerve roots. They include:  · Damage to a disk in the lumbar spine. The damaged disk may then press on nearby nerve roots.  · Degeneration from wear and tear, and aging. This can lead to narrowing (stenosis) of the openings between the vertebrae. The narrowed openings press on nerve roots as they leave the spinal canal.  · Unstable spine. This is when a vertebra slips forward. It can then press on a nerve root.  Other, less common things can put pressure on nerves in the low back. These include diabetes, infection, or a tumor.  Symptoms of lumbar radiculopathy  These include:  · Pain in the low back  · Pain, numbness, tingling, or weakness that travels into the buttocks, hip, groin, or leg  · Muscle spasms  Treatment for lumbar radiculopathy  In most cases, your healthcare provider will  first try treatments that help relieve symptoms. These may include:  · Prescription and over-the-counter pain medicines. These help relieve pain, swelling, and irritation.  · Limits on positions and activities that increase pain. But lying in bed or avoiding all movement is only recommended for a short period of time.  · Physical therapy, including exercises and stretches. This helps decrease pain and increase movement and function.  · Steroid shots into the lower back. This may help relieve symptoms for a time.  · Weight-loss program. If you are overweight, losing extra pounds may help relieve symptoms.  In some cases, you may need surgery to fix the underlying problem. This depends on the cause, the symptoms, and how long the pain has lasted.  Possible complications  Over time, an irritated and inflamed nerve may become damaged. This may lead to long-lasting (permanent) numbness or weakness in your legs and feet. If symptoms change suddenly or get worse, be sure to let your healthcare provider know.  When to call your healthcare provider  Call your healthcare provider right away if you have any of these:  · New pain or pain that gets worse  · New or increasing weakness, tingling, or numbness in your leg or foot  · Problems controlling your bladder or bowel   Date Last Reviewed: 3/10/2016  © 3190-2369 [employer]. [address] [place] [address], [place] [address]. All rights reserved. This information is not intended as a substitute for professional medical care. Always follow your healthcare professional's instructions.        Muscle Strain in the Extremities  A muscle strain is a stretching and tearing of muscle fibers. This causes pain, especially when you move that muscle. There may also be some swelling and bruising.  Home care  · Keep the hurt area raised to reduce pain and swelling. This is especially important during the first 48 hours.  · Apply an ice pack over the injured area for 15 to 20 minutes  every 3 to 6 hours. You should do this for the first 24 to 48 hours. You can make an ice pack by filling a plastic bag that seals at the top with ice cubes and then wrapping it with a thin towel. Be careful not to injure your skin with the ice treatments. Ice should never be applied directly to skin. Continue the use of ice packs for relief of pain and swelling as needed. After 48 hours, apply heat (warm shower or warm bath) for 15 to 20 minutes several times a day, or alternate ice and heat.  · You may use over-the-counter pain medicine to control pain, unless another medicine was prescribed. If you have chronic liver or kidney disease or ever had a stomach ulcer or GI bleeding, talk with your healthcare provider before using these medicines.  · For leg strains: If crutches have been recommended, dont put full weight on the hurt leg until you can do so without pain. You can return to sports when you are able to hop and run on the injured leg without pain.  Follow-up care  Follow up with your healthcare provider, or as advised.  When to seek medical advice  Call your healthcare provider right away if any of these occur:  · The toes of the injured leg become swollen, cold, blue, numb, or tingly  · Pain or swelling increases  Date Last Reviewed: 11/19/2015  © 5081-3947 [employer]. [address] [place] [address], [place] [address]. All rights reserved. This information is not intended as a substitute for professional medical care. Always follow your healthcare professional's instructions.

## 2020-07-07 ENCOUNTER — PATIENT OUTREACH (OUTPATIENT)
Dept: ADMINISTRATIVE | Facility: OTHER | Age: 70
End: 2020-07-07

## 2020-07-07 ENCOUNTER — PATIENT OUTREACH (OUTPATIENT)
Dept: OTHER | Facility: OTHER | Age: 70
End: 2020-07-07

## 2020-07-07 NOTE — PROGRESS NOTES
Patient's chart was reviewed for overdue KOMAL topics.  Immunizations reconciled.    Orders placed:n/a  Labs Linked:n/a     The wound was explored to base in bloodless field.

## 2020-07-07 NOTE — PROGRESS NOTES
Last 5 Patient Entered Readings                                      Current 30 Day Average: 148/80     Recent Readings 7/6/2020 7/4/2020 7/3/2020 7/2/2020 7/1/2020    SBP (mmHg) 165 107 143 146 160    DBP (mmHg) 89 70 77 84 88    Pulse 51 65 54 60 59        Last 6 Patient Entered Readings                                          Most Recent A1c: 7.2% on 6/4/2020  (Goal: 8%)     Recent Readings 7/6/2020 7/3/2020 6/28/2020 6/24/2020 6/18/2020    Blood Glucose (mg/dL) 149 72 152 156 118          Intervention/Plan    There are no preventive care reminders to display for this patient.    Current Medication Regimen:  Hypertension Medications             carvediloL (COREG) 3.125 MG tablet TAKE ONE TABLET BY MOUTH TWICE DAILY WITH MEALS FOR BLOOD PRESSURE AND (STOP ATENOLOL)    hydroCHLOROthiazide (HYDRODIURIL) 25 MG tablet TAKE ONE TABLET BY MOUTH DAILY FOR BLOOD PRESSURE    irbesartan (AVAPRO) 300 MG tablet Take 1 tablet (300 mg total) by mouth every evening.        Diabetes Medications             glipiZIDE (GLUCOTROL) 5 MG tablet TAKE ONE TABLET BY MOUTH TWICE DAILY WITH MEALS FOR DIABETES    metFORMIN (GLUCOPHAGE-XR) 500 MG XR 24hr tablet TAKE TWO TABLETS BY MOUTH TWICE DAILY WITH MEALS FOR DIABETES    TRULICITY 1.5 mg/0.5 mL PnIj INJECT 1.5MG INTO SKIN EVERY SEVEN DAYS

## 2020-07-08 ENCOUNTER — OFFICE VISIT (OUTPATIENT)
Dept: ORTHOPEDICS | Facility: CLINIC | Age: 70
End: 2020-07-08
Payer: COMMERCIAL

## 2020-07-08 VITALS — WEIGHT: 163.56 LBS | HEIGHT: 67 IN | BODY MASS INDEX: 25.67 KG/M2

## 2020-07-08 DIAGNOSIS — M54.9 DORSALGIA, UNSPECIFIED: ICD-10-CM

## 2020-07-08 PROCEDURE — 99999 PR PBB SHADOW E&M-EST. PATIENT-LVL IV: ICD-10-PCS | Mod: PBBFAC,,, | Performed by: ORTHOPAEDIC SURGERY

## 2020-07-08 PROCEDURE — 99999 PR PBB SHADOW E&M-EST. PATIENT-LVL IV: CPT | Mod: PBBFAC,,, | Performed by: ORTHOPAEDIC SURGERY

## 2020-07-08 PROCEDURE — 3008F BODY MASS INDEX DOCD: CPT | Mod: CPTII,S$GLB,, | Performed by: ORTHOPAEDIC SURGERY

## 2020-07-08 PROCEDURE — 99204 OFFICE O/P NEW MOD 45 MIN: CPT | Mod: S$GLB,,, | Performed by: ORTHOPAEDIC SURGERY

## 2020-07-08 PROCEDURE — 1159F PR MEDICATION LIST DOCUMENTED IN MEDICAL RECORD: ICD-10-PCS | Mod: S$GLB,,, | Performed by: ORTHOPAEDIC SURGERY

## 2020-07-08 PROCEDURE — 1159F MED LIST DOCD IN RCRD: CPT | Mod: S$GLB,,, | Performed by: ORTHOPAEDIC SURGERY

## 2020-07-08 PROCEDURE — 1125F AMNT PAIN NOTED PAIN PRSNT: CPT | Mod: S$GLB,,, | Performed by: ORTHOPAEDIC SURGERY

## 2020-07-08 PROCEDURE — 1125F PR PAIN SEVERITY QUANTIFIED, PAIN PRESENT: ICD-10-PCS | Mod: S$GLB,,, | Performed by: ORTHOPAEDIC SURGERY

## 2020-07-08 PROCEDURE — 99204 PR OFFICE/OUTPT VISIT, NEW, LEVL IV, 45-59 MIN: ICD-10-PCS | Mod: S$GLB,,, | Performed by: ORTHOPAEDIC SURGERY

## 2020-07-08 PROCEDURE — 3008F PR BODY MASS INDEX (BMI) DOCUMENTED: ICD-10-PCS | Mod: CPTII,S$GLB,, | Performed by: ORTHOPAEDIC SURGERY

## 2020-07-08 PROCEDURE — 1101F PT FALLS ASSESS-DOCD LE1/YR: CPT | Mod: CPTII,S$GLB,, | Performed by: ORTHOPAEDIC SURGERY

## 2020-07-08 PROCEDURE — 1101F PR PT FALLS ASSESS DOC 0-1 FALLS W/OUT INJ PAST YR: ICD-10-PCS | Mod: CPTII,S$GLB,, | Performed by: ORTHOPAEDIC SURGERY

## 2020-07-08 RX ORDER — MELOXICAM 15 MG/1
15 TABLET ORAL DAILY
Qty: 30 TABLET | Refills: 1 | Status: SHIPPED | OUTPATIENT
Start: 2020-07-08 | End: 2020-07-08 | Stop reason: SDUPTHER

## 2020-07-08 NOTE — LETTER
July 13, 2020      Dante Harrington MD  [address]  [address]  [place] [address]           [facility]  [address]  [place] [address]  Phone: [phone]          Patient: Esteban Mike   MR Number: [id number]   YOB: 1950   Date of Visit: 7/8/2020       Dear Dr. Dante Harrington:    Thank you for referring Esteban Mike to me for evaluation. Attached you will find relevant portions of my assessment and plan of care.    If you have questions, please do not hesitate to call me. I look forward to following Esteban Mike along with you.    Sincerely,    Migue Cortez MD    Enclosure  CC:  No Recipients    If you would like to receive this communication electronically, please contact [email or web address] or [phone] to request more information on [employer] access.    For providers and/or their staff who would like to refer a patient to [facility], please contact us through our one-stop-shop provider referral line, [facility], at [phone].    If you feel you have received this communication in error or would no longer like to receive these types of communications, please e-mail [email or web address]

## 2020-07-08 NOTE — PROGRESS NOTES
DATE: 7/8/2020  PATIENT: Esteban Mike    Attending Physician: Migue Cortez M.D.    CHIEF COMPLAINT: LBP    HISTORY:  Esteban Mike is a 69 y.o. male, , here for initial evaluation of low back and right leg pain (Back - 9, Leg - 10). The pain has been present for 1 week. The patient describes the pain as throbbing.  The pain is worse with any activity. There is no associated numbness and tingling. There is no subjective weakness. Prior treatments have included chiropractor txt yest with short lived improvement.    The Patient denies myelopathic symptoms such as handwriting changes or difficulty with buttons/coins/keys. Denies perineal paresthesias, bowel/bladder dysfunction.    PAST MEDICAL/SURGICAL HISTORY:  Past Medical History:   Diagnosis Date    Aortic valve disease     Bilateral carotid artery stenosis 7/18/2018    CAD (coronary artery disease)     Carotid artery occlusion      LICA, 70-80% JULIUS    Diabetes mellitus     Diabetes mellitus type II     Elevated LFTs     GERD (gastroesophageal reflux disease)     Hyperlipidemia     Hypertension     PVD (peripheral vascular disease)      Past Surgical History:   Procedure Laterality Date    CARDIAC CATHETERIZATION      carotid      CEA      CORONARY ARTERY BYPASS GRAFT  5/2/08    X4    ESOPHAGOGASTRODUODENOSCOPY N/A 4/1/2019    Procedure: EGD (ESOPHAGOGASTRODUODENOSCOPY);  Surgeon: William Holman MD;  Location: [address];  Service: Endoscopy;  Laterality: N/A;    HERNIA REPAIR      ica stent      TONSILLECTOMY         Current Medications:   Current Outpatient Medications:     aspirin 81 MG chewable tablet, Take 81 mg by mouth once daily.  , Disp: , Rfl:     atorvastatin (LIPITOR) 80 MG tablet, TAKE ONE TABLET BY MOUTH DAILY AFTER SUPPER FOR CHOLESTEROL, Disp: 90 tablet, Rfl: 2    blood sugar diagnostic Strp, To check BG 3 times daily, to use with insurance preferred meter, Disp: 200 strip, Rfl:  11    blood-glucose meter kit, To check BG 3 times daily, to use with insurance preferred meter, Disp: 1 each, Rfl: 0    carvediloL (COREG) 3.125 MG tablet, TAKE ONE TABLET BY MOUTH TWICE DAILY WITH MEALS FOR BLOOD PRESSURE AND (STOP ATENOLOL), Disp: 180 tablet, Rfl: 3    fish oil-omega-3 fatty acids 300-1,000 mg capsule, Take 2 g by mouth once daily. 3 tablets  , Disp: , Rfl:     glipiZIDE (GLUCOTROL) 5 MG tablet, TAKE ONE TABLET BY MOUTH TWICE DAILY WITH MEALS FOR DIABETES, Disp: 180 tablet, Rfl: 1    hydroCHLOROthiazide (HYDRODIURIL) 25 MG tablet, TAKE ONE TABLET BY MOUTH DAILY FOR BLOOD PRESSURE, Disp: 90 tablet, Rfl: 1    irbesartan (AVAPRO) 300 MG tablet, Take 1 tablet (300 mg total) by mouth every evening., Disp: 90 tablet, Rfl: 1    lancets Misc, To check BG 3 times daily, to use with insurance preferred meter, Disp: 200 each, Rfl: 11    metFORMIN (GLUCOPHAGE-XR) 500 MG XR 24hr tablet, TAKE TWO TABLETS BY MOUTH TWICE DAILY WITH MEALS FOR DIABETES, Disp: 360 tablet, Rfl: 0    multivitamin capsule, Take 1 capsule by mouth once daily., Disp: , Rfl:     sildenafil (VIAGRA) 100 MG tablet, Take 1/2 tab prior to coitus, Disp: 6 tablet, Rfl: 5    traMADol (ULTRAM) 50 mg tablet, Take 1 tablet (50 mg total) by mouth every 12 (twelve) hours as needed for Pain., Disp: 14 tablet, Rfl: 0    TRULICITY 1.5 mg/0.5 mL PnIj, INJECT 1.5MG INTO SKIN EVERY SEVEN DAYS, Disp: 6 mL, Rfl: 0    vit C-vit E-lutein-min-om-3 (OCUVITE) 942-60-8-150 mg-unit-mg-mg Cap, Take 1 tablet by mouth once daily., Disp: , Rfl:     pantoprazole (PROTONIX) 40 MG tablet, Take 1 tablet (40 mg total) by mouth before breakfast., Disp: 90 tablet, Rfl: 3    Social History:   Social History     Socioeconomic History    Marital status:      Spouse name: Not on file    Number of children: Not on file    Years of education: Not on file    Highest education level: Not on file   Occupational History    Occupation: [employer]      "Employer: [employer]   Social Needs    Financial resource strain: Not on file    Food insecurity     Worry: Not on file     Inability: Not on file    Transportation needs     Medical: Not on file     Non-medical: Not on file   Tobacco Use    Smoking status: Never Smoker    Smokeless tobacco: Never Used   Substance and Sexual Activity    Alcohol use: Yes     Comment: Socially     Drug use: No    Sexual activity: Yes     Partners: Female     Birth control/protection: None   Lifestyle    Physical activity     Days per week: Not on file     Minutes per session: Not on file    Stress: Not on file   Relationships    Social connections     Talks on phone: Not on file     Gets together: Not on file     Attends Sabianist service: Not on file     Active member of club or organization: Not on file     Attends meetings of clubs or organizations: Not on file     Relationship status: Not on file   Other Topics Concern    Not on file   Social History Narrative    [employer], 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 Negative for chills, fever and night sweats.   Cardiovascular: Negative for chest pain and syncope.   Respiratory: Negative for cough and shortness of breath.   Gastrointestinal: See HPI. Negative for nausea/vomiting. Negative for abdominal pain.  Genitourinary: See HPI. Negative for discoloration or dysuria.  Hematologic/Lymphatic: negative for bleeding/clotting disorders.   Musculoskeletal: Negative for falls and muscle weakness.   Neurological: See HPI. no history of seizures. no history of cranial surgery or shunts.  Neurological: See HPI. No seizures.   Endocrine: Negative for polydipsia, polyphagia and polyuria.   Allergic/Immunologic: Negative for hives and persistent infections.     EXAM:  Ht 5' 7" (1.702 m)   Wt 74.2 kg (163 lb 9.3 oz)   BMI 25.62 kg/m²     PHYSICAL EXAMINATION:    General: The patient is a  69 y.o. male in no " apparent distress, the patient is orientatied to person, place and time.  Psych: Normal mood and affect  HEENT: Vision grossly intact, hearing intact to the spoken word.  Lungs: Respirations unlabored.  Gait: Normal station and gait, no difficulty with toe or heel walk.   Skin: Dorsal lumbar skin negative for rashes, lesions, hairy patches and surgical scars. There is no lumbar tenderness to palpation.  Range of motion: Lumbar range of motion is acceptable.  Spinal Balance: Global saggital and coronal spinal balance acceptable, no significant for scoliosis and kyphosis.  Musculoskeletal: No pain with the range of motion of the bilateral hips. No trochanteric tenderness to palpation.  Vascular: Bilateral lower extremities warm and well perfused, Dorsalis pedis pulses 2+ bilaterally.  Neurological: Normal strength and tone in all major motor groups in the bilateral lower extremities. Normal sensation to light touch in the L2-S1 dermatomes bilaterally.  Deep tendon reflexes symmetric 2+ in the bilateral lower extremities.  Negative Babinski bilaterally. Straight leg raise positive on the right    IMAGING:      Today I personally reviewed AP, Lat and Flex/Ex  upright L-spine that demonstrate mild spondylosis, no anterolisthesis      Body mass index is 25.62 kg/m².  Hemoglobin A1C   Date Value Ref Range Status   06/04/2020 7.2 (H) <5.7 % of total Hgb Final     Comment:     For someone without known diabetes, a hemoglobin A1c  value of 6.5% or greater indicates that they may have   diabetes and this should be confirmed with a follow-up   test.     For someone with known diabetes, a value <7% indicates   that their diabetes is well controlled and a value   greater than or equal to 7% indicates suboptimal   control. A1c targets should be individualized based on   duration of diabetes, age, comorbid conditions, and   other considerations.     Currently, no consensus exists regarding use of  hemoglobin A1c for diagnosis of  diabetes for children.         02/03/2020 9.3 (H) <5.7 % of total Hgb Final     Comment:     For someone without known diabetes, a hemoglobin A1c  value of 6.5% or greater indicates that they may have   diabetes and this should be confirmed with a follow-up   test.     For someone with known diabetes, a value <7% indicates   that their diabetes is well controlled and a value   greater than or equal to 7% indicates suboptimal   control. A1c targets should be individualized based on   duration of diabetes, age, comorbid conditions, and   other considerations.     Currently, no consensus exists regarding use of  hemoglobin A1c for diagnosis of diabetes for children.         01/13/2020 10.3 (H) <5.7 % of total Hgb Final     Comment:     For someone without known diabetes, a hemoglobin A1c  value of 6.5% or greater indicates that they may have   diabetes and this should be confirmed with a follow-up   test.     For someone with known diabetes, a value <7% indicates   that their diabetes is well controlled and a value   greater than or equal to 7% indicates suboptimal   control. A1c targets should be individualized based on   duration of diabetes, age, comorbid conditions, and   other considerations.     Currently, no consensus exists regarding use of  hemoglobin A1c for diagnosis of diabetes for children.         07/08/2019 9.7 % Final       ASSESSMENT/PLAN:    Diagnoses and all orders for this visit:    Dorsalgia, unspecified  -     MRI Lumbar Spine Without Contrast; Future  -     Ambulatory referral/consult to Physical/Occupational Therapy; Future      No follow-ups on file.      - MRI lumbar spine  - PT eval and treat  - NSAIDs PRN for sx control  - RTC in 6 weeks

## 2020-07-09 ENCOUNTER — OFFICE VISIT (OUTPATIENT)
Dept: INTERNAL MEDICINE | Facility: CLINIC | Age: 70
End: 2020-07-09
Attending: FAMILY MEDICINE
Payer: COMMERCIAL

## 2020-07-09 VITALS
DIASTOLIC BLOOD PRESSURE: 60 MMHG | HEART RATE: 60 BPM | BODY MASS INDEX: 27.61 KG/M2 | WEIGHT: 175.94 LBS | OXYGEN SATURATION: 99 % | SYSTOLIC BLOOD PRESSURE: 124 MMHG | HEIGHT: 67 IN

## 2020-07-09 DIAGNOSIS — I25.810 CORONARY ARTERY DISEASE INVOLVING AUTOLOGOUS VEIN CORONARY BYPASS GRAFT WITHOUT ANGINA PECTORIS: ICD-10-CM

## 2020-07-09 DIAGNOSIS — I65.21 INTERNAL CAROTID ARTERY OCCLUSION, RIGHT: ICD-10-CM

## 2020-07-09 DIAGNOSIS — K20.90 ESOPHAGITIS: ICD-10-CM

## 2020-07-09 DIAGNOSIS — E78.5 HYPERLIPIDEMIA, UNSPECIFIED HYPERLIPIDEMIA TYPE: ICD-10-CM

## 2020-07-09 DIAGNOSIS — M54.31 SCIATICA OF RIGHT SIDE: ICD-10-CM

## 2020-07-09 DIAGNOSIS — K21.00 GASTROESOPHAGEAL REFLUX DISEASE WITH ESOPHAGITIS: ICD-10-CM

## 2020-07-09 DIAGNOSIS — I65.22 LEFT CAROTID ARTERY STENOSIS: ICD-10-CM

## 2020-07-09 DIAGNOSIS — I10 HYPERTENSION, ESSENTIAL: ICD-10-CM

## 2020-07-09 DIAGNOSIS — I73.9 PVD (PERIPHERAL VASCULAR DISEASE): ICD-10-CM

## 2020-07-09 DIAGNOSIS — E11.9 NON-INSULIN DEPENDENT TYPE 2 DIABETES MELLITUS: Primary | Chronic | ICD-10-CM

## 2020-07-09 DIAGNOSIS — Z12.11 COLON CANCER SCREENING: ICD-10-CM

## 2020-07-09 DIAGNOSIS — K76.0 NAFLD (NONALCOHOLIC FATTY LIVER DISEASE): ICD-10-CM

## 2020-07-09 PROCEDURE — 99999 PR PBB SHADOW E&M-EST. PATIENT-LVL IV: ICD-10-PCS | Mod: PBBFAC,,, | Performed by: FAMILY MEDICINE

## 2020-07-09 PROCEDURE — 99214 PR OFFICE/OUTPT VISIT, EST, LEVL IV, 30-39 MIN: ICD-10-PCS | Mod: S$GLB,,, | Performed by: FAMILY MEDICINE

## 2020-07-09 PROCEDURE — 1101F PT FALLS ASSESS-DOCD LE1/YR: CPT | Mod: CPTII,S$GLB,, | Performed by: FAMILY MEDICINE

## 2020-07-09 PROCEDURE — 3074F SYST BP LT 130 MM HG: CPT | Mod: CPTII,S$GLB,, | Performed by: FAMILY MEDICINE

## 2020-07-09 PROCEDURE — 3008F PR BODY MASS INDEX (BMI) DOCUMENTED: ICD-10-PCS | Mod: CPTII,S$GLB,, | Performed by: FAMILY MEDICINE

## 2020-07-09 PROCEDURE — 1159F PR MEDICATION LIST DOCUMENTED IN MEDICAL RECORD: ICD-10-PCS | Mod: S$GLB,,, | Performed by: FAMILY MEDICINE

## 2020-07-09 PROCEDURE — 99214 OFFICE O/P EST MOD 30 MIN: CPT | Mod: S$GLB,,, | Performed by: FAMILY MEDICINE

## 2020-07-09 PROCEDURE — 99999 PR PBB SHADOW E&M-EST. PATIENT-LVL IV: CPT | Mod: PBBFAC,,, | Performed by: FAMILY MEDICINE

## 2020-07-09 PROCEDURE — 1101F PR PT FALLS ASSESS DOC 0-1 FALLS W/OUT INJ PAST YR: ICD-10-PCS | Mod: CPTII,S$GLB,, | Performed by: FAMILY MEDICINE

## 2020-07-09 PROCEDURE — 3078F DIAST BP <80 MM HG: CPT | Mod: CPTII,S$GLB,, | Performed by: FAMILY MEDICINE

## 2020-07-09 PROCEDURE — 1125F AMNT PAIN NOTED PAIN PRSNT: CPT | Mod: S$GLB,,, | Performed by: FAMILY MEDICINE

## 2020-07-09 PROCEDURE — 1159F MED LIST DOCD IN RCRD: CPT | Mod: S$GLB,,, | Performed by: FAMILY MEDICINE

## 2020-07-09 PROCEDURE — 1125F PR PAIN SEVERITY QUANTIFIED, PAIN PRESENT: ICD-10-PCS | Mod: S$GLB,,, | Performed by: FAMILY MEDICINE

## 2020-07-09 PROCEDURE — 3078F PR MOST RECENT DIASTOLIC BLOOD PRESSURE < 80 MM HG: ICD-10-PCS | Mod: CPTII,S$GLB,, | Performed by: FAMILY MEDICINE

## 2020-07-09 PROCEDURE — 3051F HG A1C>EQUAL 7.0%<8.0%: CPT | Mod: CPTII,S$GLB,, | Performed by: FAMILY MEDICINE

## 2020-07-09 PROCEDURE — 3008F BODY MASS INDEX DOCD: CPT | Mod: CPTII,S$GLB,, | Performed by: FAMILY MEDICINE

## 2020-07-09 PROCEDURE — 3051F PR MOST RECENT HEMOGLOBIN A1C LEVEL 7.0 - < 8.0%: ICD-10-PCS | Mod: CPTII,S$GLB,, | Performed by: FAMILY MEDICINE

## 2020-07-09 PROCEDURE — 3074F PR MOST RECENT SYSTOLIC BLOOD PRESSURE < 130 MM HG: ICD-10-PCS | Mod: CPTII,S$GLB,, | Performed by: FAMILY MEDICINE

## 2020-07-09 NOTE — PATIENT INSTRUCTIONS
See future [facility] lab orders and please draw A1C, CMP and Lipids - in 6 months,Thank you.    Information about cholesterol, high blood pressure and healthy diet and activity recommendations can be found at the following links on the Internet:    http://www.nhlbi.nih.gov/health/health-topics/topics/hbc  http://www.nhlbi.nih.gov/health/educational/lose_wt/index.htm  Http://www.nhlbi.nih.gov/files/docs/public/heart/hbp_low.pdf  http://www.heart.org/HEARTORG/  http://diabetes.org/  https://www.cdc.gov/  Https://healthfinder.gov/  https://health.gov/dietaryguidelines/2015/guidelines/  https://health.gov/paguidelines/second-edition/pdf/Physical_Activity_Guidelines_2nd_edition.pdf

## 2020-07-09 NOTE — PROGRESS NOTES
Subjective:       Patient ID: Esteban Mike is a 69 y.o. male.    Chief Complaint: Follow-up (6 month f/u ) and Leg Pain (right leg, pain rate 5)    Established patient follows up for management of chronic medical illnesses with complaints today. Please see dictation and ROS for interval problems, specific complaints and disease management discussion.    P, S, Fm, Soc Hx's; Meds, allergies reviewed and reconciled.  Health maintenance file reviewed and addressed items due.     and Dr. Cortez for sciatica - improving.    Back Pain  This is a new problem. The current episode started 1 to 4 weeks ago. The problem has been gradually improving since onset. The pain is present in the gluteal. The pain radiates to the right thigh, right knee and right foot. The pain is moderate. Pertinent negatives include no abdominal pain, chest pain, fever, headaches, numbness or weakness. He has tried analgesics and NSAIDs for the symptoms. The treatment provided moderate relief.     Review of Systems   Constitutional: Negative for chills, fatigue, fever and unexpected weight change.   HENT: Negative for congestion and trouble swallowing.    Eyes: Negative for redness and visual disturbance.   Respiratory: Negative for cough, chest tightness and shortness of breath.    Cardiovascular: Negative for chest pain, palpitations and leg swelling.   Gastrointestinal: Negative for abdominal pain and blood in stool.   Genitourinary: Negative for difficulty urinating and hematuria.   Musculoskeletal: Positive for back pain and myalgias. Negative for arthralgias, gait problem, joint swelling and neck pain.   Skin: Negative for color change and rash.   Neurological: Negative for tremors, speech difficulty, weakness, numbness and headaches.   Hematological: Negative for adenopathy. Does not bruise/bleed easily.   Psychiatric/Behavioral: Negative for behavioral problems, confusion and sleep disturbance. The patient is not  nervous/anxious.        Objective:      Physical Exam  Vitals signs and nursing note reviewed.   Constitutional:       Appearance: He is well-developed. He is not diaphoretic.   HENT:      Head: Normocephalic and atraumatic.   Eyes:      General: No scleral icterus.     Conjunctiva/sclera: Conjunctivae normal.   Neck:      Musculoskeletal: Normal range of motion and neck supple.      Vascular: No carotid bruit.   Cardiovascular:      Rate and Rhythm: Normal rate and regular rhythm.      Heart sounds: Heart sounds are distant. Murmur present. No friction rub. No gallop.    Pulmonary:      Effort: Pulmonary effort is normal. No respiratory distress.      Breath sounds: Normal breath sounds. No wheezing or rales.   Abdominal:      General: There is no distension.      Tenderness: There is no abdominal tenderness.   Musculoskeletal:         General: No deformity.   Skin:     General: Skin is warm and dry.      Findings: No erythema or rash.   Neurological:      Mental Status: He is alert and oriented to person, place, and time.      Cranial Nerves: No cranial nerve deficit.      Motor: No tremor.      Coordination: Coordination normal.      Gait: Gait normal.   Psychiatric:         Behavior: Behavior normal.         Thought Content: Thought content normal.         Judgment: Judgment normal.         Assessment:       1. Non-insulin dependent type 2 diabetes mellitus    2. Hypertension, essential    3. Hyperlipidemia, unspecified hyperlipidemia type    4. Coronary artery disease involving autologous vein coronary bypass graft without angina pectoris    5. PVD (peripheral vascular disease)    6. Internal carotid artery occlusion, right    7. Left carotid artery stenosis    8. NAFLD (nonalcoholic fatty liver disease)    9. Esophagitis    10. Gastroesophageal reflux disease with esophagitis    11. Sciatica of right side    12. Colon cancer screening        Plan:     Medication List with Changes/Refills   Current Medications     ASPIRIN 81 MG CHEWABLE TABLET    Take 81 mg by mouth once daily.      ATORVASTATIN (LIPITOR) 80 MG TABLET    TAKE ONE TABLET BY MOUTH DAILY AFTER SUPPER FOR CHOLESTEROL    BLOOD SUGAR DIAGNOSTIC STRP    To check BG 3 times daily, to use with insurance preferred meter    BLOOD-GLUCOSE METER KIT    To check BG 3 times daily, to use with insurance preferred meter    CARVEDILOL (COREG) 3.125 MG TABLET    TAKE ONE TABLET BY MOUTH TWICE DAILY WITH MEALS FOR BLOOD PRESSURE AND (STOP ATENOLOL)    FISH OIL-OMEGA-3 FATTY ACIDS 300-1,000 MG CAPSULE    Take 2 g by mouth once daily. 3 tablets       GLIPIZIDE (GLUCOTROL) 5 MG TABLET    TAKE ONE TABLET BY MOUTH TWICE DAILY WITH MEALS FOR DIABETES    HYDROCHLOROTHIAZIDE (HYDRODIURIL) 25 MG TABLET    TAKE ONE TABLET BY MOUTH DAILY FOR BLOOD PRESSURE    IRBESARTAN (AVAPRO) 300 MG TABLET    Take 1 tablet (300 mg total) by mouth every evening.    LANCETS MISC    To check BG 3 times daily, to use with insurance preferred meter    MELOXICAM (MOBIC) 15 MG TABLET    Take 1 tablet (15 mg total) by mouth once daily.    METFORMIN (GLUCOPHAGE-XR) 500 MG XR 24HR TABLET    TAKE TWO TABLETS BY MOUTH TWICE DAILY WITH MEALS FOR DIABETES    MULTIVITAMIN CAPSULE    Take 1 capsule by mouth once daily.    PANTOPRAZOLE (PROTONIX) 40 MG TABLET    Take 1 tablet (40 mg total) by mouth before breakfast.    SILDENAFIL (VIAGRA) 100 MG TABLET    Take 1/2 tab prior to coitus    TRAMADOL (ULTRAM) 50 MG TABLET    Take 1 tablet (50 mg total) by mouth every 12 (twelve) hours as needed for Pain.    TRULICITY 1.5 MG/0.5 ML PNIJ    INJECT 1.5MG INTO SKIN EVERY SEVEN DAYS    VIT C-VIT E-LUTEIN-MIN-OM-3 (OCUVITE) 864-88-7-150 MG-UNIT-MG-MG CAP    Take 1 tablet by mouth once daily.     Esteban was seen today for follow-up and leg pain.    Diagnoses and all orders for this visit:    Non-insulin dependent type 2 diabetes mellitus  -     Hemoglobin A1C; Future  -     Comprehensive metabolic panel; Future  -     Lipid  Panel; Future  -     Ambulatory referral/consult to Endocrinology; Future    Hypertension, essential    Hyperlipidemia, unspecified hyperlipidemia type    Coronary artery disease involving autologous vein coronary bypass graft without angina pectoris    PVD (peripheral vascular disease)    Internal carotid artery occlusion, right  Comments:  repeat US due this winter    Left carotid artery stenosis  Comments:  repeat US due this winter    NAFLD (nonalcoholic fatty liver disease)    Esophagitis  Comments:  12 week follow up from last EGD was recommended    Gastroesophageal reflux disease with esophagitis  Comments:  12 week follow up from last EGD was recommended    Sciatica of right side    Colon cancer screening  Comments:  last c scope 2013 - nml. no fam hist and states no prior polyps      See meds, orders, follow up, routing and instructions sections of encounter and AVS. Discussed with patient and provided on AVS.    Discussed diet and exercise and links provided on AVS for detailed information.    Diabetes Management Status    Statin: Taking  ACE/ARB: Taking    Screening or Prevention Patient's value Goal Complete/Controlled?   HgA1C Testing and Control   Lab Results   Component Value Date    HGBA1C 7.2 (H) 06/04/2020      Annually/Less than 8% Yes   Lipid profile : 01/13/2020 Annually Yes   LDL control Lab Results   Component Value Date    LDLCALC 69 01/13/2020    Annually/Less than 100 mg/dl  Yes   Nephropathy screening Lab Results   Component Value Date    LABMICR 9.0 12/19/2017     Lab Results   Component Value Date    PROTEINUA NEGATIVE 11/02/2017    Annually No   Blood pressure BP Readings from Last 1 Encounters:   07/09/20 124/60    Less than 140/90 Yes   Dilated retinal exam : 01/23/2020 Annually Yes   Foot exam   : 01/14/2020 Annually Yes

## 2020-07-09 NOTE — Clinical Note
See future lab orders and please draw A1C, CMP and Lipids - in 6 months,Thank you.    Request follow up with Dana Johnson

## 2020-07-10 ENCOUNTER — TELEPHONE (OUTPATIENT)
Dept: INTERNAL MEDICINE | Facility: CLINIC | Age: 70
End: 2020-07-10

## 2020-07-10 NOTE — TELEPHONE ENCOUNTER
Contact: [phone] [facility]  Requesting an RX refill or new RX.  Is this a refill or new RX:  New   RX name and strength: Jardinaci 25 MG  Directions (copy/paste from chart):    Is this a 30 day or 90 day RX:  90  Local pharmacy or mail order pharmacy:  local  Pharmacy name and phone # (copy/paste from chart):   [facility] - [place], [place] - [address]  Comments:

## 2020-07-13 ENCOUNTER — TELEPHONE (OUTPATIENT)
Dept: INTERNAL MEDICINE | Facility: CLINIC | Age: 70
End: 2020-07-13

## 2020-07-13 NOTE — TELEPHONE ENCOUNTER
Called patient to r/s appt he missed due to another appt same day and him being in a lot of pain from Sciatica.   Appointment set for 7.30.20.  Dana Johnson RN  Health

## 2020-07-13 NOTE — TELEPHONE ENCOUNTER
----- Message from Ned Hartley MD sent at 7/9/2020  8:41 AM CDT -----  See future lab orders and please draw A1C, CMP and Lipids - in 6 months,Thank you.Request follow up with Dana Johnson

## 2020-07-15 NOTE — PROGRESS NOTES
"[facility]: Health  Follow-Up    The history is provided by the patient.         Reason for review: Blood glucose not at goal and Blood pressure not at goal      Additional Follow-up details: Mr. Mike saw his PCP last week about elevated BP. Patient denies changes, and states that the visit "went okay". He said he was told to "keep doing what I'm doing" and go have new labs drawn. Additionally, he is to follow up with endocrinology.    Mr. Mike said that his pharmacy called yesterday and said they were having trouble filling his prescription for jardiance. He is not out and will call if he is unable to get more before he runs out.    Patient is out of town this week, but will be home next week. Will follow up when he returns.        Diet-Not assessed      Additional diet details:    Physical Activity-Not assessed      Tobacco and Alcohol-Not assessed    Intervention/Plan  There are no preventive care reminders to display for this patient.    Last 5 Patient Entered Readings                                      Current 30 Day Average: 147/81     Recent Readings 7/12/2020 7/9/2020 7/6/2020 7/4/2020 7/3/2020    SBP (mmHg) 141 155 165 107 143    DBP (mmHg) 79 96 89 70 77    Pulse 52 65 51 65 54        Last 6 Patient Entered Readings                                          Most Recent A1c: 7.2% on 6/4/2020  (Goal: 8%)     Recent Readings 7/12/2020 7/9/2020 7/6/2020 7/3/2020 6/28/2020    Blood Glucose (mg/dL) 120 119 149 72 152             "

## 2020-07-21 ENCOUNTER — TELEPHONE (OUTPATIENT)
Dept: INTERNAL MEDICINE | Facility: CLINIC | Age: 70
End: 2020-07-21

## 2020-07-21 DIAGNOSIS — E11.9 NON-INSULIN DEPENDENT TYPE 2 DIABETES MELLITUS: Primary | ICD-10-CM

## 2020-07-21 RX ORDER — EMPAGLIFLOZIN 25 MG/1
25 TABLET, FILM COATED ORAL DAILY
Qty: 30 TABLET | Refills: 0 | Status: SHIPPED | OUTPATIENT
Start: 2020-07-21 | End: 2020-08-17 | Stop reason: SDUPTHER

## 2020-07-21 NOTE — TELEPHONE ENCOUNTER
Contact: [facility]   Requesting an RX refill or new RX.  Is this a refill or new RX:    RX name and strength: empagliflozin (JARDIANCE) 10 mg Tab  Directions (copy/paste from chart):   Take 10 mg by mouth once daily for 14 days, THEN 25 mg once daily. - Oral  Is this a 30 day or 90 day RX:    Local pharmacy or mail order pharmacy:  local  Pharmacy name and phone # (copy/paste from chart):   [facility] - [place], [place] - [address] [phone] (Phone)  [phone] (Fax)  Comments:

## 2020-07-22 DIAGNOSIS — E11.51 TYPE 2 DIABETES, WITH PERIPHERAL CIRCULATORY DISORDER NOT AT GOAL: ICD-10-CM

## 2020-07-22 RX ORDER — DULAGLUTIDE 1.5 MG/.5ML
INJECTION, SOLUTION SUBCUTANEOUS
Qty: 6 ML | Refills: 0 | Status: SHIPPED | OUTPATIENT
Start: 2020-07-22 | End: 2020-11-04 | Stop reason: SDUPTHER

## 2020-07-22 NOTE — TELEPHONE ENCOUNTER
----- Message from Tari Johnston sent at 7/22/2020  3:03 PM CDT -----  Contact: [facility]/[phone]  Requesting an RX refill or new RX.  Is this a refill or new RX:  refill 1  RX name and strength: TRULICITY 1.5 mg/0.5 mL PnIj  Directions (copy/paste from chart):    Is this a 30 day or 90 day RX:    Local pharmacy or mail order pharmacy:  local pharmacy  Pharmacy name and phone # (copy/paste from chart):   [facility] [place], [place] - [address] [phone] (Phone)  [phone] (Fax)      Comments:

## 2020-07-22 NOTE — TELEPHONE ENCOUNTER
Care Due:                  Date            Visit Type   Department     Provider  --------------------------------------------------------------------------------                                ESTABLISHED   [facility] INTERNAL  Last Visit: 07-      PATIENT      MEDICINE       CORRINE DORAN                              ESTABLISHED   [facility] INTERNAL  Next Visit: 01-      PATIENT      MEDICINE       CORRINE DORAN                                                            Last  Test          Frequency    Reason                     Performed    Due Date  --------------------------------------------------------------------------------    Cr..........  6 months...  hydroCHLOROthiazide......  02- 08-    K...........  6 months...  hydroCHLOROthiazide......  02- 08-    Na..........  6 months...  hydroCHLOROthiazide......  02- 08-    Powered by [employer]. Reference number: [id number]. 7/22/2020 4:09:22 PM CDT

## 2020-07-30 NOTE — PROGRESS NOTES
Subjective:      Patient ID: Esteban Mike is a 69 y.o. male.    Chief Complaint:  Follow-up    History of Present Illness  Esteban Mike with presents today for follow up of Type 2 DM. Previous patient of mine. Last seen in Jan 2020.     In the past, he was not checking BGs and eating whatever he wanted.     Did not read portal message from Feb 2020 to start Jardiance and stop HCTZ due to hypercalciemia. We spoke with him in March 2020 and instructed him to start Jardiance and stop HCTZ.     With regards to the diabetes:    Diagnosed with Type 2 DM in 2008  Family History of Type 2 DM in mother and father, brother  Known complications:  DKA/HHS:   RN: up to date; Mild NPDR OU per Dr. Braun note in 1/23/2020  PN: +   Nephropathy: -;  CAD: CABG x4 in 2008, post right CEA 2006 and left ICA stent 2007.   PVD: +    NAFLD. 06/2014 liver Bx: simple steatosis with no steatohepatitis or fibrosis     Current regimen:  Trulicity 1.5 mg weekly -started around Aug 2019  Metformin XR 1000mg BID  Glipizide 5 mg twice daily  Jardiance 25 mg daily    He is going to a pharmacy and getting pre-made pack of medications.    Missed doses? -sometimes he misses 2nd dose of medication    Other medications tried:   Tradjenta 5 mg daily -taken off to put on Trulicity  MDI after CABG    1 # times a weekly testing  BG in clinic today is 102 fasting  See below for log  Hypoglycemic event-denies  Yes, did not need assistance   Knows how to correct with 15 grams of carbs- juice, coke, or a peppermint.         Dietary recall: He feels that he is trying to following diabetic diet.   Eats 3 meals a day.   B: biscuit and cup of coffee - splenda  L: out to eat  D: out to eat  Snacks: ice cream and candy  Drinks: water and diet drinks    Exercise - tried to stay active - No formal exercise. He cuts grass during the summer. He has been dealing with a lot of back pain lately.      Education - last visit: 8/21/19 KEYONA Holman RN    Has  a Medic alert tag-none and does not want  Glucagon/Baqsimi-  Lives alone    Diabetes Management Status  Statin: Taking  ACE/ARB: Taking    Lab Results   Component Value Date    HGBA1C 7.2 (H) 06/04/2020    HGBA1C 9.3 (H) 02/03/2020    HGBA1C 10.3 (H) 01/13/2020     Screening or Prevention Patient's value Goal Complete/Controlled?   HgA1C Testing and Control   Lab Results   Component Value Date    HGBA1C 7.2 (H) 06/04/2020      Annually/Less than 8% No   Lipid profile : 01/13/2020 Annually Yes   LDL control Lab Results   Component Value Date    LDLCALC 69 01/13/2020    Annually/Less than 100 mg/dl  No   Nephropathy screening Lab Results   Component Value Date    LABMICR 9.0 12/19/2017     Lab Results   Component Value Date    PROTEINUA NEGATIVE 11/02/2017    Annually No   Blood pressure BP Readings from Last 1 Encounters:   08/03/20 120/70    Less than 140/90 Yes   Dilated retinal exam : 01/23/2020 Annually Yes   Foot exam   : 01/14/2020 Annually No     With regards to hypercalcemia,     We noted high calcium on Jan 2020 labs. We checked with PCP to stop HCTZ and he agreed but needed patient to go for a BP check. We instructed patient to stop HCTZ but he did not and follow up with PCP for BP check.      Ref. Range 11/2/2017 15:19 4/11/2018 07:34 10/3/2018 11:30 1/13/2020 09:31 2/3/2020 13:17   Calcium Latest Ref Range: 8.6 - 10.3 mg/dL 10.4 (H) 9.9 10.1 10.4 (H) 10.9 (H)        Ref. Range 9/3/2016 08:01   Vit D, 25-Hydroxy Latest Ref Range: 30 - 96 ng/mL 30       Review of Systems   Constitutional: Positive for fatigue.   Eyes: Negative for visual disturbance.   Respiratory: Negative for shortness of breath.    Cardiovascular: Negative for chest pain.   Gastrointestinal: Negative for abdominal pain.   Endocrine: Positive for polydipsia. Negative for polyphagia and polyuria.   Musculoskeletal: Positive for back pain. Negative for arthralgias.   Skin: Negative for wound.   Neurological: Negative for headaches.  "  Hematological: Does not bruise/bleed easily.   Psychiatric/Behavioral: Positive for sleep disturbance.     Objective:   Physical Exam  Vitals signs reviewed.   Constitutional:       Appearance: He is well-developed.   Neck:      Thyroid: No thyromegaly.   Cardiovascular:      Rate and Rhythm: Normal rate.   Pulmonary:      Effort: Pulmonary effort is normal.   Abdominal:      Palpations: Abdomen is soft.     injection sites are without edema or erythema. No lipo hypertropthy or atrophy  Diabetes Foot Exam:   No sores or lesions to bilateral feet  Shoes appropriate  DM foot exam deferred; done in Jan 2020    Visit Vitals  /70   Ht 5' 7" (1.702 m)   Wt 79.4 kg (175 lb)   BMI 27.41 kg/m²        Lab Review:   Lab Results   Component Value Date    HGBA1C 7.2 (H) 06/04/2020    HGBA1C 9.3 (H) 02/03/2020    HGBA1C 10.3 (H) 01/13/2020      Lab Results   Component Value Date    CHOL 127 01/13/2020    HDL 38 (L) 01/13/2020    LDLCALC 69 01/13/2020    TRIG 118 01/13/2020    CHOLHDL 3.3 01/13/2020     Lab Results   Component Value Date     02/03/2020    K 5.0 02/03/2020     02/03/2020    CO2 31 02/03/2020     (H) 02/03/2020    BUN 18 02/03/2020    CREATININE 0.95 02/03/2020    CALCIUM 10.9 (H) 02/03/2020    PROT 6.7 02/03/2020    ALBUMIN 4.6 02/03/2020    BILITOT 0.5 02/03/2020    ALKPHOS 83 02/03/2020    AST 39 (H) 02/03/2020    ALT 97 (H) 02/03/2020    ANIONGAP 7 (L) 09/03/2016    ESTGFRAFRICA 96 02/03/2020    EGFRNONAA 82 02/03/2020    TSH 0.69 04/11/2018     Vit D, 25-Hydroxy   Date Value Ref Range Status   09/03/2016 30 30 - 96 ng/mL Final     Comment:     Vitamin D deficiency.........<10 ng/mL                              Vitamin D insufficiency......10-29 ng/mL       Vitamin D sufficiency........> or equal to 30 ng/mL  Vitamin D toxicity............>100 ng/mL       Assessment and Plan     1. Diabetic polyneuropathy associated with type 2 diabetes mellitus  Ambulatory referral/consult to Diabetes " Education   2. Hypercalcemia  Renal function panel    Renal function panel   3. Hyperlipidemia, unspecified hyperlipidemia type     4. Hypertension, essential     5. Noncompliance     6. Non-insulin dependent type 2 diabetes mellitus  Ambulatory referral/consult to Endocrinology     Diabetic neuropathy associated with type 2 diabetes mellitus  -- Reviewed goals of therapy are to get the best control we can without hypoglycemia  -- Refer to diabetes education. Discussed that he is very close to diabetic control and he needs to work on lifestyle changes in order to prevent more medications.   Medication changes:   Continue current medications  Trulicity 1.5 mg weekly -started around Aug 2019  Metformin XR 1000mg BID  Glipizide 5 mg twice daily  Jardiance 25 mg daily  -- Advised frequent self blood glucose monitoring.  Patient encouraged to document glucose results and bring them to every clinic visit    -- Hypoglycemia precautions discussed. Instructed on precautions before driving.    -- Call for Bg repeatedly < 90 or > 180.   -- Close adherence to lifestyle changes recommended.   -- Periodic follow ups for eye evaluations, foot care and dental care suggested.  -- Encouraged exercise  -- Given information on low carb snacks and DM drinks. Encouraged to avoid ice cream and candy.   -- Discussed his goal A1c is <7%      Hyperlipidemia  Controlled   On statin per ADA recommendations      Hypertension, essential  -- on ARB  -- Controlled  -- Blood pressure goals discussed with patient      Noncompliance  -- Encouraged lifestyle modifications   -- Encouraged to check BG at least BID   -- Medication compliance has improved since he started with pre-made medication packets    Hypercalcemia  -- Had labs with [facility] last week though we only have an A1c. Will obtain labs from [facility].  -- Advised to stop HCTZ and make appt with PCP for a BP check in one month  -- Renal function panel in one month.      Follow up in about 3 months  (around 11/3/2020).

## 2020-08-03 ENCOUNTER — OFFICE VISIT (OUTPATIENT)
Dept: ENDOCRINOLOGY | Facility: CLINIC | Age: 70
End: 2020-08-03
Attending: FAMILY MEDICINE
Payer: COMMERCIAL

## 2020-08-03 VITALS
SYSTOLIC BLOOD PRESSURE: 120 MMHG | WEIGHT: 175 LBS | DIASTOLIC BLOOD PRESSURE: 70 MMHG | HEIGHT: 67 IN | BODY MASS INDEX: 27.47 KG/M2

## 2020-08-03 DIAGNOSIS — E11.42 DIABETIC POLYNEUROPATHY ASSOCIATED WITH TYPE 2 DIABETES MELLITUS: Primary | ICD-10-CM

## 2020-08-03 DIAGNOSIS — Z91.199 NONCOMPLIANCE: ICD-10-CM

## 2020-08-03 DIAGNOSIS — E78.5 HYPERLIPIDEMIA, UNSPECIFIED HYPERLIPIDEMIA TYPE: ICD-10-CM

## 2020-08-03 DIAGNOSIS — I10 HYPERTENSION, ESSENTIAL: ICD-10-CM

## 2020-08-03 DIAGNOSIS — E11.9 NON-INSULIN DEPENDENT TYPE 2 DIABETES MELLITUS: Chronic | ICD-10-CM

## 2020-08-03 DIAGNOSIS — E83.52 HYPERCALCEMIA: ICD-10-CM

## 2020-08-03 PROCEDURE — 3078F DIAST BP <80 MM HG: CPT | Mod: CPTII,S$GLB,, | Performed by: NURSE PRACTITIONER

## 2020-08-03 PROCEDURE — 3051F HG A1C>EQUAL 7.0%<8.0%: CPT | Mod: CPTII,S$GLB,, | Performed by: NURSE PRACTITIONER

## 2020-08-03 PROCEDURE — 99214 OFFICE O/P EST MOD 30 MIN: CPT | Mod: S$GLB,,, | Performed by: NURSE PRACTITIONER

## 2020-08-03 PROCEDURE — 99999 PR PBB SHADOW E&M-EST. PATIENT-LVL V: CPT | Mod: PBBFAC,,, | Performed by: NURSE PRACTITIONER

## 2020-08-03 PROCEDURE — 1159F MED LIST DOCD IN RCRD: CPT | Mod: S$GLB,,, | Performed by: NURSE PRACTITIONER

## 2020-08-03 PROCEDURE — 3051F PR MOST RECENT HEMOGLOBIN A1C LEVEL 7.0 - < 8.0%: ICD-10-PCS | Mod: CPTII,S$GLB,, | Performed by: NURSE PRACTITIONER

## 2020-08-03 PROCEDURE — 1126F PR PAIN SEVERITY QUANTIFIED, NO PAIN PRESENT: ICD-10-PCS | Mod: S$GLB,,, | Performed by: NURSE PRACTITIONER

## 2020-08-03 PROCEDURE — 99999 PR PBB SHADOW E&M-EST. PATIENT-LVL V: ICD-10-PCS | Mod: PBBFAC,,, | Performed by: NURSE PRACTITIONER

## 2020-08-03 PROCEDURE — 99214 PR OFFICE/OUTPT VISIT, EST, LEVL IV, 30-39 MIN: ICD-10-PCS | Mod: S$GLB,,, | Performed by: NURSE PRACTITIONER

## 2020-08-03 PROCEDURE — 1159F PR MEDICATION LIST DOCUMENTED IN MEDICAL RECORD: ICD-10-PCS | Mod: S$GLB,,, | Performed by: NURSE PRACTITIONER

## 2020-08-03 PROCEDURE — 3078F PR MOST RECENT DIASTOLIC BLOOD PRESSURE < 80 MM HG: ICD-10-PCS | Mod: CPTII,S$GLB,, | Performed by: NURSE PRACTITIONER

## 2020-08-03 PROCEDURE — 3008F BODY MASS INDEX DOCD: CPT | Mod: CPTII,S$GLB,, | Performed by: NURSE PRACTITIONER

## 2020-08-03 PROCEDURE — 3008F PR BODY MASS INDEX (BMI) DOCUMENTED: ICD-10-PCS | Mod: CPTII,S$GLB,, | Performed by: NURSE PRACTITIONER

## 2020-08-03 PROCEDURE — 3074F PR MOST RECENT SYSTOLIC BLOOD PRESSURE < 130 MM HG: ICD-10-PCS | Mod: CPTII,S$GLB,, | Performed by: NURSE PRACTITIONER

## 2020-08-03 PROCEDURE — 3074F SYST BP LT 130 MM HG: CPT | Mod: CPTII,S$GLB,, | Performed by: NURSE PRACTITIONER

## 2020-08-03 PROCEDURE — 1101F PR PT FALLS ASSESS DOC 0-1 FALLS W/OUT INJ PAST YR: ICD-10-PCS | Mod: CPTII,S$GLB,, | Performed by: NURSE PRACTITIONER

## 2020-08-03 PROCEDURE — 1101F PT FALLS ASSESS-DOCD LE1/YR: CPT | Mod: CPTII,S$GLB,, | Performed by: NURSE PRACTITIONER

## 2020-08-03 PROCEDURE — 1126F AMNT PAIN NOTED NONE PRSNT: CPT | Mod: S$GLB,,, | Performed by: NURSE PRACTITIONER

## 2020-08-03 NOTE — ASSESSMENT & PLAN NOTE
-- Encouraged lifestyle modifications   -- Encouraged to check BG at least BID   -- Medication compliance has improved since he started with pre-made medication packets

## 2020-08-03 NOTE — PATIENT INSTRUCTIONS
Continue current medications for diabetes    Stop HCTZ -hydrochlorthiazide    MAKE APPOINTMENT WITH DR DORAN FOR BP CHECK IN ONE MONTH      Diabetic drinks we recommend:   Water -BEST  Crystal light sugar free packets  Gatorade zero   Powerade zero  Tea without sweetener    Diabetic drinks we do not recommend:  Coke or any sugary regular soft drink  Coffee with sugar  Milk  Juice  Beer  Liquor    Sweeteners we recommend:  Mando Li    Note: Caffeine can increase your blood sugar   Snacks can be an important part of a balanced, healthy meal plan. They allow you to eat more frequently, feeling full and satisfied throughout the day. Also, they allow you to spread carbohydrates evenly, which may stabilize blood sugars.  Plus, snacks are enjoyable!     The amount of carbohydrate needed at snacks varies. Generally, about 15-30 grams of carbohydrate per snack is recommended.  Below you will find some tasty treats.       0-5 gm carb   Crystal Light   Vitamin Water Zero   Herbal tea, unsweetened   2 tsp peanut butter on celery   1./2 cup sugar-free jell-o   1 sugar-free popsicle   ¼ cup blueberries   8oz Blue Armida unsweetened almond milk   5 baby carrots & celery sticks, cucumbers, bell peppers dipped in ¼ cup salsa, 2Tbsp light ranch dressing or 2Tbsp plain Greek yogurt   10 Goldfish crackers   ½ oz low-fat cheese or string cheese   1 closed handful of nuts, unsalted   1 Tbsp of sunflower seeds, unsalted   1 cup Smart Pop popcorn   1 whole grain brown rice cake        15 gm carb   1 small piece of fruit or ½ banana or 1/2 cup lite canned fruit   3 gila cracker squares   3 cups Smart Pop popcorn, top spray butter, Coreas lite salt or cinnamon and Truvia   5 Vanilla Wafers   ½ cup low fat, no added sugar ice cream or frozen yogurt (Blue bell, Blue Bunny, Weight Watchers, Skinny Cow)   ½ turkey, ham, or chicken sandwich   ½ c fruit with ½ c Cottage cheese   4-6 unsalted wheat crackers  with 1 oz low fat cheese or 1 tbsp peanut butter    30-45 goldfish crackers (depending on flavor)    7-8 Zoroastrianism mini brown rice cakes (caramel, apple cinnamon, chocolate)    12 Zoroastrianism mini brown rice cakes (cheddar, bbq, ranch)    1/3 cup hummus dip with raw veg   1/2 whole wheat deb, 1Tbsp hummus   Mini Pizza (1/2 whole wheat English muffin, low-fat  cheese, tomato sauce)   100 calorie snack pack (Oreo, Chips Ahoy, Ritz Mix, Baked Cheetos)   4-6 oz. light or Greek Style yogurt (Chobani, Yoplait, Okios, [place])   ½ cup sugar-free pudding     6 in. wheat tortilla or deb oven toasted chips (topped with spray butter flavoring, cinnamon, Truvia OR spray butter, garlic powder, chili powder)    18 BBQ Popchips (available at [employer], Whole Foods, Fresh Market)       Hypoglycemia - Low Blood Sugar    What can you do?  Rule of 15:    Test your blood sugar   If glucose is between 50-70 mg/dL then ingest 15 grams of fast-acting carbs   If glucose is less than 50 mg/dL then ingest 30 grams of fast-acting carbs   Ingest 15 grams of fast-acting carbohydrate - such as:   a. 3-4 glucose tablets  b. 4 oz juice  c. ½ can regular soda pop  d. 15 skittles or mini jelly beans    Re-check your blood sugar in 15 minutes. If its less than 70mg/dl, repeat steps 2 and 3.   If your next meal is more than 1 hour away, eat an additional 15 grams of carbohydrate and 1 ounce of protein (examples include crackers with cheese or one-half of a sandwich with peanut butter). It is important not to eat too much because this can raise your blood sugar above the target level.      After your blood sugar has normalized, think about why you went low. If you notice a pattern of low blood sugars, contact your Diabetes Team. We may need to adjust your medication.

## 2020-08-03 NOTE — LETTER
August 3, 2020      Ned Hartley MD  [address]  [facility] [address]           [facility] - Endocrinology [address]th [address]  [address]  [facility] [address]  Phone: [phone]  Fax: [phone]          Patient: Esteban Mike   MR Number: [id number]   YOB: 1950   Date of Visit: 8/3/2020       Dear Dr. Ned Hartley:    Thank you for referring Esteban Mike to me for evaluation. Attached you will find relevant portions of my assessment and plan of care.    If you have questions, please do not hesitate to call me. I look forward to following Esteban Mike along with you.    Sincerely,    Jaon Saab, BLAYNE    Enclosure  CC:  No Recipients    If you would like to receive this communication electronically, please contact [email or web address] or [phone] to request more information on "[employer] Link access.    For providers and/or their staff who would like to refer a patient to [facility], please contact us through our one-stop-shop provider referral line, [facility], at [phone].    If you feel you have received this communication in error or would no longer like to receive these types of communications, please e-mail [email or web address]

## 2020-08-03 NOTE — ASSESSMENT & PLAN NOTE
-- Reviewed goals of therapy are to get the best control we can without hypoglycemia  -- Refer to diabetes education. Discussed that he is very close to diabetic control and he needs to work on lifestyle changes in order to prevent more medications.   Medication changes:   Continue current medications  Trulicity 1.5 mg weekly -started around Aug 2019  Metformin XR 1000mg BID  Glipizide 5 mg twice daily  Jardiance 25 mg daily  -- Advised frequent self blood glucose monitoring.  Patient encouraged to document glucose results and bring them to every clinic visit    -- Hypoglycemia precautions discussed. Instructed on precautions before driving.    -- Call for Bg repeatedly < 90 or > 180.   -- Close adherence to lifestyle changes recommended.   -- Periodic follow ups for eye evaluations, foot care and dental care suggested.  -- Encouraged exercise  -- Given information on low carb snacks and DM drinks. Encouraged to avoid ice cream and candy.   -- Discussed his goal A1c is <7%

## 2020-08-03 NOTE — ASSESSMENT & PLAN NOTE
-- Had labs with [facility] last week though we only have an A1c. Will obtain labs from [facility].  -- Advised to stop HCTZ and make appt with PCP for a BP check in one month  -- Renal function panel in one month.

## 2020-08-04 ENCOUNTER — PATIENT OUTREACH (OUTPATIENT)
Dept: ADMINISTRATIVE | Facility: OTHER | Age: 70
End: 2020-08-04

## 2020-08-04 NOTE — PROGRESS NOTES
Requested updates within Care Everywhere.  Patient's chart was reviewed for overdue KOMAL topics.  Immunizations reconciled.    Orders placed:n/a  Labs Linked:n/a

## 2020-08-05 ENCOUNTER — PATIENT OUTREACH (OUTPATIENT)
Dept: OTHER | Facility: OTHER | Age: 70
End: 2020-08-05

## 2020-08-05 ENCOUNTER — OFFICE VISIT (OUTPATIENT)
Dept: ORTHOPEDICS | Facility: CLINIC | Age: 70
End: 2020-08-05
Payer: COMMERCIAL

## 2020-08-05 VITALS — WEIGHT: 172.81 LBS | HEIGHT: 67 IN | BODY MASS INDEX: 27.12 KG/M2

## 2020-08-05 DIAGNOSIS — M54.16 LUMBAR RADICULOPATHY: Primary | ICD-10-CM

## 2020-08-05 PROCEDURE — 1101F PT FALLS ASSESS-DOCD LE1/YR: CPT | Mod: CPTII,S$GLB,, | Performed by: ORTHOPAEDIC SURGERY

## 2020-08-05 PROCEDURE — 3008F BODY MASS INDEX DOCD: CPT | Mod: CPTII,S$GLB,, | Performed by: ORTHOPAEDIC SURGERY

## 2020-08-05 PROCEDURE — 1125F AMNT PAIN NOTED PAIN PRSNT: CPT | Mod: S$GLB,,, | Performed by: ORTHOPAEDIC SURGERY

## 2020-08-05 PROCEDURE — 99213 PR OFFICE/OUTPT VISIT, EST, LEVL III, 20-29 MIN: ICD-10-PCS | Mod: S$GLB,,, | Performed by: ORTHOPAEDIC SURGERY

## 2020-08-05 PROCEDURE — 1125F PR PAIN SEVERITY QUANTIFIED, PAIN PRESENT: ICD-10-PCS | Mod: S$GLB,,, | Performed by: ORTHOPAEDIC SURGERY

## 2020-08-05 PROCEDURE — 1159F PR MEDICATION LIST DOCUMENTED IN MEDICAL RECORD: ICD-10-PCS | Mod: S$GLB,,, | Performed by: ORTHOPAEDIC SURGERY

## 2020-08-05 PROCEDURE — 1101F PR PT FALLS ASSESS DOC 0-1 FALLS W/OUT INJ PAST YR: ICD-10-PCS | Mod: CPTII,S$GLB,, | Performed by: ORTHOPAEDIC SURGERY

## 2020-08-05 PROCEDURE — 3008F PR BODY MASS INDEX (BMI) DOCUMENTED: ICD-10-PCS | Mod: CPTII,S$GLB,, | Performed by: ORTHOPAEDIC SURGERY

## 2020-08-05 PROCEDURE — 99213 OFFICE O/P EST LOW 20 MIN: CPT | Mod: S$GLB,,, | Performed by: ORTHOPAEDIC SURGERY

## 2020-08-05 PROCEDURE — 1159F MED LIST DOCD IN RCRD: CPT | Mod: S$GLB,,, | Performed by: ORTHOPAEDIC SURGERY

## 2020-08-05 PROCEDURE — 99999 PR PBB SHADOW E&M-EST. PATIENT-LVL IV: CPT | Mod: PBBFAC,,, | Performed by: ORTHOPAEDIC SURGERY

## 2020-08-05 PROCEDURE — 99999 PR PBB SHADOW E&M-EST. PATIENT-LVL IV: ICD-10-PCS | Mod: PBBFAC,,, | Performed by: ORTHOPAEDIC SURGERY

## 2020-08-11 NOTE — PROGRESS NOTES
THE PATIENT RETURNS FOR FOLLOW-UP.  I SAW HIM 4 WEEKS AGO FOR LOW BACK PAIN AND RIGHT LOWER EXTREMITY PAIN.    HE HAS BEEN TAKING ANTI-INFLAMMATORIES, GOING TO PHYSICAL THERAPY, AND ALSO HAS BEEN SEEKING CHIROPRACTIC CARE.    OVERALL HE IS MOVING IN THE RIGHT DIRECTION.    TODAY WE DISCUSSED OPTIONS, I THINK HE IS DOING WELL WITH CONSERVATIVE MANAGEMENT I RECOMMENDED THAT HE CONTINUE TO TREAT THIS CONSERVATIVELY.    I spent 15 minutes with the patient of which greater than 1/2 the time was devoted to counciling the patient regarding treatment options.

## 2020-08-16 ENCOUNTER — PATIENT OUTREACH (OUTPATIENT)
Dept: ADMINISTRATIVE | Facility: OTHER | Age: 70
End: 2020-08-16

## 2020-08-16 NOTE — PROGRESS NOTES
LINKS immunization registry updated  Care Everywhere updated  Health Maintenance updated  Chart reviewed for overdue [facility] Encounters health maintenance testing  Chart/Media reviewed for: colon cancer screening results  Orders entered:N/A

## 2020-08-17 ENCOUNTER — CLINICAL SUPPORT (OUTPATIENT)
Dept: DIABETES | Facility: CLINIC | Age: 70
End: 2020-08-17
Payer: COMMERCIAL

## 2020-08-17 DIAGNOSIS — E11.9 NON-INSULIN DEPENDENT TYPE 2 DIABETES MELLITUS: Primary | ICD-10-CM

## 2020-08-17 DIAGNOSIS — E11.9 NON-INSULIN DEPENDENT TYPE 2 DIABETES MELLITUS: ICD-10-CM

## 2020-08-17 DIAGNOSIS — E11.42 DIABETIC POLYNEUROPATHY ASSOCIATED WITH TYPE 2 DIABETES MELLITUS: ICD-10-CM

## 2020-08-17 PROCEDURE — 99999 PR PBB SHADOW E&M-EST. PATIENT-LVL III: ICD-10-PCS | Mod: PBBFAC,,, | Performed by: DIETITIAN, REGISTERED

## 2020-08-17 PROCEDURE — 99999 PR PBB SHADOW E&M-EST. PATIENT-LVL III: CPT | Mod: PBBFAC,,, | Performed by: DIETITIAN, REGISTERED

## 2020-08-17 PROCEDURE — G0108 DIAB MANAGE TRN  PER INDIV: HCPCS | Mod: S$GLB,,, | Performed by: DIETITIAN, REGISTERED

## 2020-08-17 PROCEDURE — G0108 PR DIAB MANAGE TRN  PER INDIV: ICD-10-PCS | Mod: S$GLB,,, | Performed by: DIETITIAN, REGISTERED

## 2020-08-17 RX ORDER — EMPAGLIFLOZIN 25 MG/1
25 TABLET, FILM COATED ORAL DAILY
Qty: 90 TABLET | Refills: 3 | Status: SHIPPED | OUTPATIENT
Start: 2020-08-17 | End: 2020-09-16

## 2020-08-18 VITALS — HEIGHT: 67 IN | BODY MASS INDEX: 27.12 KG/M2 | WEIGHT: 172.81 LBS

## 2020-08-18 NOTE — PROGRESS NOTES
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 Current DM medications are metformin XR 500mg, Jardiance 25mg, Glipizide 5mg, Trulicity 1.5)  Reviewed Problem List with Patient: Yes    Health Maintenance was reviewed today with patient. Discussed with patient importance of routine eye exams, foot exams/foot care, blood work (i.e.: A1c, microalbumin, and lipid), dental visits, yearly flu vaccine, and pneumonia vaccine as indicated by PCP. Patient verbalized understanding.     Health Maintenance Topics with due status: Not Due       Topic Last Completion Date    TETANUS VACCINE 10/12/2018    Influenza Vaccine 10/09/2019    Lipid Panel 01/13/2020    Foot Exam 01/14/2020    Eye Exam 01/23/2020    PROSTATE-SPECIFIC ANTIGEN 06/04/2020    Hemoglobin A1c 06/04/2020    High Dose Statin 08/11/2020    Aspirin/Antiplatelet Therapy 08/11/2020     Health Maintenance Due   Topic Date Due    Colorectal Cancer Screening  01/23/2020       Nutrition  Meal Planning: water, eats out often, 3 meals per day, drinks regular soda, diet drinks, artificial sweeteners  What type of sweetener do you use?: Splenda  What type of beverages do you drink?: other (see comments), juice, water, sport drinks, diet soda/tea, regular soda/tea(Coffee, Diet Rootbeer, Regular Coke (occassionally), Tea (mixes 1/2 sweet and 1/2 unsweet))  Meal Plan 24 Hour Recall - Breakfast: Tanja's biscuit,  coffee with splenda  Meal Plan 24 Hour Recall - Lunch: [employer] 2 slices  Meal Plan 24 Hour Recall - Dinner: Boiled shrimp and crabs  Meal Plan 24 Hour Recall - Snack: candy, chips, cake occassionally    Monitoring   Monitoring: Other  Self Monitoring : Followed by digital medicine, BS logs: 99, 140, 124, 144, 131, 147, 168, 120, 119, 149, 72(Checked it this AM - BG was 174; follows with digital medicine - has checked BG 12 times since 10/18/19 - Lowest reading 159; Highest reading 312)  Blood Glucose Logs: Yes  Do you use a personal continuous glucose monitor?: No  In the last month, how often have you had a low blood sugar reaction?: never  What are your symptoms of low blood sugar?: able to recall weak  How do you treat low blood sugar?: able to recall eat candy or drink a coke  Can you tell when your blood sugar is too high?: no  How do you treat high blood sugar?: able to recall take medication and exercise    Exercise   Exercise Type: walking(cutting lawns)  Intensity: Moderate  Frequency: 3-5 Times per week  Duration: > 1 hour    Current Diabetes Treatment   Current Treatment: Oral Medication, Injectable(Sometimes misses dose of medication. 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  Smoking Status: Never a Smoker  Alcohol Use: Never            DDS-2 Score  ( > 3 = SIGNIFICANT DISTRESS): 4        Physician-Related Distress: 1     Interpersonal Distress: 1    Barriers to Change  Barriers to Change: Functional limitation(Back pain - referred to healthy back)  Learning Challenges : None    Readiness to Learn   Readiness to Learn : Acceptance    Cultural Influences  Cultural Influences: No    Diabetes Education  Assessment/Progress  Diabetes Disease Process (diabetes disease process and treatment options): Discussion, Individual Session, Written Materials Provided, Comprehends Key Points  Nutrition (Incorporating nutritional management into one's lifestyle): Instructed, Discussion, Individual Session, Written Materials Provided, Demonstrates Understanding/Competency (verbalizes/demonstrates), Demonstration, Comprehends Key Points(Reviewed general nutrition guidelines and plate method; patient states not likely to count carbs or read food labels)  Physical Activity (incorporating physical activity into one's lifestyle): Instructed, Discussion, Individual Session, Written Materials Provided, Demonstrates Understanding/Competency (verbalizes/demonstrates), Comprehends Key Points(Reviewed goals and benefits; has elliptical and outside bike at home; also discussed use of foot/arm cycle)  Medications (states correct name, dose, onset, peak, duration, side effects & timing of meds): Discussion, Individual Session, Written Materials Provided, Comprehends Key Points(Reviewed medication regimen)  Monitoring (monitoring blood glucose/other parameters & using results): Discussion, Individual Session, Written Materials Provided, Comprehends Key Points(Reviewed SMBG schedule and BG goals; interested in Keyonna; advised to check with digital medicine to see if he could use it and still follow with their program)  Acute Complications (preventing, detecting, and treating acute complications): Discussion, Individual Session, Written Materials Provided, Comprehends Key Points(Reviewed s/s and treatment of hypoglycemia)  Chronic Complications (preventing, detecting, and treating chronic complications): Discussion, Individual Session, Written Materials Provided, Comprehends Key Points(Reviewed standards of care; scheduled for eye exam)  Clinical (diabetes, other pertinent medical history, and relevant comorbidities reviewed during visit):  Discussion, Comprehends Key Points, Individual Session, Written Materials Provided  Cognitive (knowledge of self-management skills, functional health literacy): Discussion, Comprehends Key Points, Individual Session, Written Materials Provided  Psychosocial (emotional response to diabetes): Discussion, Comprehends Key Points, Individual Session, Written Materials Provided  Diabetes Distress and Support Systems: Individual Session, Written Materials Provided, Discussion, Comprehends Key Points(Time constraints)  Behavioral (readiness for change, lifestyle practices, self-care behaviors): Discussion, Comprehends Key Points, Individual Session, Written Materials Provided  Patient educated on what is DM, T1DM, T2DM, risk factors, managing DM, DM diet, carbohydrate counting, meal planning, reading a food label, healthy snack options, benefits of physical activity, diabetes care schedule, foot care guidelines, diabetes and retinopathy screening, s/s hypo and hyperglycemia, long/short term complication of uncontrolled DM, importance of compliance with treatment plan, how to use a glucometer, reviewed understanding diabetes distress, medications for treating DM, their mechanism of action and possible side effects, reviewed current level and goal level for HgbA1c, blood glucose, microalbumin, and lipids. Patient provided with written literature, diabetes management resources and support, DM Management program contact information.    Goals  Patient has selected/evaluated goals during today's session: Yes, evaluated  Healthy Eating: % Met  Met Percentage : 50%  Physical Activity: % Met  Met Percentage : 100%  Monitoring: % Met  Met Percentage : 75%  Medications: % Met  Met Percentage : 75%  Problem Solving: In Progress  Healthy Coping: In Progress  Reducing Risks: In Progress         Diabetes Care Plan/Intervention  Education Plan/Intervention: Individual Follow-Up DSMT    Diabetes Meal Plan  Restrictions: Restricted  Carbohydrate, Low Sodium, Low Fat  Calories: 1800  Carbohydrate Per Meal: 45-60g  Carbohydrate Per Snack : 7-15g  Fat: 50  Protein: 135    Today's Self-Management Care Plan was developed with the patient's input and is based on barriers identified during today's assessment.    The long and short-term goals in the care plan were written with the patient/caregiver's input. The patient has agreed to work toward these goals to improve his overall diabetes control.      The patient received a copy of today's self-management plan and verbalized understanding of the care plan, goals, and all of today's instructions.      The patient was encouraged to communicate with his physician and care team regarding his condition(s) and treatment.  I provided the patient with my contact information today and encouraged him to contact me via phone or patient portal as needed.     Education Units of Time   Time Spent: 60 min

## 2020-08-21 ENCOUNTER — PATIENT OUTREACH (OUTPATIENT)
Dept: OTHER | Facility: OTHER | Age: 70
End: 2020-08-21

## 2020-08-21 ENCOUNTER — TELEPHONE (OUTPATIENT)
Dept: DIABETES | Facility: CLINIC | Age: 70
End: 2020-08-21

## 2020-08-21 DIAGNOSIS — E11.51 TYPE 2 DIABETES, WITH PERIPHERAL CIRCULATORY DISORDER NOT AT GOAL: Primary | ICD-10-CM

## 2020-08-21 RX ORDER — AMLODIPINE BESYLATE 5 MG/1
5 TABLET ORAL DAILY
Qty: 30 TABLET | Refills: 1 | Status: SHIPPED | OUTPATIENT
Start: 2020-08-21 | End: 2020-09-14 | Stop reason: SDUPTHER

## 2020-08-21 NOTE — TELEPHONE ENCOUNTER
----- Message from Gopi Graf sent at 8/20/2020  2:51 PM CDT -----  Regarding: monitor fell off to test blood sugar        The Pt would like for you to call him back asap since his monitor fell off a few hours ago.  He said he just got this monitor earlier this week and it fell off.    Phone # [phone]

## 2020-08-21 NOTE — PROGRESS NOTES
Digital Medicine: Clinician Follow-Up    The history is provided by the patient.      Review of patient's allergies indicates:   -- Amlodipine     --  Patient states this may have caused problems with             his gums.  Unclear whether he has actually stopped             the medication or not.  Not a true allergy.   -- Hydrochlorothiazide     --  Medication stopped by Endocrinology Department due             to possible alterations in serum calcium.  Not a             true allergy.  Follow-up reason(s): Alert received.   Care Team received high BP alert.      Hypertension    Readings are trending up due to patient no longer taking HCTZ. He is also taking meloxicam 15 mg QD.  Patient is not experiencing signs/symptoms of hypertension.          Last 5 Patient Entered Readings                                      Current 30 Day Average: 158/85     Recent Readings 8/21/2020 8/12/2020 8/9/2020 8/4/2020 7/28/2020    SBP (mmHg) 191 173 152 144 140    DBP (mmHg) 88 85 73 83 96    Pulse 58 54 64 62 69        Last 6 Patient Entered Readings                                          Most Recent A1c: 7.2% on 6/4/2020  (Goal: 8%)     Recent Readings 8/21/2020 8/20/2020 8/19/2020 8/18/2020 8/18/2020    Blood Glucose (mg/dL) 92 171 94 99 130             Depression Screening  Did not address depression screening.    Sleep Apnea Screening    Did not address sleep apnea screening.     Medication Affordability Screening  Did not address medication affordability screening.     Medication Adherence-Medication adherence was assessed.           ASSESSMENT(S)  Patient's A1C goal is less than or equal to 8 per 2020 ADA guidelines. Patient's most recent A1C result is at goal. Lab Results    Component                Value               Date                     LABA1C                   6.8 (H)             04/11/2018               HGBA1C                   7.2 (H)             06/04/2020          .     Patients BP average is 158/85 mmHg,  which is above goal. Patient's BP goal is less than or equal to 130/80 per 2017 ACC/AHA Hypertension Guidelines.       Hypertension Plan  Medication change. Will start amlodipine 5 mg QD.  Additional monitoring needed.  Spoke to patient's pharmacy as he receives medications in pill packs. They will add amlodipine in pill packs for the next 3 weeks while we are establishing maintenance dose.   Diabetes Plan  Continue current therapy.  Patient is currently wearing CGM.      Addressed any questions or concerns and patient has my contact information if needed prior to next outreach. Patient verbalizes understanding.      Explained to the patient that the Digital Medicine team is not available for emergencies. Advised patient call [facility] ([phone] or [phone]) or [phone] if needed.         There are no preventive care reminders to display for this patient.      Hypertension Medications             amLODIPine (NORVASC) 5 MG tablet Take 1 tablet (5 mg total) by mouth once daily.    carvediloL (COREG) 3.125 MG tablet TAKE ONE TABLET BY MOUTH TWICE DAILY WITH MEALS FOR BLOOD PRESSURE AND (STOP ATENOLOL)    irbesartan (AVAPRO) 300 MG tablet Take 1 tablet (300 mg total) by mouth every evening.            Diabetes Medications             dulaglutide (TRULICITY) 1.5 mg/0.5 mL pen injector INJECT 1.5MG INTO SKIN EVERY SEVEN DAYS    empagliflozin (JARDIANCE) 25 mg tablet Take 1 tablet (25 mg total) by mouth once daily.    glipiZIDE (GLUCOTROL) 5 MG tablet TAKE ONE TABLET BY MOUTH TWICE DAILY WITH MEALS FOR DIABETES    metFORMIN (GLUCOPHAGE-XR) 500 MG XR 24hr tablet TAKE TWO TABLETS BY MOUTH TWICE DAILY WITH MEALS FOR DIABETES

## 2020-09-08 ENCOUNTER — NUTRITION (OUTPATIENT)
Dept: DIABETES | Facility: CLINIC | Age: 70
End: 2020-09-08
Payer: COMMERCIAL

## 2020-09-08 VITALS — BODY MASS INDEX: 27.12 KG/M2 | HEIGHT: 67 IN | WEIGHT: 172.81 LBS

## 2020-09-08 DIAGNOSIS — E11.9 NON-INSULIN DEPENDENT TYPE 2 DIABETES MELLITUS: ICD-10-CM

## 2020-09-08 DIAGNOSIS — E11.42 DIABETIC POLYNEUROPATHY ASSOCIATED WITH TYPE 2 DIABETES MELLITUS: Primary | ICD-10-CM

## 2020-09-08 PROCEDURE — G0108 DIAB MANAGE TRN  PER INDIV: HCPCS | Mod: S$GLB,,, | Performed by: DIETITIAN, REGISTERED

## 2020-09-08 PROCEDURE — 99999 PR PBB SHADOW E&M-EST. PATIENT-LVL III: ICD-10-PCS | Mod: PBBFAC,,, | Performed by: DIETITIAN, REGISTERED

## 2020-09-08 PROCEDURE — G0108 PR DIAB MANAGE TRN  PER INDIV: ICD-10-PCS | Mod: S$GLB,,, | Performed by: DIETITIAN, REGISTERED

## 2020-09-08 PROCEDURE — 99999 PR PBB SHADOW E&M-EST. PATIENT-LVL III: CPT | Mod: PBBFAC,,, | Performed by: DIETITIAN, REGISTERED

## 2020-09-08 NOTE — PROGRESS NOTES
Diabetes Education  Author: Mini Cochran RD, CDE  Date: 9/8/2020    Diabetes Care Management Summary  Diabetes Education Record Assessment/Progress: Comprehensive/Group(Last seen for DE 8/21/19; also follows with digital medicine)  Current Diabetes Risk Level: Moderate     Last A1c:   Lab Results   Component Value Date    HGBA1C 7.2 (H) 06/04/2020    HGBA1C 9.7 07/08/2019     Last visit with Diabetes Educator: : 01/15/2020      Diabetes Type  Diabetes Type : Type II    Diabetes History  Diabetes Diagnosis: >10 years  Current Treatment: Oral Medication, Diet, Injectable(metformin XR 500mg, Jardiance 25mg, Glipizide 5mg, Trulicity 1.5)  Reviewed Problem List with Patient: Yes    Health Maintenance was reviewed today with patient. Discussed with patient importance of routine eye exams, foot exams/foot care, blood work (i.e.: A1c, microalbumin, and lipid), dental visits, yearly flu vaccine, and pneumonia vaccine as indicated by PCP. Patient verbalized understanding.     Health Maintenance Topics with due status: Not Due       Topic Last Completion Date    TETANUS VACCINE 10/12/2018    Lipid Panel 01/13/2020    Foot Exam 01/14/2020    Eye Exam 01/23/2020    PROSTATE-SPECIFIC ANTIGEN 06/04/2020    High Dose Statin 08/11/2020    Aspirin/Antiplatelet Therapy 08/11/2020     Health Maintenance Due   Topic Date Due    Colorectal Cancer Screening  01/23/2020    Influenza Vaccine (1) 08/01/2020    Hemoglobin A1c  09/04/2020       Nutrition  Meal Planning: 3 meals per day, diet drinks, water, drinks regular soda, eats out often, snacks between meal  What type of sweetener do you use?: Splenda  What type of beverages do you drink?: other (see comments), juice, water, sport drinks, diet soda/tea, regular soda/tea(Coffee, Diet Rootbeer, Regular Coke (occassionally), Tea (mixes 1/2 sweet and 1/2 unsweet))  Meal Plan 24 Hour Recall - Breakfast: Tanja's biscuit, coffee with splenda  Meal Plan 24 Hour Recall - Lunch: [employer] 2  slices  Meal Plan 24 Hour Recall - Dinner: Boiled shrimp and crabs  Meal Plan 24 Hour Recall - Snack: candy, chips, cake occassionally    Monitoring   Monitoring: Other  Self Monitoring : Followed by digital medicine, BS logs: 99, 140, 124, 144, 131, 147, 168, 120, 119, 149, 72(Checked it this AM - BG was 174; follows with digital medicine - has checked BG 12 times since 10/18/19 - Lowest reading 159; Highest reading 312)  Blood Glucose Logs: Yes  Do you use a personal continuous glucose monitor?: No  In the last month, how often have you had a low blood sugar reaction?: never  What are your symptoms of low blood sugar?: weak  How do you treat low blood sugar?: able to recall eat candy or drink a coke  Can you tell when your blood sugar is too high?: no  How do you treat high blood sugar?: medication            Exercise   Exercise Type: exercise class(physical therapy)  Frequency: 3-5 Times per week  Duration: 1 hour    Current Diabetes Treatment   Current Treatment: Oral Medication, Diet, Injectable(metformin XR 500mg, Jardiance 25mg, Glipizide 5mg, Trulicity 1.5)    Social History  Preferred Learning Method: Face to Face  Primary Support: Self, Family  Educational Level: High School  Smoking Status: Never a Smoker  Alcohol Use: Rarely                                Barriers to Change  Barriers to Change: Functional limitation(Back pain - referred to healthy back)  Learning Challenges : None    Readiness to Learn   Readiness to Learn : Acceptance    Cultural Influences  Cultural Influences: No    Diabetes Education Assessment/Progress  Diabetes Disease Process (diabetes disease process and treatment options): Discussion, Individual Session, Comprehends Key Points  Nutrition (Incorporating nutritional management into one's lifestyle): Instructed, Discussion, Individual Session, Demonstrates Understanding/Competency (verbalizes/demonstrates), Demonstration, Comprehends Key Points(Reviewed general nutrition guidelines  and plate method; patient states not likely to count carbs or read food labels)  Physical Activity (incorporating physical activity into one's lifestyle): Instructed, Discussion, Individual Session, Demonstrates Understanding/Competency (verbalizes/demonstrates), Comprehends Key Points(Reviewed goals and benefits; has elliptical and outside bike at home; also discussed use of foot/arm cycle)  Medications (states correct name, dose, onset, peak, duration, side effects & timing of meds): Discussion, Individual Session, Comprehends Key Points(Reviewed medication regimen)  Monitoring (monitoring blood glucose/other parameters & using results): Discussion, Individual Session, Comprehends Key Points(Reviewed SMBG schedule and BG goals; interested in Keyonna; advised to check with digital medicine to see if he could use it and still follow with their program)  Acute Complications (preventing, detecting, and treating acute complications): Discussion, Individual Session, Comprehends Key Points(Reviewed s/s and treatment of hypoglycemia)  Chronic Complications (preventing, detecting, and treating chronic complications): Discussion, Individual Session, Comprehends Key Points(Reviewed standards of care; scheduled for eye exam)  Clinical (diabetes, other pertinent medical history, and relevant comorbidities reviewed during visit): Discussion, Comprehends Key Points, Individual Session  Cognitive (knowledge of self-management skills, functional health literacy): Discussion, Comprehends Key Points, Individual Session  Psychosocial (emotional response to diabetes): Discussion, Comprehends Key Points, Individual Session  Diabetes Distress and Support Systems: Individual Session, Discussion, Comprehends Key Points(Time constraints)  Behavioral (readiness for change, lifestyle practices, self-care behaviors): Discussion, Comprehends Key Points, Individual Session    Goals  Patient has selected/evaluated goals during today's session:  Yes, evaluated  Physical Activity: In Progress  Monitoring: In Progress  Medications: In Progress  Problem Solving: In Progress  Healthy Coping: In Progress  Reducing Risks: In Progress         Diabetes Care Plan/Intervention  Education Plan/Intervention: Individual Follow-Up DSMT    Diabetes Meal Plan  Restrictions: Restricted Carbohydrate, Low Sodium, Low Fat  Calories: 1800  Carbohydrate Per Meal: 45-60g  Carbohydrate Per Snack : 7-15g  Fat: 50  Protein: 135    Today's Self-Management Care Plan was developed with the patient's input and is based on barriers identified during today's assessment.    The long and short-term goals in the care plan were written with the patient/caregiver's input. The patient has agreed to work toward these goals to improve his overall diabetes control.      The patient received a copy of today's self-management plan and verbalized understanding of the care plan, goals, and all of today's instructions.      The patient was encouraged to communicate with his physician and care team regarding his condition(s) and treatment.  I provided the patient with my contact information today and encouraged him to contact me via phone or patient portal as needed.     Education Units of Time   Time Spent: 30 min

## 2020-09-14 RX ORDER — AMLODIPINE BESYLATE 5 MG/1
5 TABLET ORAL DAILY
Qty: 90 TABLET | Refills: 1 | Status: SHIPPED | OUTPATIENT
Start: 2020-09-14 | End: 2021-01-26 | Stop reason: SDUPTHER

## 2020-09-14 NOTE — PROGRESS NOTES
"Digital Medicine: Health  Follow-Up    The history is provided by the patient.             Reason for review: Blood glucose at goal and Blood pressure not at goal      Additional Follow-up details: Mr. Mike is doing okay, but is a little frustrated today. He feels that [facility] is "too big" since he has recently has some confusion with his medication regimen.     Mr. Mike feels that he needs to take more responsibility of her own health and is thinking about cancelling the pill pack to start putting his medications together himself.     Encouraged patient to start submitting more readings. He was pleased with his last BP reading. Support provided.         Diet-Change      Dietary Improvements:Patient states that he has been trying to make better food choices to improve BG. Support provided.    Dietary Indiscretions:          Physical Activity-Change      Additional physical activity details: Patient continues to cut grass in addition to attending physical therapy for back nerve pain.       Medication Adherence-Medication adherence was assessed.      Substance, Sleep, Stress-Not assessed      Continue current diet/physical activity routine.  Provided patient education.       Addressed any questions or concerns and patient has my contact information if needed prior to next outreach. Patient verbalizes understanding.      Explained the importance of self-monitoring and medication adherence. Encouraged the patient to communicate with their health  for lifestyle modifications to help improve or maintain a healthy lifestyle.                Topic    Hemoglobin A1C        Last 5 Patient Entered Readings                                      Current 30 Day Average: 161/78     Recent Readings 9/6/2020 8/27/2020 8/24/2020 8/21/2020 8/12/2020    SBP (mmHg) 129 157 167 191 173    DBP (mmHg) 70 79 76 88 85    Pulse 58 57 61 58 54        Last 6 Patient Entered Readings                                          " Most Recent A1c: 7.2% on 6/4/2020  (Goal: 8%)     Recent Readings 9/1/2020 8/27/2020 8/25/2020 8/24/2020 8/21/2020    Blood Glucose (mg/dL) 152 108 114 119 127

## 2020-09-15 ENCOUNTER — OFFICE VISIT (OUTPATIENT)
Dept: INTERNAL MEDICINE | Facility: CLINIC | Age: 70
End: 2020-09-15
Attending: FAMILY MEDICINE
Payer: COMMERCIAL

## 2020-09-15 VITALS
SYSTOLIC BLOOD PRESSURE: 124 MMHG | WEIGHT: 166.88 LBS | BODY MASS INDEX: 26.19 KG/M2 | HEIGHT: 67 IN | HEART RATE: 56 BPM | DIASTOLIC BLOOD PRESSURE: 78 MMHG | OXYGEN SATURATION: 97 %

## 2020-09-15 DIAGNOSIS — I25.810 CORONARY ARTERY DISEASE INVOLVING AUTOLOGOUS VEIN CORONARY BYPASS GRAFT WITHOUT ANGINA PECTORIS: ICD-10-CM

## 2020-09-15 DIAGNOSIS — K21.00 GASTROESOPHAGEAL REFLUX DISEASE WITH ESOPHAGITIS: ICD-10-CM

## 2020-09-15 DIAGNOSIS — K20.90 ESOPHAGITIS: ICD-10-CM

## 2020-09-15 DIAGNOSIS — E11.9 NON-INSULIN DEPENDENT TYPE 2 DIABETES MELLITUS: Chronic | ICD-10-CM

## 2020-09-15 DIAGNOSIS — M54.31 SCIATICA OF RIGHT SIDE: ICD-10-CM

## 2020-09-15 DIAGNOSIS — I73.9 PVD (PERIPHERAL VASCULAR DISEASE): ICD-10-CM

## 2020-09-15 DIAGNOSIS — E11.42 DIABETIC POLYNEUROPATHY ASSOCIATED WITH TYPE 2 DIABETES MELLITUS: ICD-10-CM

## 2020-09-15 DIAGNOSIS — E78.5 HYPERLIPIDEMIA, UNSPECIFIED HYPERLIPIDEMIA TYPE: ICD-10-CM

## 2020-09-15 DIAGNOSIS — M79.604 PAIN OF RIGHT LOWER EXTREMITY: Primary | ICD-10-CM

## 2020-09-15 DIAGNOSIS — I10 HYPERTENSION, ESSENTIAL: ICD-10-CM

## 2020-09-15 PROCEDURE — 99999 PR PBB SHADOW E&M-EST. PATIENT-LVL III: ICD-10-PCS | Mod: PBBFAC,,, | Performed by: FAMILY MEDICINE

## 2020-09-15 PROCEDURE — 3008F BODY MASS INDEX DOCD: CPT | Mod: CPTII,S$GLB,, | Performed by: FAMILY MEDICINE

## 2020-09-15 PROCEDURE — 1101F PR PT FALLS ASSESS DOC 0-1 FALLS W/OUT INJ PAST YR: ICD-10-PCS | Mod: CPTII,S$GLB,, | Performed by: FAMILY MEDICINE

## 2020-09-15 PROCEDURE — 1159F MED LIST DOCD IN RCRD: CPT | Mod: S$GLB,,, | Performed by: FAMILY MEDICINE

## 2020-09-15 PROCEDURE — 99999 PR PBB SHADOW E&M-EST. PATIENT-LVL III: CPT | Mod: PBBFAC,,, | Performed by: FAMILY MEDICINE

## 2020-09-15 PROCEDURE — 1101F PT FALLS ASSESS-DOCD LE1/YR: CPT | Mod: CPTII,S$GLB,, | Performed by: FAMILY MEDICINE

## 2020-09-15 PROCEDURE — 3051F PR MOST RECENT HEMOGLOBIN A1C LEVEL 7.0 - < 8.0%: ICD-10-PCS | Mod: CPTII,S$GLB,, | Performed by: FAMILY MEDICINE

## 2020-09-15 PROCEDURE — 99215 OFFICE O/P EST HI 40 MIN: CPT | Mod: S$GLB,,, | Performed by: FAMILY MEDICINE

## 2020-09-15 PROCEDURE — 3051F HG A1C>EQUAL 7.0%<8.0%: CPT | Mod: CPTII,S$GLB,, | Performed by: FAMILY MEDICINE

## 2020-09-15 PROCEDURE — 3074F PR MOST RECENT SYSTOLIC BLOOD PRESSURE < 130 MM HG: ICD-10-PCS | Mod: CPTII,S$GLB,, | Performed by: FAMILY MEDICINE

## 2020-09-15 PROCEDURE — 99215 PR OFFICE/OUTPT VISIT, EST, LEVL V, 40-54 MIN: ICD-10-PCS | Mod: S$GLB,,, | Performed by: FAMILY MEDICINE

## 2020-09-15 PROCEDURE — 3078F DIAST BP <80 MM HG: CPT | Mod: CPTII,S$GLB,, | Performed by: FAMILY MEDICINE

## 2020-09-15 PROCEDURE — 3008F PR BODY MASS INDEX (BMI) DOCUMENTED: ICD-10-PCS | Mod: CPTII,S$GLB,, | Performed by: FAMILY MEDICINE

## 2020-09-15 PROCEDURE — 3078F PR MOST RECENT DIASTOLIC BLOOD PRESSURE < 80 MM HG: ICD-10-PCS | Mod: CPTII,S$GLB,, | Performed by: FAMILY MEDICINE

## 2020-09-15 PROCEDURE — 1159F PR MEDICATION LIST DOCUMENTED IN MEDICAL RECORD: ICD-10-PCS | Mod: S$GLB,,, | Performed by: FAMILY MEDICINE

## 2020-09-15 PROCEDURE — 3074F SYST BP LT 130 MM HG: CPT | Mod: CPTII,S$GLB,, | Performed by: FAMILY MEDICINE

## 2020-09-15 RX ORDER — INFLUENZA A VIRUS A/MICHIGAN/45/2015 X-275 (H1N1) ANTIGEN (FORMALDEHYDE INACTIVATED), INFLUENZA A VIRUS A/SINGAPORE/INFIMH-16-0019/2016 IVR-186 (H3N2) ANTIGEN (FORMALDEHYDE INACTIVATED), INFLUENZA B VIRUS B/PHUKET/3073/2013 ANTIGEN (FORMALDEHYDE INACTIVATED), AND INFLUENZA B VIRUS B/MARYLAND/15/2016 BX-69A ANTIGEN (FORMALDEHYDE INACTIVATED) 60; 60; 60; 60 UG/.7ML; UG/.7ML; UG/.7ML; UG/.7ML
INJECTION, SUSPENSION INTRAMUSCULAR
COMMUNITY
Start: 2020-09-11 | End: 2020-09-15 | Stop reason: ALTCHOICE

## 2020-09-15 NOTE — PROGRESS NOTES
Subjective:       Patient ID: Esteban Mike is a 70 y.o. male.    Chief Complaint: Follow-up    Established patient follows up for management of chronic medical illnesses with complaints today. Please see dictation and ROS for interval problems, specific complaints and disease management discussion.    P, S, Fm, Soc Hx's; Meds, allergies reviewed and reconciled.  Health maintenance file reviewed and addressed items due.    Needed [employer] form completed.  This is the primary reason that patient schedule today.  Also reports he is having back pain.  Had seen neurosurgeon, refer to physical therapy.  Pain radiating to right lower extremity.  Saw physical therapist.  They put that on hold and asked him to get a vascular workup.  He does not have a claudication pattern.  He does have a history of other vascular disease throughout.  This includes carotid and coronary.  Appointment with cardiologist later in week.    An extensive amount of time was involved in chart review today.  Patient had a complaint about his medications and pill pack.  Also stated he is not taking amlodipine, stated something to the effect of gum problems in the past.  The medication is still showing on his chart.  There is no clear evidence that this was discontinued at any point in time.    Blood pressures have been elevated.  He was placed on meloxicam.  We will discontinue that.  Last renal function several months ago was within normal limits.  Patient apparently also taking OTC anti-inflammatory as well.    He was complaining about a lack of coordination of care.  Complained that in days past, primary care physician would do more and specially medicine do last.  He once again asked about his Gastroenterology and colonoscopy followups.  We had brought to his attention at a recent visit the necessity to follow-up with gastroenterology based on his last EGD report.  He had esophagitis.  We have placed a couple of referrals or  recommended that he follow up, no appointments have been scheduled.  I explained to the patient that when he sees a specialist, it is the responsibility of that provider and there Department for follow-up scheduling.  I like ways explained that our role in primary care and also APRN AWV reviews is to perform chart review and identify potential care gaps.  We also discussed shared responsibility with the patients.  He seemed upset about some lack of coordination, however upon further review, several of these issues have been brought to his attention in the past.    We discussed hydrochlorothiazide.  There was some lack of clarity concerning his taking this at his last visits.  His chart show that medication still active at his last visit with me.  Upon chart review today, endocrinology nurse practitioner had recommended and documented a message to him earlier in the year, to discontinue that medication due to abnormalities with his calcium.  She again noted telephone call in March, and further discussion at her August 3, 2020 clinic encounter with him.  Given this, I will label his chart as HCTZ reaction unspecified, not a true allergy.    Additionally states he had some sort of an issue with amlodipine in the past, possible gum recession.  Unclear whether he is taking this at the current time or not.  Again, will label his chart as amlodipine reaction unspecified, not true allergy.    Review of Systems   Constitutional: Negative for appetite change, chills, diaphoresis, fatigue and fever.   HENT: Negative for congestion, postnasal drip, rhinorrhea, sore throat and trouble swallowing.    Eyes: Negative for visual disturbance.   Respiratory: Negative for cough, choking, chest tightness, shortness of breath and wheezing.    Cardiovascular: Negative for chest pain and leg swelling.   Gastrointestinal: Negative for abdominal distention, abdominal pain, diarrhea, nausea and vomiting.   Genitourinary: Negative for  difficulty urinating.   Musculoskeletal: Positive for arthralgias, back pain and myalgias.   Skin: Negative for rash.   Neurological: Negative for weakness, light-headedness and headaches.       Objective:      Physical Exam  Vitals signs and nursing note reviewed.   Constitutional:       General: He is not in acute distress.     Appearance: He is well-developed.   Neck:      Musculoskeletal: Neck supple.   Cardiovascular:      Pulses:           Dorsalis pedis pulses are 2+ on the right side.        Posterior tibial pulses are 1+ on the right side.   Pulmonary:      Effort: Pulmonary effort is normal.   Musculoskeletal:      Right lower leg: No edema.      Left lower leg: No edema.   Feet:      Right foot:      Skin integrity: No skin breakdown.   Skin:     General: Skin is warm and dry.      Findings: No rash.   Neurological:      Mental Status: He is alert and oriented to person, place, and time.   Psychiatric:         Behavior: Behavior normal.         Thought Content: Thought content normal.         Judgment: Judgment normal.         Assessment:       1. Pain of right lower extremity    2. Sciatica of right side    3. Non-insulin dependent type 2 diabetes mellitus    4. Coronary artery disease involving autologous vein coronary bypass graft without angina pectoris    5. Hypertension, essential    6. Hyperlipidemia, unspecified hyperlipidemia type    7. PVD (peripheral vascular disease)    8. Diabetic polyneuropathy associated with type 2 diabetes mellitus    9. Gastroesophageal reflux disease with esophagitis    10. Esophagitis        Plan:     Medication List with Changes/Refills   Current Medications    AMLODIPINE (NORVASC) 5 MG TABLET    Take 1 tablet (5 mg total) by mouth once daily.    ASPIRIN 81 MG CHEWABLE TABLET    Take 81 mg by mouth once daily.      ATORVASTATIN (LIPITOR) 80 MG TABLET    TAKE ONE TABLET BY MOUTH DAILY AFTER SUPPER FOR CHOLESTEROL    BLOOD SUGAR DIAGNOSTIC STRP    To check BG 3 times  daily, to use with insurance preferred meter    BLOOD-GLUCOSE METER KIT    To check BG 3 times daily, to use with insurance preferred meter    CARVEDILOL (COREG) 3.125 MG TABLET    TAKE ONE TABLET BY MOUTH TWICE DAILY WITH MEALS FOR BLOOD PRESSURE AND (STOP ATENOLOL)    DULAGLUTIDE (TRULICITY) 1.5 MG/0.5 ML PEN INJECTOR    INJECT 1.5MG INTO SKIN EVERY SEVEN DAYS    EMPAGLIFLOZIN (JARDIANCE) 25 MG TABLET    Take 1 tablet (25 mg total) by mouth once daily.    FISH OIL-OMEGA-3 FATTY ACIDS 300-1,000 MG CAPSULE    Take 2 g by mouth once daily. 3 tablets       GLIPIZIDE (GLUCOTROL) 5 MG TABLET    TAKE ONE TABLET BY MOUTH TWICE DAILY WITH MEALS FOR DIABETES    IRBESARTAN (AVAPRO) 300 MG TABLET    Take 1 tablet (300 mg total) by mouth every evening.    LANCETS MISC    To check BG 3 times daily, to use with insurance preferred meter    METFORMIN (GLUCOPHAGE-XR) 500 MG XR 24HR TABLET    TAKE TWO TABLETS BY MOUTH TWICE DAILY WITH MEALS FOR DIABETES    MULTIVITAMIN CAPSULE    Take 1 capsule by mouth once daily.    PANTOPRAZOLE (PROTONIX) 40 MG TABLET    Take 1 tablet (40 mg total) by mouth before breakfast.    TRAMADOL (ULTRAM) 50 MG TABLET    Take 1 tablet (50 mg total) by mouth every 12 (twelve) hours as needed for Pain.    VIT C-VIT E-LUTEIN-MIN-OM-3 (OCUVITE) 580-38-5-150 MG-UNIT-MG-MG CAP    Take 1 tablet by mouth once daily.   Discontinued Medications    FLUZONE HIGHDOSE QUAD 20-21  MCG/0.7 ML SYRG    INJECT 0.7 ML INTRAMUSCULARLY ONCE    MELOXICAM (MOBIC) 15 MG TABLET    Take 1 tablet (15 mg total) by mouth once daily.    SILDENAFIL (VIAGRA) 100 MG TABLET    Take 1/2 tab prior to coitus     Esteban was seen today for follow-up.    Diagnoses and all orders for this visit:    Pain of right lower extremity  -     CV Ultrasound doppler arterial legs bilat; Future  -     CV Segmental Pressures Low Ext W Stress; Future    Sciatica of right side    Non-insulin dependent type 2 diabetes mellitus    Coronary artery disease  "involving autologous vein coronary bypass graft without angina pectoris    Hypertension, essential    Hyperlipidemia, unspecified hyperlipidemia type    PVD (peripheral vascular disease)  -     CV Ultrasound doppler arterial legs bilat; Future  -     CV Segmental Pressures Low Ext W Stress; Future    Diabetic polyneuropathy associated with type 2 diabetes mellitus    Gastroesophageal reflux disease with esophagitis  -     Ambulatory referral/consult to Gastroenterology; Future    Esophagitis  -     Ambulatory referral/consult to Gastroenterology; Future      See meds, orders, follow up, routing and instructions sections of encounter and AVS. Discussed with patient and provided on AVS.    No evidence of PVD R today - good pulses. I think his issue is neurogenic. Will chk vasc studies.    Today's appointment was >50 minutes including >50% time spent in counseling, chart review, documentation,  and other.    "This note will not be shared with the patient."    Diabetes Management Status    Statin: Taking  ACE/ARB: Taking    Screening or Prevention Patient's value Goal Complete/Controlled?   HgA1C Testing and Control   Lab Results   Component Value Date    HGBA1C 7.2 (H) 06/04/2020      Annually/Less than 8% Yes   Lipid profile : 01/13/2020 Annually Yes   LDL control Lab Results   Component Value Date    LDLCALC 69 01/13/2020    Annually/Less than 100 mg/dl  Yes   Nephropathy screening Lab Results   Component Value Date    LABMICR 9.0 12/19/2017     Lab Results   Component Value Date    PROTEINUA NEGATIVE 11/02/2017    Annually No   Blood pressure BP Readings from Last 1 Encounters:   09/15/20 124/78    Less than 140/90 Yes   Dilated retinal exam : 01/23/2020 Annually Yes   Foot exam   : 01/14/2020 Annually Yes     "

## 2020-09-16 ENCOUNTER — TELEPHONE (OUTPATIENT)
Dept: GASTROENTEROLOGY | Facility: CLINIC | Age: 70
End: 2020-09-16

## 2020-09-16 ENCOUNTER — HOSPITAL ENCOUNTER (OUTPATIENT)
Dept: CARDIOLOGY | Facility: HOSPITAL | Age: 70
Discharge: HOME | End: 2020-09-16
Attending: FAMILY MEDICINE
Payer: COMMERCIAL

## 2020-09-16 DIAGNOSIS — I73.9 PVD (PERIPHERAL VASCULAR DISEASE): ICD-10-CM

## 2020-09-16 DIAGNOSIS — K22.10 EROSIVE ESOPHAGITIS: Primary | ICD-10-CM

## 2020-09-16 DIAGNOSIS — M79.604 PAIN OF RIGHT LOWER EXTREMITY: ICD-10-CM

## 2020-09-16 LAB
LEFT ANT TIBIAL SYS PSV: 75 CM/S
LEFT CFA PSV: 166 CM/S
LEFT EXTERNAL ILIAC PSV: 134 CM/S
LEFT PERONEAL SYS PSV: 70 CM/S
LEFT POPLITEAL PSV: 90 CM/S
LEFT POST TIBIAL SYS PSV: 99 CM/S
LEFT PROFUNDA SYS PSV: 178 CM/S
LEFT SUPER FEMORAL DIST SYS PSV: 94 CM/S
LEFT SUPER FEMORAL MID SYS PSV: 126 CM/S
LEFT SUPER FEMORAL OSTIAL SYS PSV: 141 CM/S
LEFT SUPER FEMORAL PROX SYS PSV: 111 CM/S
LEFT TIB/PER TRUNK SYS PSV: 99 CM/S
OHS CV LEFT LOWER EXTREMITY ABI (NO CALC): 1.04
OHS CV RIGHT ABI LOWER EXTREMITY (NO CALC): 1.07
RIGHT ANT TIBIAL SYS PSV: 74 CM/S
RIGHT CFA PSV: 122 CM/S
RIGHT EXTERNAL ILLIAC PSV: 126 CM/S
RIGHT PERONEAL SYS PSV: 70 CM/S
RIGHT POPLITEAL PSV: 71 CM/S
RIGHT POST TIBIAL SYS PSV: 76 CM/S
RIGHT PROFUNDA SYS PSV: 170 CM/S
RIGHT SUPER FEMORAL DIST SYS PSV: 135 CM/S
RIGHT SUPER FEMORAL MID SYS PSV: 149 CM/S
RIGHT SUPER FEMORAL OSTIAL SYS PSV: 230 CM/S
RIGHT SUPER FEMORAL PROX SYS PSV: 150 CM/S
RIGHT TIB/PER TRUNK SYS PSV: 71 CM/S

## 2020-09-16 PROCEDURE — 93925 LOWER EXTREMITY STUDY: CPT

## 2020-09-16 PROCEDURE — 93925 LOWER EXTREMITY STUDY: CPT | Mod: 26,,, | Performed by: INTERNAL MEDICINE

## 2020-09-16 PROCEDURE — 93925 CV US DOPPLER ARTERIAL LEGS BILATERAL (CUPID ONLY): ICD-10-PCS | Mod: 26,,, | Performed by: INTERNAL MEDICINE

## 2020-09-16 NOTE — TELEPHONE ENCOUNTER
MA spoke to pt regarding message below per Justine.     Pt express no one contact him to schedule repeat egd. Pt would like to schedule egd     MA informed pt a message will be sent to Dr. Holman staff to advise.    Per Dr. Holman - Repeat upper endoscopy in 12 weeks to check healing. Last EGD 4/1/2019      ----- Message from Katya Weir MA sent at 9/16/2020  7:53 AM CDT -----  Dr John referred patient for GERD.  Please contact him to schedule an appointment.       - - -

## 2020-09-20 ENCOUNTER — TELEPHONE (OUTPATIENT)
Dept: INTERNAL MEDICINE | Facility: CLINIC | Age: 70
End: 2020-09-20

## 2020-09-20 DIAGNOSIS — Z12.11 COLON CANCER SCREENING: Primary | ICD-10-CM

## 2020-09-20 NOTE — TELEPHONE ENCOUNTER
Patient is due for colon cancer screening or a colonoscopy.     The order we previously placed  since patient never scheduled. I placed another order for colonoscopy.    Please call and notify patient. Please advise that the colonoscopy department should constact them to schedule, but if they are not contacted in 1-2 weeks to call the department.    Please provide the colonoscopy scheduling number is [phone].    Thank you.

## 2020-09-20 NOTE — TELEPHONE ENCOUNTER
"----- Message from Fiona Leonard RN sent at 9/15/2020  8:09 AM CDT -----  Regarding: Cancellation of Order # [id number]  Order number [id number] for the procedure CASE REQUEST GI [GI5]   has been canceled by Fiona Leonard RN [[id number]]. This procedure   was ordered by Ned Hartley MD [[id number]] on Mar 12, 2020   for the patient Esteban Jollyosiellianne [[id number]]. The reason   for cancellation was "None".  "

## 2020-09-21 ENCOUNTER — HOSPITAL ENCOUNTER (OUTPATIENT)
Dept: CARDIOLOGY | Facility: HOSPITAL | Age: 70
Discharge: HOME | End: 2020-09-21
Attending: FAMILY MEDICINE
Payer: COMMERCIAL

## 2020-09-21 DIAGNOSIS — M79.604 PAIN OF RIGHT LOWER EXTREMITY: ICD-10-CM

## 2020-09-21 DIAGNOSIS — I73.9 PVD (PERIPHERAL VASCULAR DISEASE): ICD-10-CM

## 2020-09-21 PROCEDURE — 93924 LWR XTR VASC STDY BILAT: CPT | Mod: 50

## 2020-09-21 PROCEDURE — 93924 LWR XTR VASC STDY BILAT: CPT | Mod: 26,,, | Performed by: INTERNAL MEDICINE

## 2020-09-21 PROCEDURE — 93924 SEGMENTAL PRESSURE LOWER EXTREMITY: ICD-10-PCS | Mod: 26,,, | Performed by: INTERNAL MEDICINE

## 2020-09-22 ENCOUNTER — PATIENT MESSAGE (OUTPATIENT)
Dept: INTERNAL MEDICINE | Facility: CLINIC | Age: 70
End: 2020-09-22

## 2020-09-22 LAB
IMMEDIATE ARM BP: 173 MMHG
IMMEDIATE LEFT ABI: 0.92
IMMEDIATE LEFT TIBIAL: 159 MMHG
IMMEDIATE RIGHT ABI: 0.92
IMMEDIATE RIGHT TIBIAL: 160 MMHG
LEFT ABI: 1.16
LEFT ARM BP: 128 MMHG
LEFT CALF BP: 139 MMHG
LEFT DORSALIS PEDIS: 153 MMHG
LEFT LOWER LEG BP: 164 MMHG
LEFT POSTERIOR TIBIAL: 146 MMHG
LEFT UPPER LEG BP: 175 MMHG
RIGHT ABI: 1.14
RIGHT ARM BP: 132 MMHG
RIGHT CALF BP: 151 MMHG
RIGHT DORSALIS PEDIS: 151 MMHG
RIGHT LOWER LEG BP: 170 MMHG
RIGHT POSTERIOR TIBIAL: 147 MMHG
RIGHT UPPER LEG BP: 193 MMHG
TREADMILL GRADE: 12 %
TREADMILL SPEED: 2 MPH
TREADMILL TIME: 5 MIN

## 2020-10-02 ENCOUNTER — TELEPHONE (OUTPATIENT)
Dept: ENDOSCOPY | Facility: HOSPITAL | Age: 70
End: 2020-10-02

## 2020-10-02 NOTE — TELEPHONE ENCOUNTER
In basket message received regarding scheduling EGd and Colonoscopy procedures. Called patientl. No answer. Left voicemail message with main contact number to Endoscopy Scheduling Department for patient to return phone call.

## 2020-10-05 ENCOUNTER — PATIENT MESSAGE (OUTPATIENT)
Dept: ADMINISTRATIVE | Facility: HOSPITAL | Age: 70
End: 2020-10-05

## 2020-10-05 DIAGNOSIS — Z12.11 SPECIAL SCREENING FOR MALIGNANT NEOPLASMS, COLON: Primary | ICD-10-CM

## 2020-10-05 DIAGNOSIS — Z01.818 PRE-OP TESTING: ICD-10-CM

## 2020-10-05 RX ORDER — POLYETHYLENE GLYCOL 3350, SODIUM SULFATE ANHYDROUS, SODIUM BICARBONATE, SODIUM CHLORIDE, POTASSIUM CHLORIDE 236; 22.74; 6.74; 5.86; 2.97 G/4L; G/4L; G/4L; G/4L; G/4L
4 POWDER, FOR SOLUTION ORAL ONCE
Qty: 4000 ML | Refills: 0 | Status: SHIPPED | OUTPATIENT
Start: 2020-10-05 | End: 2020-10-05

## 2020-10-21 ENCOUNTER — PATIENT MESSAGE (OUTPATIENT)
Dept: ADMINISTRATIVE | Facility: HOSPITAL | Age: 70
End: 2020-10-21

## 2020-10-22 ENCOUNTER — PATIENT OUTREACH (OUTPATIENT)
Dept: ADMINISTRATIVE | Facility: HOSPITAL | Age: 70
End: 2020-10-22

## 2020-10-27 ENCOUNTER — PATIENT OUTREACH (OUTPATIENT)
Dept: OTHER | Facility: OTHER | Age: 70
End: 2020-10-27

## 2020-10-27 NOTE — PROGRESS NOTES
Digital Medicine: Health  Follow-Up                Reason for review: Blood glucose at goal and Blood pressure not at goal        Topics Covered on Call: physical activity and Diet    Additional Follow-up details: Mr. Mike is doing okay. Patient was finally able to schedule his EGD for December and is glad its getting taken care of.    Patient is extremely pleased with BG readings. He hopes to see his next A1c <7%. Praise and encouragement provided.     Spikes in BP related to high sodium meals. Encouraged sodium restriction.            Diet-no change to diet    No change to diet.        Physical Activity-no change to routine  No change to exercise routine.       Additional physical activity details: Patient is now wearing a Fitbit to help track steps. 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 The last time he cut 6 yards in one day his step count was 31,000.      Medication Adherence-Medication adherence was assessed.      Substance, Sleep, Stress-Not assessed      Additional monitoring needed.  Provided patient education.       Addressed patient questions and patient has my contact information if needed prior to next outreach. Patient verbalizes understanding.      Explained the importance of self-monitoring and medication adherence. Encouraged the patient to communicate with their health  for lifestyle modifications to help improve or maintain a healthy lifestyle.                   Topic    Hemoglobin A1C          Last 5 Patient Entered Readings                                      Current 30 Day Average: 151/78     Recent Readings 10/21/2020 10/14/2020 10/7/2020 9/19/2020 9/6/2020    SBP (mmHg) 162 142 149 114 129    DBP (mmHg) 82 72 79 61 70    Pulse 61 62 62 58 58        Last 6 Patient Entered Readings                                          Most Recent A1c: 7.2% on 6/4/2020  (Goal: 8%)     Recent Readings 10/23/2020 10/22/2020 10/21/2020 10/19/2020  10/18/2020    Blood Glucose (mg/dL) 134 114 141 102 114

## 2020-11-01 ENCOUNTER — PATIENT OUTREACH (OUTPATIENT)
Dept: ADMINISTRATIVE | Facility: OTHER | Age: 70
End: 2020-11-01

## 2020-11-01 NOTE — PROGRESS NOTES
Care Everywhere: updated  Immunization: updated  Health Maintenance: updated  Media Review:   Legacy Review:   Order placed:   Upcoming appts:colonoscopy 12/4, hemoglobin 1/7/2020

## 2020-11-02 DIAGNOSIS — E11.65 TYPE 2 DIABETES MELLITUS WITH HYPERGLYCEMIA, WITHOUT LONG-TERM CURRENT USE OF INSULIN: ICD-10-CM

## 2020-11-03 DIAGNOSIS — E11.42 DIABETIC POLYNEUROPATHY ASSOCIATED WITH TYPE 2 DIABETES MELLITUS: Primary | ICD-10-CM

## 2020-11-03 DIAGNOSIS — E78.5 HYPERLIPIDEMIA, UNSPECIFIED HYPERLIPIDEMIA TYPE: ICD-10-CM

## 2020-11-03 RX ORDER — METFORMIN HYDROCHLORIDE 500 MG/1
TABLET, EXTENDED RELEASE ORAL
Qty: 360 TABLET | Refills: 0 | Status: SHIPPED | OUTPATIENT
Start: 2020-11-03 | End: 2021-01-26 | Stop reason: SDUPTHER

## 2020-11-03 NOTE — TELEPHONE ENCOUNTER
Care Due:                  Date            Visit Type   Department     Provider  --------------------------------------------------------------------------------                                MYCHART                              FOLLOWUP/OF  [facility] INTERNAL  Last Visit: 09-      FICE VISIT   MEDICINE       CORIRNE DORAN                              ESTABLISHED   [facility] INTERNAL  Next Visit: 01-      PATIENT      MEDICINE       CORRINE DORAN                                                            Last  Test          Frequency    Reason                     Performed    Due Date  --------------------------------------------------------------------------------    CMP.........  12 months..  atorvastatin, glipiZIDE,   02- 01-                             irbesartan, metFORMIN....    HBA1C.......  6 months...  dulaglutide, glipiZIDE,    06- 12-                             metFORMIN................    Lipid Panel.  12 months..  atorvastatin.............  01- 01-    Powered by [employer]. Reference number: [id number]. 11/02/2020 6:35:05 PM   CST

## 2020-11-04 DIAGNOSIS — E11.51 TYPE 2 DIABETES, WITH PERIPHERAL CIRCULATORY DISORDER NOT AT GOAL: ICD-10-CM

## 2020-11-04 DIAGNOSIS — E11.65 TYPE 2 DIABETES MELLITUS WITH HYPERGLYCEMIA, WITHOUT LONG-TERM CURRENT USE OF INSULIN: ICD-10-CM

## 2020-11-04 NOTE — TELEPHONE ENCOUNTER
----- Message from Berkley Vargas sent at 11/4/2020  3:20 PM CST -----  Regarding: Ms. Michaud @ [facility] # [phone], fax# [phone]  Requesting an RX refill or new RX.  Is this a refill or new RX: Refill  RX name and strength:glipiZIDE (GLUCOTROL) 5 MG tablet  Is this a 30 day or 90 day RX: 90  Pharmacy name and phone # [email or web address] [place] - [address]  [phone] Fax# [phone]           Requesting an RX refill or new RX.  Is this a refill or new RX: Refill  RX name and strength:irbesartan (AVAPRO) 300 MG tablet  Is this a 30 day or 90 day RX: 90  Pharmacy name and phone # [facility] - [place], [place] - [address]  [phone] Fax# [phone]           Requesting an RX refill or new RX.  Is this a refill or new RX: Refill  RX name and strength: dulaglutide (TRULICITY) 1.5 mg/0.5 mL pen injector  Is this a 30 day or 90 day RX: 30  Pharmacy name and phone # [place], [place] - [address]  [phone] Fax# [phone]

## 2020-11-04 NOTE — TELEPHONE ENCOUNTER
No new care gaps identified.  Powered by [employer]. Reference number: [id number]. 11/04/2020 4:07:25 PM   CST

## 2020-11-05 RX ORDER — IRBESARTAN 300 MG/1
300 TABLET ORAL NIGHTLY
Qty: 90 TABLET | Refills: 0 | Status: SHIPPED | OUTPATIENT
Start: 2020-11-05 | End: 2020-12-18 | Stop reason: SDUPTHER

## 2020-11-05 RX ORDER — DULAGLUTIDE 1.5 MG/.5ML
INJECTION, SOLUTION SUBCUTANEOUS
Qty: 6 ML | Refills: 0 | Status: SHIPPED | OUTPATIENT
Start: 2020-11-05 | End: 2021-01-26 | Stop reason: SDUPTHER

## 2020-11-05 RX ORDER — GLIPIZIDE 5 MG/1
TABLET ORAL
Qty: 180 TABLET | Refills: 0 | Status: SHIPPED | OUTPATIENT
Start: 2020-11-05 | End: 2021-01-26 | Stop reason: SDUPTHER

## 2020-11-05 NOTE — TELEPHONE ENCOUNTER
Can you have Dr. Hartley contact me regarding  some antibiotics I need in order to have my teeth cleaned, please.  Respectfully, Esteban Mike    [phone] or [phone]   patients dentist has advised him to contact pcp for medication

## 2020-11-10 ENCOUNTER — TELEPHONE (OUTPATIENT)
Dept: INTERNAL MEDICINE | Facility: CLINIC | Age: 70
End: 2020-11-10

## 2020-11-10 DIAGNOSIS — Z12.11 COLON CANCER SCREENING: Primary | ICD-10-CM

## 2020-11-10 NOTE — TELEPHONE ENCOUNTER
Patient is due for colon cancer screening or a colonoscopy.     The order we previously placed  since patient never scheduled. I placed another order for colonoscopy.    Please call and notify patient. Please advise that the colonoscopy department should constact them to schedule, but if they are not contacted in 1-2 weeks to call the department.    Please provide the colonoscopy scheduling number is [phone].    Thank you.

## 2020-11-10 NOTE — TELEPHONE ENCOUNTER
"----- Message from Sharee Ramirez MA sent at 11/1/2020  9:08 AM CST -----  Regarding: Cancellation of Order # [id number]  Order number [id number] for the procedure FECAL IMMUNOCHEMICAL   TEST (IFOBT) [YUK2804] has been canceled by Sharee Ramirez MA   [[id number]]. This procedure was ordered by Ned Hartley MD   [[id number]] on Jan 29, 2020 for the patient Esteban Jollyosiellianne  [[id number]]. The reason for cancellation was "Other".    This was a future order.  "

## 2020-11-12 ENCOUNTER — TELEPHONE (OUTPATIENT)
Dept: ADMINISTRATIVE | Facility: HOSPITAL | Age: 70
End: 2020-11-12

## 2020-11-12 ENCOUNTER — TELEPHONE (OUTPATIENT)
Dept: INTERNAL MEDICINE | Facility: CLINIC | Age: 70
End: 2020-11-12

## 2020-11-12 RX ORDER — AMOXICILLIN 500 MG/1
2000 TABLET, FILM COATED ORAL
Qty: 8 TABLET | Refills: 2 | Status: SHIPPED | OUTPATIENT
Start: 2020-11-12 | End: 2021-01-12 | Stop reason: ALTCHOICE

## 2020-11-12 NOTE — TELEPHONE ENCOUNTER
----- Message from Dallas Salgado LPN sent at 11/12/2020 11:06 AM CST -----  Please Advise     Pt stated his dentist would like him to be placed on a antibiotic prior to his teeth cleaning, he will make his appointment after antibiotic are taken.

## 2020-11-12 NOTE — TELEPHONE ENCOUNTER
Contacted pt per providers request re: colonoscopy appointment. Pt stated his dentist told him he needed an antibiotic before getting his teeth cleaned message sent to provider.

## 2020-11-12 NOTE — TELEPHONE ENCOUNTER
Caller: Unspecified (Today, 11:06 AM)             Please Advise     Pt stated his dentist would like him to be placed on a antibiotic prior to his teeth cleaning, he will make his appointment after antibiotic are taken.          Please advise  Patient stated that this is his 3rd request

## 2020-11-12 NOTE — TELEPHONE ENCOUNTER
Patient states 3 requests for dental prophylaxis were made.  This is the only request I have seen.  Chart reviewed, not sure he has a direct indication, but will call in a prescription for amoxicillin, 2 g prior to dental work.  Please advise patient that such was called into his pharmacy.    [facility].

## 2020-12-04 ENCOUNTER — HOSPITAL ENCOUNTER (OUTPATIENT)
Facility: HOSPITAL | Age: 70
Discharge: HOME | End: 2020-12-04
Attending: INTERNAL MEDICINE | Admitting: INTERNAL MEDICINE
Payer: COMMERCIAL

## 2020-12-04 ENCOUNTER — ANESTHESIA EVENT (OUTPATIENT)
Dept: ENDOSCOPY | Facility: HOSPITAL | Age: 70
End: 2020-12-04
Payer: COMMERCIAL

## 2020-12-04 ENCOUNTER — ANESTHESIA (OUTPATIENT)
Dept: ENDOSCOPY | Facility: HOSPITAL | Age: 70
End: 2020-12-04
Payer: COMMERCIAL

## 2020-12-04 ENCOUNTER — TELEPHONE (OUTPATIENT)
Dept: ENDOSCOPY | Facility: HOSPITAL | Age: 70
End: 2020-12-04

## 2020-12-04 VITALS
RESPIRATION RATE: 16 BRPM | HEIGHT: 67 IN | TEMPERATURE: 98 F | BODY MASS INDEX: 25.9 KG/M2 | DIASTOLIC BLOOD PRESSURE: 73 MMHG | SYSTOLIC BLOOD PRESSURE: 157 MMHG | WEIGHT: 165 LBS | HEART RATE: 55 BPM | OXYGEN SATURATION: 98 %

## 2020-12-04 DIAGNOSIS — Z79.899 ENCOUNTER FOR MONITORING LONG-TERM PROTON PUMP INHIBITOR THERAPY: ICD-10-CM

## 2020-12-04 DIAGNOSIS — Z12.11 COLON CANCER SCREENING: ICD-10-CM

## 2020-12-04 DIAGNOSIS — Z51.81 ENCOUNTER FOR MONITORING LONG-TERM PROTON PUMP INHIBITOR THERAPY: ICD-10-CM

## 2020-12-04 DIAGNOSIS — Z13.820 OSTEOPOROSIS SCREENING: ICD-10-CM

## 2020-12-04 DIAGNOSIS — K22.10 ESOPHAGITIS, EROSIVE: ICD-10-CM

## 2020-12-04 DIAGNOSIS — K44.9 HIATAL HERNIA: Primary | ICD-10-CM

## 2020-12-04 LAB — POCT GLUCOSE: 163 MG/DL (ref 70–110)

## 2020-12-04 PROCEDURE — 88305 TISSUE EXAM BY PATHOLOGIST: ICD-10-PCS | Mod: 26,,, | Performed by: PATHOLOGY

## 2020-12-04 PROCEDURE — 88305 TISSUE EXAM BY PATHOLOGIST: CPT | Performed by: PATHOLOGY

## 2020-12-04 PROCEDURE — 88312 SPECIAL STAINS GROUP 1: CPT | Mod: 26,,, | Performed by: PATHOLOGY

## 2020-12-04 PROCEDURE — 88312 PR  SPECIAL STAINS,GROUP I: ICD-10-PCS | Mod: 26,,, | Performed by: PATHOLOGY

## 2020-12-04 PROCEDURE — 25000003 PHARM REV CODE 250: Performed by: INTERNAL MEDICINE

## 2020-12-04 PROCEDURE — 88341 PR IHC OR ICC EACH ADD'L SINGLE ANTIBODY  STAINPR: ICD-10-PCS | Mod: 26,,, | Performed by: PATHOLOGY

## 2020-12-04 PROCEDURE — 88342 CHG IMMUNOCYTOCHEMISTRY: ICD-10-PCS | Mod: 26,,, | Performed by: PATHOLOGY

## 2020-12-04 PROCEDURE — 43239 PR EGD, FLEX, W/BIOPSY, SGL/MULTI: ICD-10-PCS | Mod: 51,,, | Performed by: INTERNAL MEDICINE

## 2020-12-04 PROCEDURE — 88342 IMHCHEM/IMCYTCHM 1ST ANTB: CPT | Performed by: PATHOLOGY

## 2020-12-04 PROCEDURE — E9220 PRA ENDO ANESTHESIA: HCPCS | Mod: 33,,, | Performed by: NURSE ANESTHETIST, CERTIFIED REGISTERED

## 2020-12-04 PROCEDURE — 88341 IMHCHEM/IMCYTCHM EA ADD ANTB: CPT | Mod: 26,,, | Performed by: PATHOLOGY

## 2020-12-04 PROCEDURE — 88312 SPECIAL STAINS GROUP 1: CPT | Performed by: PATHOLOGY

## 2020-12-04 PROCEDURE — 88305 TISSUE EXAM BY PATHOLOGIST: CPT | Mod: 26,,, | Performed by: PATHOLOGY

## 2020-12-04 PROCEDURE — 88341 IMHCHEM/IMCYTCHM EA ADD ANTB: CPT | Performed by: PATHOLOGY

## 2020-12-04 PROCEDURE — 63600175 PHARM REV CODE 636 W HCPCS: Performed by: NURSE ANESTHETIST, CERTIFIED REGISTERED

## 2020-12-04 PROCEDURE — 43239 EGD BIOPSY SINGLE/MULTIPLE: CPT | Mod: 51,,, | Performed by: INTERNAL MEDICINE

## 2020-12-04 PROCEDURE — 88342 IMHCHEM/IMCYTCHM 1ST ANTB: CPT | Mod: 26,,, | Performed by: PATHOLOGY

## 2020-12-04 PROCEDURE — 43239 EGD BIOPSY SINGLE/MULTIPLE: CPT | Performed by: INTERNAL MEDICINE

## 2020-12-04 PROCEDURE — G0121 COLON CA SCRN NOT HI RSK IND: HCPCS | Mod: ,,, | Performed by: INTERNAL MEDICINE

## 2020-12-04 PROCEDURE — 27201012 HC FORCEPS, HOT/COLD, DISP: Performed by: INTERNAL MEDICINE

## 2020-12-04 PROCEDURE — G0121 COLON CA SCRN NOT HI RSK IND: HCPCS | Performed by: INTERNAL MEDICINE

## 2020-12-04 PROCEDURE — E9220 PRA ENDO ANESTHESIA: ICD-10-PCS | Mod: 33,,, | Performed by: NURSE ANESTHETIST, CERTIFIED REGISTERED

## 2020-12-04 PROCEDURE — 25000003 PHARM REV CODE 250: Performed by: NURSE ANESTHETIST, CERTIFIED REGISTERED

## 2020-12-04 PROCEDURE — 37000008 HC ANESTHESIA 1ST 15 MINUTES: Performed by: INTERNAL MEDICINE

## 2020-12-04 PROCEDURE — G0121 COLON CA SCRN NOT HI RSK IND: ICD-10-PCS | Mod: ,,, | Performed by: INTERNAL MEDICINE

## 2020-12-04 PROCEDURE — 37000009 HC ANESTHESIA EA ADD 15 MINS: Performed by: INTERNAL MEDICINE

## 2020-12-04 RX ORDER — LIDOCAINE HYDROCHLORIDE 20 MG/ML
INJECTION, SOLUTION INFILTRATION; PERINEURAL
Status: DISCONTINUED | OUTPATIENT
Start: 2020-12-04 | End: 2020-12-04

## 2020-12-04 RX ORDER — SODIUM CHLORIDE 9 MG/ML
INJECTION, SOLUTION INTRAVENOUS CONTINUOUS
Status: DISCONTINUED | OUTPATIENT
Start: 2020-12-04 | End: 2020-12-04 | Stop reason: HOSPADM

## 2020-12-04 RX ORDER — PROPOFOL 10 MG/ML
VIAL (ML) INTRAVENOUS
Status: DISCONTINUED | OUTPATIENT
Start: 2020-12-04 | End: 2020-12-04

## 2020-12-04 RX ORDER — PANTOPRAZOLE SODIUM 40 MG/1
40 TABLET, DELAYED RELEASE ORAL
Qty: 90 TABLET | Refills: 3 | Status: SHIPPED | OUTPATIENT
Start: 2020-12-04 | End: 2022-01-23

## 2020-12-04 RX ORDER — PROPOFOL 10 MG/ML
VIAL (ML) INTRAVENOUS CONTINUOUS PRN
Status: DISCONTINUED | OUTPATIENT
Start: 2020-12-04 | End: 2020-12-04

## 2020-12-04 RX ORDER — LABETALOL HYDROCHLORIDE 5 MG/ML
INJECTION, SOLUTION INTRAVENOUS
Status: DISCONTINUED | OUTPATIENT
Start: 2020-12-04 | End: 2020-12-04

## 2020-12-04 RX ADMIN — PROPOFOL 175 MCG/KG/MIN: 10 INJECTION, EMULSION INTRAVENOUS at 09:12

## 2020-12-04 RX ADMIN — SODIUM CHLORIDE: 0.9 INJECTION, SOLUTION INTRAVENOUS at 09:12

## 2020-12-04 RX ADMIN — PROPOFOL 20 MG: 10 INJECTION, EMULSION INTRAVENOUS at 09:12

## 2020-12-04 RX ADMIN — SODIUM CHLORIDE: 0.9 INJECTION, SOLUTION INTRAVENOUS at 08:12

## 2020-12-04 RX ADMIN — LABETALOL HYDROCHLORIDE 10 MG: 5 INJECTION, SOLUTION INTRAVENOUS at 09:12

## 2020-12-04 RX ADMIN — PROPOFOL 80 MG: 10 INJECTION, EMULSION INTRAVENOUS at 09:12

## 2020-12-04 RX ADMIN — LIDOCAINE HYDROCHLORIDE 100 MG: 20 INJECTION, SOLUTION INFILTRATION; PERINEURAL at 09:12

## 2020-12-04 NOTE — [employer] PATIENT INSTRUCTIONS
Discharge Summary/Instructions after an Endoscopic Procedure  Patient Name: Esteban Mike  Patient MRN: [id number]  Patient YOB: 1950 Friday, December 4, 2020  William Holman MD  RESTRICTIONS:  During your procedure today, you received medications for sedation.  These   medications may affect your judgment, balance and coordination.  Therefore,   for 24 hours, you have the following restrictions:   - DO NOT drive a car, operate machinery, make legal/financial decisions,   sign important papers or drink alcohol.    ACTIVITY:  Today: no heavy lifting, straining or running due to procedural   sedation/anesthesia.  The following day: return to full activity including work.  DIET:  Eat and drink normally unless instructed otherwise.     TREATMENT FOR COMMON SIDE EFFECTS:  - Mild abdominal pain, nausea, belching, bloating or excessive gas:  rest,   eat lightly and use a heating pad.  - Sore Throat: treat with throat lozenges and/or gargle with warm salt   water.  - Because air was used during the procedure, expelling large amounts of air   from your rectum or belching is normal.  - If a bowel prep was taken, you may not have a bowel movement for 1-3 days.    This is normal.  SYMPTOMS TO WATCH FOR AND REPORT TO YOUR PHYSICIAN:  1. Abdominal pain or bloating, other than gas cramps.  2. Chest pain.  3. Back pain.  4. Signs of infection such as: chills or fever occurring within 24 hours   after the procedure.  5. Rectal bleeding, which would show as bright red, maroon, or black stools.   (A tablespoon of blood from the rectum is not serious, especially if   hemorrhoids are present.)  6. Vomiting.  7. Weakness or dizziness.  GO DIRECTLY TO THE NEAREST EMERGENCY ROOM IF YOU HAVE ANY OF THE FOLLOWING:      Difficulty breathing              Chills and/or fever over 101 F   Persistent vomiting and/or vomiting blood   Severe abdominal pain   Severe chest pain   Black, tarry stools   Bleeding- more than one  tablespoon   Any other symptom or condition that you feel may need urgent attention  Your doctor recommends these additional instructions:  If any biopsies were taken, your doctors clinic will contact you in 1 to 2   weeks with any results.  - Discharge patient to home.   - Follow an antireflux regimen indefinitely.   - Await pathology results.   - Telephone endoscopist for pathology results in 2 weeks.   - Use Protonix (pantoprazole) 40 mg by mouth once daily indefinitely.   - Use Protonix 40 mg once daily (or any other full strength proton pump   inhibitor) - best taken 45 minutes before your first protein containing   meal.   - Return to GI clinic at the next available appointment.   - Repeat upper endoscopy in 1 year for surveillance based on pathology   results.   - Return to primary care physician.   - The findings and recommendations were discussed with the patient.  For questions, problems or results please call your physician - William Holman MD at Work:  [phone].  [facility], EMERGENCY ROOM PHONE NUMBER: [phone]  IF A COMPLICATION OR EMERGENCY SITUATION ARISES AND YOU ARE UNABLE TO REACH   YOUR PHYSICIAN - GO DIRECTLY TO THE EMERGENCY ROOM.  Willaim Holman MD  12/4/2020 10:07:50 AM  This report has been verified and signed electronically.  [employer]

## 2020-12-04 NOTE — TRANSFER OF CARE
"Anesthesia Transfer of Care Note    Patient: Esteban Mike    Procedure(s) Performed: Procedure(s) (LRB):  EGD (ESOPHAGOGASTRODUODENOSCOPY) (N/A)  COLONOSCOPY (N/A)    Patient location: PACU    Anesthesia Type: general    Transport from OR: Transported from OR on room air with adequate spontaneous ventilation    Post pain: adequate analgesia    Post assessment: no apparent anesthetic complications and tolerated procedure well    Post vital signs: stable    Level of consciousness: awake, alert and oriented    Nausea/Vomiting: no nausea/vomiting    Complications: none    Transfer of care protocol was followed      Last vitals:   Visit Vitals  BP (!) 151/71   Pulse 69   Temp 36.6 °C (97.9 °F) (Temporal)   Resp 16   Ht 5' 7" (1.702 m)   Wt 74.8 kg (165 lb)   SpO2 96%   BMI 25.84 kg/m²     "

## 2020-12-04 NOTE — [employer] PATIENT INSTRUCTIONS
Discharge Summary/Instructions after an Endoscopic Procedure  Patient Name: Esteban Mike  Patient MRN: [id number]  Patient YOB: 1950 Friday, December 4, 2020  William Holman MD  RESTRICTIONS:  During your procedure today, you received medications for sedation.  These   medications may affect your judgment, balance and coordination.  Therefore,   for 24 hours, you have the following restrictions:   - DO NOT drive a car, operate machinery, make legal/financial decisions,   sign important papers or drink alcohol.    ACTIVITY:  Today: no heavy lifting, straining or running due to procedural   sedation/anesthesia.  The following day: return to full activity including work.  DIET:  Eat and drink normally unless instructed otherwise.     TREATMENT FOR COMMON SIDE EFFECTS:  - Mild abdominal pain, nausea, belching, bloating or excessive gas:  rest,   eat lightly and use a heating pad.  - Sore Throat: treat with throat lozenges and/or gargle with warm salt   water.  - Because air was used during the procedure, expelling large amounts of air   from your rectum or belching is normal.  - If a bowel prep was taken, you may not have a bowel movement for 1-3 days.    This is normal.  SYMPTOMS TO WATCH FOR AND REPORT TO YOUR PHYSICIAN:  1. Abdominal pain or bloating, other than gas cramps.  2. Chest pain.  3. Back pain.  4. Signs of infection such as: chills or fever occurring within 24 hours   after the procedure.  5. Rectal bleeding, which would show as bright red, maroon, or black stools.   (A tablespoon of blood from the rectum is not serious, especially if   hemorrhoids are present.)  6. Vomiting.  7. Weakness or dizziness.  GO DIRECTLY TO THE NEAREST EMERGENCY ROOM IF YOU HAVE ANY OF THE FOLLOWING:      Difficulty breathing              Chills and/or fever over 101 F   Persistent vomiting and/or vomiting blood   Severe abdominal pain   Severe chest pain   Black, tarry stools   Bleeding- more than one  tablespoon   Any other symptom or condition that you feel may need urgent attention  Your doctor recommends these additional instructions:  If any biopsies were taken, your doctors clinic will contact you in 1 to 2   weeks with any results.  - Discharge patient to home.   - Repeat colonoscopy in 10 years for screening purposes.   - THIS RECOMMENDATION of 10-Years FOR NEXT SCREENING COLONOSCOPY ASSUMES   THAT YOU WILL NOT HAVE HAD OR NOT HAD A FIRST OR SECOND DEGREE RELATIVE/S   WITH COLORECTAL CANCER OR AN ADVANCE COLON ADENOMAS BEFORE THE AGE OF 60   YEARS OF AGE OF THOSE INDIVIDUALS BY THE TIME OF YOUR NEXT SCREENING   COLONOSCOPY.               - Return to referring physician.   - The findings and recommendations were discussed with the patient.  For questions, problems or results please call your physician - William Holman MD at Work:  [phone].  [facility], EMERGENCY ROOM PHONE NUMBER: [phone]  IF A COMPLICATION OR EMERGENCY SITUATION ARISES AND YOU ARE UNABLE TO REACH   YOUR PHYSICIAN - GO DIRECTLY TO THE EMERGENCY ROOM.  William Holman MD  12/4/2020 10:07:19 AM  This report has been verified and signed electronically.  [employer]

## 2020-12-04 NOTE — ANESTHESIA PREPROCEDURE EVALUATION
12/04/2020  Esteban Mike is a 70 y.o., male.    Anesthesia Evaluation    I have reviewed the Patient Summary Reports.      I have reviewed the Medications.     Review of Systems      Physical Exam  General:  Well nourished    Airway/Jaw/Neck:  Airway Findings: Mallampati: I                Anesthesia Plan  Type of Anesthesia, risks & benefits discussed:  Anesthesia Type:  general  Patient's Preference:   Intra-op Monitoring Plan: standard ASA monitors  Intra-op Monitoring Plan Comments:   Post Op Pain Control Plan:   Post Op Pain Control Plan Comments:   Induction:   IV  Beta Blocker:  Patient is on a Beta-Blocker and has received one dose within the past 24 hours (No further documentation required).       Informed Consent: Patient understands risks and agrees with Anesthesia plan.  Questions answered. Anesthesia consent signed with patient.  ASA Score: 3     Day of Surgery Review of History & Physical:  There are no significant changes.          Ready For Surgery From Anesthesia Perspective.

## 2020-12-04 NOTE — H&P
Short Stay Endoscopy History and Physical    PCP - Ned John MD    Procedure - EGD/Colonoscopy  ASA - per anesthesia  Mallampati - per anesthesia  History of Anesthesia problems - no  Family history Anesthesia problems -  no   Plan of anesthesia - MAC    HPI:  This is a 70 y.o. male here for evaluation of :     EGD - follow-up erosive esophagitis. EGD 4/2019 with 3cm hiatal hernia and Gr C esophagitis. Reports primarily taking tums/rolaids or protonix prn, no standing medication. Continues to experience reflux but not daily. No other UGI symptoms.    Colon - CRC screening. Last colonoscopy 2013 normal. Denies any changes in bowel habits. No overt signs of bleeding. No fam history of CRC    On ASA 81mg, protonix 40mg daily prn  No nsaids    ROS:  Constitutional: No fevers, chills, No weight loss  CV: No chest pain  Pulm: No cough, No shortness of breath  Ophtho: No vision changes  GI: see HPI  Derm: No rash    Medical History:  has a past medical history of Aortic valve disease, Bilateral carotid artery stenosis (7/18/2018), CAD (coronary artery disease), Carotid artery occlusion, Diabetes mellitus, Diabetes mellitus type II, Elevated LFTs, GERD (gastroesophageal reflux disease), Hyperlipidemia, Hypertension, and PVD (peripheral vascular disease).    Surgical History:  has a past surgical history that includes carotid; ica stent; Hernia repair; Tonsillectomy; Cardiac catheterization; Coronary artery bypass graft (5/2/08); CEA; and Esophagogastroduodenoscopy (N/A, 4/1/2019).    Family History: family history includes Cirrhosis in his paternal grandfather; Diabetes in his mother; Heart disease in his father, maternal grandfather, mother, and paternal grandfather.. Otherwise no colon cancer, inflammatory bowel disease, or GI malignancies.    Social History:  reports that he has never smoked. He has never used smokeless tobacco. He reports current alcohol use. He reports that he does not use drugs.    Review of  patient's allergies indicates:   Allergen Reactions    Amlodipine      Patient states this may have caused problems with his gums.  Unclear whether he has actually stopped the medication or not.  Not a true allergy.    Hydrochlorothiazide      Medication stopped by Endocrinology Department due to possible alterations in serum calcium.  Not a true allergy.       Medications:   Medications Prior to Admission   Medication Sig Dispense Refill Last Dose    amoxicillin (AMOXIL) 500 MG Tab Take 4 tablets (2,000 mg total) by mouth On call Procedure. 8 tablet 2 12/3/2020 at Unknown time    aspirin 81 MG chewable tablet Take 81 mg by mouth once daily.     12/3/2020 at Unknown time    atorvastatin (LIPITOR) 80 MG tablet TAKE ONE TABLET BY MOUTH DAILY AFTER SUPPER FOR CHOLESTEROL 90 tablet 2 12/3/2020 at Unknown time    carvediloL (COREG) 3.125 MG tablet TAKE ONE TABLET BY MOUTH TWICE DAILY WITH MEALS FOR BLOOD PRESSURE AND (STOP ATENOLOL) 180 tablet 3 12/3/2020 at Unknown time    dulaglutide (TRULICITY) 1.5 mg/0.5 mL pen injector INJECT 1.5MG INTO SKIN EVERY SEVEN DAYS 6 mL 0 Past Week at Unknown time    glipiZIDE (GLUCOTROL) 5 MG tablet TAKE ONE TABLET BY MOUTH TWICE DAILY WITH MEALS FOR DIABETES 180 tablet 0 12/3/2020 at Unknown time    metFORMIN (GLUCOPHAGE-XR) 500 MG ER 24hr tablet TAKE TWO TABLETS BY MOUTH TWICE DAILY WITH MEALS FOR DIABETES 360 tablet 0 12/3/2020 at Unknown time    traMADol (ULTRAM) 50 mg tablet Take 1 tablet (50 mg total) by mouth every 12 (twelve) hours as needed for Pain. 14 tablet 0 Past Month at Unknown time    amLODIPine (NORVASC) 5 MG tablet Take 1 tablet (5 mg total) by mouth once daily. 90 tablet 1 Unknown at Unknown time    blood sugar diagnostic Strp To check BG 3 times daily, to use with insurance preferred meter 200 strip 11 Unknown at Unknown time    blood-glucose meter kit To check BG 3 times daily, to use with insurance preferred meter 1 each 0 Unknown at Unknown time     fish oil-omega-3 fatty acids 300-1,000 mg capsule Take 2 g by mouth once daily. 3 tablets      Unknown at Unknown time    irbesartan (AVAPRO) 300 MG tablet Take 1 tablet (300 mg total) by mouth every evening. 90 tablet 0 Unknown at Unknown time    [facility] To check BG 3 times daily, to use with insurance preferred meter 200 each 11 Unknown at Unknown time    multivitamin capsule Take 1 capsule by mouth once daily.   Unknown at Unknown time    pantoprazole (PROTONIX) 40 MG tablet Take 1 tablet (40 mg total) by mouth before breakfast. 90 tablet 3     vit C-vit E-lutein-min-om-3 (OCUVITE) 115-75-8-150 mg-unit-mg-mg Cap Take 1 tablet by mouth once daily.   Unknown at Unknown time       Physical Exam:    Vital Signs:   Vitals:    12/04/20 0836   BP: (!) 151/71   Pulse:    Resp:    Temp:        General Appearance: Well appearing in no acute distress  Eyes:    No scleral icterus  ENT: Neck supple, Lips, mucosa, and tongue normal; teeth and gums normal  Lungs: CTA anteriorly  Heart:  Regular rate, S1, S2 normal, no murmurs heard.  Abdomen: Soft, non tender, non distended with normal bowel sounds. No hepatosplenomegaly, ascites, or mass.  Extremities: No edema  Skin: No rash    Labs:  Lab Results   Component Value Date    WBC 10.3 04/11/2018    HGB 14.2 04/11/2018    HCT 42.3 04/11/2018     04/11/2018    CHOL 127 01/13/2020    TRIG 118 01/13/2020    HDL 38 (L) 01/13/2020    ALT 97 (H) 02/03/2020    AST 39 (H) 02/03/2020     02/03/2020    K 5.0 02/03/2020     02/03/2020    CREATININE 0.95 02/03/2020    BUN 18 02/03/2020    CO2 31 02/03/2020    TSH 0.69 04/11/2018    PSA 0.28 05/20/2015    INR 1.0 06/25/2014    GLUF 108 11/13/2008    HGBA1C 7.2 (H) 06/04/2020    MICROALBUR 2.0 01/13/2020       I have explained the risks and benefits of endoscopy procedures to the patient including but not limited to bleeding, perforation, infection, and death.  The patient was asked if they understand and allowed to  ask any further questions to their satisfaction.    Romulo Liu MD

## 2020-12-04 NOTE — DISCHARGE INSTRUCTIONS

## 2020-12-04 NOTE — ANESTHESIA POSTPROCEDURE EVALUATION
Anesthesia Post Evaluation    Patient: Esteban Mike    Procedure(s) Performed: Procedure(s) (LRB):  EGD (ESOPHAGOGASTRODUODENOSCOPY) (N/A)  COLONOSCOPY (N/A)    Final Anesthesia Type: general    Patient location during evaluation: GI PACU  Patient participation: Yes- Able to Participate  Level of consciousness: awake and alert and oriented  Post-procedure vital signs: reviewed and stable  Pain management: adequate  Airway patency: patent    PONV status at discharge: No PONV  Anesthetic complications: no      Cardiovascular status: hemodynamically stable  Respiratory status: unassisted, spontaneous ventilation and room air  Hydration status: euvolemic  Follow-up not needed.          Vitals Value Taken Time   /73 12/04/20 1037   Temp 36.6 °C (97.9 °F) 12/04/20 1005   Pulse 55 12/04/20 1037   Resp 16 12/04/20 1037   SpO2 98 % 12/04/20 1037         No case tracking events are documented in the log.      Pain/Chano Score: Chano Score: 10 (12/4/2020 10:20 AM)

## 2020-12-08 ENCOUNTER — PATIENT OUTREACH (OUTPATIENT)
Dept: OTHER | Facility: OTHER | Age: 70
End: 2020-12-08

## 2020-12-08 NOTE — PROGRESS NOTES
"Digital Medicine: Health  Follow-Up    The history is provided by the patient.                     Topics Covered on Call: physical activity and Diet    Additional Follow-up details: Mr. Mike is doing okay. Patient reports having tests and preventative screenings completed last week in the clinic. He was told to "come back in 10 years" for another colonoscopy so he was happy with that news.     Patient has had elevated BP the past two days, and said his BP was high the day of his tests. He isn't sure why. He is pleased with BG though, and says its because he's "trying to eat right". Support and encouragement provided. Encouraged patient to continue to monitor. He thanked me for calling.            Diet-no change to diet    No change to diet.        Physical Activity-no change to routine  No change to exercise routine.     Medication Adherence-Medication adherence was assessed.      Substance, Sleep, Stress-Not assessed      Additional monitoring needed.  Continue current diet/physical activity routine.       Addressed patient questions and patient has my contact information if needed prior to next outreach. Patient verbalizes understanding.      Explained the importance of self-monitoring and medication adherence. Encouraged the patient to communicate with their health  for lifestyle modifications to help improve or maintain a healthy lifestyle.                   Topic    Hemoglobin A1C          Last 5 Patient Entered Readings                                      Current 30 Day Average: 162/83     Recent Readings 12/8/2020 12/7/2020 12/6/2020 12/3/2020 11/20/2020    SBP (mmHg) 177 196 151 150 138    DBP (mmHg) 92 92 81 80 72    Pulse 64 100 64 55 60        Last 6 Patient Entered Readings                                          Most Recent A1c: 7.2% on 6/4/2020  (Goal: 8%)     Recent Readings 12/8/2020 12/7/2020 12/6/2020 12/5/2020 11/24/2020    Blood Glucose (mg/dL) 98 130 121 140 137             "

## 2020-12-11 LAB
FINAL PATHOLOGIC DIAGNOSIS: NORMAL
GROSS: NORMAL
Lab: NORMAL

## 2020-12-18 NOTE — TELEPHONE ENCOUNTER
No new care gaps identified.  Powered by [employer]. Reference number: [id number]. 12/18/2020 4:45:40 PM   CST

## 2020-12-21 RX ORDER — IRBESARTAN 300 MG/1
300 TABLET ORAL NIGHTLY
Qty: 90 TABLET | Refills: 0 | Status: SHIPPED | OUTPATIENT
Start: 2020-12-21 | End: 2021-07-14 | Stop reason: SDUPTHER

## 2020-12-21 NOTE — PROGRESS NOTES
Refill Authorization Note   Esteban Mike  is requesting a refill authorization.  Brief Assessment and Rationale for Refill:  Approve     Medication Therapy Plan:  CDMR. bp controlled at LOV; approve 3 more     Medication Reconciliation Completed: No   Comments:       Requested Prescriptions   Pending Prescriptions Disp Refills    irbesartan (AVAPRO) 300 MG tablet 90 tablet 0     Sig: Take 1 tablet (300 mg total) by mouth every evening.       Cardiovascular:  Angiotensin Receptor Blockers Failed - 12/21/2020  1:45 PM        Failed - Last BP in normal range within 360 days.     BP Readings from Last 3 Encounters:   12/04/20 (!) 157/73   09/15/20 124/78   08/03/20 120/70              Passed - Patient is at least 18 years old        Passed - Office visit in past 12 months or future 90 days     Recent Outpatient Visits            3 months ago Pain of right lower extremity    [facility] nathalia Hartley MD    4 months ago Lumbar radiculopathy    [id number]    4 months ago Diabetic polyneuropathy associated with type 2 diabetes mellitus    [facility]o [facility] [address] Joan Saab NP    5 months ago Non-insulin dependent type 2 diabetes mellitus    [facility] Tammy Hartley MD    5 months ago Dorsalgia, unspecified    [id number] Migue Cortez MD          Future Appointments              In 2 weeks [facility] nathalia, [facility]    In 3 weeks MD [facility], [facility] PC[facility]    In 1 month VASCULAR, [facility] - Cardiology, [place]                Passed - Cr is 1.4 or below and within 360 days     Creatinine   Date Value Ref Range Status   02/03/2020 0.95 0.70 - 1.25 mg/dL Final     Comment:     For patients >49 years of age, the reference limit  for Creatinine is approximately 13% higher  for people  identified as -American.        01/13/2020 0.91 0.70 - 1.25 mg/dL Final     Comment:     For patients >49 years of age, the reference limit  for Creatinine is approximately 13% higher for people  identified as -American.        10/03/2018 0.96 0.70 - 1.25 mg/dL Final     Comment:     For patients >49 years of age, the reference limit  for Creatinine is approximately 13% higher for people  identified as -American.                   Passed - K in normal range and within 360 days     Potassium   Date Value Ref Range Status   02/03/2020 5.0 3.5 - 5.3 mmol/L Final   01/13/2020 4.7 3.5 - 5.3 mmol/L Final   10/03/2018 4.5 3.5 - 5.3 mmol/L Final              Passed - eGFR within 360 days     eGFR if non    Date Value Ref Range Status   02/03/2020 82 > OR = 60 mL/min/1.73m2 Final   01/13/2020 87 > OR = 60 mL/min/1.73m2 Final   10/03/2018 82 > OR = 60 mL/min/1.73m2 Final     eGFR if    Date Value Ref Range Status   02/03/2020 96 > OR = 60 mL/min/1.73m2 Final   01/13/2020 101 > OR = 60 mL/min/1.73m2 Final   10/03/2018 95 > OR = 60 mL/min/1.73m2 Final                  Appointments  past 12m or future 3m with PCP    Date Provider   Last Visit   9/15/2020 Ned Hartley MD   Next Visit   1/12/2021 Ned Hartley MD   ED visits in past 90 days: 0     Note composed:1:47 PM 12/21/2020

## 2021-01-12 ENCOUNTER — OFFICE VISIT (OUTPATIENT)
Dept: INTERNAL MEDICINE | Facility: CLINIC | Age: 71
End: 2021-01-12
Attending: FAMILY MEDICINE
Payer: COMMERCIAL

## 2021-01-12 DIAGNOSIS — Z12.5 PROSTATE CANCER SCREENING: ICD-10-CM

## 2021-01-12 DIAGNOSIS — E78.5 HYPERLIPIDEMIA, UNSPECIFIED HYPERLIPIDEMIA TYPE: ICD-10-CM

## 2021-01-12 DIAGNOSIS — K21.00 GASTROESOPHAGEAL REFLUX DISEASE WITH ESOPHAGITIS, UNSPECIFIED WHETHER HEMORRHAGE: ICD-10-CM

## 2021-01-12 DIAGNOSIS — I10 HYPERTENSION, ESSENTIAL: ICD-10-CM

## 2021-01-12 DIAGNOSIS — E11.9 NON-INSULIN DEPENDENT TYPE 2 DIABETES MELLITUS: Chronic | ICD-10-CM

## 2021-01-12 DIAGNOSIS — J06.9 UPPER RESPIRATORY TRACT INFECTION, UNSPECIFIED TYPE: ICD-10-CM

## 2021-01-12 DIAGNOSIS — M79.604 PAIN OF RIGHT LOWER EXTREMITY: ICD-10-CM

## 2021-01-12 DIAGNOSIS — Z00.00 ANNUAL PHYSICAL EXAM: Primary | ICD-10-CM

## 2021-01-12 DIAGNOSIS — E11.65 TYPE 2 DIABETES MELLITUS WITH HYPERGLYCEMIA, WITHOUT LONG-TERM CURRENT USE OF INSULIN: ICD-10-CM

## 2021-01-12 PROBLEM — Z12.11 COLON CANCER SCREENING: Status: RESOLVED | Noted: 2020-12-04 | Resolved: 2021-01-12

## 2021-01-12 PROCEDURE — 3072F LOW RISK FOR RETINOPATHY: CPT | Mod: S$GLB,,, | Performed by: FAMILY MEDICINE

## 2021-01-12 PROCEDURE — 99397 PER PM REEVAL EST PAT 65+ YR: CPT | Mod: S$GLB,,, | Performed by: FAMILY MEDICINE

## 2021-01-12 PROCEDURE — 3051F HG A1C>EQUAL 7.0%<8.0%: CPT | Mod: CPTII,S$GLB,, | Performed by: FAMILY MEDICINE

## 2021-01-12 PROCEDURE — 3072F PR LOW RISK FOR RETINOPATHY: ICD-10-PCS | Mod: S$GLB,,, | Performed by: FAMILY MEDICINE

## 2021-01-12 PROCEDURE — 99999 PR PBB SHADOW E&M-EST. PATIENT-LVL III: CPT | Mod: PBBFAC,,, | Performed by: FAMILY MEDICINE

## 2021-01-12 PROCEDURE — 99999 PR PBB SHADOW E&M-EST. PATIENT-LVL III: ICD-10-PCS | Mod: PBBFAC,,, | Performed by: FAMILY MEDICINE

## 2021-01-12 PROCEDURE — 99397 PR PREVENTIVE VISIT,EST,65 & OVER: ICD-10-PCS | Mod: S$GLB,,, | Performed by: FAMILY MEDICINE

## 2021-01-12 PROCEDURE — 3051F PR MOST RECENT HEMOGLOBIN A1C LEVEL 7.0 - < 8.0%: ICD-10-PCS | Mod: CPTII,S$GLB,, | Performed by: FAMILY MEDICINE

## 2021-01-14 LAB
ALBUMIN SERPL-MCNC: 4.5 G/DL (ref 3.6–5.1)
ALBUMIN/CREAT UR: 7 MCG/MG CREAT
ALBUMIN/GLOB SERPL: 2.1 (CALC) (ref 1–2.5)
ALP SERPL-CCNC: 70 U/L (ref 35–144)
ALT SERPL-CCNC: 49 U/L (ref 9–46)
AST SERPL-CCNC: 27 U/L (ref 10–35)
BILIRUB SERPL-MCNC: 0.8 MG/DL (ref 0.2–1.2)
BUN SERPL-MCNC: 18 MG/DL (ref 7–25)
BUN/CREAT SERPL: ABNORMAL (CALC) (ref 6–22)
CALCIUM SERPL-MCNC: 9.9 MG/DL (ref 8.6–10.3)
CHLORIDE SERPL-SCNC: 102 MMOL/L (ref 98–110)
CHOLEST SERPL-MCNC: 124 MG/DL
CHOLEST/HDLC SERPL: 4 (CALC)
CO2 SERPL-SCNC: 32 MMOL/L (ref 20–32)
CREAT SERPL-MCNC: 0.91 MG/DL (ref 0.7–1.25)
CREAT UR-MCNC: 125 MG/DL (ref 20–320)
GFRSERPLBLD MDRD-ARVRAT: 86 ML/MIN/1.73M2
GLOBULIN SER CALC-MCNC: 2.1 G/DL (CALC) (ref 1.9–3.7)
GLUCOSE SERPL-MCNC: 119 MG/DL (ref 65–99)
HBA1C MFR BLD: 6.6 % OF TOTAL HGB
HDLC SERPL-MCNC: 31 MG/DL
LDLC SERPL CALC-MCNC: 70 MG/DL (CALC)
MICROALBUMIN UR-MCNC: 0.9 MG/DL
NONHDLC SERPL-MCNC: 93 MG/DL (CALC)
POTASSIUM SERPL-SCNC: 4.9 MMOL/L (ref 3.5–5.3)
PROT SERPL-MCNC: 6.6 G/DL (ref 6.1–8.1)
PSA SERPL-MCNC: 0.3 NG/ML
SODIUM SERPL-SCNC: 142 MMOL/L (ref 135–146)
TRIGL SERPL-MCNC: 146 MG/DL

## 2021-01-15 ENCOUNTER — PATIENT MESSAGE (OUTPATIENT)
Dept: INTERNAL MEDICINE | Facility: CLINIC | Age: 71
End: 2021-01-15

## 2021-01-15 ENCOUNTER — TELEPHONE (OUTPATIENT)
Dept: INTERNAL MEDICINE | Facility: CLINIC | Age: 71
End: 2021-01-15

## 2021-01-25 ENCOUNTER — PATIENT MESSAGE (OUTPATIENT)
Dept: INTERNAL MEDICINE | Facility: CLINIC | Age: 71
End: 2021-01-25

## 2021-01-26 DIAGNOSIS — E11.51 TYPE 2 DIABETES, WITH PERIPHERAL CIRCULATORY DISORDER NOT AT GOAL: ICD-10-CM

## 2021-01-26 DIAGNOSIS — E11.65 TYPE 2 DIABETES MELLITUS WITH HYPERGLYCEMIA, WITHOUT LONG-TERM CURRENT USE OF INSULIN: ICD-10-CM

## 2021-01-26 DIAGNOSIS — E78.5 HYPERLIPIDEMIA, UNSPECIFIED HYPERLIPIDEMIA TYPE: ICD-10-CM

## 2021-01-26 RX ORDER — DULAGLUTIDE 1.5 MG/.5ML
INJECTION, SOLUTION SUBCUTANEOUS
Qty: 6 ML | Refills: 0 | Status: SHIPPED | OUTPATIENT
Start: 2021-01-26 | End: 2021-05-06

## 2021-01-26 RX ORDER — ATORVASTATIN CALCIUM 80 MG/1
TABLET, FILM COATED ORAL
Qty: 90 TABLET | Refills: 2 | Status: SHIPPED | OUTPATIENT
Start: 2021-01-26 | End: 2021-07-19 | Stop reason: SDUPTHER

## 2021-01-26 RX ORDER — AMLODIPINE BESYLATE 5 MG/1
5 TABLET ORAL DAILY
Qty: 90 TABLET | Refills: 1 | Status: SHIPPED | OUTPATIENT
Start: 2021-01-26 | End: 2021-07-26 | Stop reason: SINTOL

## 2021-01-26 RX ORDER — GLIPIZIDE 5 MG/1
TABLET ORAL
Qty: 180 TABLET | Refills: 0 | Status: SHIPPED | OUTPATIENT
Start: 2021-01-26 | End: 2021-07-14 | Stop reason: SDUPTHER

## 2021-01-26 RX ORDER — METFORMIN HYDROCHLORIDE 500 MG/1
TABLET, EXTENDED RELEASE ORAL
Qty: 360 TABLET | Refills: 0 | Status: SHIPPED | OUTPATIENT
Start: 2021-01-26 | End: 2021-07-08

## 2021-02-01 ENCOUNTER — HOSPITAL ENCOUNTER (OUTPATIENT)
Dept: CARDIOLOGY | Facility: HOSPITAL | Age: 71
Discharge: HOME | End: 2021-02-01
Attending: FAMILY MEDICINE
Payer: COMMERCIAL

## 2021-02-01 DIAGNOSIS — M79.604 PAIN OF RIGHT LOWER EXTREMITY: ICD-10-CM

## 2021-02-01 PROCEDURE — 93971 EXTREMITY STUDY: CPT | Mod: 26,RT,, | Performed by: INTERNAL MEDICINE

## 2021-02-01 PROCEDURE — 93971 CV US DOPPLER VENOUS LEG RIGHT (CUPID ONLY): ICD-10-PCS | Mod: 26,RT,, | Performed by: INTERNAL MEDICINE

## 2021-02-01 PROCEDURE — 93971 EXTREMITY STUDY: CPT | Mod: RT

## 2021-02-04 NOTE — TELEPHONE ENCOUNTER
Refill Authorization Note     is requesting a refill authorization.    Brief assessment and rationale for refill: APPROVE: prr               Medication reconciliation completed: No                         Comments:   Automatic Epic Protocol Generated Data:    Requested Prescriptions   Signed Prescriptions Disp Refills    carvediloL (COREG) 3.125 MG tablet 180 tablet 3     Sig: TAKE ONE TABLET BY MOUTH TWICE DAILY WITH MEALS FOR BLOOD PRESSURE AND (STOP ATENOLOL)       Cardiovascular:  Beta Blockers Passed - 4/22/2020 11:10 AM        Passed - Patient is at least 18 years old        Passed - Last BP in normal range within 360 days.     BP Readings from Last 3 Encounters:   03/12/20 128/70   02/13/20 132/70   01/14/20 124/80              Passed - Last Heart Rate in normal range within 360 days.     Pulse Readings from Last 3 Encounters:   03/12/20 66   02/13/20 65   01/14/20 77             Passed - Office visit in past 12 months or future 90 days.     Recent Outpatient Visits            1 month ago Hypertension, essential    [facility] - Internal Medicine Ned Hartley MD    2 months ago Coronary artery disease involving autologous vein coronary bypass graft without angina pectoris    [facility] - Cardiology Jose E Grajeda MD    3 months ago Type 2 diabetes mellitus without retinopathy    [facility] Stephani Braun, BHARGAV    3 months ago Non-insulin dependent type 2 diabetes mellitus    [facility] - Endocrinology 6th FL Joan Saab NP    3 months ago Annual physical exam    [facility] Ned Hartley MD          Future Appointments              In 2 months MD [facility] - Internal Medicine, [facility] PCW    In [facility] months [facility]                   Appointments  past 12m or future 3m with PCP    Date Provider   Last Visit   3/12/2020 Ned Hartley MD   Next Visit   7/9/2020 Ned PERLA  MD Naeem   ED visits in past 90 days: [unfilled]     Note composed:4:02 PM 04/27/2020      I have personally seen and examined this patient.  I have fully participated in the care of this patient. I have reviewed all pertinent clinical information, including history, physical exam, plan and the Resident’s note and agree except as noted.

## 2021-02-10 ENCOUNTER — PATIENT OUTREACH (OUTPATIENT)
Dept: ADMINISTRATIVE | Facility: OTHER | Age: 71
End: 2021-02-10

## 2021-02-11 ENCOUNTER — OFFICE VISIT (OUTPATIENT)
Dept: CARDIOLOGY | Facility: CLINIC | Age: 71
End: 2021-02-11
Payer: COMMERCIAL

## 2021-02-11 VITALS
OXYGEN SATURATION: 96 % | DIASTOLIC BLOOD PRESSURE: 60 MMHG | HEIGHT: 67 IN | WEIGHT: 178.81 LBS | HEART RATE: 69 BPM | SYSTOLIC BLOOD PRESSURE: 122 MMHG | BODY MASS INDEX: 28.07 KG/M2

## 2021-02-11 DIAGNOSIS — E11.51 TYPE 2 DIABETES, WITH PERIPHERAL CIRCULATORY DISORDER NOT AT GOAL: ICD-10-CM

## 2021-02-11 DIAGNOSIS — I65.22 LEFT CAROTID ARTERY STENOSIS: ICD-10-CM

## 2021-02-11 DIAGNOSIS — I25.810 CORONARY ARTERY DISEASE INVOLVING AUTOLOGOUS VEIN CORONARY BYPASS GRAFT WITHOUT ANGINA PECTORIS: Primary | ICD-10-CM

## 2021-02-11 DIAGNOSIS — I10 ESSENTIAL HYPERTENSION: ICD-10-CM

## 2021-02-11 DIAGNOSIS — E78.5 HYPERLIPIDEMIA, UNSPECIFIED HYPERLIPIDEMIA TYPE: ICD-10-CM

## 2021-02-11 PROCEDURE — 1126F PR PAIN SEVERITY QUANTIFIED, NO PAIN PRESENT: ICD-10-PCS | Mod: S$GLB,,, | Performed by: INTERNAL MEDICINE

## 2021-02-11 PROCEDURE — 99999 PR PBB SHADOW E&M-EST. PATIENT-LVL IV: CPT | Mod: PBBFAC,,, | Performed by: INTERNAL MEDICINE

## 2021-02-11 PROCEDURE — 3078F DIAST BP <80 MM HG: CPT | Mod: CPTII,S$GLB,, | Performed by: INTERNAL MEDICINE

## 2021-02-11 PROCEDURE — 99999 PR PBB SHADOW E&M-EST. PATIENT-LVL IV: ICD-10-PCS | Mod: PBBFAC,,, | Performed by: INTERNAL MEDICINE

## 2021-02-11 PROCEDURE — 1159F MED LIST DOCD IN RCRD: CPT | Mod: S$GLB,,, | Performed by: INTERNAL MEDICINE

## 2021-02-11 PROCEDURE — 3288F PR FALLS RISK ASSESSMENT DOCUMENTED: ICD-10-PCS | Mod: CPTII,S$GLB,, | Performed by: INTERNAL MEDICINE

## 2021-02-11 PROCEDURE — 3078F PR MOST RECENT DIASTOLIC BLOOD PRESSURE < 80 MM HG: ICD-10-PCS | Mod: CPTII,S$GLB,, | Performed by: INTERNAL MEDICINE

## 2021-02-11 PROCEDURE — 3008F BODY MASS INDEX DOCD: CPT | Mod: CPTII,S$GLB,, | Performed by: INTERNAL MEDICINE

## 2021-02-11 PROCEDURE — 99214 PR OFFICE/OUTPT VISIT, EST, LEVL IV, 30-39 MIN: ICD-10-PCS | Mod: S$GLB,,, | Performed by: INTERNAL MEDICINE

## 2021-02-11 PROCEDURE — 3072F PR LOW RISK FOR RETINOPATHY: ICD-10-PCS | Mod: S$GLB,,, | Performed by: INTERNAL MEDICINE

## 2021-02-11 PROCEDURE — 3044F PR MOST RECENT HEMOGLOBIN A1C LEVEL <7.0%: ICD-10-PCS | Mod: CPTII,S$GLB,, | Performed by: INTERNAL MEDICINE

## 2021-02-11 PROCEDURE — 1126F AMNT PAIN NOTED NONE PRSNT: CPT | Mod: S$GLB,,, | Performed by: INTERNAL MEDICINE

## 2021-02-11 PROCEDURE — 1159F PR MEDICATION LIST DOCUMENTED IN MEDICAL RECORD: ICD-10-PCS | Mod: S$GLB,,, | Performed by: INTERNAL MEDICINE

## 2021-02-11 PROCEDURE — 1101F PR PT FALLS ASSESS DOC 0-1 FALLS W/OUT INJ PAST YR: ICD-10-PCS | Mod: CPTII,S$GLB,, | Performed by: INTERNAL MEDICINE

## 2021-02-11 PROCEDURE — 3044F HG A1C LEVEL LT 7.0%: CPT | Mod: CPTII,S$GLB,, | Performed by: INTERNAL MEDICINE

## 2021-02-11 PROCEDURE — 1101F PT FALLS ASSESS-DOCD LE1/YR: CPT | Mod: CPTII,S$GLB,, | Performed by: INTERNAL MEDICINE

## 2021-02-11 PROCEDURE — 3008F PR BODY MASS INDEX (BMI) DOCUMENTED: ICD-10-PCS | Mod: CPTII,S$GLB,, | Performed by: INTERNAL MEDICINE

## 2021-02-11 PROCEDURE — 99214 OFFICE O/P EST MOD 30 MIN: CPT | Mod: S$GLB,,, | Performed by: INTERNAL MEDICINE

## 2021-02-11 PROCEDURE — 3074F PR MOST RECENT SYSTOLIC BLOOD PRESSURE < 130 MM HG: ICD-10-PCS | Mod: CPTII,S$GLB,, | Performed by: INTERNAL MEDICINE

## 2021-02-11 PROCEDURE — 3288F FALL RISK ASSESSMENT DOCD: CPT | Mod: CPTII,S$GLB,, | Performed by: INTERNAL MEDICINE

## 2021-02-11 PROCEDURE — 3072F LOW RISK FOR RETINOPATHY: CPT | Mod: S$GLB,,, | Performed by: INTERNAL MEDICINE

## 2021-02-11 PROCEDURE — 3074F SYST BP LT 130 MM HG: CPT | Mod: CPTII,S$GLB,, | Performed by: INTERNAL MEDICINE

## 2021-02-18 ENCOUNTER — OFFICE VISIT (OUTPATIENT)
Dept: OPTOMETRY | Facility: CLINIC | Age: 71
End: 2021-02-18
Payer: COMMERCIAL

## 2021-02-18 DIAGNOSIS — H25.813 COMBINED FORM OF AGE-RELATED CATARACT, BOTH EYES: ICD-10-CM

## 2021-02-18 DIAGNOSIS — Z98.890 HISTORY OF REPAIR OF RETINAL DEFECT BY LASER PHOTOCOAGULATION: ICD-10-CM

## 2021-02-18 DIAGNOSIS — H52.4 PRESBYOPIA: ICD-10-CM

## 2021-02-18 DIAGNOSIS — E11.9 TYPE 2 DIABETES MELLITUS WITHOUT RETINOPATHY: Primary | ICD-10-CM

## 2021-02-18 PROCEDURE — 1101F PR PT FALLS ASSESS DOC 0-1 FALLS W/OUT INJ PAST YR: ICD-10-PCS | Mod: CPTII,S$GLB,, | Performed by: OPTOMETRIST

## 2021-02-18 PROCEDURE — 2023F PR DILATED RETINAL EXAM W/O EVID OF RETINOPATHY: ICD-10-PCS | Mod: S$GLB,,, | Performed by: OPTOMETRIST

## 2021-02-18 PROCEDURE — 1126F PR PAIN SEVERITY QUANTIFIED, NO PAIN PRESENT: ICD-10-PCS | Mod: S$GLB,,, | Performed by: OPTOMETRIST

## 2021-02-18 PROCEDURE — 1126F AMNT PAIN NOTED NONE PRSNT: CPT | Mod: S$GLB,,, | Performed by: OPTOMETRIST

## 2021-02-18 PROCEDURE — 2023F DILAT RTA XM W/O RTNOPTHY: CPT | Mod: S$GLB,,, | Performed by: OPTOMETRIST

## 2021-02-18 PROCEDURE — 92014 COMPRE OPH EXAM EST PT 1/>: CPT | Mod: S$GLB,,, | Performed by: OPTOMETRIST

## 2021-02-18 PROCEDURE — 99999 PR PBB SHADOW E&M-EST. PATIENT-LVL III: ICD-10-PCS | Mod: PBBFAC,,, | Performed by: OPTOMETRIST

## 2021-02-18 PROCEDURE — 1101F PT FALLS ASSESS-DOCD LE1/YR: CPT | Mod: CPTII,S$GLB,, | Performed by: OPTOMETRIST

## 2021-02-18 PROCEDURE — 3288F FALL RISK ASSESSMENT DOCD: CPT | Mod: CPTII,S$GLB,, | Performed by: OPTOMETRIST

## 2021-02-18 PROCEDURE — 92014 PR EYE EXAM, EST PATIENT,COMPREHESV: ICD-10-PCS | Mod: S$GLB,,, | Performed by: OPTOMETRIST

## 2021-02-18 PROCEDURE — 3288F PR FALLS RISK ASSESSMENT DOCUMENTED: ICD-10-PCS | Mod: CPTII,S$GLB,, | Performed by: OPTOMETRIST

## 2021-02-18 PROCEDURE — 99999 PR PBB SHADOW E&M-EST. PATIENT-LVL III: CPT | Mod: PBBFAC,,, | Performed by: OPTOMETRIST

## 2021-04-26 ENCOUNTER — OFFICE VISIT (OUTPATIENT)
Dept: URGENT CARE | Facility: CLINIC | Age: 71
End: 2021-04-26
Payer: COMMERCIAL

## 2021-04-26 VITALS
BODY MASS INDEX: 28.25 KG/M2 | HEIGHT: 67 IN | RESPIRATION RATE: 18 BRPM | OXYGEN SATURATION: 98 % | TEMPERATURE: 98 F | DIASTOLIC BLOOD PRESSURE: 74 MMHG | SYSTOLIC BLOOD PRESSURE: 135 MMHG | HEART RATE: 62 BPM | WEIGHT: 180 LBS
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
## 2021-04-26 DIAGNOSIS — S22.32XA CLOSED FRACTURE OF ONE RIB OF LEFT SIDE, INITIAL ENCOUNTER: Primary | ICD-10-CM

## 2021-04-26 PROCEDURE — 99214 PR OFFICE/OUTPT VISIT, EST, LEVL IV, 30-39 MIN: ICD-10-PCS | Mod: S$GLB,,, | Performed by: STUDENT IN AN ORGANIZED HEALTH CARE EDUCATION/TRAINING PROGRAM

## 2021-04-26 PROCEDURE — 71101 X-RAY EXAM UNILAT RIBS/CHEST: CPT | Mod: LT,S$GLB,, | Performed by: RADIOLOGY

## 2021-04-26 PROCEDURE — 71101 XR RIBS MIN 3 VIEWS W/ PA CHEST LEFT: ICD-10-PCS | Mod: LT,S$GLB,, | Performed by: RADIOLOGY

## 2021-04-26 PROCEDURE — 3072F PR LOW RISK FOR RETINOPATHY: ICD-10-PCS | Mod: S$GLB,,, | Performed by: STUDENT IN AN ORGANIZED HEALTH CARE EDUCATION/TRAINING PROGRAM

## 2021-04-26 PROCEDURE — 3008F PR BODY MASS INDEX (BMI) DOCUMENTED: ICD-10-PCS | Mod: CPTII,S$GLB,, | Performed by: STUDENT IN AN ORGANIZED HEALTH CARE EDUCATION/TRAINING PROGRAM

## 2021-04-26 PROCEDURE — 99214 OFFICE O/P EST MOD 30 MIN: CPT | Mod: S$GLB,,, | Performed by: STUDENT IN AN ORGANIZED HEALTH CARE EDUCATION/TRAINING PROGRAM

## 2021-04-26 PROCEDURE — 3008F BODY MASS INDEX DOCD: CPT | Mod: CPTII,S$GLB,, | Performed by: STUDENT IN AN ORGANIZED HEALTH CARE EDUCATION/TRAINING PROGRAM

## 2021-04-26 PROCEDURE — 3072F LOW RISK FOR RETINOPATHY: CPT | Mod: S$GLB,,, | Performed by: STUDENT IN AN ORGANIZED HEALTH CARE EDUCATION/TRAINING PROGRAM

## 2021-05-03 ENCOUNTER — OFFICE VISIT (OUTPATIENT)
Dept: INTERNAL MEDICINE | Facility: CLINIC | Age: 71
End: 2021-05-03
Attending: FAMILY MEDICINE
Payer: COMMERCIAL

## 2021-05-03 VITALS
OXYGEN SATURATION: 98 % | HEIGHT: 67 IN | BODY MASS INDEX: 27.89 KG/M2 | SYSTOLIC BLOOD PRESSURE: 108 MMHG | WEIGHT: 177.69 LBS | DIASTOLIC BLOOD PRESSURE: 58 MMHG | HEART RATE: 58 BPM

## 2021-05-03 DIAGNOSIS — E78.5 HYPERLIPIDEMIA, UNSPECIFIED HYPERLIPIDEMIA TYPE: ICD-10-CM

## 2021-05-03 DIAGNOSIS — E11.51 TYPE 2 DIABETES, WITH PERIPHERAL CIRCULATORY DISORDER NOT AT GOAL: ICD-10-CM

## 2021-05-03 DIAGNOSIS — I10 HYPERTENSION, ESSENTIAL: ICD-10-CM

## 2021-05-03 DIAGNOSIS — K76.0 NAFLD (NONALCOHOLIC FATTY LIVER DISEASE): ICD-10-CM

## 2021-05-03 DIAGNOSIS — I65.22 LEFT CAROTID ARTERY STENOSIS: ICD-10-CM

## 2021-05-03 DIAGNOSIS — E11.65 TYPE 2 DIABETES MELLITUS WITH HYPERGLYCEMIA, WITHOUT LONG-TERM CURRENT USE OF INSULIN: ICD-10-CM

## 2021-05-03 DIAGNOSIS — R35.0 BENIGN PROSTATIC HYPERPLASIA WITH URINARY FREQUENCY: ICD-10-CM

## 2021-05-03 DIAGNOSIS — E11.42 DIABETIC POLYNEUROPATHY ASSOCIATED WITH TYPE 2 DIABETES MELLITUS: ICD-10-CM

## 2021-05-03 DIAGNOSIS — N40.1 BENIGN PROSTATIC HYPERPLASIA WITH URINARY FREQUENCY: ICD-10-CM

## 2021-05-03 DIAGNOSIS — K21.00 GASTROESOPHAGEAL REFLUX DISEASE WITH ESOPHAGITIS, UNSPECIFIED WHETHER HEMORRHAGE: ICD-10-CM

## 2021-05-03 DIAGNOSIS — I25.810 CORONARY ARTERY DISEASE INVOLVING AUTOLOGOUS VEIN CORONARY BYPASS GRAFT WITHOUT ANGINA PECTORIS: ICD-10-CM

## 2021-05-03 DIAGNOSIS — E11.9 NON-INSULIN DEPENDENT TYPE 2 DIABETES MELLITUS: Primary | ICD-10-CM

## 2021-05-03 DIAGNOSIS — I73.9 PVD (PERIPHERAL VASCULAR DISEASE): ICD-10-CM

## 2021-05-03 DIAGNOSIS — S29.9XXA RIB INJURY: ICD-10-CM

## 2021-05-03 DIAGNOSIS — I65.21 INTERNAL CAROTID ARTERY OCCLUSION, RIGHT: ICD-10-CM

## 2021-05-03 PROCEDURE — 99999 PR PBB SHADOW E&M-EST. PATIENT-LVL IV: ICD-10-PCS | Mod: PBBFAC,,, | Performed by: FAMILY MEDICINE

## 2021-05-03 PROCEDURE — 3008F PR BODY MASS INDEX (BMI) DOCUMENTED: ICD-10-PCS | Mod: CPTII,S$GLB,, | Performed by: FAMILY MEDICINE

## 2021-05-03 PROCEDURE — 1159F PR MEDICATION LIST DOCUMENTED IN MEDICAL RECORD: ICD-10-PCS | Mod: S$GLB,,, | Performed by: FAMILY MEDICINE

## 2021-05-03 PROCEDURE — 3288F PR FALLS RISK ASSESSMENT DOCUMENTED: ICD-10-PCS | Mod: CPTII,S$GLB,, | Performed by: FAMILY MEDICINE

## 2021-05-03 PROCEDURE — 1100F PR PT FALLS ASSESS DOC 2+ FALLS/FALL W/INJURY/YR: ICD-10-PCS | Mod: CPTII,S$GLB,, | Performed by: FAMILY MEDICINE

## 2021-05-03 PROCEDURE — 1126F PR PAIN SEVERITY QUANTIFIED, NO PAIN PRESENT: ICD-10-PCS | Mod: S$GLB,,, | Performed by: FAMILY MEDICINE

## 2021-05-03 PROCEDURE — 3078F DIAST BP <80 MM HG: CPT | Mod: CPTII,S$GLB,, | Performed by: FAMILY MEDICINE

## 2021-05-03 PROCEDURE — 3288F FALL RISK ASSESSMENT DOCD: CPT | Mod: CPTII,S$GLB,, | Performed by: FAMILY MEDICINE

## 2021-05-03 PROCEDURE — 1126F AMNT PAIN NOTED NONE PRSNT: CPT | Mod: S$GLB,,, | Performed by: FAMILY MEDICINE

## 2021-05-03 PROCEDURE — 1100F PTFALLS ASSESS-DOCD GE2>/YR: CPT | Mod: CPTII,S$GLB,, | Performed by: FAMILY MEDICINE

## 2021-05-03 PROCEDURE — 3044F PR MOST RECENT HEMOGLOBIN A1C LEVEL <7.0%: ICD-10-PCS | Mod: CPTII,S$GLB,, | Performed by: FAMILY MEDICINE

## 2021-05-03 PROCEDURE — 3044F HG A1C LEVEL LT 7.0%: CPT | Mod: CPTII,S$GLB,, | Performed by: FAMILY MEDICINE

## 2021-05-03 PROCEDURE — 3072F LOW RISK FOR RETINOPATHY: CPT | Mod: S$GLB,,, | Performed by: FAMILY MEDICINE

## 2021-05-03 PROCEDURE — 99214 OFFICE O/P EST MOD 30 MIN: CPT | Mod: S$GLB,,, | Performed by: FAMILY MEDICINE

## 2021-05-03 PROCEDURE — 3074F PR MOST RECENT SYSTOLIC BLOOD PRESSURE < 130 MM HG: ICD-10-PCS | Mod: CPTII,S$GLB,, | Performed by: FAMILY MEDICINE

## 2021-05-03 PROCEDURE — 3074F SYST BP LT 130 MM HG: CPT | Mod: CPTII,S$GLB,, | Performed by: FAMILY MEDICINE

## 2021-05-03 PROCEDURE — 1159F MED LIST DOCD IN RCRD: CPT | Mod: S$GLB,,, | Performed by: FAMILY MEDICINE

## 2021-05-03 PROCEDURE — 3078F PR MOST RECENT DIASTOLIC BLOOD PRESSURE < 80 MM HG: ICD-10-PCS | Mod: CPTII,S$GLB,, | Performed by: FAMILY MEDICINE

## 2021-05-03 PROCEDURE — 99214 PR OFFICE/OUTPT VISIT, EST, LEVL IV, 30-39 MIN: ICD-10-PCS | Mod: S$GLB,,, | Performed by: FAMILY MEDICINE

## 2021-05-03 PROCEDURE — 3072F PR LOW RISK FOR RETINOPATHY: ICD-10-PCS | Mod: S$GLB,,, | Performed by: FAMILY MEDICINE

## 2021-05-03 PROCEDURE — 3008F BODY MASS INDEX DOCD: CPT | Mod: CPTII,S$GLB,, | Performed by: FAMILY MEDICINE

## 2021-05-03 PROCEDURE — 99999 PR PBB SHADOW E&M-EST. PATIENT-LVL IV: CPT | Mod: PBBFAC,,, | Performed by: FAMILY MEDICINE

## 2021-05-03 RX ORDER — EMPAGLIFLOZIN 25 MG/1
25 TABLET, FILM COATED ORAL DAILY
COMMUNITY
Start: 2021-01-25 | End: 2021-12-13 | Stop reason: SDUPTHER

## 2021-05-03 RX ORDER — TAMSULOSIN HYDROCHLORIDE 0.4 MG/1
0.4 CAPSULE ORAL DAILY
Qty: 90 CAPSULE | Refills: 3 | Status: SHIPPED | OUTPATIENT
Start: 2021-05-03 | End: 2021-07-19 | Stop reason: SDUPTHER

## 2021-05-05 ENCOUNTER — PATIENT OUTREACH (OUTPATIENT)
Dept: ADMINISTRATIVE | Facility: OTHER | Age: 71
End: 2021-05-05

## 2021-05-05 LAB
APPEARANCE UR: CLEAR
BILIRUB UR QL STRIP: NEGATIVE
COLOR UR: YELLOW
GLUCOSE UR QL STRIP: ABNORMAL
HBA1C MFR BLD: 6.9 % OF TOTAL HGB
HGB UR QL STRIP: NEGATIVE
KETONES UR QL STRIP: NEGATIVE
LEUKOCYTE ESTERASE UR QL STRIP: NEGATIVE
NITRITE UR QL STRIP: NEGATIVE
PH UR STRIP: ABNORMAL [PH] (ref 5–8)
PROT UR QL STRIP: NEGATIVE
SP GR UR STRIP: 1.03 (ref 1–1.03)

## 2021-05-06 ENCOUNTER — OFFICE VISIT (OUTPATIENT)
Dept: ENDOCRINOLOGY | Facility: CLINIC | Age: 71
End: 2021-05-06
Attending: FAMILY MEDICINE
Payer: COMMERCIAL

## 2021-05-06 DIAGNOSIS — I10 HYPERTENSION, ESSENTIAL: ICD-10-CM

## 2021-05-06 DIAGNOSIS — E83.52 HYPERCALCEMIA: ICD-10-CM

## 2021-05-06 DIAGNOSIS — E11.9 NON-INSULIN DEPENDENT TYPE 2 DIABETES MELLITUS: Chronic | ICD-10-CM

## 2021-05-06 DIAGNOSIS — E11.42 DIABETIC POLYNEUROPATHY ASSOCIATED WITH TYPE 2 DIABETES MELLITUS: ICD-10-CM

## 2021-05-06 DIAGNOSIS — Z91.199 NONCOMPLIANCE: ICD-10-CM

## 2021-05-06 PROCEDURE — 99214 PR OFFICE/OUTPT VISIT, EST, LEVL IV, 30-39 MIN: ICD-10-PCS | Mod: 95,,, | Performed by: NURSE PRACTITIONER

## 2021-05-06 PROCEDURE — 3044F PR MOST RECENT HEMOGLOBIN A1C LEVEL <7.0%: ICD-10-PCS | Mod: CPTII,,, | Performed by: NURSE PRACTITIONER

## 2021-05-06 PROCEDURE — 99214 OFFICE O/P EST MOD 30 MIN: CPT | Mod: 95,,, | Performed by: NURSE PRACTITIONER

## 2021-05-06 PROCEDURE — 1159F MED LIST DOCD IN RCRD: CPT | Mod: ,,, | Performed by: NURSE PRACTITIONER

## 2021-05-06 PROCEDURE — 3072F PR LOW RISK FOR RETINOPATHY: ICD-10-PCS | Mod: ,,, | Performed by: NURSE PRACTITIONER

## 2021-05-06 PROCEDURE — 3044F HG A1C LEVEL LT 7.0%: CPT | Mod: CPTII,,, | Performed by: NURSE PRACTITIONER

## 2021-05-06 PROCEDURE — 3072F LOW RISK FOR RETINOPATHY: CPT | Mod: ,,, | Performed by: NURSE PRACTITIONER

## 2021-05-06 PROCEDURE — 1159F PR MEDICATION LIST DOCUMENTED IN MEDICAL RECORD: ICD-10-PCS | Mod: ,,, | Performed by: NURSE PRACTITIONER

## 2021-05-06 RX ORDER — DULAGLUTIDE 3 MG/.5ML
3 INJECTION, SOLUTION SUBCUTANEOUS
Qty: 4 PEN | Refills: 0 | Status: SHIPPED | OUTPATIENT
Start: 2021-05-06 | End: 2021-06-05

## 2021-05-11 ENCOUNTER — HOSPITAL ENCOUNTER (OUTPATIENT)
Dept: CARDIOLOGY | Facility: HOSPITAL | Age: 71
Discharge: HOME | End: 2021-05-11
Attending: FAMILY MEDICINE
Payer: COMMERCIAL

## 2021-05-11 DIAGNOSIS — I65.22 LEFT CAROTID ARTERY STENOSIS: ICD-10-CM

## 2021-05-11 DIAGNOSIS — I65.21 INTERNAL CAROTID ARTERY OCCLUSION, RIGHT: ICD-10-CM

## 2021-05-11 LAB
LEFT ARM DIASTOLIC BLOOD PRESSURE: 60 MMHG
LEFT ARM SYSTOLIC BLOOD PRESSURE: 110 MMHG
LEFT CBA DIAS: 15 CM/S
LEFT CBA SYS: 46 CM/S
LEFT CCA DIST DIAS: 23 CM/S
LEFT CCA DIST SYS: 61 CM/S
LEFT CCA MID DIAS: 14 CM/S
LEFT CCA MID SYS: 47 CM/S
LEFT CCA PROX DIAS: 22 CM/S
LEFT CCA PROX SYS: 69 CM/S
LEFT ECA DIAS: 13 CM/S
LEFT ECA SYS: 100 CM/S
LEFT ICA DIST DIAS: 39 CM/S
LEFT ICA DIST SYS: 119 CM/S
LEFT ICA MID DIAS: 32 CM/S
LEFT ICA MID SYS: 93 CM/S
LEFT ICA PROX DIAS: 19 CM/S
LEFT ICA PROX SYS: 59 CM/S
LEFT VERTEBRAL DIAS: 18 CM/S
LEFT VERTEBRAL SYS: 68 CM/S
OHS CV CAROTID RIGHT ICA EDV HIGHEST: 0
OHS CV CAROTID ULTRASOUND LEFT ICA/CCA RATIO: 1.95
OHS CV CAROTID ULTRASOUND RIGHT ICA/CCA RATIO: 0
OHS CV PV CAROTID LEFT HIGHEST CCA: 69
OHS CV PV CAROTID LEFT HIGHEST ICA: 119
OHS CV PV CAROTID RIGHT HIGHEST CCA: 22
OHS CV PV CAROTID RIGHT HIGHEST ICA: 0
OHS CV US CAROTID LEFT HIGHEST EDV: 39
RIGHT ARM DIASTOLIC BLOOD PRESSURE: 60 MMHG
RIGHT ARM SYSTOLIC BLOOD PRESSURE: 110 MMHG
RIGHT CBA DIAS: 9 CM/S
RIGHT CBA SYS: 47 CM/S
RIGHT CCA DIST DIAS: 0 CM/S
RIGHT CCA DIST SYS: 8 CM/S
RIGHT CCA MID DIAS: 0 CM/S
RIGHT CCA MID SYS: 11 CM/S
RIGHT CCA PROX DIAS: 0 CM/S
RIGHT CCA PROX SYS: 22 CM/S
RIGHT ECA DIAS: 0 CM/S
RIGHT ECA SYS: 0 CM/S
RIGHT ICA DIST DIAS: 0 CM/S
RIGHT ICA DIST SYS: 0 CM/S
RIGHT ICA MID DIAS: 0 CM/S
RIGHT ICA MID SYS: 0 CM/S
RIGHT ICA PROX DIAS: 0 CM/S
RIGHT ICA PROX SYS: 0 CM/S
RIGHT VERTEBRAL DIAS: 15 CM/S
RIGHT VERTEBRAL SYS: 72 CM/S

## 2021-05-11 PROCEDURE — 93880 EXTRACRANIAL BILAT STUDY: CPT | Mod: 26,,, | Performed by: INTERNAL MEDICINE

## 2021-05-11 PROCEDURE — 93880 EXTRACRANIAL BILAT STUDY: CPT

## 2021-05-11 PROCEDURE — 93880 CV US DOPPLER CAROTID (CUPID ONLY): ICD-10-PCS | Mod: 26,,, | Performed by: INTERNAL MEDICINE

## 2021-05-12 ENCOUNTER — PATIENT MESSAGE (OUTPATIENT)
Dept: ENDOCRINOLOGY | Facility: CLINIC | Age: 71
End: 2021-05-12

## 2021-05-12 LAB
25(OH)D3 SERPL-MCNC: 27 NG/ML (ref 30–100)
ALBUMIN SERPL-MCNC: 4.6 G/DL (ref 3.6–5.1)
ALBUMIN SERPL-MCNC: 4.6 G/DL (ref 3.6–5.1)
ALBUMIN/GLOB SERPL: 2.1 (CALC) (ref 1–2.5)
ALP SERPL-CCNC: 99 U/L (ref 35–144)
ALT SERPL-CCNC: 39 U/L (ref 9–46)
AST SERPL-CCNC: 21 U/L (ref 10–35)
BILIRUB SERPL-MCNC: 0.5 MG/DL (ref 0.2–1.2)
BUN SERPL-MCNC: 15 MG/DL (ref 7–25)
BUN SERPL-MCNC: 15 MG/DL (ref 7–25)
BUN/CREAT SERPL: ABNORMAL (CALC) (ref 6–22)
BUN/CREAT SERPL: ABNORMAL (CALC) (ref 6–22)
CALCIUM SERPL-MCNC: 10.3 MG/DL (ref 8.6–10.3)
CALCIUM SERPL-MCNC: 10.3 MG/DL (ref 8.6–10.3)
CHLORIDE SERPL-SCNC: 103 MMOL/L (ref 98–110)
CHLORIDE SERPL-SCNC: 103 MMOL/L (ref 98–110)
CO2 SERPL-SCNC: 31 MMOL/L (ref 20–32)
CO2 SERPL-SCNC: 31 MMOL/L (ref 20–32)
CREAT SERPL-MCNC: 0.85 MG/DL (ref 0.7–1.25)
CREAT SERPL-MCNC: 0.85 MG/DL (ref 0.7–1.25)
GLOBULIN SER CALC-MCNC: 2.2 G/DL (CALC) (ref 1.9–3.7)
GLUCOSE SERPL-MCNC: 144 MG/DL (ref 65–99)
GLUCOSE SERPL-MCNC: 144 MG/DL (ref 65–99)
PHOSPHATE SERPL-MCNC: 3.3 MG/DL (ref 2.1–4.3)
POTASSIUM SERPL-SCNC: 4.9 MMOL/L (ref 3.5–5.3)
POTASSIUM SERPL-SCNC: 4.9 MMOL/L (ref 3.5–5.3)
PROT SERPL-MCNC: 6.8 G/DL (ref 6.1–8.1)
PTH-INTACT SERPL-MCNC: 40 PG/ML (ref 14–64)
SODIUM SERPL-SCNC: 141 MMOL/L (ref 135–146)
SODIUM SERPL-SCNC: 141 MMOL/L (ref 135–146)

## 2021-05-20 ENCOUNTER — TELEPHONE (OUTPATIENT)
Dept: INTERNAL MEDICINE | Facility: CLINIC | Age: 71
End: 2021-05-20

## 2021-05-27 ENCOUNTER — PATIENT MESSAGE (OUTPATIENT)
Dept: ENDOCRINOLOGY | Facility: CLINIC | Age: 71
End: 2021-05-27

## 2021-06-10 RX ORDER — DULAGLUTIDE 3 MG/.5ML
3 INJECTION, SOLUTION SUBCUTANEOUS
Qty: 4 PEN | Refills: 11 | Status: SHIPPED | OUTPATIENT
Start: 2021-06-10 | End: 2022-05-16

## 2021-07-02 ENCOUNTER — TELEPHONE (OUTPATIENT)
Dept: INTERNAL MEDICINE | Facility: CLINIC | Age: 71
End: 2021-07-02

## 2021-07-06 DIAGNOSIS — E11.65 TYPE 2 DIABETES MELLITUS WITH HYPERGLYCEMIA, WITHOUT LONG-TERM CURRENT USE OF INSULIN: ICD-10-CM

## 2021-07-08 RX ORDER — METFORMIN HYDROCHLORIDE 500 MG/1
TABLET, EXTENDED RELEASE ORAL
Qty: 360 TABLET | Refills: 0 | Status: SHIPPED | OUTPATIENT
Start: 2021-07-08 | End: 2021-10-07

## 2021-07-14 ENCOUNTER — PATIENT MESSAGE (OUTPATIENT)
Dept: INTERNAL MEDICINE | Facility: CLINIC | Age: 71
End: 2021-07-14

## 2021-07-14 DIAGNOSIS — I10 ESSENTIAL HYPERTENSION: ICD-10-CM

## 2021-07-14 DIAGNOSIS — I10 HYPERTENSION, ESSENTIAL: ICD-10-CM

## 2021-07-14 DIAGNOSIS — E11.65 TYPE 2 DIABETES MELLITUS WITH HYPERGLYCEMIA, WITHOUT LONG-TERM CURRENT USE OF INSULIN: ICD-10-CM

## 2021-07-15 RX ORDER — HYDROCHLOROTHIAZIDE 25 MG/1
25 TABLET ORAL DAILY
Qty: 90 TABLET | Refills: 1 | Status: CANCELLED | OUTPATIENT
Start: 2021-07-15

## 2021-07-16 RX ORDER — CARVEDILOL 3.12 MG/1
TABLET ORAL
Qty: 180 TABLET | Refills: 3 | Status: SHIPPED | OUTPATIENT
Start: 2021-07-16 | End: 2022-01-14

## 2021-07-16 RX ORDER — IRBESARTAN 300 MG/1
300 TABLET ORAL NIGHTLY
Qty: 90 TABLET | Refills: 3 | Status: SHIPPED | OUTPATIENT
Start: 2021-07-16 | End: 2022-06-22

## 2021-07-16 RX ORDER — GLIPIZIDE 5 MG/1
TABLET ORAL
Qty: 180 TABLET | Refills: 1 | Status: SHIPPED | OUTPATIENT
Start: 2021-07-16 | End: 2021-12-29

## 2021-07-19 ENCOUNTER — TELEPHONE (OUTPATIENT)
Dept: INTERNAL MEDICINE | Facility: CLINIC | Age: 71
End: 2021-07-19

## 2021-07-19 DIAGNOSIS — E78.5 HYPERLIPIDEMIA, UNSPECIFIED HYPERLIPIDEMIA TYPE: ICD-10-CM

## 2021-07-19 RX ORDER — HYDROCHLOROTHIAZIDE 25 MG/1
25 TABLET ORAL DAILY
COMMUNITY
Start: 2021-04-08 | End: 2021-07-19 | Stop reason: SINTOL

## 2021-07-19 RX ORDER — TAMSULOSIN HYDROCHLORIDE 0.4 MG/1
0.4 CAPSULE ORAL DAILY
Qty: 90 CAPSULE | Refills: 3 | Status: SHIPPED | OUTPATIENT
Start: 2021-07-19 | End: 2022-08-05 | Stop reason: SDUPTHER

## 2021-07-19 RX ORDER — ATORVASTATIN CALCIUM 80 MG/1
TABLET, FILM COATED ORAL
Qty: 90 TABLET | Refills: 2 | Status: SHIPPED | OUTPATIENT
Start: 2021-07-19 | End: 2021-12-24

## 2021-07-26 ENCOUNTER — OFFICE VISIT (OUTPATIENT)
Dept: INTERNAL MEDICINE | Facility: CLINIC | Age: 71
End: 2021-07-26
Attending: FAMILY MEDICINE
Payer: COMMERCIAL

## 2021-07-26 VITALS
HEART RATE: 64 BPM | WEIGHT: 179.44 LBS | SYSTOLIC BLOOD PRESSURE: 138 MMHG | HEIGHT: 67 IN | OXYGEN SATURATION: 98 % | DIASTOLIC BLOOD PRESSURE: 64 MMHG | BODY MASS INDEX: 28.16 KG/M2

## 2021-07-26 DIAGNOSIS — I65.21 INTERNAL CAROTID ARTERY OCCLUSION, RIGHT: ICD-10-CM

## 2021-07-26 DIAGNOSIS — I25.810 CORONARY ARTERY DISEASE INVOLVING AUTOLOGOUS VEIN CORONARY BYPASS GRAFT WITHOUT ANGINA PECTORIS: ICD-10-CM

## 2021-07-26 DIAGNOSIS — I65.22 LEFT CAROTID ARTERY STENOSIS: ICD-10-CM

## 2021-07-26 DIAGNOSIS — K20.90 ESOPHAGITIS: ICD-10-CM

## 2021-07-26 DIAGNOSIS — E11.51 TYPE 2 DIABETES, WITH PERIPHERAL CIRCULATORY DISORDER NOT AT GOAL: ICD-10-CM

## 2021-07-26 DIAGNOSIS — I10 HYPERTENSION, ESSENTIAL: Primary | ICD-10-CM

## 2021-07-26 DIAGNOSIS — E11.9 NON-INSULIN DEPENDENT TYPE 2 DIABETES MELLITUS: Chronic | ICD-10-CM

## 2021-07-26 PROBLEM — E11.65 TYPE 2 DIABETES MELLITUS WITH HYPERGLYCEMIA, WITHOUT LONG-TERM CURRENT USE OF INSULIN: Status: RESOLVED | Noted: 2020-03-12 | Resolved: 2021-07-26

## 2021-07-26 PROCEDURE — 3075F PR MOST RECENT SYSTOLIC BLOOD PRESS GE 130-139MM HG: ICD-10-PCS | Mod: CPTII,S$GLB,, | Performed by: FAMILY MEDICINE

## 2021-07-26 PROCEDURE — 99214 PR OFFICE/OUTPT VISIT, EST, LEVL IV, 30-39 MIN: ICD-10-PCS | Mod: S$GLB,,, | Performed by: FAMILY MEDICINE

## 2021-07-26 PROCEDURE — 3008F BODY MASS INDEX DOCD: CPT | Mod: CPTII,S$GLB,, | Performed by: FAMILY MEDICINE

## 2021-07-26 PROCEDURE — 99999 PR PBB SHADOW E&M-EST. PATIENT-LVL III: ICD-10-PCS | Mod: PBBFAC,,, | Performed by: FAMILY MEDICINE

## 2021-07-26 PROCEDURE — 3044F PR MOST RECENT HEMOGLOBIN A1C LEVEL <7.0%: ICD-10-PCS | Mod: CPTII,S$GLB,, | Performed by: FAMILY MEDICINE

## 2021-07-26 PROCEDURE — 3078F DIAST BP <80 MM HG: CPT | Mod: CPTII,S$GLB,, | Performed by: FAMILY MEDICINE

## 2021-07-26 PROCEDURE — 3072F LOW RISK FOR RETINOPATHY: CPT | Mod: CPTII,S$GLB,, | Performed by: FAMILY MEDICINE

## 2021-07-26 PROCEDURE — 3078F PR MOST RECENT DIASTOLIC BLOOD PRESSURE < 80 MM HG: ICD-10-PCS | Mod: CPTII,S$GLB,, | Performed by: FAMILY MEDICINE

## 2021-07-26 PROCEDURE — 1101F PT FALLS ASSESS-DOCD LE1/YR: CPT | Mod: CPTII,S$GLB,, | Performed by: FAMILY MEDICINE

## 2021-07-26 PROCEDURE — 1159F MED LIST DOCD IN RCRD: CPT | Mod: CPTII,S$GLB,, | Performed by: FAMILY MEDICINE

## 2021-07-26 PROCEDURE — 3288F PR FALLS RISK ASSESSMENT DOCUMENTED: ICD-10-PCS | Mod: CPTII,S$GLB,, | Performed by: FAMILY MEDICINE

## 2021-07-26 PROCEDURE — 3075F SYST BP GE 130 - 139MM HG: CPT | Mod: CPTII,S$GLB,, | Performed by: FAMILY MEDICINE

## 2021-07-26 PROCEDURE — 1101F PR PT FALLS ASSESS DOC 0-1 FALLS W/OUT INJ PAST YR: ICD-10-PCS | Mod: CPTII,S$GLB,, | Performed by: FAMILY MEDICINE

## 2021-07-26 PROCEDURE — 3008F PR BODY MASS INDEX (BMI) DOCUMENTED: ICD-10-PCS | Mod: CPTII,S$GLB,, | Performed by: FAMILY MEDICINE

## 2021-07-26 PROCEDURE — 3288F FALL RISK ASSESSMENT DOCD: CPT | Mod: CPTII,S$GLB,, | Performed by: FAMILY MEDICINE

## 2021-07-26 PROCEDURE — 1126F PR PAIN SEVERITY QUANTIFIED, NO PAIN PRESENT: ICD-10-PCS | Mod: CPTII,S$GLB,, | Performed by: FAMILY MEDICINE

## 2021-07-26 PROCEDURE — 3072F PR LOW RISK FOR RETINOPATHY: ICD-10-PCS | Mod: CPTII,S$GLB,, | Performed by: FAMILY MEDICINE

## 2021-07-26 PROCEDURE — 99999 PR PBB SHADOW E&M-EST. PATIENT-LVL III: CPT | Mod: PBBFAC,,, | Performed by: FAMILY MEDICINE

## 2021-07-26 PROCEDURE — 1160F RVW MEDS BY RX/DR IN RCRD: CPT | Mod: CPTII,S$GLB,, | Performed by: FAMILY MEDICINE

## 2021-07-26 PROCEDURE — 3044F HG A1C LEVEL LT 7.0%: CPT | Mod: CPTII,S$GLB,, | Performed by: FAMILY MEDICINE

## 2021-07-26 PROCEDURE — 99214 OFFICE O/P EST MOD 30 MIN: CPT | Mod: S$GLB,,, | Performed by: FAMILY MEDICINE

## 2021-07-26 PROCEDURE — 1126F AMNT PAIN NOTED NONE PRSNT: CPT | Mod: CPTII,S$GLB,, | Performed by: FAMILY MEDICINE

## 2021-07-26 PROCEDURE — 1160F PR REVIEW ALL MEDS BY PRESCRIBER/CLIN PHARMACIST DOCUMENTED: ICD-10-PCS | Mod: CPTII,S$GLB,, | Performed by: FAMILY MEDICINE

## 2021-07-26 PROCEDURE — 1159F PR MEDICATION LIST DOCUMENTED IN MEDICAL RECORD: ICD-10-PCS | Mod: CPTII,S$GLB,, | Performed by: FAMILY MEDICINE

## 2021-08-02 ENCOUNTER — PATIENT MESSAGE (OUTPATIENT)
Dept: INTERNAL MEDICINE | Facility: CLINIC | Age: 71
End: 2021-08-02

## 2021-08-03 ENCOUNTER — PATIENT OUTREACH (OUTPATIENT)
Dept: ADMINISTRATIVE | Facility: HOSPITAL | Age: 71
End: 2021-08-03

## 2021-08-03 ENCOUNTER — TELEPHONE (OUTPATIENT)
Dept: INTERNAL MEDICINE | Facility: CLINIC | Age: 71
End: 2021-08-03

## 2021-08-03 DIAGNOSIS — E11.51 TYPE 2 DIABETES, WITH PERIPHERAL CIRCULATORY DISORDER NOT AT GOAL: Primary | ICD-10-CM

## 2021-08-04 ENCOUNTER — PATIENT MESSAGE (OUTPATIENT)
Dept: ENDOCRINOLOGY | Facility: CLINIC | Age: 71
End: 2021-08-04

## 2021-08-04 LAB — HBA1C MFR BLD: 7.6 % OF TOTAL HGB

## 2021-08-28 ENCOUNTER — TELEPHONE (OUTPATIENT)
Dept: INTERNAL MEDICINE | Facility: CLINIC | Age: 71
End: 2021-08-28

## 2021-10-05 ENCOUNTER — TELEPHONE (OUTPATIENT)
Dept: INTERNAL MEDICINE | Facility: CLINIC | Age: 71
End: 2021-10-05

## 2021-10-05 DIAGNOSIS — E11.65 TYPE 2 DIABETES MELLITUS WITH HYPERGLYCEMIA, WITHOUT LONG-TERM CURRENT USE OF INSULIN: ICD-10-CM

## 2021-10-07 RX ORDER — METFORMIN HYDROCHLORIDE 500 MG/1
1000 TABLET, EXTENDED RELEASE ORAL 2 TIMES DAILY WITH MEALS
Qty: 360 TABLET | Refills: 1 | Status: SHIPPED | OUTPATIENT
Start: 2021-10-07 | End: 2022-03-28

## 2021-12-13 RX ORDER — EMPAGLIFLOZIN 25 MG/1
25 TABLET, FILM COATED ORAL DAILY
Qty: 30 TABLET | Refills: 3 | Status: SHIPPED | OUTPATIENT
Start: 2021-12-13 | End: 2022-04-21

## 2021-12-24 DIAGNOSIS — E78.5 HYPERLIPIDEMIA, UNSPECIFIED HYPERLIPIDEMIA TYPE: ICD-10-CM

## 2021-12-24 RX ORDER — ATORVASTATIN CALCIUM 80 MG/1
TABLET, FILM COATED ORAL
Qty: 90 TABLET | Refills: 0 | Status: SHIPPED | OUTPATIENT
Start: 2021-12-24 | End: 2022-08-05 | Stop reason: SDUPTHER

## 2021-12-27 DIAGNOSIS — E11.65 TYPE 2 DIABETES MELLITUS WITH HYPERGLYCEMIA, WITHOUT LONG-TERM CURRENT USE OF INSULIN: ICD-10-CM

## 2021-12-29 RX ORDER — GLIPIZIDE 5 MG/1
TABLET ORAL
Qty: 180 TABLET | Refills: 0 | Status: SHIPPED | OUTPATIENT
Start: 2021-12-29 | End: 2022-03-24 | Stop reason: SDUPTHER

## 2022-01-03 ENCOUNTER — OFFICE VISIT (OUTPATIENT)
Dept: OPTOMETRY | Facility: CLINIC | Age: 72
End: 2022-01-03
Attending: FAMILY MEDICINE
Payer: MEDICARE

## 2022-01-03 DIAGNOSIS — H25.813 COMBINED FORM OF AGE-RELATED CATARACT, BOTH EYES: ICD-10-CM

## 2022-01-03 DIAGNOSIS — H52.4 PRESBYOPIA: ICD-10-CM

## 2022-01-03 DIAGNOSIS — Z98.890 HISTORY OF REPAIR OF RETINAL DEFECT BY LASER PHOTOCOAGULATION: ICD-10-CM

## 2022-01-03 DIAGNOSIS — E11.9 NON-INSULIN DEPENDENT TYPE 2 DIABETES MELLITUS: Chronic | ICD-10-CM

## 2022-01-03 DIAGNOSIS — E11.9 TYPE 2 DIABETES MELLITUS WITHOUT RETINOPATHY: Primary | ICD-10-CM

## 2022-01-03 PROCEDURE — 92014 COMPRE OPH EXAM EST PT 1/>: CPT | Mod: S$PBB,,, | Performed by: OPTOMETRIST

## 2022-01-03 PROCEDURE — 99213 OFFICE O/P EST LOW 20 MIN: CPT | Mod: PBBFAC | Performed by: OPTOMETRIST

## 2022-01-03 PROCEDURE — 92014 PR EYE EXAM, EST PATIENT,COMPREHESV: ICD-10-PCS | Mod: S$PBB,,, | Performed by: OPTOMETRIST

## 2022-01-03 PROCEDURE — 99999 PR PBB SHADOW E&M-EST. PATIENT-LVL III: ICD-10-PCS | Mod: PBBFAC,,, | Performed by: OPTOMETRIST

## 2022-01-03 PROCEDURE — 99999 PR PBB SHADOW E&M-EST. PATIENT-LVL III: CPT | Mod: PBBFAC,,, | Performed by: OPTOMETRIST

## 2022-01-03 NOTE — PROGRESS NOTES
HPI     CC: Pt referred by Ned Hartley M.D. for annual diabetic eye exam.   Pt states that he uses Visine PRN OU for allergy eyes (watery, itchy,   occasionally red). Patient denies diplopia, headaches, flashes/floaters,   and pain.    CINDI: 02/18/2021 with Dr. Braun    (-) Changes in vision   (-) Pain  (-) Irritation   (+) Itching   (-) Flashes  (-) Floaters  (+) Glasses wearer: (+)OTC +3.25 readers  (-) CL wearer  (+) Uses eye gtts: (+)Visine PRN OU  (+)Ocuvite po once daily    Does patient want a refraction today? No, pt happy with OTC +3.25 readers.    (-) Eye injury  (+) Eye surgery : (+)h/o retinopexy OD - lattice degeneration   (+)POHx: (+)cataracts OU  (-)FOHx    (+)DM LBS: 127 this A.M. Pt states that BS levels do not go past 160.   Hemoglobin A1C       Date                     Value               Ref Range             Status                08/03/2021               7.6 (H)             <5.7 % of tota*       Final                       05/04/2021               6.9 (H)             <5.7 % of tota*       Final                       01/13/2021               6.6 (H)             <5.7 % of tota*       Final                       07/08/2019               9.7                 %                     Final                        Last edited by Stephani Braun, OD on 1/3/2022  8:23 AM. (History)            Assessment /Plan     For exam results, see Encounter Report.    Type 2 diabetes mellitus without retinopathy    Non-insulin dependent type 2 diabetes mellitus  -     Ambulatory referral/consult to Optometry    History of repair of retinal defect by laser photocoagulation    Combined form of age-related cataract, both eyes    Presbyopia      1-2. No retinopathy noted, OU. Continue proper BS control and annual diabetic eye exams. Monitor yearly.     3. 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 Performed by [facility]. Stable since CINDI (02/2021). No holes, tears, detachments noted 360 OU. Discussed s/s of RD and to  RTC ASAP if occur. Monitor yearly.     4. Educated pt on findings. Mildly visually significant cataracts, however, no need for removal at this time. Monitor yearly.     5. Continue use of OTC reading glasses prn. Monitor yearly.         RTC in 1 year for annual DM eye exam or sooner if needed.

## 2022-01-23 ENCOUNTER — PATIENT MESSAGE (OUTPATIENT)
Dept: GASTROENTEROLOGY | Facility: CLINIC | Age: 72
End: 2022-01-23
Payer: MEDICARE

## 2022-01-23 DIAGNOSIS — K44.9 HIATAL HERNIA: ICD-10-CM

## 2022-01-23 DIAGNOSIS — Z79.899 ENCOUNTER FOR MONITORING LONG-TERM PROTON PUMP INHIBITOR THERAPY: ICD-10-CM

## 2022-01-23 DIAGNOSIS — Z51.81 ENCOUNTER FOR MONITORING LONG-TERM PROTON PUMP INHIBITOR THERAPY: ICD-10-CM

## 2022-01-23 DIAGNOSIS — K21.00 GASTROESOPHAGEAL REFLUX DISEASE WITH ESOPHAGITIS WITHOUT HEMORRHAGE: Primary | ICD-10-CM

## 2022-02-17 ENCOUNTER — OFFICE VISIT (OUTPATIENT)
Dept: INTERNAL MEDICINE | Facility: CLINIC | Age: 72
End: 2022-02-17
Attending: FAMILY MEDICINE
Payer: COMMERCIAL

## 2022-02-17 ENCOUNTER — TELEPHONE (OUTPATIENT)
Dept: INTERNAL MEDICINE | Facility: CLINIC | Age: 72
End: 2022-02-17

## 2022-02-17 ENCOUNTER — LAB VISIT (OUTPATIENT)
Dept: LAB | Facility: HOSPITAL | Age: 72
End: 2022-02-17
Attending: FAMILY MEDICINE
Payer: MEDICARE

## 2022-02-17 ENCOUNTER — PATIENT MESSAGE (OUTPATIENT)
Dept: ADMINISTRATIVE | Facility: HOSPITAL | Age: 72
End: 2022-02-17
Payer: MEDICARE

## 2022-02-17 VITALS — SYSTOLIC BLOOD PRESSURE: 126 MMHG | DIASTOLIC BLOOD PRESSURE: 62 MMHG

## 2022-02-17 DIAGNOSIS — E11.69 TYPE 2 DIABETES MELLITUS WITH OTHER SPECIFIED COMPLICATION, WITHOUT LONG-TERM CURRENT USE OF INSULIN: ICD-10-CM

## 2022-02-17 DIAGNOSIS — E78.5 HYPERLIPIDEMIA, UNSPECIFIED HYPERLIPIDEMIA TYPE: ICD-10-CM

## 2022-02-17 DIAGNOSIS — I10 HYPERTENSION, ESSENTIAL: ICD-10-CM

## 2022-02-17 DIAGNOSIS — I65.22 LEFT CAROTID ARTERY STENOSIS: ICD-10-CM

## 2022-02-17 DIAGNOSIS — N40.0 BENIGN PROSTATIC HYPERPLASIA, UNSPECIFIED WHETHER LOWER URINARY TRACT SYMPTOMS PRESENT: ICD-10-CM

## 2022-02-17 DIAGNOSIS — E11.51 TYPE 2 DIABETES, WITH PERIPHERAL CIRCULATORY DISORDER NOT AT GOAL: ICD-10-CM

## 2022-02-17 DIAGNOSIS — Z12.5 PROSTATE CANCER SCREENING: ICD-10-CM

## 2022-02-17 DIAGNOSIS — Z00.00 ANNUAL PHYSICAL EXAM: Primary | ICD-10-CM

## 2022-02-17 DIAGNOSIS — I73.9 PVD (PERIPHERAL VASCULAR DISEASE): ICD-10-CM

## 2022-02-17 DIAGNOSIS — Z00.00 ANNUAL PHYSICAL EXAM: ICD-10-CM

## 2022-02-17 DIAGNOSIS — E11.42 DIABETIC POLYNEUROPATHY ASSOCIATED WITH TYPE 2 DIABETES MELLITUS: ICD-10-CM

## 2022-02-17 DIAGNOSIS — I25.810 CORONARY ARTERY DISEASE INVOLVING AUTOLOGOUS VEIN CORONARY BYPASS GRAFT WITHOUT ANGINA PECTORIS: ICD-10-CM

## 2022-02-17 PROBLEM — E11.9 TYPE 2 DIABETES MELLITUS, WITHOUT LONG-TERM CURRENT USE OF INSULIN: Status: ACTIVE | Noted: 2018-04-10

## 2022-02-17 LAB
ALBUMIN/CREAT UR: 5.5 UG/MG (ref 0–30)
BACTERIA #/AREA URNS AUTO: NORMAL /HPF
BILIRUB UR QL STRIP: NEGATIVE
CLARITY UR REFRACT.AUTO: CLEAR
COLOR UR AUTO: YELLOW
CREAT UR-MCNC: 91 MG/DL (ref 23–375)
GLUCOSE UR QL STRIP: ABNORMAL
HGB UR QL STRIP: NEGATIVE
KETONES UR QL STRIP: ABNORMAL
LEUKOCYTE ESTERASE UR QL STRIP: NEGATIVE
MICROALBUMIN UR DL<=1MG/L-MCNC: 5 UG/ML
MICROSCOPIC COMMENT: NORMAL
NITRITE UR QL STRIP: NEGATIVE
PH UR STRIP: 5 [PH] (ref 5–8)
PROT UR QL STRIP: NEGATIVE
RBC #/AREA URNS AUTO: 0 /HPF (ref 0–4)
SP GR UR STRIP: >=1.03 (ref 1–1.03)
URN SPEC COLLECT METH UR: ABNORMAL
WBC #/AREA URNS AUTO: 0 /HPF (ref 0–5)
YEAST UR QL AUTO: NORMAL

## 2022-02-17 PROCEDURE — 82570 ASSAY OF URINE CREATININE: CPT | Performed by: FAMILY MEDICINE

## 2022-02-17 PROCEDURE — 82043 UR ALBUMIN QUANTITATIVE: CPT | Performed by: FAMILY MEDICINE

## 2022-02-17 PROCEDURE — 87086 URINE CULTURE/COLONY COUNT: CPT | Performed by: FAMILY MEDICINE

## 2022-02-17 PROCEDURE — 99999 PR PBB SHADOW E&M-EST. PATIENT-LVL III: CPT | Mod: PBBFAC,,, | Performed by: FAMILY MEDICINE

## 2022-02-17 PROCEDURE — 99213 OFFICE O/P EST LOW 20 MIN: CPT | Mod: PBBFAC | Performed by: FAMILY MEDICINE

## 2022-02-17 PROCEDURE — 81001 URINALYSIS AUTO W/SCOPE: CPT | Performed by: FAMILY MEDICINE

## 2022-02-17 PROCEDURE — 99999 PR PBB SHADOW E&M-EST. PATIENT-LVL III: ICD-10-PCS | Mod: PBBFAC,,, | Performed by: FAMILY MEDICINE

## 2022-02-17 RX ORDER — CARVEDILOL 6.25 MG/1
6.25 TABLET ORAL 2 TIMES DAILY WITH MEALS
Qty: 180 TABLET | Refills: 1 | Status: SHIPPED | OUTPATIENT
Start: 2022-02-17 | End: 2022-09-01

## 2022-02-17 NOTE — PROGRESS NOTES
Subjective:       Patient ID: Esteban Mike is a 71 y.o. male.    Chief Complaint: No chief complaint on file.    Established patient for an annual wellness check/physical exam and also chronic disease management. Specific complaints - see dictation and please see ROS.  P, S, Fm, Soc Hx's; Meds, allergies reviewed and reconciled.  Health maintenance file reviewed and addressed items due. Recent applicable lab, imaging and cardiovascular results reviewed.  Problem list items reviewed and modified or added entries (in the overview section) may not be transcribed into this encounter note due to note writer format.        Review of Systems   Constitutional: Negative for appetite change, chills, diaphoresis, fatigue and fever.   HENT: Negative for congestion, postnasal drip, rhinorrhea, sore throat and trouble swallowing.    Eyes: Negative for visual disturbance.   Respiratory: Negative for cough, choking, chest tightness, shortness of breath and wheezing.    Cardiovascular: Negative for chest pain and leg swelling.   Gastrointestinal: Negative for abdominal distention, abdominal pain, diarrhea, nausea and vomiting.   Genitourinary: Positive for frequency and urgency. Negative for difficulty urinating and hematuria.   Musculoskeletal: Negative for arthralgias and myalgias.   Skin: Negative for rash.   Neurological: Negative for weakness, light-headedness and headaches.   Psychiatric/Behavioral: Negative for confusion and dysphoric mood.       Objective:      Physical Exam  Vitals and nursing note reviewed.   Constitutional:       Appearance: He is well-developed and well-nourished. He is not diaphoretic.   Eyes:      General: No scleral icterus.  Neck:      Thyroid: No thyromegaly.      Vascular: No carotid bruit or JVD.      Trachea: No tracheal deviation.   Cardiovascular:      Rate and Rhythm: Normal rate and regular rhythm.      Pulses: Intact distal pulses.      Heart sounds: Murmur heard.   No friction  rub. No gallop.    Pulmonary:      Effort: Pulmonary effort is normal. No respiratory distress.      Breath sounds: Normal breath sounds. No wheezing or rales.   Abdominal:      General: There is no distension.      Palpations: Abdomen is soft. There is no mass.      Tenderness: There is no abdominal tenderness. There is no guarding or rebound.   Musculoskeletal:         General: No edema.      Cervical back: Normal range of motion and neck supple.   Lymphadenopathy:      Cervical: No cervical adenopathy.   Skin:     General: Skin is warm and dry.      Findings: No erythema or rash.   Neurological:      Mental Status: He is alert and oriented to person, place, and time.      Cranial Nerves: No cranial nerve deficit.      Motor: No tremor.      Coordination: Coordination normal.      Gait: Gait normal.   Psychiatric:         Mood and Affect: Mood and affect normal.         Behavior: Behavior normal.         Thought Content: Thought content normal.         Judgment: Judgment normal.         Assessment:       1. Annual physical exam    2. Type 2 diabetes mellitus with other specified complication, without long-term current use of insulin    3. Type 2 diabetes, with peripheral circulatory disorder not at goal    4. Diabetic polyneuropathy associated with type 2 diabetes mellitus    5. Hypertension, essential    6. Hyperlipidemia, unspecified hyperlipidemia type    7. Prostate cancer screening    8. Benign prostatic hyperplasia, unspecified whether lower urinary tract symptoms present    9. Coronary artery disease involving autologous vein coronary bypass graft without angina pectoris    10. Left carotid artery stenosis    11. PVD (peripheral vascular disease)        Plan:     Medication List with Changes/Refills   Current Medications    ASPIRIN 81 MG CHEWABLE TABLET    Take 81 mg by mouth once daily.      ATORVASTATIN (LIPITOR) 80 MG TABLET    TAKE ONE TABLET BY MOUTH DAILY AFTER SUPPER FOR cholesterol    BLOOD SUGAR  DIAGNOSTIC STRP    To check BG 3 times daily, to use with insurance preferred meter    BLOOD-GLUCOSE METER KIT    To check BG 3 times daily, to use with insurance preferred meter    DULAGLUTIDE (TRULICITY) 3 MG/0.5 ML PEN INJECTOR    Inject 3 mg into the skin every 7 days.    FISH OIL-OMEGA-3 FATTY ACIDS 300-1,000 MG CAPSULE    Take 2 g by mouth once daily. 3 tablets       GLIPIZIDE (GLUCOTROL) 5 MG TABLET    TAKE ONE TABLET BY MOUTH TWICE DAILY WITH MEALS FOR DIABETES    IRBESARTAN (AVAPRO) 300 MG TABLET    Take 1 tablet (300 mg total) by mouth every evening.    JARDIANCE 25 MG TABLET    Take 1 tablet (25 mg total) by mouth once daily.    LANCETS MISC    To check BG 3 times daily, to use with insurance preferred meter    METFORMIN (GLUCOPHAGE-XR) 500 MG ER 24HR TABLET    Take 2 tablets (1,000 mg total) by mouth 2 (two) times daily with meals.    MULTIVITAMIN CAPSULE    Take 1 capsule by mouth once daily.    PANTOPRAZOLE (PROTONIX) 40 MG TABLET    TAKE ONE TABLET BY MOUTH ONCE DAILY BEFORE BREAKFAST    TAMSULOSIN (FLOMAX) 0.4 MG CAP    Take 1 capsule (0.4 mg total) by mouth once daily.    VIT C-VIT E-LUTEIN-MIN-OM-3 (OCUVITE) 624-10-4-150 MG-UNIT-MG-MG CAP    Take 1 tablet by mouth once daily.   Changed and/or Refilled Medications    Modified Medication Previous Medication    CARVEDILOL (COREG) 6.25 MG TABLET carvediloL (COREG) 3.125 MG tablet       Take 1 tablet (6.25 mg total) by mouth 2 (two) times daily with meals.    Take 2 tablets (6.25 mg total) by mouth 2 (two) times daily with meals.     Diagnoses and all orders for this visit:    Annual physical exam  -     Hemoglobin A1C; Future  -     Comprehensive Metabolic Panel; Future  -     Lipid Panel; Future  -     PSA, Screening; Future  -     Microalbumin/Creatinine Ratio, Urine; Future    Type 2 diabetes mellitus with other specified complication, without long-term current use of insulin  -     Hemoglobin A1C; Future  -     Comprehensive Metabolic Panel;  Future  -     Lipid Panel; Future  -     Microalbumin/Creatinine Ratio, Urine; Future  -     Ambulatory referral/consult to Endocrinology; Future    Type 2 diabetes, with peripheral circulatory disorder not at goal    Diabetic polyneuropathy associated with type 2 diabetes mellitus    Hypertension, essential  -     carvediloL (COREG) 6.25 MG tablet; Take 1 tablet (6.25 mg total) by mouth 2 (two) times daily with meals.    Hyperlipidemia, unspecified hyperlipidemia type    Prostate cancer screening  -     PSA, Screening; Future    Benign prostatic hyperplasia, unspecified whether lower urinary tract symptoms present  -     Urinalysis; Future  -     Urinalysis Microscopic; Future  -     Urine culture; Future  -     Ambulatory referral/consult to Urology; Future    Coronary artery disease involving autologous vein coronary bypass graft without angina pectoris  -     Ambulatory referral/consult to Cardiology; Future    Left carotid artery stenosis    PVD (peripheral vascular disease)      See meds, orders, follow up, routing and instructions sections of encounter and AVS. Discussed with patient and provided on AVS.    Discussed diet and exercise and links provided on AVS for detailed information.    Diabetes Management Status    Statin: Taking  ACE/ARB: Taking    Screening or Prevention Patient's value Goal Complete/Controlled?   HgA1C Testing and Control   Lab Results   Component Value Date    HGBA1C 7.6 (H) 08/03/2021      Annually/Less than 8% Yes   Lipid profile : 01/13/2021 Annually No   LDL control Lab Results   Component Value Date    LDLCALC 70 01/13/2021    Annually/Less than 100 mg/dl  No   Nephropathy screening Lab Results   Component Value Date    LABMICR 9.0 12/19/2017     Lab Results   Component Value Date    PROTEINUA NEGATIVE 05/04/2021     No results found for: UTPCR   Annually Yes   Blood pressure BP Readings from Last 1 Encounters:   02/17/22 126/62    Less than 140/90 Yes   Dilated retinal exam :  01/03/2022 Annually Yes   Foot exam   : 07/26/2021 Annually Yes

## 2022-02-18 LAB — BACTERIA UR CULT: NO GROWTH

## 2022-02-23 ENCOUNTER — OFFICE VISIT (OUTPATIENT)
Dept: UROLOGY | Facility: CLINIC | Age: 72
End: 2022-02-23
Attending: FAMILY MEDICINE
Payer: MEDICARE

## 2022-02-23 VITALS
SYSTOLIC BLOOD PRESSURE: 111 MMHG | DIASTOLIC BLOOD PRESSURE: 54 MMHG | HEART RATE: 64 BPM | HEIGHT: 67 IN | BODY MASS INDEX: 26.92 KG/M2 | WEIGHT: 171.5 LBS

## 2022-02-23 DIAGNOSIS — N40.0 BENIGN PROSTATIC HYPERPLASIA, UNSPECIFIED WHETHER LOWER URINARY TRACT SYMPTOMS PRESENT: ICD-10-CM

## 2022-02-23 PROCEDURE — 99204 PR OFFICE/OUTPT VISIT, NEW, LEVL IV, 45-59 MIN: ICD-10-PCS | Mod: S$PBB,,, | Performed by: UROLOGY

## 2022-02-23 PROCEDURE — 99999 PR PBB SHADOW E&M-EST. PATIENT-LVL IV: ICD-10-PCS | Mod: PBBFAC,,, | Performed by: UROLOGY

## 2022-02-23 PROCEDURE — 99999 PR PBB SHADOW E&M-EST. PATIENT-LVL IV: CPT | Mod: PBBFAC,,, | Performed by: UROLOGY

## 2022-02-23 PROCEDURE — 99204 OFFICE O/P NEW MOD 45 MIN: CPT | Mod: S$PBB,,, | Performed by: UROLOGY

## 2022-02-23 PROCEDURE — 99214 OFFICE O/P EST MOD 30 MIN: CPT | Mod: PBBFAC | Performed by: UROLOGY

## 2022-02-23 RX ORDER — TAMSULOSIN HYDROCHLORIDE 0.4 MG/1
0.8 CAPSULE ORAL DAILY
Qty: 60 CAPSULE | Refills: 12 | Status: SHIPPED | OUTPATIENT
Start: 2022-02-23 | End: 2023-03-20 | Stop reason: SDUPTHER

## 2022-02-23 NOTE — PROGRESS NOTES
Subjective:       Patient ID: Esteban Mike is a 71 y.o. male.    Chief Complaint: No chief complaint on file.     HPIPatient is here for prostate check.  He has lower urinary tract symptoms.  His postvoid residual is minimal and his urine is clear.  He does take tamsulosin 0.4 mg q.day and I am going to increase it to twice a day.  He does not wish to have any testing at this moment in time    Past Medical History:   Diagnosis Date    Aortic valve disease     Bilateral carotid artery stenosis 7/18/2018    CAD (coronary artery disease)     Carotid artery occlusion      LICA, 70-80% JULIUS    Diabetes mellitus     Diabetes mellitus type II     Elevated LFTs     GERD (gastroesophageal reflux disease)     Hyperlipidemia     Hypertension     PVD (peripheral vascular disease)        Past Surgical History:   Procedure Laterality Date    CARDIAC CATHETERIZATION      carotid      CEA      COLONOSCOPY N/A 12/4/2020    Procedure: COLONOSCOPY;  Surgeon: William Holman MD;  Location: [facility] (4TH FLR);  Service: Endoscopy;  Laterality: N/A;  merged orders together    CORONARY ARTERY BYPASS GRAFT  5/2/08    X4    ESOPHAGOGASTRODUODENOSCOPY N/A 4/1/2019    Procedure: EGD (ESOPHAGOGASTRODUODENOSCOPY);  Surgeon: William Holman MD;  Location: [facility] (4TH FLR);  Service: Endoscopy;  Laterality: N/A;    ESOPHAGOGASTRODUODENOSCOPY N/A 12/4/2020    Procedure: EGD (ESOPHAGOGASTRODUODENOSCOPY);  Surgeon: William Holman MD;  [facility]  Service: Endoscopy;  Laterality: N/A;  covid test [facility] 12/1    HERNIA REPAIR      ica stent      TONSILLECTOMY         Family History   Problem Relation Age of Onset    Diabetes Mother     Heart disease Mother     Heart disease Father     Heart disease Maternal Grandfather     Heart disease Paternal Grandfather     Cirrhosis Paternal Grandfather     Colon cancer Neg Hx     Inflammatory bowel disease Neg Hx     Stomach cancer  Neg Hx     Skin cancer Neg Hx        Social History     Socioeconomic History    Marital status:    Occupational History    Occupation: [employer] [employer]     Employer: [employer]   Tobacco Use    Smoking status: Never Smoker    Smokeless tobacco: Never Used   Substance and Sexual Activity    Alcohol use: Yes     Comment: Socially     Drug use: No    Sexual activity: Yes     Partners: Female     Birth control/protection: None   Social History Narrative    [employer], 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       Allergies:  Amlodipine and Hydrochlorothiazide    Medications:    Current Outpatient Medications:     aspirin 81 MG chewable tablet, Take 81 mg by mouth once daily., Disp: , Rfl:     atorvastatin (LIPITOR) 80 MG tablet, TAKE ONE TABLET BY MOUTH DAILY AFTER SUPPER FOR cholesterol, Disp: 90 tablet, Rfl: 0    blood sugar diagnostic Strp, To check BG 3 times daily, to use with insurance preferred meter, Disp: 200 strip, Rfl: 11    blood-glucose meter kit, To check BG 3 times daily, to use with insurance preferred meter, Disp: 1 each, Rfl: 0    carvediloL (COREG) 6.25 MG tablet, Take 1 tablet (6.25 mg total) by mouth 2 (two) times daily with meals., Disp: 180 tablet, Rfl: 1    dulaglutide (TRULICITY) 3 mg/0.5 mL pen injector, Inject 3 mg into the skin every 7 days., Disp: 4 pen, Rfl: 11    fish oil-omega-3 fatty acids 300-1,000 mg capsule, Take 2 g by mouth once daily. 3 tablets, Disp: , Rfl:     glipiZIDE (GLUCOTROL) 5 MG tablet, TAKE ONE TABLET BY MOUTH TWICE DAILY WITH MEALS FOR DIABETES, Disp: 180 tablet, Rfl: 0    irbesartan (AVAPRO) 300 MG tablet, Take 1 tablet (300 mg total) by mouth every evening., Disp: 90 tablet, Rfl: 3    JARDIANCE 25 mg tablet, Take 1 tablet (25 mg total) by mouth once daily., Disp: 30 tablet, Rfl: 3    lancets Misc, To check BG 3 times daily, to use with insurance preferred meter, Disp: 200 each, Rfl: 11    metFORMIN (GLUCOPHAGE-XR) 500 MG ER  24hr tablet, Take 2 tablets (1,000 mg total) by mouth 2 (two) times daily with meals., Disp: 360 tablet, Rfl: 1    multivitamin capsule, Take 1 capsule by mouth once daily., Disp: , Rfl:     pantoprazole (PROTONIX) 40 MG tablet, TAKE ONE TABLET BY MOUTH ONCE DAILY BEFORE BREAKFAST, Disp: 90 tablet, Rfl: 3    tamsulosin (FLOMAX) 0.4 mg Cap, Take 1 capsule (0.4 mg total) by mouth once daily., Disp: 90 capsule, Rfl: 3    vit C-vit E-lutein-min-om-3 [id number] mg-unit-mg-mg Cap, Take 1 tablet by mouth once daily., Disp: , Rfl:     tamsulosin (FLOMAX) 0.4 mg Cap, Take 2 capsules (0.8 mg total) by mouth once daily., Disp: 60 capsule, Rfl: 12    Review of Systems   Constitutional: Negative for activity change, appetite change, chills, diaphoresis, fatigue, fever and unexpected weight change.   HENT: Negative for congestion, dental problem, hearing loss, mouth sores, postnasal drip, rhinorrhea, sinus pressure and trouble swallowing.    Eyes: Negative for pain, discharge and itching.   Respiratory: Negative for apnea, cough, choking, chest tightness, shortness of breath and wheezing.    Cardiovascular: Negative for chest pain, palpitations and leg swelling.   Gastrointestinal: Negative for abdominal distention, abdominal pain, anal bleeding, blood in stool, constipation, diarrhea, nausea, rectal pain and vomiting.   Endocrine: Negative for polydipsia and polyuria.   Genitourinary: Positive for decreased urine volume, difficulty urinating and urgency. Negative for dysuria, enuresis, flank pain, frequency, genital sores, hematuria, penile discharge, penile pain, penile swelling, scrotal swelling and testicular pain.   Musculoskeletal: Negative for arthralgias, back pain and myalgias.   Skin: Negative for color change, rash and wound.   Neurological: Negative for dizziness, syncope, speech difficulty, light-headedness and headaches.   Hematological: Negative for adenopathy. Does not bruise/bleed easily.    Psychiatric/Behavioral: Negative for behavioral problems, confusion, hallucinations and sleep disturbance.       Objective:      Physical Exam  Constitutional:       Appearance: He is well-developed.   HENT:      Head: Normocephalic.   Cardiovascular:      Rate and Rhythm: Normal rate.   Pulmonary:      Effort: Pulmonary effort is normal.   Abdominal:      Palpations: Abdomen is soft.   Genitourinary:     Prostate: Normal.      Comments: 30 g benign no nodules  PSA is 0.3  Skin:     General: Skin is warm.   Neurological:      Mental Status: He is alert.         Assessment:       1. Benign prostatic hyperplasia, unspecified whether lower urinary tract symptoms present        Plan:       Diagnoses and all orders for this visit:    Benign prostatic hyperplasia, unspecified whether lower urinary tract symptoms present  -     Ambulatory referral/consult to Urology    Other orders  -     tamsulosin (FLOMAX) 0.4 mg Cap; Take 2 capsules (0.8 mg total) by mouth once daily.         Patient will call if he decides he wants to have workup or if his symptoms worsen.  I would schedule him for renal ultrasound cysto and truss possible sides did was urgency.  He will call p.r.n.

## 2022-02-28 ENCOUNTER — TELEPHONE (OUTPATIENT)
Dept: ENDOSCOPY | Facility: HOSPITAL | Age: 72
End: 2022-02-28
Payer: MEDICARE

## 2022-03-01 ENCOUNTER — TELEPHONE (OUTPATIENT)
Dept: INTERNAL MEDICINE | Facility: CLINIC | Age: 72
End: 2022-03-01
Payer: MEDICARE

## 2022-03-01 DIAGNOSIS — E11.51 TYPE 2 DIABETES, WITH PERIPHERAL CIRCULATORY DISORDER NOT AT GOAL: Primary | ICD-10-CM

## 2022-03-01 LAB
ALBUMIN SERPL-MCNC: 4.3 G/DL (ref 3.6–5.1)
ALBUMIN/GLOB SERPL: 1.9 (CALC) (ref 1–2.5)
ALP SERPL-CCNC: 80 U/L (ref 35–144)
ALT SERPL-CCNC: 24 U/L (ref 9–46)
AST SERPL-CCNC: 22 U/L (ref 10–35)
BILIRUB SERPL-MCNC: 0.4 MG/DL (ref 0.2–1.2)
BUN SERPL-MCNC: 25 MG/DL (ref 7–25)
BUN/CREAT SERPL: ABNORMAL (CALC) (ref 6–22)
CALCIUM SERPL-MCNC: 9.8 MG/DL (ref 8.6–10.3)
CHLORIDE SERPL-SCNC: 104 MMOL/L (ref 98–110)
CHOLEST SERPL-MCNC: 121 MG/DL
CHOLEST/HDLC SERPL: 2.9 (CALC)
CO2 SERPL-SCNC: 26 MMOL/L (ref 20–32)
CREAT SERPL-MCNC: 0.82 MG/DL (ref 0.7–1.25)
GLOBULIN SER CALC-MCNC: 2.3 G/DL (CALC) (ref 1.9–3.7)
GLUCOSE SERPL-MCNC: 136 MG/DL (ref 65–99)
HDLC SERPL-MCNC: 42 MG/DL
LDLC SERPL CALC-MCNC: 65 MG/DL (CALC)
NONHDLC SERPL-MCNC: 79 MG/DL (CALC)
POTASSIUM SERPL-SCNC: 4.4 MMOL/L (ref 3.5–5.3)
PROT SERPL-MCNC: 6.6 G/DL (ref 6.1–8.1)
PSA SERPL-MCNC: 0.29 NG/ML
SODIUM SERPL-SCNC: 140 MMOL/L (ref 135–146)
TRIGL SERPL-MCNC: 63 MG/DL

## 2022-03-01 NOTE — TELEPHONE ENCOUNTER
[facility] results received - no A1C was received (oredered on 2/17).    Please advise patient an see new order and please provide for him to schedule at [facility].    Thank you

## 2022-03-08 LAB — HBA1C MFR BLD: 6.4 % OF TOTAL HGB

## 2022-03-09 ENCOUNTER — TELEPHONE (OUTPATIENT)
Dept: CARDIOLOGY | Facility: CLINIC | Age: 72
End: 2022-03-09
Payer: MEDICARE

## 2022-03-09 DIAGNOSIS — R00.2 PALPITATIONS: Primary | ICD-10-CM

## 2022-03-10 ENCOUNTER — OFFICE VISIT (OUTPATIENT)
Dept: CARDIOLOGY | Facility: CLINIC | Age: 72
End: 2022-03-10
Attending: FAMILY MEDICINE
Payer: MEDICARE

## 2022-03-10 VITALS
OXYGEN SATURATION: 98 % | BODY MASS INDEX: 27.41 KG/M2 | SYSTOLIC BLOOD PRESSURE: 130 MMHG | HEIGHT: 67 IN | DIASTOLIC BLOOD PRESSURE: 60 MMHG | HEART RATE: 66 BPM | WEIGHT: 174.63 LBS

## 2022-03-10 DIAGNOSIS — I73.9 PVD (PERIPHERAL VASCULAR DISEASE): ICD-10-CM

## 2022-03-10 DIAGNOSIS — I25.810 CORONARY ARTERY DISEASE INVOLVING AUTOLOGOUS VEIN CORONARY BYPASS GRAFT WITHOUT ANGINA PECTORIS: ICD-10-CM

## 2022-03-10 DIAGNOSIS — I10 HYPERTENSION, ESSENTIAL: ICD-10-CM

## 2022-03-10 DIAGNOSIS — I10 ESSENTIAL HYPERTENSION: Primary | ICD-10-CM

## 2022-03-10 DIAGNOSIS — E78.5 HYPERLIPIDEMIA, UNSPECIFIED HYPERLIPIDEMIA TYPE: ICD-10-CM

## 2022-03-10 DIAGNOSIS — E11.51 TYPE 2 DIABETES, WITH PERIPHERAL CIRCULATORY DISORDER NOT AT GOAL: ICD-10-CM

## 2022-03-10 DIAGNOSIS — I65.22 LEFT CAROTID ARTERY STENOSIS: ICD-10-CM

## 2022-03-10 PROCEDURE — 99999 PR PBB SHADOW E&M-EST. PATIENT-LVL V: CPT | Mod: PBBFAC,,, | Performed by: INTERNAL MEDICINE

## 2022-03-10 PROCEDURE — 99213 OFFICE O/P EST LOW 20 MIN: CPT | Mod: S$PBB,,, | Performed by: INTERNAL MEDICINE

## 2022-03-10 PROCEDURE — 99999 PR PBB SHADOW E&M-EST. PATIENT-LVL V: ICD-10-PCS | Mod: PBBFAC,,, | Performed by: INTERNAL MEDICINE

## 2022-03-10 PROCEDURE — 99213 PR OFFICE/OUTPT VISIT, EST, LEVL III, 20-29 MIN: ICD-10-PCS | Mod: S$PBB,,, | Performed by: INTERNAL MEDICINE

## 2022-03-10 PROCEDURE — 99215 OFFICE O/P EST HI 40 MIN: CPT | Mod: PBBFAC,PN | Performed by: INTERNAL MEDICINE

## 2022-03-10 NOTE — PROGRESS NOTES
Subjective:    Patient ID:  Esteban Mike is a 71 y.o. male who presents for follow-up of Coronary Artery Disease      HPI     The patient is a 71 year old male last seen 2/11/21 is followed with CAD post CABG 2008, post right CEA 2006 and left ICA stent 2007. He is followed by [facility] medicine. He remains well and reports no chest pain or CHAU. 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  Lab Results   Component Value Date     02/28/2022    K 4.4 02/28/2022     02/28/2022    CO2 26 02/28/2022    BUN 25 02/28/2022    CREATININE 0.82 02/28/2022     (H) 02/28/2022    HGBA1C 6.4 (H) 03/07/2022    HGBA1C 9.7 07/08/2019    MG 2.6 05/06/2008    AST 22 02/28/2022    ALT 24 02/28/2022    ALBUMIN 4.3 02/28/2022    PROT 6.6 02/28/2022    BILITOT 0.4 02/28/2022    WBC 10.3 04/11/2018    HGB 14.2 04/11/2018    HCT 42.3 04/11/2018    MCV 90.8 04/11/2018     04/11/2018    INR 1.0 06/25/2014    PSA 0.28 05/20/2015    TSH 0.69 04/11/2018         Lab Results   Component Value Date    CHOL 121 02/28/2022    HDL 42 02/28/2022    HDL 46 07/08/2019    TRIG 63 02/28/2022       Lab Results   Component Value Date    LDLCALC 65 02/28/2022       Past Medical History:   Diagnosis Date    Aortic valve disease     Bilateral carotid artery stenosis 7/18/2018    CAD (coronary artery disease)     Carotid artery occlusion      LICA, 70-80% JULIUS    Diabetes mellitus     Diabetes mellitus type II     Elevated LFTs     GERD (gastroesophageal reflux disease)     Hyperlipidemia     Hypertension     PVD (peripheral vascular disease)        Current Outpatient Medications:     aspirin 81 MG chewable tablet, Take 81 mg by mouth once daily., Disp: , Rfl:     atorvastatin (LIPITOR) 80 MG tablet, TAKE ONE TABLET BY MOUTH DAILY AFTER SUPPER FOR cholesterol, Disp: 90 tablet, Rfl: 0    blood sugar diagnostic Strp, To check BG 3 times daily, to use with insurance preferred meter, Disp: 200  strip, Rfl: 11    blood-glucose meter kit, To check BG 3 times daily, to use with insurance preferred meter, Disp: 1 each, Rfl: 0    carvediloL (COREG) 6.25 MG tablet, Take 1 tablet (6.25 mg total) by mouth 2 (two) times daily with meals., Disp: 180 tablet, Rfl: 1    dulaglutide (TRULICITY) 3 mg/0.5 mL pen injector, Inject 3 mg into the skin every 7 days., Disp: 4 pen, Rfl: 11    fish oil-omega-3 fatty acids 300-1,000 mg capsule, Take 2 g by mouth once daily. 3 tablets, Disp: , Rfl:     glipiZIDE (GLUCOTROL) 5 MG tablet, TAKE ONE TABLET BY MOUTH TWICE DAILY WITH MEALS FOR DIABETES, Disp: 180 tablet, Rfl: 0    irbesartan (AVAPRO) 300 MG tablet, Take 1 tablet (300 mg total) by mouth every evening., Disp: 90 tablet, Rfl: 3    JARDIANCE 25 mg tablet, Take 1 tablet (25 mg total) by mouth once daily., Disp: 30 tablet, Rfl: 3    lancets Misc, To check BG 3 times daily, to use with insurance preferred meter, Disp: 200 each, Rfl: 11    metFORMIN (GLUCOPHAGE-XR) 500 MG ER 24hr tablet, Take 2 tablets (1,000 mg total) by mouth 2 (two) times daily with meals., Disp: 360 tablet, Rfl: 1    multivitamin capsule, Take 1 capsule by mouth once daily., Disp: , Rfl:     pantoprazole (PROTONIX) 40 MG tablet, TAKE ONE TABLET BY MOUTH ONCE DAILY BEFORE BREAKFAST, Disp: 90 tablet, Rfl: 3    tamsulosin (FLOMAX) 0.4 mg Cap, Take 1 capsule (0.4 mg total) by mouth once daily., Disp: 90 capsule, Rfl: 3    tamsulosin (FLOMAX) 0.4 mg Cap, Take 2 capsules (0.8 mg total) by mouth once daily., Disp: 60 capsule, Rfl: 12    vit C-vit E-lutein-min-om-3 [id number] mg-unit-mg-mg Cap, Take 1 tablet by mouth once daily., Disp: , Rfl:           Review of Systems   Constitutional: Negative for decreased appetite, diaphoresis, fever, malaise/fatigue, weight gain and weight loss.   HENT: Negative for congestion, ear discharge, ear pain and nosebleeds.    Eyes: Negative for blurred vision, double vision and visual disturbance.   Cardiovascular:  "Negative for chest pain, claudication, cyanosis, dyspnea on exertion, irregular heartbeat, leg swelling, near-syncope, orthopnea, palpitations, paroxysmal nocturnal dyspnea and syncope.   Respiratory: Negative for cough, hemoptysis, shortness of breath, sleep disturbances due to breathing, snoring, sputum production and wheezing.    Endocrine: Negative for polydipsia, polyphagia and polyuria.   Hematologic/Lymphatic: Negative for adenopathy and bleeding problem. Does not bruise/bleed easily.   Skin: Negative for color change, nail changes, poor wound healing and rash.   Musculoskeletal: Negative for muscle cramps and muscle weakness.   Gastrointestinal: Negative for abdominal pain, anorexia, change in bowel habit, hematochezia, nausea and vomiting.   Genitourinary: Negative for dysuria, frequency and hematuria.   Neurological: Negative for brief paralysis, difficulty with concentration, excessive daytime sleepiness, dizziness, focal weakness, headaches, light-headedness, seizures, vertigo and weakness.   Psychiatric/Behavioral: Negative for altered mental status and depression.   Allergic/Immunologic: Negative for persistent infections.        Objective:/60   Pulse 66   Ht 5' 7" (1.702 m)   Wt 79.2 kg (174 lb 9.7 oz)   SpO2 98%   BMI 27.35 kg/m²             Physical Exam  Vitals reviewed.   Constitutional:       Appearance: Normal appearance. He is well-developed and normal weight.   HENT:      Head: Normocephalic.      Right Ear: External ear normal.      Left Ear: External ear normal.      Nose: Nose normal.   Eyes:      General: No scleral icterus.     Pupils: Pupils are equal, round, and reactive to light.   Neck:      Thyroid: No thyromegaly.      Vascular: No JVD.      Trachea: No tracheal deviation.     Cardiovascular:      Rate and Rhythm: Normal rate and regular rhythm.      Pulses: Intact distal pulses.           Carotid pulses are 2+ on the right side and 2+ on the left side.       Dorsalis " pedis pulses are 2+ on the right side and 2+ on the left side.        Posterior tibial pulses are 2+ on the right side and 2+ on the left side.      Heart sounds: No murmur heard.    No friction rub. No gallop.   Pulmonary:      Effort: Pulmonary effort is normal.      Breath sounds: Normal breath sounds.   Chest:       Abdominal:      General: Bowel sounds are normal. There is no distension.      Tenderness: There is no abdominal tenderness. There is no guarding.   Musculoskeletal:         General: No tenderness. Normal range of motion.      Cervical back: Normal range of motion and neck supple.   Lymphadenopathy:      Comments: Palpation of neck and groin lymph nodes normal   Skin:     General: Skin is dry.      Comments: Palpation of skin normal   Neurological:      Mental Status: He is alert and oriented to person, place, and time.      Cranial Nerves: No cranial nerve deficit.      Motor: No abnormal muscle tone.      Coordination: Coordination normal.   Psychiatric:         Behavior: Behavior normal.         Thought Content: Thought content normal.         Judgment: Judgment normal.           Assessment:       1. Essential hypertension    2. Coronary artery disease involving autologous vein coronary bypass graft without angina pectoris    3. Type 2 diabetes, with peripheral circulatory disorder not at goal    4. Left carotid artery stenosis    5. PVD (peripheral vascular disease)    6. Hypertension, essential    7. Hyperlipidemia, unspecified hyperlipidemia type         Plan:       Esteban was seen today for coronary artery disease.    Diagnoses and all orders for this visit:    Essential hypertension  -     CBC Auto Differential; Future; Expected date: 03/10/2023  -     Comprehensive Metabolic Panel; Future; Expected date: 03/10/2023    Coronary artery disease involving autologous vein coronary bypass graft without angina pectoris  -     Ambulatory referral/consult to Cardiology    Type 2 diabetes, with  peripheral circulatory disorder not at goal    Left carotid artery stenosis    PVD (peripheral vascular disease)    Hypertension, essential    Hyperlipidemia, unspecified hyperlipidemia type  -     Lipid Panel; Future; Expected date: 03/10/2023

## 2022-03-15 ENCOUNTER — TELEPHONE (OUTPATIENT)
Dept: ENDOSCOPY | Facility: HOSPITAL | Age: 72
End: 2022-03-15
Payer: MEDICARE

## 2022-03-24 PROCEDURE — 99454 REM MNTR PHYSIOL PARAM 16-30: CPT | Mod: PBBFAC | Performed by: FAMILY MEDICINE

## 2022-03-27 DIAGNOSIS — E11.65 TYPE 2 DIABETES MELLITUS WITH HYPERGLYCEMIA, WITHOUT LONG-TERM CURRENT USE OF INSULIN: ICD-10-CM

## 2022-03-27 NOTE — TELEPHONE ENCOUNTER
No new care gaps identified.  Powered by [employer]. Reference number: [id number].   3/27/2022 8:01:14 AM CDT

## 2022-03-28 RX ORDER — METFORMIN HYDROCHLORIDE 500 MG/1
1000 TABLET, EXTENDED RELEASE ORAL 2 TIMES DAILY WITH MEALS
Qty: 360 TABLET | Refills: 1 | Status: SHIPPED | OUTPATIENT
Start: 2022-03-28 | End: 2022-09-11

## 2022-03-28 NOTE — TELEPHONE ENCOUNTER
Refill Routing Note   Medication(s) are not appropriate for processing by [facility] for the following reason(s):      - Drug-Disease Interaction (metFORMIN and NAFLD (nonalcoholic fatty liver disease))    [facility] action(s):  Defer Medication-related problems identified: Drug-disease interaction        --->Care Gap information included in message below if applicable.   Medication reconciliation completed: No   Automatic Epic Generated Protocol Data:        Requested Prescriptions   Pending Prescriptions Disp Refills    metFORMIN (GLUCOPHAGE-XR) 500 MG ER 24hr tablet [Pharmacy Med Name: metformin  mg tablet,extended release 24 hr] 360 tablet 1     Sig: Take 2 tablets (1,000 mg total) by mouth 2 (two) times daily with meals.        Endocrinology:  Diabetes - Biguanides Passed - 3/28/2022  4:30 PM        Passed - Patient is at least 18 years old        Passed - Valid encounter within last 15 months     Recent Visits  Date Type Provider Dept   02/17/22 Office Visit Ned Hartley MD [facility]   07/26/21 Office Visit Ned Hartley MD [facility]   05/03/21 Office Visit Ned Hartley MD [facility]   01/12/21 Office Visit Ned Hartley MD [facility]   09/15/20 Office Visit Ned Hartley MD [facility]   07/09/20 Office Visit Ned Hartley MD [facility]   Showing recent visits within past 720 days and meeting all other requirements  Future Appointments  No visits were found meeting these conditions.  Showing future appointments within next 150 days and meeting all other requirements        Future Appointments                In 2 months [facility] - Endo Diabetes 6th Fl, [facility]             Passed - Matches previous order         Previous Authorizing Provider: Ned Hartley MD (metFORMIN (GLUCOPHAGE-XR) 500 MG ER 24hr tablet)  Previous Pharmacy: [email or web address] [address]            Passed - No ED/Hospital visits since last PCP visit     Last PCP Visit: 2/17/2022 Last Admission: 12/4/2020 Last ED Visit:           Passed - Cr is 1.39 or below and within 360 days       Lab Results   Component Value Date    CREATININE 0.82 02/28/2022    CREATININE 0.85 05/10/2021    CREATININE 0.85 05/10/2021              Passed - HBA1C within 180 days       Lab Results   Component Value Date    LABA1C 6.8 (H) 04/11/2018    LABA1C 12.2 (H) 11/06/2017    HGBA1C 6.4 (H) 03/07/2022    HGBA1C 7.6 (H) 08/03/2021    HGBA1C 6.9 (H) 05/04/2021              Passed - eGFR is 45 or above and within 360 days     Lab Results   Component Value Date    EGFRNONAA 90 02/28/2022    EGFRNONAA 90 05/10/2021    EGFRNONAA 90 05/10/2021                       Appointments  past 12m or future 3m with PCP    Date Provider   Last Visit   2/17/2022 Ned Hartley MD   Next Visit   Visit date not found Ned Hartley MD   ED visits in past 90 days: 0        Note composed:4:34 PM 03/28/2022

## 2022-04-04 ENCOUNTER — PATIENT MESSAGE (OUTPATIENT)
Dept: ADMINISTRATIVE | Facility: OTHER | Age: 72
End: 2022-04-04
Payer: COMMERCIAL

## 2022-05-16 ENCOUNTER — PATIENT MESSAGE (OUTPATIENT)
Dept: ENDOCRINOLOGY | Facility: CLINIC | Age: 72
End: 2022-05-16
Payer: COMMERCIAL

## 2022-05-16 DIAGNOSIS — E11.42 DIABETIC POLYNEUROPATHY ASSOCIATED WITH TYPE 2 DIABETES MELLITUS: Primary | ICD-10-CM

## 2022-05-16 RX ORDER — DULAGLUTIDE 3 MG/.5ML
3 INJECTION, SOLUTION SUBCUTANEOUS
Qty: 4 PEN | Refills: 0 | Status: SHIPPED | OUTPATIENT
Start: 2022-05-16 | End: 2022-06-30

## 2022-05-18 ENCOUNTER — ANESTHESIA EVENT (OUTPATIENT)
Dept: ENDOSCOPY | Facility: HOSPITAL | Age: 72
End: 2022-05-18
Payer: MEDICARE

## 2022-05-18 ENCOUNTER — HOSPITAL ENCOUNTER (OUTPATIENT)
Facility: HOSPITAL | Age: 72
Discharge: HOME | End: 2022-05-18
Attending: INTERNAL MEDICINE | Admitting: INTERNAL MEDICINE
Payer: COMMERCIAL

## 2022-05-18 ENCOUNTER — ANESTHESIA (OUTPATIENT)
Dept: ENDOSCOPY | Facility: HOSPITAL | Age: 72
End: 2022-05-18
Payer: MEDICARE

## 2022-05-18 VITALS
DIASTOLIC BLOOD PRESSURE: 78 MMHG | BODY MASS INDEX: 25.74 KG/M2 | OXYGEN SATURATION: 96 % | SYSTOLIC BLOOD PRESSURE: 154 MMHG | RESPIRATION RATE: 16 BRPM | HEIGHT: 67 IN | WEIGHT: 164 LBS | HEART RATE: 56 BPM | TEMPERATURE: 98 F

## 2022-05-18 DIAGNOSIS — K21.9 GERD (GASTROESOPHAGEAL REFLUX DISEASE): ICD-10-CM

## 2022-05-18 LAB — POCT GLUCOSE: 107 MG/DL (ref 70–110)

## 2022-05-18 PROCEDURE — D9220A PRA ANESTHESIA: Mod: CRNA,,, | Performed by: NURSE ANESTHETIST, CERTIFIED REGISTERED

## 2022-05-18 PROCEDURE — 43239 EGD BIOPSY SINGLE/MULTIPLE: CPT | Performed by: INTERNAL MEDICINE

## 2022-05-18 PROCEDURE — D9220A PRA ANESTHESIA: ICD-10-PCS | Mod: ANES,,, | Performed by: STUDENT IN AN ORGANIZED HEALTH CARE EDUCATION/TRAINING PROGRAM

## 2022-05-18 PROCEDURE — 82962 GLUCOSE BLOOD TEST: CPT | Performed by: INTERNAL MEDICINE

## 2022-05-18 PROCEDURE — 45378 DIAGNOSTIC COLONOSCOPY: CPT | Performed by: INTERNAL MEDICINE

## 2022-05-18 PROCEDURE — 27201012 HC FORCEPS, HOT/COLD, DISP: Performed by: INTERNAL MEDICINE

## 2022-05-18 PROCEDURE — 37000009 HC ANESTHESIA EA ADD 15 MINS: Performed by: INTERNAL MEDICINE

## 2022-05-18 PROCEDURE — 88305 TISSUE EXAM BY PATHOLOGIST: ICD-10-PCS | Mod: 26,,, | Performed by: PATHOLOGY

## 2022-05-18 PROCEDURE — D9220A PRA ANESTHESIA: ICD-10-PCS | Mod: CRNA,,, | Performed by: NURSE ANESTHETIST, CERTIFIED REGISTERED

## 2022-05-18 PROCEDURE — 43239 PR EGD, FLEX, W/BIOPSY, SGL/MULTI: ICD-10-PCS | Mod: ,,, | Performed by: INTERNAL MEDICINE

## 2022-05-18 PROCEDURE — D9220A PRA ANESTHESIA: Mod: ANES,,, | Performed by: STUDENT IN AN ORGANIZED HEALTH CARE EDUCATION/TRAINING PROGRAM

## 2022-05-18 PROCEDURE — 25000003 PHARM REV CODE 250: Performed by: NURSE ANESTHETIST, CERTIFIED REGISTERED

## 2022-05-18 PROCEDURE — 88305 TISSUE EXAM BY PATHOLOGIST: CPT | Mod: 26,,, | Performed by: PATHOLOGY

## 2022-05-18 PROCEDURE — 88305 TISSUE EXAM BY PATHOLOGIST: CPT | Performed by: PATHOLOGY

## 2022-05-18 PROCEDURE — 63600175 PHARM REV CODE 636 W HCPCS: Performed by: NURSE ANESTHETIST, CERTIFIED REGISTERED

## 2022-05-18 PROCEDURE — 37000008 HC ANESTHESIA 1ST 15 MINUTES: Performed by: INTERNAL MEDICINE

## 2022-05-18 PROCEDURE — 43239 EGD BIOPSY SINGLE/MULTIPLE: CPT | Mod: ,,, | Performed by: INTERNAL MEDICINE

## 2022-05-18 RX ORDER — PROPOFOL 10 MG/ML
VIAL (ML) INTRAVENOUS
Status: DISCONTINUED | OUTPATIENT
Start: 2022-05-18 | End: 2022-05-18

## 2022-05-18 RX ORDER — SODIUM CHLORIDE 0.9 % (FLUSH) 0.9 %
3 SYRINGE (ML) INJECTION EVERY 4 HOURS PRN
Status: DISCONTINUED | OUTPATIENT
Start: 2022-05-18 | End: 2022-05-18 | Stop reason: HOSPADM

## 2022-05-18 RX ORDER — SODIUM CHLORIDE 9 MG/ML
INJECTION, SOLUTION INTRAVENOUS CONTINUOUS
Status: DISCONTINUED | OUTPATIENT
Start: 2022-05-18 | End: 2022-05-18 | Stop reason: HOSPADM

## 2022-05-18 RX ORDER — LIDOCAINE HYDROCHLORIDE 10 MG/ML
INJECTION, SOLUTION INTRAVENOUS
Status: DISCONTINUED | OUTPATIENT
Start: 2022-05-18 | End: 2022-05-18

## 2022-05-18 RX ADMIN — PROPOFOL 40 MG: 10 INJECTION, EMULSION INTRAVENOUS at 10:05

## 2022-05-18 RX ADMIN — PROPOFOL 100 MG: 10 INJECTION, EMULSION INTRAVENOUS at 10:05

## 2022-05-18 RX ADMIN — LIDOCAINE HYDROCHLORIDE 50 MG: 10 INJECTION, SOLUTION INTRAVENOUS at 10:05

## 2022-05-18 RX ADMIN — SODIUM CHLORIDE: 9 INJECTION, SOLUTION INTRAVENOUS at 10:05

## 2022-05-18 RX ADMIN — PROPOFOL 20 MG: 10 INJECTION, EMULSION INTRAVENOUS at 10:05

## 2022-05-18 NOTE — ANESTHESIA POSTPROCEDURE EVALUATION
Anesthesia Post Evaluation    Patient: Esteban Mike    Procedure(s) Performed: Procedure(s) (LRB):  EGD (ESOPHAGOGASTRODUODENOSCOPY) (N/A)    Final Anesthesia Type: general      Patient location during evaluation: PACU  Patient participation: Yes- Able to Participate  Level of consciousness: awake and alert  Post-procedure vital signs: reviewed and stable  Pain management: adequate  Airway patency: patent    PONV status at discharge: No PONV  Anesthetic complications: no      Cardiovascular status: blood pressure returned to baseline  Respiratory status: unassisted  Hydration status: euvolemic  Follow-up not needed.          Vitals Value Taken Time   /78 05/18/22 1116   Temp 36.5 °C (97.7 °F) 05/18/22 1045   Pulse 56 05/18/22 1119   Resp 16 05/18/22 1115   SpO2 97 % 05/18/22 1119   Vitals shown include unvalidated device data.      No case tracking events are documented in the log.      Pain/Chano Score: Chano Score: 10 (5/18/2022 11:00 AM)

## 2022-05-18 NOTE — H&P
[facility]  History & Physical    Subjective:      Chief Complaint/Reason for Admission:    EGD    Esteban Mike is a 71 y.o. male.    Past Medical History:   Diagnosis Date    Aortic valve disease     Bilateral carotid artery stenosis 7/18/2018    CAD (coronary artery disease)     Carotid artery occlusion      LICA, 70-80% JULIUS    Diabetes mellitus     Diabetes mellitus type II     Elevated LFTs     GERD (gastroesophageal reflux disease)     Hyperlipidemia     Hypertension     PVD (peripheral vascular disease)      Past Surgical History:   Procedure Laterality Date    CARDIAC CATHETERIZATION      carotid      CEA      COLONOSCOPY N/A 12/4/2020    Procedure: COLONOSCOPY;  Surgeon: William Holman MD;  Location: [facility] (4TH FLR);  Service: Endoscopy;  Laterality: N/A;  merged orders together    CORONARY ARTERY BYPASS GRAFT  5/2/08    X4    ESOPHAGOGASTRODUODENOSCOPY N/A 4/1/2019    Procedure: EGD (ESOPHAGOGASTRODUODENOSCOPY);  Surgeon: William Holman MD;  Location: [facility] (4TH FLR);  Service: Endoscopy;  Laterality: N/A;    ESOPHAGOGASTRODUODENOSCOPY N/A 12/4/2020    Procedure: EGD (ESOPHAGOGASTRODUODENOSCOPY);  Surgeon: William Holman MD;  [facility]  Service: Endoscopy;  Laterality: N/A;  covid test [facility] 12/1    HERNIA REPAIR      ica stent      TONSILLECTOMY       Family History   Problem Relation Age of Onset    Diabetes Mother     Heart disease Mother     Heart disease Father     Heart disease Maternal Grandfather     Heart disease Paternal Grandfather     Cirrhosis Paternal Grandfather     Colon cancer Neg Hx     Inflammatory bowel disease Neg Hx     Stomach cancer Neg Hx     Skin cancer Neg Hx      Social History     Tobacco Use    Smoking status: Never Smoker    Smokeless tobacco: Never Used   Substance Use Topics    Alcohol use: Yes     Comment: Socially     Drug use: No       PTA Medications   Medication Sig     aspirin 81 MG chewable tablet Take 81 mg by mouth once daily.    atorvastatin (LIPITOR) 80 MG tablet TAKE ONE TABLET BY MOUTH DAILY AFTER SUPPER FOR cholesterol    blood sugar diagnostic Strp To check BG 3 times daily, to use with insurance preferred meter    blood-glucose meter kit To check BG 3 times daily, to use with insurance preferred meter    carvediloL (COREG) 6.25 MG tablet Take 1 tablet (6.25 mg total) by mouth 2 (two) times daily with meals.    dulaglutide (TRULICITY) 3 mg/0.5 mL pen injector Inject 3 mg into the skin every 7 days.    fish oil-omega-3 fatty acids 300-1,000 mg capsule Take 2 g by mouth once daily. 3 tablets    glipiZIDE (GLUCOTROL) 5 MG tablet TAKE ONE TABLET BY MOUTH TWICE DAILY WITH MEALS FOR DIABETES    irbesartan (AVAPRO) 300 MG tablet Take 1 tablet (300 mg total) by mouth every evening.    JARDIANCE 25 mg tablet TAKE ONE TABLET BY MOUTH ONCE DAILY    lancets Misc To check BG 3 times daily, to use with insurance preferred meter    metFORMIN (GLUCOPHAGE-XR) 500 MG ER 24hr tablet Take 2 tablets (1,000 mg total) by mouth 2 (two) times daily with meals.    multivitamin capsule Take 1 capsule by mouth once daily.    pantoprazole (PROTONIX) 40 MG tablet TAKE ONE TABLET BY MOUTH ONCE DAILY BEFORE BREAKFAST    tamsulosin (FLOMAX) 0.4 mg Cap Take 1 capsule (0.4 mg total) by mouth once daily.    tamsulosin (FLOMAX) 0.4 mg Cap Take 2 capsules (0.8 mg total) by mouth once daily.    vit C-vit E-lutein-min-om-3 835-11-9-150 mg-unit-mg-mg Cap Take 1 tablet by mouth once daily.     Review of patient's allergies indicates:   Allergen Reactions    Amlodipine      Patient states this may have caused problems with his gums.  Unclear whether he has actually stopped the medication or not.  Not a true allergy.    Hydrochlorothiazide      Medication stopped by Endocrinology Department due to possible alterations in serum calcium.  Not a true allergy.        Review of Systems    Constitutional: Negative for fever.   Respiratory: Negative for shortness of breath.    Cardiovascular: Negative for chest pain.   Gastrointestinal: Positive for heartburn. Negative for abdominal pain.       Objective:      Vital Signs (Most Recent)       Vital Signs Range (Last 24H):       Physical Exam  Constitutional:       Appearance: Normal appearance.   Pulmonary:      Effort: Pulmonary effort is normal.   Neurological:      Mental Status: He is alert and oriented to person, place, and time.         Assessment:      There are no hospital problems to display for this patient.      Plan:    EGD

## 2022-05-18 NOTE — TRANSFER OF CARE
"Anesthesia Transfer of Care Note    Patient: Esteban Mike    Procedure(s) Performed: Procedure(s) (LRB):  EGD (ESOPHAGOGASTRODUODENOSCOPY) (N/A)    Patient location: [facility]    Anesthesia Type: general    Transport from OR: Transported from OR on room air with adequate spontaneous ventilation    Post pain: adequate analgesia    Post assessment: no apparent anesthetic complications    Post vital signs: stable    Level of consciousness: sedated    Nausea/Vomiting: no nausea/vomiting    Complications: none    Transfer of care protocol was followed      Last vitals:   Visit Vitals  BP (!) 181/75   Pulse (!) 57   Temp 36.7 °C (98.1 °F)   Resp 16   Ht 5' 7" (1.702 m)   Wt 74.4 kg (164 lb)   SpO2 98%   BMI 25.69 kg/m²     "

## 2022-05-18 NOTE — ANESTHESIA PREPROCEDURE EVALUATION
Pre-operative evaluation for Procedure(s) (LRB):  EGD (ESOPHAGOGASTRODUODENOSCOPY) (N/A)    Esteban Mike is a 71 y.o. male with pmh of CAD (s/p CABG 2008), carotid artery stenosis (s/p CEA 2006 and L ICA stent 2007), HTN, DM2, NIDIA who presents with a hiatal hernia and GERD. Plan for the above procedure.     2D Echo:  7/2018 PET stress:  CONCLUSIONS: NORMAL MYOCARDIAL PERFUSION PET STRESS TEST   1. The perfusion scan is free of evidence for myocardial ischemia or injury.   2. Resting wall motion is physiologic. Stress wall motion is physiologic.   3. LV function is normal at rest and stress.  (normal is >= 51%)   4. The ventricular volumes are normal at rest and stress.   5. The extracardiac distribution of radioactivity is normal.   6. There was no previous study available to compare.      Patient Active Problem List   Diagnosis    Coronary artery disease involving autologous vein coronary bypass graft without angina pectoris    Hyperlipidemia    PVD (peripheral vascular disease)    Internal carotid artery occlusion, right    Left carotid artery stenosis    NAFLD (nonalcoholic fatty liver disease)    Hypertension, essential    Type 2 diabetes, with peripheral circulatory disorder not at goal    Diabetic neuropathy associated with type 2 diabetes mellitus    Noncompliance    Benign prostatic hyperplasia    NIDIA (obstructive sleep apnea)    Type 2 diabetes mellitus, without long-term current use of insulin    Gastroesophageal reflux disease with esophagitis    Positive cardiac stress test    Esophagitis    Hypercalcemia        No current facility-administered medications on file prior to encounter.     Current Outpatient Medications on File Prior to Encounter   Medication Sig Dispense Refill    aspirin 81 MG chewable tablet Take 81 mg by mouth once daily.      atorvastatin (LIPITOR) 80  MG tablet TAKE ONE TABLET BY MOUTH DAILY AFTER SUPPER FOR cholesterol 90 tablet 0    blood sugar diagnostic Strp To check BG 3 times daily, to use with insurance preferred meter 200 strip 11    blood-glucose meter kit To check BG 3 times daily, to use with insurance preferred meter 1 each 0    fish oil-omega-3 fatty acids 300-1,000 mg capsule Take 2 g by mouth once daily. 3 tablets      irbesartan (AVAPRO) 300 MG tablet Take 1 tablet (300 mg total) by mouth every evening. 90 tablet 3    lancets Misc To check BG 3 times daily, to use with insurance preferred meter 200 each 11    multivitamin capsule Take 1 capsule by mouth once daily.      pantoprazole (PROTONIX) 40 MG tablet TAKE ONE TABLET BY MOUTH ONCE DAILY BEFORE BREAKFAST 90 tablet 3    tamsulosin (FLOMAX) 0.4 mg Cap Take 1 capsule (0.4 mg total) by mouth once daily. 90 capsule 3    vit C-vit E-lutein-min-om-3 724-52-2-150 mg-unit-mg-mg Cap Take 1 tablet by mouth once daily.         Past Surgical History:   Procedure Laterality Date    CARDIAC CATHETERIZATION      carotid      CEA      COLONOSCOPY N/A 12/4/2020    Procedure: COLONOSCOPY;  Surgeon: William Holman MD;  Location: [address]);  Service: Endoscopy;  Laterality: N/A;  merged orders together    CORONARY ARTERY BYPASS GRAFT  5/2/08    X4    ESOPHAGOGASTRODUODENOSCOPY N/A 4/1/2019    Procedure: EGD (ESOPHAGOGASTRODUODENOSCOPY);  Surgeon: William Holman MD;  [address]  Service: Endoscopy;  Laterality: N/A;    ESOPHAGOGASTRODUODENOSCOPY N/A 12/4/2020    Procedure: EGD (ESOPHAGOGASTRODUODENOSCOPY);  Surgeon: William Holman MD;  [address]  Service: Endoscopy;  Laterality: N/A;  covid test [facility] 12/1    HERNIA REPAIR      ica stent      TONSILLECTOMY             Pre-op Assessment    I have reviewed the Patient Summary Reports.     I have reviewed the Nursing Notes. I have reviewed the NPO Status.   I have reviewed the  Medications.     Review of Systems  Anesthesia Hx:  No problems with previous Anesthesia Denies Hx of Anesthetic complications  History of prior surgery of interest to airway management or planning: Denies Family Hx of Anesthesia complications.   Denies Personal Hx of Anesthesia complications.   Social:  Non-Smoker    Hematology/Oncology:  Hematology Normal   Oncology Normal     EENT/Dental:EENT/Dental Normal   Cardiovascular:   Hypertension CAD  CABG/stent   Denies Angina. hyperlipidemia  Denies CHAU. ECG has been reviewed. Carotid artery disease   Pulmonary:   Denies Shortness of breath. Sleep Apnea    Renal/:  Renal/ Normal     Hepatic/GI:   GERD, well controlled Liver Disease,    Musculoskeletal:  Musculoskeletal Normal    Neurological:  Neurology Normal    Endocrine:   Diabetes    Dermatological:  Skin Normal    Psych:  Psychiatric Normal           Physical Exam  General: Well nourished, Cooperative, Alert and Oriented    Airway:  Mouth Opening: Normal  TM Distance: Normal  Tongue: Normal  Neck ROM: Normal ROM    Chest/Lungs:  Clear to auscultation, Normal Respiratory Rate    Heart:  Rate: Normal  Rhythm: Regular Rhythm  Sounds: Normal        Anesthesia Plan  Type of Anesthesia, risks & benefits discussed:    Anesthesia Type: MAC, Gen Natural Airway, Gen ETT  Intra-op Monitoring Plan: Standard ASA Monitors  Post Op Pain Control Plan: multimodal analgesia  Induction:  IV  Informed Consent: Informed consent signed with the Patient and all parties understand the risks and agree with anesthesia plan.  All questions answered.   ASA Score: 3  Day of Surgery Review of History & Physical: H&P Update referred to the surgeon/provider.    Ready For Surgery From Anesthesia Perspective.     .

## 2022-05-18 NOTE — [employer]OVATION PATIENT INSTRUCTIONS
Discharge Summary/Instructions after an Endoscopic Procedure  Patient Name: Esteban Mike  Patient MRN: [id number]  Patient YOB: 1950  Wednesday, May 18, 2022  William Holman MD  Dear patient,  As a result of recent federal legislation (The Federal Cures Act), you may   receive lab or pathology results from your procedure in your [facility]   account before your physician is able to contact you. Your physician or   their representative will relay the results to you with their   recommendations at their soonest availability.  Thank you,  RESTRICTIONS:  During your procedure today, you received medications for sedation.  These   medications may affect your judgment, balance and coordination.  Therefore,   for 24 hours, you have the following restrictions:   - DO NOT drive a car, operate machinery, make legal/financial decisions,   sign important papers or drink alcohol.    ACTIVITY:  Today: no heavy lifting, straining or running due to procedural   sedation/anesthesia.  The following day: return to full activity including work.  DIET:  Eat and drink normally unless instructed otherwise.     TREATMENT FOR COMMON SIDE EFFECTS:  - Mild abdominal pain, nausea, belching, bloating or excessive gas:  rest,   eat lightly and use a heating pad.  - Sore Throat: treat with throat lozenges and/or gargle with warm salt   water.  - Because air was used during the procedure, expelling large amounts of air   from your rectum or belching is normal.  - If a bowel prep was taken, you may not have a bowel movement for 1-3 days.    This is normal.  SYMPTOMS TO WATCH FOR AND REPORT TO YOUR PHYSICIAN:  1. Abdominal pain or bloating, other than gas cramps.  2. Chest pain.  3. Back pain.  4. Signs of infection such as: chills or fever occurring within 24 hours   after the procedure.  5. Rectal bleeding, which would show as bright red, maroon, or black stools.   (A tablespoon of blood from the rectum is not serious, especially  if   hemorrhoids are present.)  6. Vomiting.  7. Weakness or dizziness.  GO DIRECTLY TO THE NEAREST EMERGENCY ROOM IF YOU HAVE ANY OF THE FOLLOWING:      Difficulty breathing              Chills and/or fever over 101 F   Persistent vomiting and/or vomiting blood   Severe abdominal pain   Severe chest pain   Black, tarry stools   Bleeding- more than one tablespoon   Any other symptom or condition that you feel may need urgent attention  Your doctor recommends these additional instructions:  If any biopsies were taken, your doctors clinic will contact you in 1 to 2   weeks with any results.  - Discharge patient to home.   - Follow an antireflux regimen.   - Await pathology results.   - Telephone endoscopist for pathology results in 3 weeks.   - Repeat upper endoscopy in 3 years for surveillance based on pathology   results.   - Use Protonix 40 mg once daily (or any other full strength proton pump   inhibitor) - best taken 45 minutes before your first protein containing   meal.   - Return to GI clinic at the next available appointment.   - The findings and recommendations were discussed with the patient.  For questions, problems or results please call your physician - William Holman MD at Work:  [phone].  [facility], EMERGENCY ROOM PHONE NUMBER: [phone]  IF A COMPLICATION OR EMERGENCY SITUATION ARISES AND YOU ARE UNABLE TO REACH   YOUR PHYSICIAN - GO DIRECTLY TO THE EMERGENCY ROOM.  William Holman MD  5/18/2022 10:48:06 AM  This report has been verified and signed electronically.  Dear patient,  As a result of recent federal legislation (The Federal Cures Act), you may   receive lab or pathology results from your procedure in your [facility]   account before your physician is able to contact you. Your physician or   their representative will relay the results to you with their   recommendations at their soonest availability.  Thank you,  PROVATION

## 2022-05-24 LAB
FINAL PATHOLOGIC DIAGNOSIS: NORMAL
GROSS: NORMAL
Lab: NORMAL

## 2022-05-24 NOTE — PROGRESS NOTES
Esteban your EGD pathology was benign no dysplasia no Barretts esophagus    Distal esophagus at 37 cm (biopsy):   No intestinal metaplasia, no dysplasia   Squamous glandular mucosa with chronic inflammation   Glandular mucosa comprised of surface foveolar epithelium and mucus type   glands   Comment: Interp By Amee Jose M.D., Signed on 05/24/2022

## 2022-06-20 DIAGNOSIS — I10 HYPERTENSION, ESSENTIAL: ICD-10-CM

## 2022-06-20 NOTE — TELEPHONE ENCOUNTER
Care Due:                  Date            Visit Type   Department     Provider  --------------------------------------------------------------------------------                                MYCHART                              ANNUAL                              CHECKUP/PHY  [facility] INTERNAL  Last Visit: 02-      [facility]       CORRINE DORAN  Next Visit: None Scheduled  None         None Found                                                            Last  Test          Frequency    Reason                     Performed    Due Date  --------------------------------------------------------------------------------    HBA1C.......  6 months...  glipiZIDE, metFORMIN.....  03- 09-    [facility] Embedded Care Gaps. Reference number: [id number]. 6/20/2022   8:04:34 AM CDT

## 2022-06-21 NOTE — TELEPHONE ENCOUNTER
Refill Routing Note   Medication(s) are not appropriate for processing by [facility] for the following reason(s):      - Required vitals are abnormal    [facility] action(s):  Defer Medication-related problems identified: Requires labs        Medication reconciliation completed: No     Appointments  past 12m or future 3m with PCP    Date Provider   Last Visit   2/17/2022 Ned Hartley MD   Next Visit   Visit date not found Ned Hartley MD   ED visits in past 90 days: 0        Note composed:8:32 PM 06/20/2022

## 2022-06-22 RX ORDER — IRBESARTAN 300 MG/1
TABLET ORAL
Qty: 90 TABLET | Refills: 3 | Status: SHIPPED | OUTPATIENT
Start: 2022-06-22 | End: 2023-09-05

## 2022-06-22 RX ORDER — CARVEDILOL 3.12 MG/1
TABLET ORAL
Qty: 180 TABLET | Refills: 3 | OUTPATIENT
Start: 2022-06-22

## 2022-06-27 NOTE — PROGRESS NOTES
Subjective:      Patient ID: Esteban Mike is a 71 y.o. male.    Chief Complaint:  No chief complaint on file.    History of Present Illness  Esteban Mike with presents today for follow up of Type 2 DM. Previous patient of mine. Last seen in May 2021    He is frequently lost to follow up    This is a MyChart video visit.    The patient location is: at home  The chief complaint leading to consultation is: diabetes  Visit type: Virtual visit with synchronous audio and video  Total time spent with patient: 30 minutes  Each patient to whom he or she provides medical services by telemedicine is:  (1) informed of the relationship between the physician and patient and the respective role of any other health care provider with respect to management of the patient; and (2) notified that he or she may decline to receive medical services by telemedicine and may withdraw from such care at any time.    In the past, he was not checking BGs and eating whatever he wanted.     Did not read portal message from Feb 2020 to start Jardiance and stop HCTZ due to hypercalciemia. We spoke with him in March 2020 and instructed him to start Jardiance and stop HCTZ. We have asked him to check labs to no avail.     Denies changes in medical history since his last visit. He retired in July 2021 and has recently gone back to work    With regards to the diabetes:    Diagnosed with Type 2 DM in 2008  Family History of Type 2 DM in mother and father, brother  Known complications:  DKA/HHS:   RN: up to date; Mild NPDR OU per Dr. Braun note in 1/2022  PN: +   Nephropathy: -; due Feb 2023  CAD: CABG x4 in 2008, post right CEA 2006 and left ICA stent 2007.   PVD: +  NAFLD. 06/2014 liver Bx: simple steatosis with no steatohepatitis or fibrosis     Current regimen:  Metformin XR 1000mg twice daily  Trulicity 3.0 mg weekly -started around Aug 2019  Jardiance 25 mg daily  Glipizide 5 mg twice daily with breakfast and dinner - trying  "to take with a meal    He does not like doing the pharmacy pre-made pack of medications.    Missed doses -now rarely he misses 2nd dose of medication -using daily pill box    Other medications tried:   Tradjenta 5 mg daily -taken off to put on Trulicity  MDI after CABG    1 # times a weekly testing      Hypoglycemic event-denies but reports s/s of hypoglycemia when skips a meal  Yes, did not need assistance   Knows how to correct with 15 grams of carbs- juice, coke, or a peppermint.     Dietary recall: He feels that he is trying to following diabetic diet.   Eats 3 meals a day.   B: biscuit and cup of coffee - splenda  L: out to eat  D: out to eat  Lives alone and does not cook. States he is eating a lot of fast food   Snacks: ice cream and candy  Drinks: water and diet drinks  ETOH: "I hardly ever drink"    Exercise - No formal exercise. He cuts grass during the summer -30 yards.     Education - last visit: 9/8/2020 CARYN Cochran     Has a Medic alert tag-none and does not want  Glucagon/Baqsimi-  Lives alone    Diabetes Management Status  Statin: Taking  ACE/ARB: Taking    Lab Results   Component Value Date    HGBA1C 6.4 (H) 03/07/2022    HGBA1C 7.6 (H) 08/03/2021    HGBA1C 6.9 (H) 05/04/2021     Screening or Prevention Patient's value Goal Complete/Controlled?   HgA1C Testing and Control   Lab Results   Component Value Date    HGBA1C 6.4 (H) 03/07/2022      Annually/Less than 8% No   Lipid profile : 02/28/2022 Annually Yes   LDL control Lab Results   Component Value Date    LDLCALC 65 02/28/2022    Annually/Less than 100 mg/dl  No   Nephropathy screening Lab Results   Component Value Date    LABMICR 5.0 02/17/2022     Lab Results   Component Value Date    PROTEINUA Negative 02/17/2022    Annually No   Blood pressure BP Readings from Last 1 Encounters:   05/18/22 (!) 154/78    Less than 140/90 Yes   Dilated retinal exam : 01/03/2022 Annually Yes   Foot exam   : 07/26/2021 Annually No     With regards to the " hypercalcemia or primary hyperparathyroidism:    Lab Results   Component Value Date    PTH 40 05/10/2021    CALCIUM 9.8 02/28/2022    PHOS 2.7 05/06/2008     Lab Results   Component Value Date    ALBUMIN 4.3 02/28/2022     d     Latest Reference Range & Units 09/03/16 08:01   Vit D, 25-Hydroxy 30 - 96 ng/mL 30     Daily intake of calcium is: none  Taking vitamin D: 2000IU daily    Denies constipation, depression, polyuria  + muscle aches or pains.    Last bmd was: none  Denies fractures, height loss or kidney stones  Denies history of renal disease.     There is no family history of hyperparathyroidism or calcium problems as far as she knows.  Was on HCTZ since 2013 until July 2021 though we tried to stop previously and he continued to take.    Denies lithium, or chlorthiadone use.    With regards to dyslipidemia,     He is on atorvastatin 80 mg daily      Latest Reference Range & Units 02/28/22 07:06   Cholesterol <200 mg/dL 121   HDL > OR = 40 mg/dL 42   HDL/Cholesterol Ratio <5.0 (calc) 2.9   LDL Cholesterol External mg/dL (calc) 65   Non HDL Chol. (LDL+VLDL) <130 mg/dL (calc) 79   Triglycerides <150 mg/dL 63     Review of Systems   Constitutional: Positive for fatigue. Negative for unexpected weight change.   Eyes: Negative for visual disturbance.   Endocrine: Negative for polydipsia, polyphagia and polyuria.   Musculoskeletal: Positive for back pain.     Objective:   Physical Exam  Constitutional:       Appearance: Normal appearance.   Neurological:      Mental Status: He is alert.       There were no vitals taken for this visit.     Lab Review:   Lab Results   Component Value Date    HGBA1C 6.4 (H) 03/07/2022    HGBA1C 7.6 (H) 08/03/2021    HGBA1C 6.9 (H) 05/04/2021      Lab Results   Component Value Date    CHOL 121 02/28/2022    HDL 42 02/28/2022    LDLCALC 65 02/28/2022    TRIG 63 02/28/2022    CHOLHDL 2.9 02/28/2022     Lab Results   Component Value Date     02/28/2022    K 4.4 02/28/2022      02/28/2022    CO2 26 02/28/2022     (H) 02/28/2022    BUN 25 02/28/2022    CREATININE 0.82 02/28/2022    CALCIUM 9.8 02/28/2022    PROT 6.6 02/28/2022    ALBUMIN 4.3 02/28/2022    BILITOT 0.4 02/28/2022    ALKPHOS 83 02/03/2020    AST 22 02/28/2022    ALT 24 02/28/2022    ANIONGAP 7 (L) 09/03/2016    ESTGFRAFRICA 105 02/28/2022    EGFRNONAA 90 02/28/2022    TSH 0.69 04/11/2018     Vit D, 25-Hydroxy   Date Value Ref Range Status   09/03/2016 30 30 - 96 ng/mL Final     Comment:     Vitamin D deficiency.........<10 ng/mL                              Vitamin D insufficiency......10-29 ng/mL       Vitamin D sufficiency........> or equal to 30 ng/mL  Vitamin D toxicity............>100 ng/mL       Assessment and Plan     1. Type 2 diabetes mellitus with diabetic polyneuropathy, without long-term current use of insulin  dulaglutide (TRULICITY) 4.5 mg/0.5 mL pen injector    Hemoglobin A1C    Hemoglobin A1C    CANCELED: Hemoglobin A1C   2. Hyperlipidemia, unspecified hyperlipidemia type     3. Hypertension, essential     4. Hypercalcemia  Vitamin D    Renal Function Panel    PTH, Intact    Vitamin D    Renal Function Panel    PTH, Intact    CANCELED: PTH, Intact    CANCELED: Renal Function Panel    CANCELED: Vitamin D   5. Noncompliance     6. Type 2 diabetes mellitus with other specified complication, without long-term current use of insulin  Ambulatory referral/consult to Endocrinology   7. Type 2 diabetes mellitus with hyperglycemia, without long-term current use of insulin  glipiZIDE (GLUCOTROL) 5 MG tablet     Type 2 diabetes mellitus, without long-term current use of insulin  -- Reviewed goals of therapy are to get the best control we can without hypoglycemia  -- Discussed his goal A1c is <7%  Medication changes:   A1c at goal and blood sugars are at goal via digital medicine  Check labs    Increase   Trulicity 4.5 mg weekly  Continue   Metformin XR 1000mg twice daily  Jardiance 25 mg daily  Decrease   Glipizide  2.5 mg with breakfast and 5 mg with dinner     If you have low blood sugar you will let me know.   -- Advised frequent self blood glucose monitoring.  Patient encouraged to document glucose results and bring them to every clinic visit    -- Hypoglycemia precautions discussed. Instructed on precautions before driving.    -- Call for Bg repeatedly < 90 or > 180.   -- Close adherence to lifestyle changes recommended.   -- Periodic follow ups for eye evaluations, foot care and dental care suggested.  -- Encouraged exercise  -- Given information on low carb snacks and DM drinks previously. Encouraged to avoid ice cream and candy.     Hyperlipidemia  Controlled   On statin per ADA recommendations    Hypertension, essential  -- on ARB  -- Controlled per patient  -- Blood pressure goals discussed with patient    Hypercalcemia  Elevated calcium dating back to 2015; highest of 11.1  He was recently taken off of HCTZ   Check Vitamin D PTH and RFP      Noncompliance  Improved      Follow up in about 4 months (around 10/30/2022).  Labs this week at [facility]

## 2022-06-29 NOTE — ASSESSMENT & PLAN NOTE
Elevated calcium dating back to 2015; highest of 11.1  He was recently taken off of HCTZ   Check Vitamin D PTH and RFP

## 2022-06-29 NOTE — ASSESSMENT & PLAN NOTE
-- Reviewed goals of therapy are to get the best control we can without hypoglycemia  -- Discussed his goal A1c is <7%  Medication changes:   A1c at goal and blood sugars are at goal via digital medicine  Check labs    Increase   Trulicity 4.5 mg weekly  Continue   Metformin XR 1000mg twice daily  Jardiance 25 mg daily  Decrease   Glipizide 2.5 mg with breakfast and 5 mg with dinner     If you have low blood sugar you will let me know.   -- Advised frequent self blood glucose monitoring.  Patient encouraged to document glucose results and bring them to every clinic visit    -- Hypoglycemia precautions discussed. Instructed on precautions before driving.    -- Call for Bg repeatedly < 90 or > 180.   -- Close adherence to lifestyle changes recommended.   -- Periodic follow ups for eye evaluations, foot care and dental care suggested.  -- Encouraged exercise  -- Given information on low carb snacks and DM drinks previously. Encouraged to avoid ice cream and candy.

## 2022-06-30 ENCOUNTER — OFFICE VISIT (OUTPATIENT)
Dept: ENDOCRINOLOGY | Facility: CLINIC | Age: 72
End: 2022-06-30
Attending: FAMILY MEDICINE
Payer: MEDICARE

## 2022-06-30 ENCOUNTER — PATIENT MESSAGE (OUTPATIENT)
Dept: ENDOCRINOLOGY | Facility: CLINIC | Age: 72
End: 2022-06-30

## 2022-06-30 DIAGNOSIS — E11.65 TYPE 2 DIABETES MELLITUS WITH HYPERGLYCEMIA, WITHOUT LONG-TERM CURRENT USE OF INSULIN: ICD-10-CM

## 2022-06-30 DIAGNOSIS — E11.42 TYPE 2 DIABETES MELLITUS WITH DIABETIC POLYNEUROPATHY, WITHOUT LONG-TERM CURRENT USE OF INSULIN: ICD-10-CM

## 2022-06-30 DIAGNOSIS — E11.69 TYPE 2 DIABETES MELLITUS WITH OTHER SPECIFIED COMPLICATION, WITHOUT LONG-TERM CURRENT USE OF INSULIN: ICD-10-CM

## 2022-06-30 DIAGNOSIS — I10 HYPERTENSION, ESSENTIAL: ICD-10-CM

## 2022-06-30 DIAGNOSIS — E83.52 HYPERCALCEMIA: ICD-10-CM

## 2022-06-30 DIAGNOSIS — Z91.199 NONCOMPLIANCE: ICD-10-CM

## 2022-06-30 DIAGNOSIS — E78.5 HYPERLIPIDEMIA, UNSPECIFIED HYPERLIPIDEMIA TYPE: ICD-10-CM

## 2022-06-30 PROCEDURE — 99214 OFFICE O/P EST MOD 30 MIN: CPT | Mod: 95,,, | Performed by: NURSE PRACTITIONER

## 2022-06-30 PROCEDURE — 99214 PR OFFICE/OUTPT VISIT, EST, LEVL IV, 30-39 MIN: ICD-10-PCS | Mod: 95,,, | Performed by: NURSE PRACTITIONER

## 2022-06-30 RX ORDER — DULAGLUTIDE 4.5 MG/.5ML
4.5 INJECTION, SOLUTION SUBCUTANEOUS
Qty: 4 PEN | Refills: 7 | Status: SHIPPED | OUTPATIENT
Start: 2022-06-30 | End: 2022-07-30

## 2022-06-30 RX ORDER — GLIPIZIDE 5 MG/1
TABLET ORAL
Qty: 180 TABLET | Refills: 0 | Status: SHIPPED | OUTPATIENT
Start: 2022-06-30 | End: 2023-03-21 | Stop reason: SDUPTHER

## 2022-06-30 NOTE — PATIENT INSTRUCTIONS
Diabetes    A1c at goal and blood sugars are at goal via digital medicine  Check labs    Increase   Trulicity 4.5 mg weekly  Continue   Metformin XR 1000mg twice daily  Jardiance 25 mg daily  Decrease   Glipizide 2.5 mg with breakfast and 5 mg with dinner     If you have low blood sugar you will let me know.      Hypoglycemia - Low Blood Sugar    What can you do?  Rule of 15:   Test your blood sugar  If glucose is between 50-70 mg/dL then ingest 15 grams of fast-acting carbs  If glucose is less than 50 mg/dL then ingest 30 grams of fast-acting carbs  Ingest 15 grams of fast-acting carbohydrate - such as:   3-4 glucose tablets  4 oz juice  ½ can regular soda pop  15 skittles or mini jelly beans   Re-check your blood sugar in 15 minutes. If its less than 70mg/dl, repeat steps 2 and 3.  If your next meal is more than 1 hour away, eat an additional 15 grams of carbohydrate and 1 ounce of protein (examples include crackers with cheese or one-half of a sandwich with peanut butter). It is important not to eat too much because this can raise your blood sugar above the target level.    After your blood sugar has normalized, think about why you went low. If you notice a pattern of low blood sugars, contact your Diabetes Team. We may need to adjust your medication.       High calcium               His last calcium level in Jan 2021 was normal and he thinks he was off of HCTZ at that time. He looked at bottle for HCTZ and restarted on 4/3/21. We will check renal function panel, Vitamin D, and PTH now and determine if he is hypercalcemia as a result of the HCTZ since his Jan labs were normal      When you go to lab for the blood that I want now, only do the renal function panel, PTH, Vitamin D for now  Next time get CMP and A1c

## 2022-07-07 LAB
25(OH)D3 SERPL-MCNC: 46 NG/ML (ref 30–100)
ALBUMIN SERPL-MCNC: 4.3 G/DL (ref 3.6–5.1)
BUN SERPL-MCNC: 20 MG/DL (ref 7–25)
BUN/CREAT SERPL: ABNORMAL (CALC) (ref 6–22)
CALCIUM SERPL-MCNC: 9.8 MG/DL (ref 8.6–10.3)
CHLORIDE SERPL-SCNC: 103 MMOL/L (ref 98–110)
CO2 SERPL-SCNC: 29 MMOL/L (ref 20–32)
CREAT SERPL-MCNC: 0.91 MG/DL (ref 0.7–1.25)
GLUCOSE SERPL-MCNC: 161 MG/DL (ref 65–99)
HBA1C MFR BLD: 6.2 % OF TOTAL HGB
PHOSPHATE SERPL-MCNC: 3.3 MG/DL (ref 2.1–4.3)
POTASSIUM SERPL-SCNC: 4.8 MMOL/L (ref 3.5–5.3)
PTH-INTACT SERPL-MCNC: 40 PG/ML (ref 16–77)
SODIUM SERPL-SCNC: 139 MMOL/L (ref 135–146)

## 2022-07-08 ENCOUNTER — PATIENT MESSAGE (OUTPATIENT)
Dept: ENDOCRINOLOGY | Facility: CLINIC | Age: 72
End: 2022-07-08
Payer: MEDICARE

## 2022-07-08 NOTE — TELEPHONE ENCOUNTER
Component      Latest Ref Rng & Units 7/6/2022   Glucose      65 - 99 mg/dL 161 (H)   BUN      7 - 25 mg/dL 20   Creatinine      0.70 - 1.25 mg/dL 0.91   eGFR if non       > OR = 60 mL/min/1.73m2 86   eGFR if       > OR = 60 mL/min/1.73m2 99   BUN/CREAT RATIO      6 - 22 (calc) NOT APPLICABLE   Sodium      135 - 146 mmol/L 139   Potassium      3.5 - 5.3 mmol/L 4.8   Chloride      98 - 110 mmol/L 103   CO2      20 - 32 mmol/L 29   Calcium      8.6 - 10.3 mg/dL 9.8   Phosphate (as Phosphorus)      2.1 - 4.3 mg/dL 3.3   Albumin      3.6 - 5.1 g/dL 4.3   Hemoglobin A1C External      <5.7 % of total Hgb 6.2 (H)   Vitamin D, 25-OH, Total      30 - 100 ng/mL 46   PTH      16 - 77 pg/mL 40

## 2022-07-20 ENCOUNTER — PATIENT MESSAGE (OUTPATIENT)
Dept: ADMINISTRATIVE | Facility: OTHER | Age: 72
End: 2022-07-20
Payer: MEDICARE

## 2022-08-05 ENCOUNTER — OFFICE VISIT (OUTPATIENT)
Dept: INTERNAL MEDICINE | Facility: CLINIC | Age: 72
End: 2022-08-05
Attending: FAMILY MEDICINE
Payer: MEDICARE

## 2022-08-05 VITALS
HEART RATE: 60 BPM | DIASTOLIC BLOOD PRESSURE: 60 MMHG | SYSTOLIC BLOOD PRESSURE: 118 MMHG | HEIGHT: 67 IN | BODY MASS INDEX: 27.64 KG/M2 | WEIGHT: 176.13 LBS | OXYGEN SATURATION: 98 %

## 2022-08-05 DIAGNOSIS — E78.5 HYPERLIPIDEMIA, UNSPECIFIED HYPERLIPIDEMIA TYPE: ICD-10-CM

## 2022-08-05 DIAGNOSIS — I65.21 INTERNAL CAROTID ARTERY OCCLUSION, RIGHT: ICD-10-CM

## 2022-08-05 DIAGNOSIS — E11.42 DIABETIC POLYNEUROPATHY ASSOCIATED WITH TYPE 2 DIABETES MELLITUS: ICD-10-CM

## 2022-08-05 DIAGNOSIS — K21.00 GASTROESOPHAGEAL REFLUX DISEASE WITH ESOPHAGITIS, UNSPECIFIED WHETHER HEMORRHAGE: ICD-10-CM

## 2022-08-05 DIAGNOSIS — N40.0 BENIGN PROSTATIC HYPERPLASIA, UNSPECIFIED WHETHER LOWER URINARY TRACT SYMPTOMS PRESENT: ICD-10-CM

## 2022-08-05 DIAGNOSIS — E11.42 TYPE 2 DIABETES MELLITUS WITH DIABETIC POLYNEUROPATHY, WITHOUT LONG-TERM CURRENT USE OF INSULIN: Primary | ICD-10-CM

## 2022-08-05 DIAGNOSIS — I25.810 CORONARY ARTERY DISEASE INVOLVING AUTOLOGOUS VEIN CORONARY BYPASS GRAFT WITHOUT ANGINA PECTORIS: ICD-10-CM

## 2022-08-05 DIAGNOSIS — I10 HYPERTENSION, ESSENTIAL: ICD-10-CM

## 2022-08-05 PROBLEM — K20.90 ESOPHAGITIS: Status: RESOLVED | Noted: 2019-04-01 | Resolved: 2022-08-05

## 2022-08-05 PROCEDURE — 99999 PR PBB SHADOW E&M-EST. PATIENT-LVL V: ICD-10-PCS | Mod: PBBFAC,,, | Performed by: FAMILY MEDICINE

## 2022-08-05 PROCEDURE — 99214 PR OFFICE/OUTPT VISIT, EST, LEVL IV, 30-39 MIN: ICD-10-PCS | Mod: S$PBB,,, | Performed by: FAMILY MEDICINE

## 2022-08-05 PROCEDURE — 99999 PR PBB SHADOW E&M-EST. PATIENT-LVL V: CPT | Mod: PBBFAC,,, | Performed by: FAMILY MEDICINE

## 2022-08-05 PROCEDURE — 99215 OFFICE O/P EST HI 40 MIN: CPT | Mod: PBBFAC | Performed by: FAMILY MEDICINE

## 2022-08-05 PROCEDURE — 99214 OFFICE O/P EST MOD 30 MIN: CPT | Mod: S$PBB,,, | Performed by: FAMILY MEDICINE

## 2022-08-05 RX ORDER — ATORVASTATIN CALCIUM 80 MG/1
80 TABLET, FILM COATED ORAL DAILY
Qty: 90 TABLET | Refills: 3 | Status: SHIPPED | OUTPATIENT
Start: 2022-08-05 | End: 2023-07-18

## 2022-08-05 NOTE — PROGRESS NOTES
Subjective:       Patient ID: Esteban Mike is a 71 y.o. male.    Chief Complaint: Follow-up    Established patient for an annual wellness check/physical exam and also chronic disease management. Specific complaints - see dictation and please see ROS.  P, S, Fm, Soc Hx's; Meds, allergies reviewed and reconciled.  Health maintenance file reviewed and addressed items due. Recent applicable lab, imaging and cardiovascular results reviewed.  Problem list items reviewed and modified or added entries (in the overview section) may not be transcribed into this encounter note due to note writer format.    Has seen his cardiologist recently, DM nurse and had a recent EGD.    Review of Systems   Constitutional: Negative for appetite change, chills, diaphoresis, fatigue and fever.   HENT: Negative for congestion, postnasal drip, rhinorrhea, sore throat and trouble swallowing.    Eyes: Negative for visual disturbance.   Respiratory: Negative for cough, choking, chest tightness, shortness of breath and wheezing.    Cardiovascular: Negative for chest pain and leg swelling.   Gastrointestinal: Negative for abdominal distention, abdominal pain, diarrhea, nausea and vomiting.   Genitourinary: Negative for difficulty urinating and hematuria.   Musculoskeletal: Positive for arthralgias. Negative for myalgias.   Skin: Negative for rash.   Neurological: Negative for weakness, light-headedness and headaches.   Psychiatric/Behavioral: Negative for confusion and dysphoric mood.       Objective:      Physical Exam  Vitals and nursing note reviewed.   Constitutional:       Appearance: He is well-developed. He is not diaphoretic.   HENT:      Head: Normocephalic and atraumatic.   Eyes:      General: No scleral icterus.     Conjunctiva/sclera: Conjunctivae normal.   Neck:      Vascular: No carotid bruit.   Cardiovascular:      Rate and Rhythm: Normal rate and regular rhythm.      Heart sounds: Heart sounds are distant. No murmur  heard.    No friction rub. No gallop.   Pulmonary:      Effort: Pulmonary effort is normal. No respiratory distress.      Breath sounds: Decreased breath sounds present. No wheezing or rales.   Abdominal:      General: There is no distension.      Tenderness: There is no abdominal tenderness.   Musculoskeletal:         General: No deformity.      Cervical back: Normal range of motion and neck supple.   Skin:     General: Skin is warm and dry.      Findings: No erythema or rash.   Neurological:      Mental Status: He is alert and oriented to person, place, and time.      Cranial Nerves: No cranial nerve deficit.      Motor: No tremor.      Coordination: Coordination normal.      Gait: Gait normal.   Psychiatric:         Behavior: Behavior normal.         Thought Content: Thought content normal.         Judgment: Judgment normal.         Assessment:       1. Type 2 diabetes mellitus with diabetic polyneuropathy, without long-term current use of insulin    2. Hyperlipidemia, unspecified hyperlipidemia type    3. Hypertension, essential    4. Coronary artery disease involving autologous vein coronary bypass graft without angina pectoris    5. Benign prostatic hyperplasia, unspecified whether lower urinary tract symptoms present    6. Gastroesophageal reflux disease with esophagitis, unspecified whether hemorrhage    7. Diabetic polyneuropathy associated with type 2 diabetes mellitus    8. Internal carotid artery occlusion, right        Plan:     Medication List with Changes/Refills   Current Medications    ASPIRIN 81 MG CHEWABLE TABLET    Take 81 mg by mouth once daily.    BLOOD SUGAR DIAGNOSTIC STRP    To check BG 3 times daily, to use with insurance preferred meter    BLOOD-GLUCOSE METER KIT    To check BG 3 times daily, to use with insurance preferred meter    CARVEDILOL (COREG) 6.25 MG TABLET    Take 1 tablet (6.25 mg total) by mouth 2 (two) times daily with meals.    FISH OIL-OMEGA-3 FATTY ACIDS 300-1,000 MG  CAPSULE    Take 2 g by mouth once daily. 3 tablets    GLIPIZIDE (GLUCOTROL) 5 MG TABLET    2.5 mg with breakfast (1/2 tab) and 5 mg with dinner (1 tab)    IRBESARTAN (AVAPRO) 300 MG TABLET    TAKE ONE TABLET BY MOUTH EVERY EVENING    JARDIANCE 25 MG TABLET    TAKE ONE TABLET BY MOUTH ONCE DAILY    LANCETS MISC    To check BG 3 times daily, to use with insurance preferred meter    METFORMIN (GLUCOPHAGE-XR) 500 MG ER 24HR TABLET    Take 2 tablets (1,000 mg total) by mouth 2 (two) times daily with meals.    MULTIVITAMIN CAPSULE    Take 1 capsule by mouth once daily.    PANTOPRAZOLE (PROTONIX) 40 MG TABLET    TAKE ONE TABLET BY MOUTH ONCE DAILY BEFORE BREAKFAST    TAMSULOSIN (FLOMAX) 0.4 MG CAP    Take 2 capsules (0.8 mg total) by mouth once daily.    VIT C-VIT E-LUTEIN-MIN-OM-3 [id number] MG-UNIT-MG-MG CAP    Take 1 tablet by mouth once daily.   Changed and/or Refilled Medications    Modified Medication Previous Medication    ATORVASTATIN (LIPITOR) 80 MG TABLET atorvastatin (LIPITOR) 80 MG tablet       Take 1 tablet (80 mg total) by mouth once daily.    TAKE ONE TABLET BY MOUTH DAILY AFTER SUPPER FOR cholesterol   Discontinued Medications    TAMSULOSIN (FLOMAX) 0.4 MG CAP    Take 1 capsule (0.4 mg total) by mouth once daily.     Esteban was seen today for follow-up.    Diagnoses and all orders for this visit:    Type 2 diabetes mellitus with diabetic polyneuropathy, without long-term current use of insulin  -     Ambulatory referral/consult to Podiatry; Future    Hyperlipidemia, unspecified hyperlipidemia type  -     atorvastatin (LIPITOR) 80 MG tablet; Take 1 tablet (80 mg total) by mouth once daily.    Hypertension, essential    Coronary artery disease involving autologous vein coronary bypass graft without angina pectoris    Benign prostatic hyperplasia, unspecified whether lower urinary tract symptoms present    Gastroesophageal reflux disease with esophagitis, unspecified whether hemorrhage    Diabetic  polyneuropathy associated with type 2 diabetes mellitus  -     Ambulatory referral/consult to Podiatry; Future    Internal carotid artery occlusion, right      See meds, orders, follow up, routing and instructions sections of encounter and AVS. Discussed with patient and provided on AVS.    Discussed diet and exercise and links provided on AVS for detailed information.    Diabetes Management Status    Statin: Taking  ACE/ARB: Taking    Screening or Prevention Patient's value Goal Complete/Controlled?   HgA1C Testing and Control   Lab Results   Component Value Date    HGBA1C 6.2 (H) 07/06/2022      Annually/Less than 8% Yes   Lipid profile : 02/28/2022 Annually Yes   LDL control Lab Results   Component Value Date    LDLCALC 65 02/28/2022    Annually/Less than 100 mg/dl  Yes   Nephropathy screening Lab Results   Component Value Date    LABMICR 5.0 02/17/2022     Lab Results   Component Value Date    PROTEINUA Negative 02/17/2022     No results found for: UTPCR   Annually Yes   Blood pressure BP Readings from Last 1 Encounters:   08/05/22 118/60    Less than 140/90 Yes   Dilated retinal exam : 01/03/2022 Annually Yes   Foot exam   : 07/26/2021 Annually No     Lab Results   Component Value Date     07/06/2022    K 4.8 07/06/2022     07/06/2022    BUN 20 07/06/2022    CREATININE 0.91 07/06/2022     (H) 07/06/2022    HGBA1C 6.2 (H) 07/06/2022    HGBA1C 9.7 07/08/2019    MG 2.6 05/06/2008    AST 22 02/28/2022    ALT 24 02/28/2022    ALBUMIN 4.3 07/06/2022    PROT 6.6 02/28/2022    BILITOT 0.4 02/28/2022    CHOL 121 02/28/2022    HDL 42 02/28/2022    HDL 46 07/08/2019    LDLCALC 65 02/28/2022    LDLCALC 85 07/20/2017    TRIG 63 02/28/2022    WBC 10.3 04/11/2018    HGB 14.2 04/11/2018    HCT 42.3 04/11/2018     04/11/2018    PSA 0.28 05/20/2015    PSADIAG 0.29 02/28/2022    TSH 0.69 04/11/2018

## 2022-08-24 ENCOUNTER — OFFICE VISIT (OUTPATIENT)
Dept: PODIATRY | Facility: CLINIC | Age: 72
End: 2022-08-24
Attending: FAMILY MEDICINE
Payer: MEDICARE

## 2022-08-24 VITALS
SYSTOLIC BLOOD PRESSURE: 135 MMHG | WEIGHT: 176 LBS | HEART RATE: 65 BPM | BODY MASS INDEX: 27.62 KG/M2 | DIASTOLIC BLOOD PRESSURE: 73 MMHG | HEIGHT: 67 IN

## 2022-08-24 DIAGNOSIS — S90.31XS CONTUSION OF RIGHT FOOT, SEQUELA: ICD-10-CM

## 2022-08-24 DIAGNOSIS — S90.811S: ICD-10-CM

## 2022-08-24 DIAGNOSIS — E11.42 TYPE 2 DIABETES MELLITUS WITH DIABETIC POLYNEUROPATHY, WITHOUT LONG-TERM CURRENT USE OF INSULIN: ICD-10-CM

## 2022-08-24 DIAGNOSIS — I73.9 PVD (PERIPHERAL VASCULAR DISEASE): ICD-10-CM

## 2022-08-24 DIAGNOSIS — E11.9 ENCOUNTER FOR DIABETIC FOOT EXAM: Primary | ICD-10-CM

## 2022-08-24 PROCEDURE — 99203 PR OFFICE/OUTPT VISIT, NEW, LEVL III, 30-44 MIN: ICD-10-PCS | Mod: S$PBB,,, | Performed by: PODIATRIST

## 2022-08-24 PROCEDURE — 99999 PR PBB SHADOW E&M-EST. PATIENT-LVL III: ICD-10-PCS | Mod: PBBFAC,,, | Performed by: PODIATRIST

## 2022-08-24 PROCEDURE — 99203 OFFICE O/P NEW LOW 30 MIN: CPT | Mod: S$PBB,,, | Performed by: PODIATRIST

## 2022-08-24 PROCEDURE — 99213 OFFICE O/P EST LOW 20 MIN: CPT | Mod: PBBFAC,PN | Performed by: PODIATRIST

## 2022-08-24 PROCEDURE — 99999 PR PBB SHADOW E&M-EST. PATIENT-LVL III: CPT | Mod: PBBFAC,,, | Performed by: PODIATRIST

## 2022-08-24 NOTE — PROGRESS NOTES
Subjective:      Patient ID: Esteban Mike is a 72 y.o. male.    Chief Complaint: No chief complaint on file.    Esteban is a 72 y.o. male who presents to the clinic upon referral from Dr. Hartley  for evaluation and treatment of diabetic feet.    Esteban has a past medical history of Aortic valve disease, Bilateral carotid artery stenosis (7/18/2018), CAD (coronary artery disease), Carotid artery occlusion, Diabetes mellitus, Diabetes mellitus type II, Elevated LFTs, GERD (gastroesophageal reflux disease), Hyperlipidemia, Hypertension, and PVD (peripheral vascular disease).   Retired from [employer]. Dx DM 2008 after quad bypass. 2006 R endarterectomy completely occluded; 2007 L carotid stent.  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    Patient relates no major problem with feet. Doing pretty well overall.  Here for annual DM foot exam but has not had one in about 4 years.     PCP: MD MORGAN Holley 8/5/22  Current shoe gear: Casual shoes    Hemoglobin A1C   Date Value Ref Range Status   07/06/2022 6.2 (H) <5.7 % of total Hgb Final     Comment:     For someone without known diabetes, a hemoglobin   A1c value between 5.7% and 6.4% is consistent with  prediabetes and should be confirmed with a   follow-up test.     For someone with known diabetes, a value <7%  indicates that their diabetes is well controlled. A1c  targets should be individualized based on duration of  diabetes, age, comorbid conditions, and other  considerations.     This assay result is consistent with an increased risk  of diabetes.     Currently, no consensus exists regarding use of  hemoglobin A1c for diagnosis of diabetes for children.        03/07/2022 6.4 (H) <5.7 % of total Hgb Final     Comment:     For someone without known diabetes, a hemoglobin   A1c value between 5.7% and 6.4% is consistent with  prediabetes and should be confirmed with a   follow-up test.     For someone with known diabetes, a value  <7%  indicates that their diabetes is well controlled. A1c  targets should be individualized based on duration of  diabetes, age, comorbid conditions, and other  considerations.     This assay result is consistent with an increased risk  of diabetes.     Currently, no consensus exists regarding use of  hemoglobin A1c for diagnosis of diabetes for children.        08/03/2021 7.6 (H) <5.7 % of total Hgb Final     Comment:     For someone without known diabetes, a hemoglobin A1c  value of 6.5% or greater indicates that they may have   diabetes and this should be confirmed with a follow-up   test.     For someone with known diabetes, a value <7% indicates   that their diabetes is well controlled and a value   greater than or equal to 7% indicates suboptimal   control. A1c targets should be individualized based on   duration of diabetes, age, comorbid conditions, and   other considerations.     Currently, no consensus exists regarding use of  hemoglobin A1c for diagnosis of diabetes for children.         07/08/2019 9.7 % Final     Review of Systems   Constitutional: Negative for malaise/fatigue.   Cardiovascular:  Negative for leg swelling and palpitations.   Skin:  Positive for color change (yellow nails) and dry skin. Negative for nail changes.   Musculoskeletal:  Positive for joint pain. Negative for arthritis, gout, joint swelling, muscle weakness and myalgias.   Neurological:  Negative for disturbances in coordination, dizziness, focal weakness, numbness, paresthesias and tremors.   Psychiatric/Behavioral:  The patient is not nervous/anxious.        Objective:      Physical Exam  Vitals reviewed.   Constitutional:       Appearance: He is well-developed and overweight.   HENT:      Head: Normocephalic.   Cardiovascular:      Pulses:           Dorsalis pedis pulses are 2+ on the right side and 2+ on the left side.        Posterior tibial pulses are 2+ on the right side and 2+ on the left side.      Comments: CRT < 3  sec to tips of toes. No edema noted to b/l LE.    Musculoskeletal:      Right foot: Deformity present. No swelling or tenderness.      Left foot: Deformity (hammertoe 2-5 B/L) present. No swelling or tenderness.      Comments: MS strength 5/5 in DF/PF/Inv/Ev resistance with no reproduction of pain in any direction.   Passive ROM ankle and pedal joints is painless. Adequate pedal joint ROM with no crepitation bilateral.      Feet:      Right foot:      Protective Sensation: 2 sites tested.  2 sites sensed.      Skin integrity: Skin integrity normal. No ulcer, blister, skin breakdown, erythema, warmth, callus or dry skin.      Left foot:      Protective Sensation: 2 sites tested.  2 sites sensed.      Skin integrity: Skin integrity normal. No ulcer, blister, skin breakdown, erythema, warmth, callus or dry skin.   Skin:     General: Skin is warm and dry.      Findings: Abrasion and bruising (plantar lateral right foot w/o h/o of injury; NSI) present. No erythema, laceration, lesion or wound.      Comments: Nails are slightly dystrophic but well trimmed. Interdigital spaces CDI b/l. Skin texture normal. Pedal hair normal.    Relates H/0 stepped on nail 1st met.head w/ no sign of puncture wound L foot.     Neurological:      Mental Status: He is alert and oriented to person, place, and time.      Sensory: No sensory deficit.      Motor: Motor function is intact.      Gait: Gait is intact.      Comments: Gross epicritic sensation are all intact bilaterally.    Psychiatric:         Mood and Affect: Mood and affect normal.         Behavior: Behavior normal. Behavior is cooperative.       Assessment:       Encounter Diagnoses   Name Primary?    Type 2 diabetes mellitus with diabetic polyneuropathy, without long-term current use of insulin     Encounter for diabetic foot exam Yes    PVD (peripheral vascular disease)     Abrasion, right foot, sequela     Contusion of right foot, sequela        Plan:       Diagnoses and all  orders for this visit:    Encounter for diabetic foot exam    Type 2 diabetes mellitus with diabetic polyneuropathy, without long-term current use of insulin  -     Ambulatory referral/consult to Podiatry    PVD (peripheral vascular disease)    Abrasion, right foot, sequela    Contusion of right foot, sequela    I counseled the patient on his conditions, their implications and medical management.    - Shoe inspection. Diabetic Foot Education. Patient reminded of the importance of good nutrition and blood sugar control to help prevent podiatric complications of diabetes. Patient instructed on proper foot hygeine. We discussed wearing proper shoe gear, daily foot inspections, never walking without protective shoe gear, annual diabetic foot exam, sooner p.r.n.

## 2022-08-28 ENCOUNTER — PATIENT MESSAGE (OUTPATIENT)
Dept: OTHER | Facility: OTHER | Age: 72
End: 2022-08-28
Payer: MEDICARE

## 2022-08-31 ENCOUNTER — PATIENT MESSAGE (OUTPATIENT)
Dept: OTHER | Facility: OTHER | Age: 72
End: 2022-08-31
Payer: MEDICARE

## 2022-09-01 ENCOUNTER — OFFICE VISIT (OUTPATIENT)
Dept: CARDIOLOGY | Facility: CLINIC | Age: 72
End: 2022-09-01
Payer: MEDICARE

## 2022-09-01 ENCOUNTER — TELEPHONE (OUTPATIENT)
Dept: CARDIOLOGY | Facility: CLINIC | Age: 72
End: 2022-09-01
Payer: MEDICARE

## 2022-09-01 VITALS
HEART RATE: 83 BPM | SYSTOLIC BLOOD PRESSURE: 160 MMHG | OXYGEN SATURATION: 96 % | DIASTOLIC BLOOD PRESSURE: 80 MMHG | WEIGHT: 172.31 LBS | BODY MASS INDEX: 27.04 KG/M2 | HEIGHT: 67 IN

## 2022-09-01 DIAGNOSIS — I65.21 INTERNAL CAROTID ARTERY OCCLUSION, RIGHT: ICD-10-CM

## 2022-09-01 DIAGNOSIS — I73.9 PVD (PERIPHERAL VASCULAR DISEASE): ICD-10-CM

## 2022-09-01 DIAGNOSIS — G47.33 OSA (OBSTRUCTIVE SLEEP APNEA): ICD-10-CM

## 2022-09-01 DIAGNOSIS — K76.0 NAFLD (NONALCOHOLIC FATTY LIVER DISEASE): ICD-10-CM

## 2022-09-01 DIAGNOSIS — I25.810 CORONARY ARTERY DISEASE INVOLVING AUTOLOGOUS VEIN CORONARY BYPASS GRAFT WITHOUT ANGINA PECTORIS: Primary | ICD-10-CM

## 2022-09-01 DIAGNOSIS — I65.22 LEFT CAROTID ARTERY STENOSIS: ICD-10-CM

## 2022-09-01 DIAGNOSIS — R00.2 PALPITATIONS: Primary | ICD-10-CM

## 2022-09-01 DIAGNOSIS — E78.5 HYPERLIPIDEMIA, UNSPECIFIED HYPERLIPIDEMIA TYPE: ICD-10-CM

## 2022-09-01 DIAGNOSIS — E11.42 DIABETIC POLYNEUROPATHY ASSOCIATED WITH TYPE 2 DIABETES MELLITUS: ICD-10-CM

## 2022-09-01 DIAGNOSIS — I10 HYPERTENSION, ESSENTIAL: ICD-10-CM

## 2022-09-01 DIAGNOSIS — E11.42 TYPE 2 DIABETES MELLITUS WITH DIABETIC POLYNEUROPATHY, WITHOUT LONG-TERM CURRENT USE OF INSULIN: ICD-10-CM

## 2022-09-01 PROCEDURE — 99213 PR OFFICE/OUTPT VISIT, EST, LEVL III, 20-29 MIN: ICD-10-PCS | Mod: S$PBB,,, | Performed by: INTERNAL MEDICINE

## 2022-09-01 PROCEDURE — 99999 PR PBB SHADOW E&M-EST. PATIENT-LVL IV: CPT | Mod: PBBFAC,,, | Performed by: INTERNAL MEDICINE

## 2022-09-01 PROCEDURE — 99999 PR PBB SHADOW E&M-EST. PATIENT-LVL IV: ICD-10-PCS | Mod: PBBFAC,,, | Performed by: INTERNAL MEDICINE

## 2022-09-01 PROCEDURE — 99214 OFFICE O/P EST MOD 30 MIN: CPT | Mod: PBBFAC,PN | Performed by: INTERNAL MEDICINE

## 2022-09-01 PROCEDURE — 99213 OFFICE O/P EST LOW 20 MIN: CPT | Mod: S$PBB,,, | Performed by: INTERNAL MEDICINE

## 2022-09-01 RX ORDER — CARVEDILOL 25 MG/1
25 TABLET ORAL 2 TIMES DAILY WITH MEALS
Qty: 180 TABLET | Refills: 3 | Status: SHIPPED | OUTPATIENT
Start: 2022-09-01 | End: 2023-03-24 | Stop reason: SDUPTHER

## 2022-09-01 NOTE — PROGRESS NOTES
Subjective:    Patient ID:  Esteban Mike is a 72 y.o. male who presents for follow-up of CAD    HPI  The patient is a 72 year old male last seen 3/10/22 is followed with CAD post CABG 2008, post right CEA 2006 and left ICA stent 2007. He is followed by digital medicine at goal 7%.He has no chest pain or CHAU. 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  Lab Results   Component Value Date     07/06/2022    K 4.8 07/06/2022     07/06/2022    CO2 29 07/06/2022    BUN 20 07/06/2022    CREATININE 0.91 07/06/2022     (H) 07/06/2022    HGBA1C 6.2 (H) 07/06/2022    HGBA1C 9.7 07/08/2019    MG 2.6 05/06/2008    AST 22 02/28/2022    ALT 24 02/28/2022    ALBUMIN 4.3 07/06/2022    PROT 6.6 02/28/2022    BILITOT 0.4 02/28/2022    WBC 10.3 04/11/2018    HGB 14.2 04/11/2018    HCT 42.3 04/11/2018    MCV 90.8 04/11/2018     04/11/2018    INR 1.0 06/25/2014    PSA 0.28 05/20/2015    TSH 0.69 04/11/2018         Lab Results   Component Value Date    CHOL 121 02/28/2022    HDL 42 02/28/2022    HDL 46 07/08/2019    TRIG 63 02/28/2022       Lab Results   Component Value Date    LDLCALC 65 02/28/2022       Past Medical History:   Diagnosis Date    Aortic valve disease     Bilateral carotid artery stenosis 7/18/2018    CAD (coronary artery disease)     Carotid artery occlusion      LICA, 70-80% JULIUS    Diabetes mellitus     Diabetes mellitus type II     Elevated LFTs     GERD (gastroesophageal reflux disease)     Hyperlipidemia     Hypertension     PVD (peripheral vascular disease)        Current Outpatient Medications:     aspirin 81 MG chewable tablet, Take 81 mg by mouth once daily., Disp: , Rfl:     atorvastatin (LIPITOR) 80 MG tablet, Take 1 tablet (80 mg total) by mouth once daily., Disp: 90 tablet, Rfl: 3    blood sugar diagnostic Strp, To check BG 3 times daily, to use with insurance preferred meter, Disp: 200 strip, Rfl: 11    blood-glucose meter kit, To check BG 3 times daily, to use with  insurance preferred meter, Disp: 1 each, Rfl: 0    dulaglutide (TRULICITY) 4.5 mg/0.5 mL pen injector, Inject 4.5 mg into the skin every 7 days., Disp: , Rfl:     fish oil-omega-3 fatty acids 300-1,000 mg capsule, Take 2 g by mouth once daily. 3 tablets, Disp: , Rfl:     glipiZIDE (GLUCOTROL) 5 MG tablet, 2.5 mg with breakfast (1/2 tab) and 5 mg with dinner (1 tab), Disp: 180 tablet, Rfl: 0    irbesartan (AVAPRO) 300 MG tablet, TAKE ONE TABLET BY MOUTH EVERY EVENING, Disp: 90 tablet, Rfl: 3    JARDIANCE 25 mg tablet, TAKE ONE TABLET BY MOUTH ONCE DAILY, Disp: 30 tablet, Rfl: 3    lancets Misc, To check BG 3 times daily, to use with insurance preferred meter, Disp: 200 each, Rfl: 11    metFORMIN (GLUCOPHAGE-XR) 500 MG ER 24hr tablet, Take 2 tablets (1,000 mg total) by mouth 2 (two) times daily with meals., Disp: 360 tablet, Rfl: 1    multivitamin capsule, Take 1 capsule by mouth once daily., Disp: , Rfl:     pantoprazole (PROTONIX) 40 MG tablet, TAKE ONE TABLET BY MOUTH ONCE DAILY BEFORE BREAKFAST, Disp: 90 tablet, Rfl: 3    tamsulosin (FLOMAX) 0.4 mg Cap, Take 2 capsules (0.8 mg total) by mouth once daily., Disp: 60 capsule, Rfl: 12    vit C-vit E-lutein-min-om-3 [id number] mg-unit-mg-mg Cap, Take 1 tablet by mouth once daily., Disp: , Rfl:     carvediloL (COREG) 25 MG tablet, Take 1 tablet (25 mg total) by mouth 2 (two) times daily with meals., Disp: 180 tablet, Rfl: 3    dulaglutide (TRULICITY SUBQ), Inject into the skin., Disp: , Rfl:           Review of Systems   Constitutional: Negative for decreased appetite, diaphoresis, fever, malaise/fatigue, weight gain and weight loss.   HENT:  Negative for congestion, ear discharge, ear pain and nosebleeds.    Eyes:  Negative for blurred vision, double vision and visual disturbance.   Cardiovascular:  Negative for chest pain, claudication, cyanosis, dyspnea on exertion, irregular heartbeat, leg swelling, near-syncope, orthopnea, palpitations, paroxysmal  "nocturnal dyspnea and syncope.   Respiratory:  Negative for cough, hemoptysis, shortness of breath, sleep disturbances due to breathing, snoring, sputum production and wheezing.    Endocrine: Negative for polydipsia, polyphagia and polyuria.   Hematologic/Lymphatic: Negative for adenopathy and bleeding problem. Does not bruise/bleed easily.   Skin:  Negative for color change, nail changes, poor wound healing and rash.   Musculoskeletal:  Negative for muscle cramps and muscle weakness.   Gastrointestinal:  Negative for abdominal pain, anorexia, change in bowel habit, hematochezia, nausea and vomiting.   Genitourinary:  Negative for dysuria, frequency and hematuria.   Neurological:  Negative for brief paralysis, difficulty with concentration, excessive daytime sleepiness, dizziness, focal weakness, headaches, light-headedness, seizures, vertigo and weakness.   Psychiatric/Behavioral:  Negative for altered mental status and depression.    Allergic/Immunologic: Negative for persistent infections.      Objective:BP (!) 160/80   Pulse 83   Ht 5' 7" (1.702 m)   Wt 78.1 kg (172 lb 4.6 oz)   SpO2 96%   BMI 26.98 kg/m²             Physical Exam  Constitutional:       Appearance: Normal appearance. He is well-developed and normal weight.   HENT:      Head: Normocephalic.      Right Ear: External ear normal.      Left Ear: External ear normal.      Nose: Nose normal.   Eyes:      General: No scleral icterus.     Pupils: Pupils are equal, round, and reactive to light.   Neck:      Thyroid: No thyromegaly.      Vascular: No JVD.      Trachea: No tracheal deviation.   Cardiovascular:      Rate and Rhythm: Normal rate and regular rhythm.      Pulses: Intact distal pulses.           Carotid pulses are 2+ on the right side and 2+ on the left side.       Dorsalis pedis pulses are 2+ on the right side and 2+ on the left side.        Posterior tibial pulses are 2+ on the right side and 2+ on the left side.      Heart sounds: No " murmur heard.    No friction rub. No gallop.   Pulmonary:      Effort: Pulmonary effort is normal.      Breath sounds: Normal breath sounds.   Chest:       Abdominal:      General: Bowel sounds are normal. There is no distension.      Tenderness: There is no abdominal tenderness. There is no guarding.   Musculoskeletal:         General: No tenderness. Normal range of motion.      Cervical back: Normal range of motion and neck supple.   Lymphadenopathy:      Comments: Palpation of neck and groin lymph nodes normal   Skin:     General: Skin is dry.      Comments: Palpation of skin normal   Neurological:      Mental Status: He is alert and oriented to person, place, and time.      Cranial Nerves: No cranial nerve deficit.      Motor: No abnormal muscle tone.      Coordination: Coordination normal.   Psychiatric:         Behavior: Behavior normal.         Thought Content: Thought content normal.         Judgment: Judgment normal.         Assessment:       1. Coronary artery disease involving autologous vein coronary bypass graft without angina pectoris    2. Hyperlipidemia, unspecified hyperlipidemia type    3. Hypertension, essential    4. Internal carotid artery occlusion, right    5. Left carotid artery stenosis    6. PVD (peripheral vascular disease)    7. Type 2 diabetes mellitus with diabetic polyneuropathy, without long-term current use of insulin    8. NAFLD (nonalcoholic fatty liver disease)    9. NIDIA (obstructive sleep apnea)    10. Diabetic polyneuropathy associated with type 2 diabetes mellitus         Plan:       Esteban was seen today for hypertension.    Diagnoses and all orders for this visit:    Coronary artery disease involving autologous vein coronary bypass graft without angina pectoris    Hyperlipidemia, unspecified hyperlipidemia type    Hypertension, essential  -     carvediloL (COREG) 25 MG tablet; Take 1 tablet (25 mg total) by mouth 2 (two) times daily with meals.    Internal carotid artery  occlusion, right    Left carotid artery stenosis    PVD (peripheral vascular disease)    Type 2 diabetes mellitus with diabetic polyneuropathy, without long-term current use of insulin    NAFLD (nonalcoholic fatty liver disease)    NIDIA (obstructive sleep apnea)    Diabetic polyneuropathy associated with type 2 diabetes mellitus    Other orders  -     dulaglutide (TRULICITY SUBQ); Inject into the skin.  -     dulaglutide (TRULICITY) 4.5 mg/0.5 mL pen injector; Inject 4.5 mg into the skin every 7 days.

## 2022-09-09 ENCOUNTER — TELEPHONE (OUTPATIENT)
Dept: INTERNAL MEDICINE | Facility: CLINIC | Age: 72
End: 2022-09-09
Payer: MEDICARE

## 2022-09-09 VITALS — SYSTOLIC BLOOD PRESSURE: 139 MMHG | DIASTOLIC BLOOD PRESSURE: 74 MMHG

## 2022-10-31 ENCOUNTER — PATIENT MESSAGE (OUTPATIENT)
Dept: INTERNAL MEDICINE | Facility: CLINIC | Age: 72
End: 2022-10-31
Payer: MEDICARE

## 2022-12-13 ENCOUNTER — PATIENT MESSAGE (OUTPATIENT)
Dept: OTHER | Facility: OTHER | Age: 72
End: 2022-12-13
Payer: MEDICARE

## 2022-12-14 ENCOUNTER — OFFICE VISIT (OUTPATIENT)
Dept: ENDOCRINOLOGY | Facility: CLINIC | Age: 72
End: 2022-12-14
Payer: MEDICARE

## 2022-12-14 DIAGNOSIS — E11.51 TYPE 2 DIABETES, WITH PERIPHERAL CIRCULATORY DISORDER NOT AT GOAL: Primary | ICD-10-CM

## 2022-12-14 DIAGNOSIS — E11.42 DIABETIC POLYNEUROPATHY ASSOCIATED WITH TYPE 2 DIABETES MELLITUS: ICD-10-CM

## 2022-12-14 DIAGNOSIS — I10 HYPERTENSION, ESSENTIAL: ICD-10-CM

## 2022-12-14 DIAGNOSIS — E78.5 HYPERLIPIDEMIA, UNSPECIFIED HYPERLIPIDEMIA TYPE: ICD-10-CM

## 2022-12-14 DIAGNOSIS — I25.810 CORONARY ARTERY DISEASE INVOLVING AUTOLOGOUS VEIN CORONARY BYPASS GRAFT WITHOUT ANGINA PECTORIS: ICD-10-CM

## 2022-12-14 PROCEDURE — 99214 OFFICE O/P EST MOD 30 MIN: CPT | Mod: 95,,, | Performed by: NURSE PRACTITIONER

## 2022-12-14 PROCEDURE — 99214 PR OFFICE/OUTPT VISIT, EST, LEVL IV, 30-39 MIN: ICD-10-PCS | Mod: 95,,, | Performed by: NURSE PRACTITIONER

## 2022-12-14 NOTE — PROGRESS NOTES
Subjective:      Patient ID: Esteban Mike is a 72 y.o. male.    Chief Complaint:  No chief complaint on file.    History of Present Illness  Esteban Mike is here for follow up of DM.  Previously seen by STIVEN Saab NP.  Last seen 6/30/2022.  This is their first visit with me.    This is a MyChart video visit.    The patient location is: [place]  The chief complaint leading to consultation is: DM  Visit type: Virtual visit with synchronous audio and video  Total time spent with patient: see below  Each patient to whom he or she provides medical services by telemedicine is:  (1) informed of the relationship between the physician and patient and the respective role of any other health care provider with respect to management of the patient; and (2) notified that he or she may decline to receive medical services by telemedicine and may withdraw from such care at any time.    With regards to diabetes:    Diagnosed: 2008  FH: mother, father, brother  Known complications:  DKA Denies  RN +  Eye Exam: upcoming appointment  PN +  Podiatry: 8/2022  Nephropathy Denies  CAD +  Denies history of pancreatitis & personal/family history of medullary thyroid cancer.     Current regimen:  Metformin 1000mg twice daily  Jardiance 25mg daily   Trulicity 4.5mg weekly  Glipizide 2.5mg with breakfast and 5mg with dinner    Reports compliance.     Other medications tried:  Tradjenta    Glucose Monitor: [facility]  0-1 times a day testing  Log reviewed:   Date Blood Sugar   11/24/2022 109   11/23/2022 171   11/17/2022 99   11/13/2022 107   11/10/2022 180   11/7/2022 127   11/6/2022 117   11/5/2022 121   11/4/2022 112   10/27/2022 91   10/23/2022 110   10/20/2022 107   10/17/2022 156       Hypoglycemia:  Denies  Knows how to correct with 15 grams of carbs- juice, coke, or a peppermint.     Diet/Exercise:  Eats 3 meals a day.   Snacks : during the day and before bed  Drinks : coffee, tea, juice  Exercise - tries to stay  active.      Diabetes Management Status    Hemoglobin A1C   Date Value Ref Range Status   07/06/2022 6.2 (H) <5.7 % of total Hgb Final     Comment:     For someone without known diabetes, a hemoglobin   A1c value between 5.7% and 6.4% is consistent with  prediabetes and should be confirmed with a   follow-up test.     For someone with known diabetes, a value <7%  indicates that their diabetes is well controlled. A1c  targets should be individualized based on duration of  diabetes, age, comorbid conditions, and other  considerations.     This assay result is consistent with an increased risk  of diabetes.     Currently, no consensus exists regarding use of  hemoglobin A1c for diagnosis of diabetes for children.        03/07/2022 6.4 (H) <5.7 % of total Hgb Final     Comment:     For someone without known diabetes, a hemoglobin   A1c value between 5.7% and 6.4% is consistent with  prediabetes and should be confirmed with a   follow-up test.     For someone with known diabetes, a value <7%  indicates that their diabetes is well controlled. A1c  targets should be individualized based on duration of  diabetes, age, comorbid conditions, and other  considerations.     This assay result is consistent with an increased risk  of diabetes.     Currently, no consensus exists regarding use of  hemoglobin A1c for diagnosis of diabetes for children.        08/03/2021 7.6 (H) <5.7 % of total Hgb Final     Comment:     For someone without known diabetes, a hemoglobin A1c  value of 6.5% or greater indicates that they may have   diabetes and this should be confirmed with a follow-up   test.     For someone with known diabetes, a value <7% indicates   that their diabetes is well controlled and a value   greater than or equal to 7% indicates suboptimal   control. A1c targets should be individualized based on   duration of diabetes, age, comorbid conditions, and   other considerations.     Currently, no consensus exists regarding use  of  hemoglobin A1c for diagnosis of diabetes for children.         07/08/2019 9.7 % Final       Statin: Taking  ACE/ARB: Taking  Screening or Prevention Patient's value Goal Complete/Controlled?   HgA1C Testing and Control   Lab Results   Component Value Date    HGBA1C 6.2 (H) 07/06/2022      Annually/Less than 8% Yes     Lipid profile : 02/28/2022 Annually Yes     LDL control Lab Results   Component Value Date    LDLCALC 65 02/28/2022    Annually/Less than 100 mg/dl  Yes     Nephropathy screening Lab Results   Component Value Date    LABMICR 5.0 02/17/2022     Lab Results   Component Value Date    PROTEINUA Negative 02/17/2022    Annually Yes     Blood pressure BP Readings from Last 1 Encounters:   09/09/22 139/74    Less than 140/90 Yes     Dilated retinal exam : 01/03/2022 Annually Yes     Foot exam   : 08/24/2022 Annually Yes         Review of Systems  As above    There were no vitals taken for this visit.    There is no height or weight on file to calculate BMI.    Lab Review:   Lab Results   Component Value Date    HGBA1C 6.2 (H) 07/06/2022    HGBA1C 6.4 (H) 03/07/2022    HGBA1C 7.6 (H) 08/03/2021       Lab Results   Component Value Date    CHOL 121 02/28/2022    HDL 42 02/28/2022    LDLCALC 65 02/28/2022    TRIG 63 02/28/2022    CHOLHDL 2.9 02/28/2022     Lab Results   Component Value Date     07/06/2022    K 4.8 07/06/2022     07/06/2022    CO2 29 07/06/2022     (H) 07/06/2022    BUN 20 07/06/2022    CREATININE 0.91 07/06/2022    CALCIUM 9.8 07/06/2022    PROT 6.6 02/28/2022    ALBUMIN 4.3 07/06/2022    BILITOT 0.4 02/28/2022    ALKPHOS 83 02/03/2020    AST 22 02/28/2022    ALT 24 02/28/2022    ANIONGAP 7 (L) 09/03/2016    ESTGFRAFRICA 99 07/06/2022    EGFRNONAA 86 07/06/2022    TSH 0.69 04/11/2018     Vit D, 25-Hydroxy   Date Value Ref Range Status   09/03/2016 30 30 - 96 ng/mL Final     Comment:     Vitamin D deficiency.........<10 ng/mL                              Vitamin D  insufficiency......10-29 ng/mL       Vitamin D sufficiency........> or equal to 30 ng/mL  Vitamin D toxicity............>100 ng/mL       Assessment and Plan     1. Type 2 diabetes, with peripheral circulatory disorder not at goal        2. Coronary artery disease involving autologous vein coronary bypass graft without angina pectoris        3. Hypertension, essential        4. Hyperlipidemia, unspecified hyperlipidemia type        5. Diabetic polyneuropathy associated with type 2 diabetes mellitus            Type 2 diabetes, with peripheral circulatory disorder not at goal  -- Labs in 3 months and prior to follow up = [facility].  -- A1c goal < or = 7.5%.  -- Medications discussed:  MFM   GLP1-DPP4  GARCIA   SGLT2   Reviewed potential adverse effects of SGLT-2 inhibitors, including genital mycotic infections, slightly increased risk of UTI, hypersensitivity, hypotension, and hyperkalemia. Advised to maintain water intake of 8-10 cups per day. Advised we need to check chemistry panel at baseline and 2 weeks after starting. Discussed FDA warning reports of ketoacidosis associated with SGLT-2 inhibitors. Advised to seek immediate medical attention and stop the medication if symptoms such as difficulty breathing, nausea, vomiting, abdominal pain, confusion, and unusual fatigue/sleepiness. Discussed possible precipitating factors including major illness/reduced food and fluid intake (advised to stop under these circumstances), and reduced insulin dose. Discussed possible effects of increased fracture risk/decreased bone density. Discussed reports of increased risk of leg and foot amputations with canagliflozin and need to seek urgent care if developed new pain or tenderness, sores or ulcers, or infections in legs/feet.     Insulin   -- Reviewed logs/CGM:  Rarely checking glucose.  Instructed to check daily - rotate timing of check.  Instructed to send glucose logs in 14 days.  Reach out to me sooner for any glucose <70 or  consistently >180.  -- Medication Changes:   Metformin 1000mg twice daily  Jardiance 25mg daily   Trulicity 4.5mg weekly  Glipizide 2.5mg with breakfast and 5mg with dinner  -- Reviewed goals of therapy are to get the best control we can without hypoglycemia.  -- Reviewed patient's current insulin regimen. Clarified proper insulin dose and timing in relation to meals, etc. Insulin injection sites and proper rotation instructed.    -- Advised frequent self blood glucose monitoring.  Patient encouraged to document glucose results and bring them to every clinic visit.  -- Hypoglycemia precautions discussed. Instructed on precautions before driving.    -- Call for Bg repeatedly < 90 or > 180.   -- Close adherence to lifestyle changes recommended.   -- Periodic follow ups for eye evaluations, foot care and dental care suggested.    Coronary artery disease involving autologous vein coronary bypass graft without angina pectoris  -- Optimize glucose control.     Diabetic neuropathy associated with type 2 diabetes mellitus  -- Optimize glucose control.       Follow up in about 6 months (around 6/14/2023).          I spent 30 minutes face-to-face with the patient, over half of the visit was spent on counseling and/or coordinating the care of the patient.    Counseling includes:  Diagnostic results, impressions, recommendations   Prognosis   Risk and benefits of management/treatment options   Instructions for management treatment and or follow-up   Importance of compliance with management   Risk factor reduction   Patient education

## 2022-12-14 NOTE — ASSESSMENT & PLAN NOTE
-- Labs in 3 months and prior to follow up = [facility].  -- A1c goal < or = 7.5%.  -- Medications discussed:  MFM   GLP1-DPP4  GARCIA   SGLT2   Reviewed potential adverse effects of SGLT-2 inhibitors, including genital mycotic infections, slightly increased risk of UTI, hypersensitivity, hypotension, and hyperkalemia. Advised to maintain water intake of 8-10 cups per day. Advised we need to check chemistry panel at baseline and 2 weeks after starting. Discussed FDA warning reports of ketoacidosis associated with SGLT-2 inhibitors. Advised to seek immediate medical attention and stop the medication if symptoms such as difficulty breathing, nausea, vomiting, abdominal pain, confusion, and unusual fatigue/sleepiness. Discussed possible precipitating factors including major illness/reduced food and fluid intake (advised to stop under these circumstances), and reduced insulin dose. Discussed possible effects of increased fracture risk/decreased bone density. Discussed reports of increased risk of leg and foot amputations with canagliflozin and need to seek urgent care if developed new pain or tenderness, sores or ulcers, or infections in legs/feet.     Insulin   -- Reviewed logs/CGM:  Rarely checking glucose.  Instructed to check daily - rotate timing of check.  Instructed to send glucose logs in 14 days.  Reach out to me sooner for any glucose <70 or consistently >180.  -- Medication Changes:   Metformin 1000mg twice daily  Jardiance 25mg daily   Trulicity 4.5mg weekly  Glipizide 2.5mg with breakfast and 5mg with dinner  -- Reviewed goals of therapy are to get the best control we can without hypoglycemia.  -- Reviewed patient's current insulin regimen. Clarified proper insulin dose and timing in relation to meals, etc. Insulin injection sites and proper rotation instructed.    -- Advised frequent self blood glucose monitoring.  Patient encouraged to document glucose results and bring them to every clinic visit.  -- Hypoglycemia  precautions discussed. Instructed on precautions before driving.    -- Call for Bg repeatedly < 90 or > 180.   -- Close adherence to lifestyle changes recommended.   -- Periodic follow ups for eye evaluations, foot care and dental care suggested.

## 2023-01-17 ENCOUNTER — PATIENT MESSAGE (OUTPATIENT)
Dept: ADMINISTRATIVE | Facility: OTHER | Age: 73
End: 2023-01-17
Payer: MEDICARE

## 2023-01-25 ENCOUNTER — OFFICE VISIT (OUTPATIENT)
Dept: OPTOMETRY | Facility: CLINIC | Age: 73
End: 2023-01-25
Payer: MEDICARE

## 2023-01-25 DIAGNOSIS — Z98.890 HISTORY OF REPAIR OF RETINAL DEFECT BY LASER PHOTOCOAGULATION: ICD-10-CM

## 2023-01-25 DIAGNOSIS — E11.9 TYPE 2 DIABETES MELLITUS WITHOUT RETINOPATHY: Primary | ICD-10-CM

## 2023-01-25 DIAGNOSIS — H52.4 PRESBYOPIA: ICD-10-CM

## 2023-01-25 DIAGNOSIS — H25.813 COMBINED FORM OF AGE-RELATED CATARACT, BOTH EYES: ICD-10-CM

## 2023-01-25 PROCEDURE — 92014 COMPRE OPH EXAM EST PT 1/>: CPT | Mod: S$PBB,,, | Performed by: OPTOMETRIST

## 2023-01-25 PROCEDURE — 99999 PR PBB SHADOW E&M-EST. PATIENT-LVL III: CPT | Mod: PBBFAC,,, | Performed by: OPTOMETRIST

## 2023-01-25 PROCEDURE — 99213 OFFICE O/P EST LOW 20 MIN: CPT | Mod: PBBFAC,PN | Performed by: OPTOMETRIST

## 2023-01-25 PROCEDURE — 99999 PR PBB SHADOW E&M-EST. PATIENT-LVL III: ICD-10-PCS | Mod: PBBFAC,,, | Performed by: OPTOMETRIST

## 2023-01-25 PROCEDURE — 92014 PR EYE EXAM, EST PATIENT,COMPREHESV: ICD-10-PCS | Mod: S$PBB,,, | Performed by: OPTOMETRIST

## 2023-01-25 NOTE — PROGRESS NOTES
HPI    CC: Pt is here today for a Diabetic eye exam. He states his vision is   doing well far away. He is just using over the counter glasses to read.    CINDI: 2022    (-) Changes in vision   (-) Pain  (-) Irritation   (-) Itching   (-) Flashes  (-) Floaters  (+) Glasses wearer, reading  (-) CL wearer  (+) Uses eye gtts, [place] prn OU    Does patient want a refraction today? no    (-) Eye injury  (+) Eye surgery , Retinopexy OD  (+)POHx, Cataract OU  (-)FOHx    (+)DM  Hemoglobin A1C       Date                     Value               Ref Range             Status                07/06/2022               6.2 (H)             <5.7 % of tota*       Final                      03/07/2022               6.4 (H)             <5.7 % of tota*       Final                        08/03/2021               7.6 (H)             <5.7 % of tota*       Final                     07/08/2019               9.7                 %                     Final                   Last edited by Stephani Braun, OD on 1/25/2023 10:07 AM.            Assessment /Plan     For exam results, see Encounter Report.    Type 2 diabetes mellitus without retinopathy    History of repair of retinal defect by laser photocoagulation    Combined form of age-related cataract, both eyes    Presbyopia      No retinopathy noted, OU. Continue proper BS control and annual diabetic eye exams. Monitor yearly.      2. 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 Performed by [facility]. Stable since CINDI (01/2022). No holes, tears, detachments noted 360 OU. Discussed s/s of RD and to RTC ASAP if occur. Monitor yearly.     3. Educated pt on findings. Mildly visually significant, however, no need for removal at this time. Monitor yearly.      4. Continue use of OTC reading glasses prn. Monitor yearly.        RTC in 1 year for annual DM eye exam or sooner if needed.

## 2023-01-30 ENCOUNTER — TELEPHONE (OUTPATIENT)
Dept: INTERNAL MEDICINE | Facility: CLINIC | Age: 73
End: 2023-01-30
Payer: MEDICARE

## 2023-01-30 NOTE — TELEPHONE ENCOUNTER
----- Message from Ava Capps sent at 1/30/2023 12:53 PM CST -----  Consult    The patient wants to know if you can she lab orders to [facility] in [place] and can you make them standing orders. Please call him once it's done.    Thank you

## 2023-03-07 ENCOUNTER — OFFICE VISIT (OUTPATIENT)
Dept: INTERNAL MEDICINE | Facility: CLINIC | Age: 73
End: 2023-03-07
Attending: FAMILY MEDICINE
Payer: MEDICARE

## 2023-03-07 ENCOUNTER — LAB VISIT (OUTPATIENT)
Dept: LAB | Facility: HOSPITAL | Age: 73
End: 2023-03-07
Attending: FAMILY MEDICINE
Payer: MEDICARE

## 2023-03-07 VITALS
BODY MASS INDEX: 27.62 KG/M2 | HEART RATE: 83 BPM | HEIGHT: 67 IN | TEMPERATURE: 98 F | WEIGHT: 176 LBS | OXYGEN SATURATION: 96 % | DIASTOLIC BLOOD PRESSURE: 64 MMHG | SYSTOLIC BLOOD PRESSURE: 130 MMHG

## 2023-03-07 DIAGNOSIS — E11.51 TYPE 2 DIABETES, WITH PERIPHERAL CIRCULATORY DISORDER NOT AT GOAL: ICD-10-CM

## 2023-03-07 DIAGNOSIS — G47.00 INSOMNIA, UNSPECIFIED TYPE: ICD-10-CM

## 2023-03-07 DIAGNOSIS — Z00.00 ANNUAL PHYSICAL EXAM: ICD-10-CM

## 2023-03-07 DIAGNOSIS — F41.9 ANXIETY: ICD-10-CM

## 2023-03-07 DIAGNOSIS — I65.21 INTERNAL CAROTID ARTERY OCCLUSION, RIGHT: ICD-10-CM

## 2023-03-07 DIAGNOSIS — Z12.5 PROSTATE CANCER SCREENING: ICD-10-CM

## 2023-03-07 DIAGNOSIS — Z00.00 ANNUAL PHYSICAL EXAM: Primary | ICD-10-CM

## 2023-03-07 DIAGNOSIS — E78.5 HYPERLIPIDEMIA, UNSPECIFIED HYPERLIPIDEMIA TYPE: ICD-10-CM

## 2023-03-07 DIAGNOSIS — K76.0 NAFLD (NONALCOHOLIC FATTY LIVER DISEASE): ICD-10-CM

## 2023-03-07 DIAGNOSIS — E11.42 TYPE 2 DIABETES MELLITUS WITH DIABETIC POLYNEUROPATHY, WITHOUT LONG-TERM CURRENT USE OF INSULIN: ICD-10-CM

## 2023-03-07 DIAGNOSIS — I25.810 CORONARY ARTERY DISEASE INVOLVING AUTOLOGOUS VEIN CORONARY BYPASS GRAFT WITHOUT ANGINA PECTORIS: ICD-10-CM

## 2023-03-07 DIAGNOSIS — I10 HYPERTENSION, ESSENTIAL: ICD-10-CM

## 2023-03-07 LAB
ALBUMIN SERPL BCP-MCNC: 4.2 G/DL (ref 3.5–5.2)
ALP SERPL-CCNC: 67 U/L (ref 55–135)
ALT SERPL W/O P-5'-P-CCNC: 34 U/L (ref 10–44)
ANION GAP SERPL CALC-SCNC: 6 MMOL/L (ref 8–16)
AST SERPL-CCNC: 20 U/L (ref 10–40)
BILIRUB SERPL-MCNC: 0.8 MG/DL (ref 0.1–1)
BUN SERPL-MCNC: 15 MG/DL (ref 8–23)
CALCIUM SERPL-MCNC: 10 MG/DL (ref 8.7–10.5)
CHLORIDE SERPL-SCNC: 106 MMOL/L (ref 95–110)
CHOLEST SERPL-MCNC: 120 MG/DL (ref 120–199)
CHOLEST/HDLC SERPL: 3.3 {RATIO} (ref 2–5)
CO2 SERPL-SCNC: 29 MMOL/L (ref 23–29)
CREAT SERPL-MCNC: 1.1 MG/DL (ref 0.5–1.4)
EST. GFR  (NO RACE VARIABLE): >60 ML/MIN/1.73 M^2
ESTIMATED AVG GLUCOSE: 140 MG/DL (ref 68–131)
GLUCOSE SERPL-MCNC: 123 MG/DL (ref 70–110)
HBA1C MFR BLD: 6.5 % (ref 4–5.6)
HDLC SERPL-MCNC: 36 MG/DL (ref 40–75)
HDLC SERPL: 30 % (ref 20–50)
LDLC SERPL CALC-MCNC: 51.8 MG/DL (ref 63–159)
NONHDLC SERPL-MCNC: 84 MG/DL
POTASSIUM SERPL-SCNC: 4.5 MMOL/L (ref 3.5–5.1)
PROT SERPL-MCNC: 6.7 G/DL (ref 6–8.4)
SODIUM SERPL-SCNC: 141 MMOL/L (ref 136–145)
TRIGL SERPL-MCNC: 161 MG/DL (ref 30–150)

## 2023-03-07 PROCEDURE — 99397 PER PM REEVAL EST PAT 65+ YR: CPT | Mod: S$PBB,,, | Performed by: FAMILY MEDICINE

## 2023-03-07 PROCEDURE — 83036 HEMOGLOBIN GLYCOSYLATED A1C: CPT | Performed by: FAMILY MEDICINE

## 2023-03-07 PROCEDURE — 99215 OFFICE O/P EST HI 40 MIN: CPT | Mod: PBBFAC | Performed by: FAMILY MEDICINE

## 2023-03-07 PROCEDURE — 99397 PR PREVENTIVE VISIT,EST,65 & OVER: ICD-10-PCS | Mod: S$PBB,,, | Performed by: FAMILY MEDICINE

## 2023-03-07 PROCEDURE — 99999 PR PBB SHADOW E&M-EST. PATIENT-LVL V: ICD-10-PCS | Mod: PBBFAC,,, | Performed by: FAMILY MEDICINE

## 2023-03-07 PROCEDURE — 80053 COMPREHEN METABOLIC PANEL: CPT | Performed by: FAMILY MEDICINE

## 2023-03-07 PROCEDURE — 99999 PR PBB SHADOW E&M-EST. PATIENT-LVL V: CPT | Mod: PBBFAC,,, | Performed by: FAMILY MEDICINE

## 2023-03-07 PROCEDURE — 36415 COLL VENOUS BLD VENIPUNCTURE: CPT | Performed by: FAMILY MEDICINE

## 2023-03-07 PROCEDURE — 80061 LIPID PANEL: CPT | Performed by: FAMILY MEDICINE

## 2023-03-07 PROCEDURE — 84153 ASSAY OF PSA TOTAL: CPT | Performed by: FAMILY MEDICINE

## 2023-03-07 NOTE — PATIENT INSTRUCTIONS
Schedule lab orders for today.     [phone] - [facility].  Clinical  or Psychologist     May try melatonin OTC for sleep.       Information about cholesterol, high blood pressure and healthy diet and activity recommendations can be found at the following links on the Internet:    http://www.nhlbi.nih.gov/health/health-topics/topics/hbc  http://www.nhlbi.nih.gov/health/educational/lose_wt/index.htm  Http://www.nhlbi.nih.gov/files/docs/public/heart/hbp_low.pdf  http://www.heart.org/HEARTORG/  http://diabetes.org/  https://www.cdc.gov/  Https://healthfinder.gov/  https://health.gov/dietaryguidelines/2015/guidelines/  https://health.gov/paguidelines/second-edition/pdf/Physical_Activity_Guidelines_2nd_edition.pdf

## 2023-03-07 NOTE — PROGRESS NOTES
Subjective:       Patient ID: Esteban Mike is a 72 y.o. male.    Chief Complaint: Annual Exam, Hypertension, and Insomnia    Established patient for an annual wellness check/physical exam and also chronic disease management. Specific complaints - see dictation, M*model entries and please see ROS.  Past, Surgical, Family, Social Histories; Medications, Allergies reviewed and reconciled.  Health maintenance file reviewed and addressed items due. Recent applicable lab, imaging and cardiovascular results reviewed.  Problem list items reviewed and modified or added entries (in the overview section) may not be transcribed into this encounter note due to note writer format.      Last A1C 6.4. Insomnia and stress. Hi BP readings at home. Digital readings elevated. Brother takes sonata.      Review of Systems   Constitutional:  Positive for fatigue. Negative for appetite change, chills, diaphoresis and fever.   HENT:  Negative for congestion, postnasal drip, rhinorrhea, sore throat and trouble swallowing.    Eyes:  Negative for visual disturbance.   Respiratory:  Negative for cough, choking, chest tightness, shortness of breath and wheezing.    Cardiovascular:  Negative for chest pain and leg swelling.   Gastrointestinal:  Negative for abdominal distention, abdominal pain, diarrhea, nausea and vomiting.   Genitourinary:  Negative for difficulty urinating and hematuria.   Musculoskeletal:  Negative for arthralgias and myalgias.   Skin:  Negative for rash.   Neurological:  Negative for weakness, light-headedness and headaches.   Psychiatric/Behavioral:  Positive for dysphoric mood and sleep disturbance. Negative for confusion. The patient is nervous/anxious.      Objective:      Physical Exam  Vitals and nursing note reviewed.   Constitutional:       Appearance: He is well-developed. He is not diaphoretic.   HENT:      Head: Normocephalic and atraumatic.   Eyes:      General: No scleral icterus.      Conjunctiva/sclera: Conjunctivae normal.   Neck:      Vascular: No carotid bruit.   Cardiovascular:      Rate and Rhythm: Normal rate and regular rhythm.      Heart sounds: Normal heart sounds. No murmur heard.    No friction rub. No gallop.   Pulmonary:      Effort: Pulmonary effort is normal. No respiratory distress.      Breath sounds: Normal breath sounds. No wheezing or rales.   Abdominal:      General: There is no distension.      Palpations: There is no mass.      Tenderness: There is no abdominal tenderness. There is no guarding or rebound.   Musculoskeletal:         General: No deformity.      Cervical back: Normal range of motion and neck supple.   Skin:     General: Skin is warm and dry.      Findings: No erythema or rash.   Neurological:      Mental Status: He is alert and oriented to person, place, and time.      Cranial Nerves: No cranial nerve deficit.      Motor: No tremor.      Coordination: Coordination normal.      Gait: Gait normal.   Psychiatric:         Behavior: Behavior normal.         Thought Content: Thought content normal.         Judgment: Judgment normal.       Assessment:       1. Annual physical exam    2. Hypertension, essential    3. Hyperlipidemia, unspecified hyperlipidemia type    4. Type 2 diabetes, with peripheral circulatory disorder not at goal    5. Type 2 diabetes mellitus with diabetic polyneuropathy, without long-term current use of insulin    6. Coronary artery disease involving autologous vein coronary bypass graft without angina pectoris    7. Prostate cancer screening    8. Internal carotid artery occlusion, right    9. NAFLD (nonalcoholic fatty liver disease)    10. Insomnia, unspecified type    11. Anxiety          Plan:     Medication List with Changes/Refills   Current Medications    ASPIRIN 81 MG CHEWABLE TABLET    Take 81 mg by mouth once daily.    ATORVASTATIN (LIPITOR) 80 MG TABLET    Take 1 tablet (80 mg total) by mouth once daily.    BLOOD SUGAR DIAGNOSTIC  STRP    To check BG 3 times daily, to use with insurance preferred meter    BLOOD-GLUCOSE METER KIT    To check BG 3 times daily, to use with insurance preferred meter    CARVEDILOL (COREG) 25 MG TABLET    Take 1 tablet (25 mg total) by mouth 2 (two) times daily with meals.    DULAGLUTIDE (TRULICITY) 4.5 MG/0.5 ML PEN INJECTOR    Inject 4.5 mg into the skin every 7 days.    FISH OIL-OMEGA-3 FATTY ACIDS 300-1,000 MG CAPSULE    Take 2 g by mouth once daily. 3 tablets    GLIPIZIDE (GLUCOTROL) 5 MG TABLET    2.5 mg with breakfast (1/2 tab) and 5 mg with dinner (1 tab)    HYDRALAZINE (APRESOLINE) 10 MG TABLET    TAKE ONE TABLET BY MOUTH EVERY TWELVE HOURS    IRBESARTAN (AVAPRO) 300 MG TABLET    TAKE ONE TABLET BY MOUTH EVERY EVENING    JARDIANCE 25 MG TABLET    Take 1 tablet (25 mg total) by mouth once daily.    LANCETS MISC    To check BG 3 times daily, to use with insurance preferred meter    METFORMIN (GLUCOPHAGE-XR) 500 MG ER 24HR TABLET    TAKE TWO TABLETS BY MOUTH TWICE DAILY WITH MEALS    MULTIVITAMIN CAPSULE    Take 1 capsule by mouth once daily.    PANTOPRAZOLE (PROTONIX) 40 MG TABLET    TAKE ONE TABLET BY MOUTH ONCE DAILY BEFORE BREAKFAST    TAMSULOSIN (FLOMAX) 0.4 MG CAP    Take 2 capsules (0.8 mg total) by mouth once daily.    VIT C-VIT E-LUTEIN-MIN-OM-3 [id number] MG-UNIT-MG-MG CAP    Take 1 tablet by mouth once daily.     1. Annual physical exam  -     Hemoglobin A1C; Future; Expected date: 03/07/2023  -     Comprehensive Metabolic Panel; Future; Expected date: 03/07/2023  -     Lipid Panel; Future; Expected date: 03/07/2023  -     PSA, Screening; Future; Expected date: 03/07/2023  -     Microalbumin/Creatinine Ratio, Urine; Future; Expected date: 03/07/2023    2. Hypertension, essential  -     Ambulatory referral/consult to Sleep Disorders; Future; Expected date: 03/14/2023    3. Hyperlipidemia, unspecified hyperlipidemia type  -     Lipid Panel; Future; Expected date: 03/07/2023    4. Type 2 diabetes,  with peripheral circulatory disorder not at goal  -     Hemoglobin A1C; Future; Expected date: 03/07/2023  -     Comprehensive Metabolic Panel; Future; Expected date: 03/07/2023  -     Microalbumin/Creatinine Ratio, Urine; Future; Expected date: 03/07/2023    5. Type 2 diabetes mellitus with diabetic polyneuropathy, without long-term current use of insulin    6. Coronary artery disease involving autologous vein coronary bypass graft without angina pectoris  Overview:  -Followed in cardiology - CAD post CABG 2008, post right CEA 2006 and left ICA stent 2007.  -on statin      7. Prostate cancer screening  -     PSA, Screening; Future; Expected date: 03/07/2023    8. Internal carotid artery occlusion, right  Overview:  CEA 2006 - occluded subsequently.    -US 5/11/2021 - There is right Internal Carotid Artery occlusion. Patent left ICA stent without hemodynamically significant restenosis.          9. NAFLD (nonalcoholic fatty liver disease)    10. Insomnia, unspecified type  -     Ambulatory referral/consult to Sleep Disorders; Future; Expected date: 03/14/2023    11. Anxiety      See meds, orders, follow up, routing and instructions sections of encounter and AVS. Discussed with patient and provided on AVS.    Lab Results   Component Value Date     07/06/2022    K 4.8 07/06/2022     07/06/2022    BUN 20 07/06/2022    CREATININE 0.91 07/06/2022     (H) 07/06/2022    HGBA1C 6.4 (H) 01/31/2023    HGBA1C 9.7 07/08/2019    MG 2.6 05/06/2008    AST 22 02/28/2022    ALT 24 02/28/2022    ALBUMIN 4.3 07/06/2022    PROT 6.6 02/28/2022    BILITOT 0.4 02/28/2022    CHOL 121 02/28/2022    HDL 42 02/28/2022    HDL 46 07/08/2019    LDLCALC 65 02/28/2022    LDLCALC 85 07/20/2017    TRIG 63 02/28/2022    WBC 10.3 04/11/2018    HGB 14.2 04/11/2018    HCT 42.3 04/11/2018     04/11/2018    PSA 0.28 05/20/2015    PSADIAG 0.29 02/28/2022    TSH 0.69 04/11/2018         Diabetes Management Status    Statin:  Taking  ACE/ARB: Taking    Screening or Prevention Patient's value Goal Complete/Controlled?   HgA1C Testing and Control   Lab Results   Component Value Date    HGBA1C 6.4 (H) 01/31/2023      Annually/Less than 8% Yes     Lipid profile : 02/28/2022 Annually No     LDL control Lab Results   Component Value Date    LDLCALC 65 02/28/2022    Annually/Less than 100 mg/dl  No     Nephropathy screening Lab Results   Component Value Date    LABMICR 5.0 02/17/2022     Lab Results   Component Value Date    PROTEINUA Negative 02/17/2022     No results found for: UTPCR   Annually No     Blood pressure BP Readings from Last 1 Encounters:   03/07/23 130/64    Less than 140/90 Yes     Dilated retinal exam : 01/25/2023 Annually Yes     Foot exam   : 08/24/2022 Annually Yes       Discussed diet and exercise and links provided on AVS for detailed information.

## 2023-03-08 LAB — COMPLEXED PSA SERPL-MCNC: 0.28 NG/ML (ref 0–4)

## 2023-03-13 ENCOUNTER — TELEPHONE (OUTPATIENT)
Dept: ENDOCRINOLOGY | Facility: CLINIC | Age: 73
End: 2023-03-13
Payer: MEDICARE

## 2023-03-13 NOTE — TELEPHONE ENCOUNTER
----- Message from Pam Delgado NP sent at 12/14/2022  2:11 PM CST -----  [facility] and let him know

## 2023-03-20 NOTE — TELEPHONE ENCOUNTER
----- Message from Marla Noonan sent at 3/20/2023  4:23 PM CDT -----  Esteban Mike calling regarding Refills  for tamsulosin (FLOMAX) 0.4 mg Cap      [facility] - [place], [place] - [address] [place] [address]  Phone: [phone] Fax: [phone]

## 2023-03-21 DIAGNOSIS — E11.65 TYPE 2 DIABETES MELLITUS WITH HYPERGLYCEMIA, WITHOUT LONG-TERM CURRENT USE OF INSULIN: ICD-10-CM

## 2023-03-21 RX ORDER — GLIPIZIDE 5 MG/1
TABLET ORAL
Qty: 180 TABLET | Refills: 0 | Status: SHIPPED | OUTPATIENT
Start: 2023-03-21 | End: 2023-05-17 | Stop reason: SDUPTHER

## 2023-03-21 RX ORDER — TAMSULOSIN HYDROCHLORIDE 0.4 MG/1
0.8 CAPSULE ORAL DAILY
Qty: 60 CAPSULE | Refills: 0 | Status: SHIPPED | OUTPATIENT
Start: 2023-03-21

## 2023-04-13 ENCOUNTER — TELEPHONE (OUTPATIENT)
Dept: CARDIOLOGY | Facility: CLINIC | Age: 73
End: 2023-04-13
Payer: MEDICARE

## 2023-04-13 ENCOUNTER — PATIENT OUTREACH (OUTPATIENT)
Dept: ADMINISTRATIVE | Facility: HOSPITAL | Age: 73
End: 2023-04-13
Payer: MEDICARE

## 2023-04-13 ENCOUNTER — OFFICE VISIT (OUTPATIENT)
Dept: CARDIOLOGY | Facility: CLINIC | Age: 73
End: 2023-04-13
Payer: MEDICARE

## 2023-04-13 VITALS
BODY MASS INDEX: 27.68 KG/M2 | WEIGHT: 176.38 LBS | SYSTOLIC BLOOD PRESSURE: 158 MMHG | OXYGEN SATURATION: 98 % | HEART RATE: 60 BPM | HEIGHT: 67 IN | DIASTOLIC BLOOD PRESSURE: 70 MMHG

## 2023-04-13 DIAGNOSIS — N18.1 TYPE 2 DIABETES MELLITUS WITH STAGE 1 CHRONIC KIDNEY DISEASE, WITHOUT LONG-TERM CURRENT USE OF INSULIN: ICD-10-CM

## 2023-04-13 DIAGNOSIS — I10 HYPERTENSION, ESSENTIAL: ICD-10-CM

## 2023-04-13 DIAGNOSIS — I25.810 CORONARY ARTERY DISEASE INVOLVING AUTOLOGOUS VEIN CORONARY BYPASS GRAFT WITHOUT ANGINA PECTORIS: Primary | ICD-10-CM

## 2023-04-13 DIAGNOSIS — I73.9 PVD (PERIPHERAL VASCULAR DISEASE): ICD-10-CM

## 2023-04-13 DIAGNOSIS — G47.33 OSA (OBSTRUCTIVE SLEEP APNEA): ICD-10-CM

## 2023-04-13 DIAGNOSIS — I65.22 LEFT CAROTID ARTERY STENOSIS: ICD-10-CM

## 2023-04-13 DIAGNOSIS — E11.41 DIABETIC MONONEUROPATHY ASSOCIATED WITH TYPE 2 DIABETES MELLITUS: ICD-10-CM

## 2023-04-13 DIAGNOSIS — I65.21 INTERNAL CAROTID ARTERY OCCLUSION, RIGHT: ICD-10-CM

## 2023-04-13 DIAGNOSIS — R00.2 PALPITATIONS: Primary | ICD-10-CM

## 2023-04-13 DIAGNOSIS — E11.22 TYPE 2 DIABETES MELLITUS WITH STAGE 1 CHRONIC KIDNEY DISEASE, WITHOUT LONG-TERM CURRENT USE OF INSULIN: ICD-10-CM

## 2023-04-13 DIAGNOSIS — R94.39 POSITIVE CARDIAC STRESS TEST: ICD-10-CM

## 2023-04-13 DIAGNOSIS — K76.0 NAFLD (NONALCOHOLIC FATTY LIVER DISEASE): ICD-10-CM

## 2023-04-13 DIAGNOSIS — E78.2 MIXED HYPERLIPIDEMIA: ICD-10-CM

## 2023-04-13 PROCEDURE — 99213 OFFICE O/P EST LOW 20 MIN: CPT | Mod: S$PBB,,, | Performed by: INTERNAL MEDICINE

## 2023-04-13 PROCEDURE — 99999 PR PBB SHADOW E&M-EST. PATIENT-LVL IV: ICD-10-PCS | Mod: PBBFAC,,, | Performed by: INTERNAL MEDICINE

## 2023-04-13 PROCEDURE — 99214 OFFICE O/P EST MOD 30 MIN: CPT | Mod: PBBFAC,PN | Performed by: INTERNAL MEDICINE

## 2023-04-13 PROCEDURE — 99213 PR OFFICE/OUTPT VISIT, EST, LEVL III, 20-29 MIN: ICD-10-PCS | Mod: S$PBB,,, | Performed by: INTERNAL MEDICINE

## 2023-04-13 PROCEDURE — 99999 PR PBB SHADOW E&M-EST. PATIENT-LVL IV: CPT | Mod: PBBFAC,,, | Performed by: INTERNAL MEDICINE

## 2023-04-13 RX ORDER — NIFEDIPINE 30 MG/1
30 TABLET, EXTENDED RELEASE ORAL DAILY
Qty: 30 TABLET | Refills: 11 | Status: SHIPPED | OUTPATIENT
Start: 2023-04-13 | End: 2023-05-17

## 2023-04-13 NOTE — PROGRESS NOTES
There are no preventive care reminders to display for this patient.    HM updated. Chart reviewed for blood pressure.    Jacqueline Arauz LPN   Clinical Care Coordinator  Primary Care and Wellness

## 2023-04-13 NOTE — PROGRESS NOTES
Subjective:    Patient ID:  Esteban Mike is a 72 y.o. male who presents for follow-up of Hypertension      HPI    The patient is a 72 year old male  is followed with CAD post CABG 2008, post right CEA 2006 and left ICA stent 2007. He is followed by digital medicine at goal 16% of the reports. His BPs have been poorly controlled. He was recently prescribed amlodipine by Dr Hartley [dose ?]. He denies chest pain or CHAU. He is active and mows 21 yards a week.  Lab Results   Component Value Date     03/07/2023    K 4.5 03/07/2023     03/07/2023    CO2 29 03/07/2023    BUN 15 03/07/2023    CREATININE 1.1 03/07/2023     (H) 03/07/2023    HGBA1C 6.5 (H) 03/07/2023    HGBA1C 9.7 07/08/2019    MG 2.6 05/06/2008    AST 20 03/07/2023    ALT 34 03/07/2023    ALBUMIN 4.2 03/07/2023    PROT 6.7 03/07/2023    BILITOT 0.8 03/07/2023    WBC 10.3 04/11/2018    HGB 14.2 04/11/2018    HCT 42.3 04/11/2018    MCV 90.8 04/11/2018     04/11/2018    INR 1.0 06/25/2014    PSA 0.28 03/07/2023    TSH 0.69 04/11/2018         Lab Results   Component Value Date    CHOL 120 03/07/2023    HDL 36 (L) 03/07/2023    HDL 46 07/08/2019    TRIG 161 (H) 03/07/2023       Lab Results   Component Value Date    LDLCALC 51.8 (L) 03/07/2023       Past Medical History:   Diagnosis Date    Aortic valve disease     Bilateral carotid artery stenosis 7/18/2018    CAD (coronary artery disease)     Carotid artery occlusion      LICA, 70-80% JULIUS    Diabetes mellitus     Diabetes mellitus type II     Elevated LFTs     GERD (gastroesophageal reflux disease)     Hyperlipidemia     Hypertension     PVD (peripheral vascular disease)        Current Outpatient Medications:     aspirin 81 MG chewable tablet, Take 81 mg by mouth once daily., Disp: , Rfl:     atorvastatin (LIPITOR) 80 MG tablet, Take 1 tablet (80 mg total) by mouth once daily., Disp: 90 tablet, Rfl: 3    blood sugar diagnostic Strp, To check BG 3 times daily, to use with  insurance preferred meter, Disp: 200 strip, Rfl: 11    blood-glucose meter kit, To check BG 3 times daily, to use with insurance preferred meter, Disp: 1 each, Rfl: 0    carvediloL (COREG) 25 MG tablet, Take 1 tablet (25 mg total) by mouth 2 (two) times daily with meals., Disp: 180 tablet, Rfl: 3    dulaglutide (TRULICITY) 4.5 mg/0.5 mL pen injector, Inject 4.5 mg into the skin every 7 days., Disp: , Rfl:     fish oil-omega-3 fatty acids 300-1,000 mg capsule, Take 2 g by mouth once daily. 3 tablets, Disp: , Rfl:     glipiZIDE (GLUCOTROL) 5 MG tablet, 2.5 mg with breakfast (1/2 tab) and 5 mg with dinner (1 tab), Disp: 180 tablet, Rfl: 0    irbesartan (AVAPRO) 300 MG tablet, TAKE ONE TABLET BY MOUTH EVERY EVENING, Disp: 90 tablet, Rfl: 3    JARDIANCE 25 mg tablet, Take 1 tablet (25 mg total) by mouth once daily., Disp: 30 tablet, Rfl: 3    lancets Misc, To check BG 3 times daily, to use with insurance preferred meter, Disp: 200 each, Rfl: 11    metFORMIN (GLUCOPHAGE-XR) 500 MG ER 24hr tablet, Take 2 tablets (1,000 mg total) by mouth 2 (two) times daily with meals., Disp: 360 tablet, Rfl: 1    multivitamin capsule, Take 1 capsule by mouth once daily., Disp: , Rfl:     pantoprazole (PROTONIX) 40 MG tablet, TAKE ONE TABLET BY MOUTH ONCE DAILY BEFORE BREAKFAST, Disp: 90 tablet, Rfl: 3    tamsulosin (FLOMAX) 0.4 mg Cap, Take 2 capsules (0.8 mg total) by mouth once daily., Disp: 60 capsule, Rfl: 0    vit C-vit E-lutein-min-om-3 067-53-8-150 mg-unit-mg-mg Cap, Take 1 tablet by mouth once daily., Disp: , Rfl:           Review of Systems   Constitutional: Negative for decreased appetite, diaphoresis, fever, malaise/fatigue, weight gain and weight loss.   HENT:  Negative for congestion, ear discharge, ear pain and nosebleeds.    Eyes:  Negative for blurred vision, double vision and visual disturbance.   Cardiovascular:  Negative for chest pain, claudication, cyanosis, dyspnea on exertion, irregular heartbeat, leg swelling,  "near-syncope, orthopnea, palpitations, paroxysmal nocturnal dyspnea and syncope.   Respiratory:  Negative for cough, hemoptysis, shortness of breath, sleep disturbances due to breathing, snoring, sputum production and wheezing.    Endocrine: Negative for polydipsia, polyphagia and polyuria.   Hematologic/Lymphatic: Negative for adenopathy and bleeding problem. Does not bruise/bleed easily.   Skin:  Negative for color change, nail changes, poor wound healing and rash.   Musculoskeletal:  Negative for muscle cramps and muscle weakness.   Gastrointestinal:  Negative for abdominal pain, anorexia, change in bowel habit, hematochezia, nausea and vomiting.   Genitourinary:  Negative for dysuria, frequency and hematuria.   Neurological:  Negative for brief paralysis, difficulty with concentration, excessive daytime sleepiness, dizziness, focal weakness, headaches, light-headedness, seizures, vertigo and weakness.   Psychiatric/Behavioral:  Negative for altered mental status and depression.    Allergic/Immunologic: Negative for persistent infections.      Objective:BP (!) 158/70   Pulse 60   Ht 5' 7" (1.702 m)   Wt 80 kg (176 lb 5.9 oz)   SpO2 98%   BMI 27.62 kg/m²             Physical Exam  Constitutional:       Appearance: He is well-developed. He is obese.   HENT:      Head: Normocephalic.      Right Ear: External ear normal.      Left Ear: External ear normal.      Nose: Nose normal.   Eyes:      General: No scleral icterus.     Pupils: Pupils are equal, round, and reactive to light.   Neck:      Thyroid: No thyromegaly.      Vascular: No JVD.      Trachea: No tracheal deviation.   Cardiovascular:      Rate and Rhythm: Normal rate and regular rhythm.      Pulses: Intact distal pulses.           Carotid pulses are 2+ on the right side and 2+ on the left side.       Dorsalis pedis pulses are 2+ on the right side and 2+ on the left side.        Posterior tibial pulses are 1+ on the right side and 0 on the left side.    "   Heart sounds: No murmur heard.    No friction rub. No gallop.   Pulmonary:      Effort: Pulmonary effort is normal.      Breath sounds: Normal breath sounds.   Abdominal:      General: Bowel sounds are normal. There is no distension.      Tenderness: There is no abdominal tenderness. There is no guarding.   Musculoskeletal:         General: No tenderness. Normal range of motion.      Cervical back: Normal range of motion and neck supple.   Lymphadenopathy:      Comments: Palpation of neck and groin lymph nodes normal   Skin:     General: Skin is dry.      Comments: Palpation of skin normal   Neurological:      Mental Status: He is alert and oriented to person, place, and time.      Cranial Nerves: No cranial nerve deficit.      Motor: No abnormal muscle tone.      Coordination: Coordination normal.   Psychiatric:         Behavior: Behavior normal.         Thought Content: Thought content normal.         Judgment: Judgment normal.         Assessment:       1. Coronary artery disease involving autologous vein coronary bypass graft without angina pectoris    2. Diabetic mononeuropathy associated with type 2 diabetes mellitus    3. PVD (peripheral vascular disease)    4. Left carotid artery stenosis    5. Positive cardiac stress test    6. Internal carotid artery occlusion, right    7. Hypertension, essential    8. Mixed hyperlipidemia    9. Type 2 diabetes mellitus with stage 1 chronic kidney disease, without long-term current use of insulin    10. NAFLD (nonalcoholic fatty liver disease)    11. NIDIA (obstructive sleep apnea)         Plan:       Esteban was seen today for hypertension.    Diagnoses and all orders for this visit:    Coronary artery disease involving autologous vein coronary bypass graft without angina pectoris    Diabetic mononeuropathy associated with type 2 diabetes mellitus    PVD (peripheral vascular disease)    Left carotid artery stenosis    Positive cardiac stress test    Internal carotid artery  occlusion, right    Hypertension, essential  -     CBC Auto Differential; Future; Expected date: 04/12/2024  -     Comprehensive Metabolic Panel; Future; Expected date: 04/12/2024    Mixed hyperlipidemia  -     Lipid Panel; Future; Expected date: 04/12/2024    Type 2 diabetes mellitus with stage 1 chronic kidney disease, without long-term current use of insulin  -     Comprehensive Metabolic Panel; Future; Expected date: 04/12/2024  -     Hemoglobin A1C; Future; Expected date: 04/12/2024    NAFLD (nonalcoholic fatty liver disease)    NIDIA (obstructive sleep apnea)

## 2023-04-18 ENCOUNTER — TELEPHONE (OUTPATIENT)
Dept: INTERNAL MEDICINE | Facility: CLINIC | Age: 73
End: 2023-04-18
Payer: MEDICARE

## 2023-04-18 VITALS — DIASTOLIC BLOOD PRESSURE: 85 MMHG | SYSTOLIC BLOOD PRESSURE: 144 MMHG

## 2023-04-18 NOTE — TELEPHONE ENCOUNTER
BP reading of 144/85 was taken from pt's Hypertension Digital Medicine flow sheet on 4/17/2023. Pt is being followed by Hypertension Digital Medicine.

## 2023-04-28 ENCOUNTER — TELEPHONE (OUTPATIENT)
Dept: INTERNAL MEDICINE | Facility: CLINIC | Age: 73
End: 2023-04-28
Payer: MEDICARE

## 2023-04-28 VITALS — SYSTOLIC BLOOD PRESSURE: 137 MMHG | DIASTOLIC BLOOD PRESSURE: 72 MMHG

## 2023-05-07 DIAGNOSIS — I10 HYPERTENSION, ESSENTIAL: ICD-10-CM

## 2023-05-08 NOTE — TELEPHONE ENCOUNTER
Refill Routing Note   Medication(s) are not appropriate for processing by [facility] for the following reason(s):      previous order by pcp was discontinued; nifedipine was reordered by cardiologist; deferred for pcp review    [facility] action(s):  Defer None identified     Medication Therapy Plan: previous order by pcp was discontinued; nifedipine was reordered by cardiologist; deferred for pcp review      Appointments  past 12m or future 3m with PCP    Date Provider   Last Visit   3/7/2023 Ned Hartley MD   Next Visit   9/7/2023 Ned Hartley MD   ED visits in past 90 days: 0        Note composed:5:27 AM 05/08/2023

## 2023-05-08 NOTE — TELEPHONE ENCOUNTER
No care due was identified.  [facility] Embedded Care Due Messages. Reference number: [id number].   5/08/2023 5:21:39 AM CDT

## 2023-05-17 RX ORDER — NIFEDIPINE 30 MG/1
TABLET, EXTENDED RELEASE ORAL
Qty: 90 TABLET | Refills: 1 | Status: SHIPPED | OUTPATIENT
Start: 2023-05-17 | End: 2023-07-06

## 2023-06-12 DIAGNOSIS — E11.22 TYPE 2 DIABETES MELLITUS WITH STAGE 1 CHRONIC KIDNEY DISEASE, WITHOUT LONG-TERM CURRENT USE OF INSULIN: ICD-10-CM

## 2023-06-12 DIAGNOSIS — N18.1 TYPE 2 DIABETES MELLITUS WITH STAGE 1 CHRONIC KIDNEY DISEASE, WITHOUT LONG-TERM CURRENT USE OF INSULIN: ICD-10-CM

## 2023-06-12 RX ORDER — EMPAGLIFLOZIN 25 MG/1
25 TABLET, FILM COATED ORAL DAILY
Qty: 90 TABLET | Refills: 1 | Status: SHIPPED | OUTPATIENT
Start: 2023-06-12 | End: 2023-12-26

## 2023-06-13 ENCOUNTER — OFFICE VISIT (OUTPATIENT)
Dept: SLEEP MEDICINE | Facility: CLINIC | Age: 73
End: 2023-06-13
Payer: MEDICARE

## 2023-06-13 VITALS
BODY MASS INDEX: 27.62 KG/M2 | WEIGHT: 176 LBS | DIASTOLIC BLOOD PRESSURE: 70 MMHG | SYSTOLIC BLOOD PRESSURE: 146 MMHG | HEIGHT: 67 IN | HEART RATE: 61 BPM

## 2023-06-13 DIAGNOSIS — G47.10 HYPERSOMNOLENCE: ICD-10-CM

## 2023-06-13 DIAGNOSIS — F51.09 OTHER INSOMNIA NOT DUE TO A SUBSTANCE OR KNOWN PHYSIOLOGICAL CONDITION: ICD-10-CM

## 2023-06-13 DIAGNOSIS — I10 HYPERTENSION, UNSPECIFIED TYPE: ICD-10-CM

## 2023-06-13 DIAGNOSIS — G47.00 INSOMNIA, UNSPECIFIED TYPE: ICD-10-CM

## 2023-06-13 DIAGNOSIS — R06.83 SNORING: Primary | ICD-10-CM

## 2023-06-13 DIAGNOSIS — I10 HYPERTENSION, ESSENTIAL: ICD-10-CM

## 2023-06-13 PROCEDURE — 99204 OFFICE O/P NEW MOD 45 MIN: CPT | Mod: S$PBB,,, | Performed by: INTERNAL MEDICINE

## 2023-06-13 PROCEDURE — 99999 PR PBB SHADOW E&M-EST. PATIENT-LVL IV: ICD-10-PCS | Mod: PBBFAC,,, | Performed by: INTERNAL MEDICINE

## 2023-06-13 PROCEDURE — 99999 PR PBB SHADOW E&M-EST. PATIENT-LVL IV: CPT | Mod: PBBFAC,,, | Performed by: INTERNAL MEDICINE

## 2023-06-13 PROCEDURE — 99214 OFFICE O/P EST MOD 30 MIN: CPT | Mod: PBBFAC | Performed by: INTERNAL MEDICINE

## 2023-06-13 PROCEDURE — 99204 PR OFFICE/OUTPT VISIT, NEW, LEVL IV, 45-59 MIN: ICD-10-PCS | Mod: S$PBB,,, | Performed by: INTERNAL MEDICINE

## 2023-06-13 NOTE — PROGRESS NOTES
Referred by Ned Hartlye MD     NEW PATIENT VISIT    Esteban Mike  is a pleasant 72 y.o. male  with PMH significant for CAD, HTN, BPH, DM, GERD who presents for evaluation in light of elevated AM blood pressures.    SLEEP SCHEDULE   Environment    Bed Time 11:30P   Sleep Latency 45min   Arousals 3   Nocturia 3   Back to sleep 15 min    Wake time 6AM   Naps    Work          Past Medical History:   Diagnosis Date    Aortic valve disease     Bilateral carotid artery stenosis 7/18/2018    CAD (coronary artery disease)     Carotid artery occlusion      LICA, 70-80% JULIUS    Diabetes mellitus     Diabetes mellitus type II     Elevated LFTs     GERD (gastroesophageal reflux disease)     Hyperlipidemia     Hypertension     PVD (peripheral vascular disease)      Patient Active Problem List   Diagnosis    Coronary artery disease involving autologous vein coronary bypass graft without angina pectoris    Hyperlipidemia    PVD (peripheral vascular disease)    Internal carotid artery occlusion, right    Left carotid artery stenosis    NAFLD (nonalcoholic fatty liver disease)    Hypertension, essential    Type 2 diabetes, with peripheral circulatory disorder not at goal    Diabetic neuropathy associated with type 2 diabetes mellitus    Noncompliance    Benign prostatic hyperplasia    NIDIA (obstructive sleep apnea)    Type 2 diabetes mellitus, without long-term current use of insulin    Gastroesophageal reflux disease with esophagitis    Positive cardiac stress test    Hypercalcemia       Current Outpatient Medications:     aspirin 81 MG chewable tablet, Take 81 mg by mouth once daily., Disp: , Rfl:     atorvastatin (LIPITOR) 80 MG tablet, Take 1 tablet (80 mg total) by mouth once daily., Disp: 90 tablet, Rfl: 3    blood sugar diagnostic Strp, To check BG 3 times daily, to use with insurance preferred meter, Disp: 200 strip, Rfl: 11    blood-glucose meter kit, To check BG 3 times daily, to use with insurance  "preferred meter, Disp: 1 each, Rfl: 0    carvediloL (COREG) 25 MG tablet, Take 1 tablet (25 mg total) by mouth 2 (two) times daily with meals., Disp: 180 tablet, Rfl: 3    dulaglutide (TRULICITY) 4.5 mg/0.5 mL pen injector, Inject 4.5 mg into the skin every 7 days., Disp: , Rfl:     fish oil-omega-3 fatty acids 300-1,000 mg capsule, Take 2 g by mouth once daily. 3 tablets, Disp: , Rfl:     glipiZIDE (GLUCOTROL) 5 MG tablet, 2.5 mg with breakfast (1/2 tab) and 5 mg with dinner (1 tab), Disp: 135 tablet, Rfl: 1    irbesartan (AVAPRO) 300 MG tablet, TAKE ONE TABLET BY MOUTH EVERY EVENING, Disp: 90 tablet, Rfl: 3    JARDIANCE 25 mg tablet, Take 1 tablet (25 mg total) by mouth once daily., Disp: 90 tablet, Rfl: 1    lancets Misc, To check BG 3 times daily, to use with insurance preferred meter, Disp: 200 each, Rfl: 11    metFORMIN (GLUCOPHAGE-XR) 500 MG ER 24hr tablet, Take 2 tablets (1,000 mg total) by mouth 2 (two) times daily with meals., Disp: 360 tablet, Rfl: 1    multivitamin capsule, Take 1 capsule by mouth once daily., Disp: , Rfl:     NIFEdipine (PROCARDIA-XL) 30 MG (OSM) 24 hr tablet, TAKE ONE TABLET BY MOUTH DAILY, Disp: 90 tablet, Rfl: 1    pantoprazole (PROTONIX) 40 MG tablet, TAKE ONE TABLET BY MOUTH ONCE DAILY BEFORE BREAKFAST, Disp: 90 tablet, Rfl: 3    tamsulosin (FLOMAX) 0.4 mg Cap, Take 2 capsules (0.8 mg total) by mouth once daily., Disp: 60 capsule, Rfl: 0    vit C-vit E-lutein-min-om-3 975-28-8-150 mg-unit-mg-mg Cap, Take 1 tablet by mouth once daily., Disp: , Rfl:      Vitals:    06/13/23 1345   BP: (!) 146/70   BP Location: Left arm   Patient Position: Sitting   BP Method: Small (Automatic)   Pulse: 61   Weight: 79.8 kg (176 lb)   Height: 5' 7" (1.702 m)     Physical Exam:    GEN:   Well-appearing  Psych:  Appropriate affect, demonstrates insight  SKIN:  No rash on the face or bridge of the nose      LABS:   Lab Results   Component Value Date    CO2 29 03/07/2023       RECORDS REVIEWED " PREVIOUSLY:         ASSESSMENT    No flowsheet data found.  PROBLEM DESCRIPTION/ Sx on Presentation  STATUS   Screening for NIDIA   No history snoring, no arousals, no  witnessed apneas   No current bed partner  Elevated blood pressure, sleepiness, nocturia, insomnia    New   Daytime Sx   + sleepiness when inactive   ESS 12/24 on intake  New   Insomnia   Has a hard time getting more than 4 hours of good sleep  Trouble falling asleep: has to be very tired  Maintenance:         waking frequently  Prior hypnotics:        Current hypnotics:     New   Nocturia   x 3 per sleep period  New   Other issues:     PLAN     -recommend sleep testing   -discussed trial therapy if NIDIA present and the patient is  open to a trial of CPAP therapy    RTC          The patient was given open opportunity to ask questions and/or express concerns about treatment plan.   All questions/concerns were discussed.     Two patient identifiers used prior to evaluation.

## 2023-06-14 ENCOUNTER — TELEPHONE (OUTPATIENT)
Dept: SLEEP MEDICINE | Facility: OTHER | Age: 73
End: 2023-06-14
Payer: MEDICARE

## 2023-06-16 ENCOUNTER — TELEPHONE (OUTPATIENT)
Dept: SLEEP MEDICINE | Facility: OTHER | Age: 73
End: 2023-06-16
Payer: MEDICARE

## 2023-06-19 ENCOUNTER — HOSPITAL ENCOUNTER (OUTPATIENT)
Dept: SLEEP MEDICINE | Facility: OTHER | Age: 73
Discharge: HOME | End: 2023-06-19
Attending: INTERNAL MEDICINE
Payer: MEDICARE

## 2023-06-19 DIAGNOSIS — F51.09 OTHER INSOMNIA NOT DUE TO A SUBSTANCE OR KNOWN PHYSIOLOGICAL CONDITION: ICD-10-CM

## 2023-06-19 DIAGNOSIS — I10 HYPERTENSION, ESSENTIAL: ICD-10-CM

## 2023-06-19 DIAGNOSIS — R06.83 SNORING: ICD-10-CM

## 2023-06-19 DIAGNOSIS — G47.10 HYPERSOMNOLENCE: ICD-10-CM

## 2023-06-19 DIAGNOSIS — G47.00 INSOMNIA, UNSPECIFIED TYPE: ICD-10-CM

## 2023-06-19 DIAGNOSIS — I10 HYPERTENSION, UNSPECIFIED TYPE: ICD-10-CM

## 2023-06-19 PROCEDURE — 95800 SLP STDY UNATTENDED: CPT

## 2023-06-20 PROCEDURE — 95806 PR SLEEP STUDY, UNATTENDED, SIMUL RECORD HR/O2 SAT/RESP FLOW/RESP EFFT: ICD-10-PCS | Mod: 26,,, | Performed by: INTERNAL MEDICINE

## 2023-06-20 PROCEDURE — 95806 SLEEP STUDY UNATT&RESP EFFT: CPT | Mod: 26,,, | Performed by: INTERNAL MEDICINE

## 2023-06-22 ENCOUNTER — PATIENT MESSAGE (OUTPATIENT)
Dept: SLEEP MEDICINE | Facility: CLINIC | Age: 73
End: 2023-06-22
Payer: MEDICARE

## 2023-06-27 ENCOUNTER — PATIENT OUTREACH (OUTPATIENT)
Dept: ADMINISTRATIVE | Facility: HOSPITAL | Age: 73
End: 2023-06-27
Payer: MEDICARE

## 2023-06-27 ENCOUNTER — TELEPHONE (OUTPATIENT)
Dept: INTERNAL MEDICINE | Facility: CLINIC | Age: 73
End: 2023-06-27
Payer: MEDICARE

## 2023-06-27 VITALS — SYSTOLIC BLOOD PRESSURE: 123 MMHG | DIASTOLIC BLOOD PRESSURE: 71 MMHG

## 2023-06-27 NOTE — TELEPHONE ENCOUNTER
/71 retrieved from patient's digital med flow sheet.    Jacqueline Arauz LPN   Clinical Care Coordinator  Primary Care and Wellness

## 2023-06-27 NOTE — PROGRESS NOTES
Health Maintenance Due   Topic Date Due    COVID-19 Vaccine (5 - Pfizer series) 06/22/2023    Foot Exam  08/24/2023           HM updated. Triggered LINKS and care everywhere. /71 retrieved from patient's digital med flow sheet.    Jacqueline Arauz LPN   Clinical Care Coordinator  Primary Care and Wellness

## 2023-07-06 ENCOUNTER — OFFICE VISIT (OUTPATIENT)
Dept: INTERNAL MEDICINE | Facility: CLINIC | Age: 73
End: 2023-07-06
Attending: FAMILY MEDICINE
Payer: MEDICARE

## 2023-07-06 VITALS
OXYGEN SATURATION: 98 % | HEART RATE: 60 BPM | WEIGHT: 174.19 LBS | DIASTOLIC BLOOD PRESSURE: 84 MMHG | HEIGHT: 67 IN | BODY MASS INDEX: 27.34 KG/M2 | SYSTOLIC BLOOD PRESSURE: 152 MMHG

## 2023-07-06 DIAGNOSIS — G47.33 OSA (OBSTRUCTIVE SLEEP APNEA): ICD-10-CM

## 2023-07-06 DIAGNOSIS — E78.5 HYPERLIPIDEMIA, UNSPECIFIED HYPERLIPIDEMIA TYPE: ICD-10-CM

## 2023-07-06 DIAGNOSIS — I25.810 CORONARY ARTERY DISEASE INVOLVING AUTOLOGOUS VEIN CORONARY BYPASS GRAFT WITHOUT ANGINA PECTORIS: ICD-10-CM

## 2023-07-06 DIAGNOSIS — E11.69 TYPE 2 DIABETES MELLITUS WITH OTHER SPECIFIED COMPLICATION, WITHOUT LONG-TERM CURRENT USE OF INSULIN: ICD-10-CM

## 2023-07-06 DIAGNOSIS — I10 HYPERTENSION, ESSENTIAL: Primary | ICD-10-CM

## 2023-07-06 PROBLEM — E11.9 TYPE 2 DIABETES MELLITUS, WITHOUT LONG-TERM CURRENT USE OF INSULIN: Status: RESOLVED | Noted: 2018-04-10 | Resolved: 2023-07-06

## 2023-07-06 PROBLEM — E11.9 TYPE 2 DIABETES MELLITUS, WITHOUT LONG-TERM CURRENT USE OF INSULIN: Status: ACTIVE | Noted: 2023-07-06

## 2023-07-06 PROCEDURE — 99214 OFFICE O/P EST MOD 30 MIN: CPT | Mod: S$PBB,,, | Performed by: FAMILY MEDICINE

## 2023-07-06 PROCEDURE — 99999 PR PBB SHADOW E&M-EST. PATIENT-LVL V: CPT | Mod: PBBFAC,,, | Performed by: FAMILY MEDICINE

## 2023-07-06 PROCEDURE — 99999 PR PBB SHADOW E&M-EST. PATIENT-LVL V: ICD-10-PCS | Mod: PBBFAC,,, | Performed by: FAMILY MEDICINE

## 2023-07-06 PROCEDURE — 99214 PR OFFICE/OUTPT VISIT, EST, LEVL IV, 30-39 MIN: ICD-10-PCS | Mod: S$PBB,,, | Performed by: FAMILY MEDICINE

## 2023-07-06 PROCEDURE — 99215 OFFICE O/P EST HI 40 MIN: CPT | Mod: PBBFAC | Performed by: FAMILY MEDICINE

## 2023-07-06 RX ORDER — NIFEDIPINE 60 MG/1
60 TABLET, EXTENDED RELEASE ORAL DAILY
Qty: 90 TABLET | Refills: 1 | Status: SHIPPED | OUTPATIENT
Start: 2023-07-06 | End: 2024-01-19

## 2023-07-06 NOTE — PATIENT INSTRUCTIONS
-carvedilol is twice a day (morning and evening)  -take nifedipine in the evening  -take irbesartan in the morning

## 2023-07-06 NOTE — PROGRESS NOTES
Subjective:       Patient ID: Esteban Mike is a 72 y.o. male.    Chief Complaint: Follow-up    Established patient follows up for management of chronic medical illnesses with complaints today. Please see dictation and ROS for interval problems, specific complaints and disease management discussion.    Past, Surgical, Family, Social, Histories; Medications, allergies reviewed and reconciled.  Health maintenance file reviewed and addressed items due. Recent applicable lab, imaging and cardiovascular results reviewed.  Problem list items reviewed and modified or added entries (in the overview section) may not be transcribed into this encounter note due to note writer format.    BP problems, running high. On digital. Tells me after cutting grass, pressures run nml. Amlodipine changed to nifed.    Recent sleep appt and diagnostic testing.      Review of Systems   Constitutional:  Negative for appetite change, chills, diaphoresis, fatigue and fever.   HENT:  Negative for congestion, postnasal drip, rhinorrhea, sore throat and trouble swallowing.    Eyes:  Negative for visual disturbance.   Respiratory:  Negative for cough, choking, chest tightness, shortness of breath and wheezing.    Cardiovascular:  Negative for chest pain and leg swelling.   Gastrointestinal:  Negative for abdominal distention, abdominal pain, diarrhea, nausea and vomiting.   Genitourinary:  Negative for difficulty urinating and hematuria.   Musculoskeletal:  Negative for arthralgias and myalgias.   Skin:  Negative for rash.   Neurological:  Negative for weakness, light-headedness and headaches.   Psychiatric/Behavioral:  Negative for confusion and dysphoric mood.      Objective:      Physical Exam  Vitals and nursing note reviewed.   Constitutional:       General: He is not in acute distress.     Appearance: He is well-developed.   Pulmonary:      Effort: Pulmonary effort is normal.   Musculoskeletal:      Cervical back: Neck supple.       Right lower leg: No edema.      Left lower leg: No edema.   Skin:     General: Skin is warm and dry.      Findings: No rash.   Neurological:      Mental Status: He is alert and oriented to person, place, and time.   Psychiatric:         Behavior: Behavior normal.         Thought Content: Thought content normal.         Judgment: Judgment normal.       Assessment:       1. Hypertension, essential    2. Hyperlipidemia, unspecified hyperlipidemia type    3. Type 2 diabetes mellitus with other specified complication, without long-term current use of insulin    4. NIDIA (obstructive sleep apnea)    5. Coronary artery disease involving autologous vein coronary bypass graft without angina pectoris        Plan:     Medication List with Changes/Refills   Current Medications    ASPIRIN 81 MG CHEWABLE TABLET    Take 81 mg by mouth once daily.    ATORVASTATIN (LIPITOR) 80 MG TABLET    Take 1 tablet (80 mg total) by mouth once daily.    BLOOD SUGAR DIAGNOSTIC STRP    To check BG 3 times daily, to use with insurance preferred meter    BLOOD-GLUCOSE METER KIT    To check BG 3 times daily, to use with insurance preferred meter    CARVEDILOL (COREG) 25 MG TABLET    Take 1 tablet (25 mg total) by mouth 2 (two) times daily with meals.    DULAGLUTIDE (TRULICITY) 4.5 MG/0.5 ML PEN INJECTOR    Inject 4.5 mg into the skin every 7 days.    FISH OIL-OMEGA-3 FATTY ACIDS 300-1,000 MG CAPSULE    Take 2 g by mouth once daily. 3 tablets    GLIPIZIDE (GLUCOTROL) 5 MG TABLET    2.5 mg with breakfast (1/2 tab) and 5 mg with dinner (1 tab)    IRBESARTAN (AVAPRO) 300 MG TABLET    TAKE ONE TABLET BY MOUTH EVERY EVENING    JARDIANCE 25 MG TABLET    Take 1 tablet (25 mg total) by mouth once daily.    LANCETS MISC    To check BG 3 times daily, to use with insurance preferred meter    METFORMIN (GLUCOPHAGE-XR) 500 MG ER 24HR TABLET    Take 2 tablets (1,000 mg total) by mouth 2 (two) times daily with meals.    MULTIVITAMIN CAPSULE    Take 1 capsule by mouth  once daily.    PANTOPRAZOLE (PROTONIX) 40 MG TABLET    TAKE ONE TABLET BY MOUTH ONCE DAILY BEFORE BREAKFAST    TAMSULOSIN (FLOMAX) 0.4 MG CAP    Take 2 capsules (0.8 mg total) by mouth once daily.    VIT C-VIT E-LUTEIN-MIN-OM-3 761-13-5-150 MG-UNIT-MG-MG CAP    Take 1 tablet by mouth once daily.   Changed and/or Refilled Medications    Modified Medication Previous Medication    NIFEDIPINE (PROCARDIA-XL) 60 MG (OSM) 24 HR TABLET NIFEdipine (PROCARDIA-XL) 30 MG (OSM) 24 hr tablet       Take 1 tablet (60 mg total) by mouth once daily.    TAKE ONE TABLET BY MOUTH DAILY     1. Hypertension, essential  Overview:  -see 7/6/2023 a and p note    Assessment & Plan:  -not controlled  -prior 'difficulty' with HCTZ - was stopped d/t ?? elev Ca. See 8/3/2020 endocrine progress note, 7/14/2021 pat message, 7/19/2021 tel note    -on digital program, multiple prior med changes  -increase nifed to 60, cont coreg, avapro  -consider lasix    Orders:  -     NIFEdipine (PROCARDIA-XL) 60 MG (OSM) 24 hr tablet; Take 1 tablet (60 mg total) by mouth once daily.  Dispense: 90 tablet; Refill: 1    2. Hyperlipidemia, unspecified hyperlipidemia type    3. Type 2 diabetes mellitus with other specified complication, without long-term current use of insulin    4. NIDIA (obstructive sleep apnea)  Overview:  -managed by sleep medicine, 6/13/2023 - CPAP rec'd  -test result located in media tab      5. Coronary artery disease involving autologous vein coronary bypass graft without angina pectoris  Overview:  -Followed in cardiology - CAD post CABG 2008, post right CEA 2006 and left ICA stent 2007.  -on statin        See meds, orders, follow up, routing and instructions sections of encounter and AVS. Discussed with patient and provided on AVS.    Discussed diet and exercise as therapeutic modalities for metabolic and other conditions. Provided patient information, which are included as links on the AVS for detailed information.    Lab Results   Component  Value Date     03/07/2023    K 4.5 03/07/2023     03/07/2023    BUN 15 03/07/2023    CREATININE 1.1 03/07/2023     (H) 03/07/2023    HGBA1C 6.5 (H) 03/07/2023    HGBA1C 9.7 07/08/2019    MG 2.6 05/06/2008    AST 20 03/07/2023    ALT 34 03/07/2023    ALBUMIN 4.2 03/07/2023    PROT 6.7 03/07/2023    BILITOT 0.8 03/07/2023    CHOL 120 03/07/2023    HDL 36 (L) 03/07/2023    HDL 46 07/08/2019    LDLCALC 51.8 (L) 03/07/2023    LDLCALC 85 07/20/2017    TRIG 161 (H) 03/07/2023    WBC 10.3 04/11/2018    HGB 14.2 04/11/2018    HCT 42.3 04/11/2018     04/11/2018    PSA 0.28 03/07/2023    PSADIAG 0.29 02/28/2022    TSH 0.69 04/11/2018         Diabetes Management Status    Statin: Taking  ACE/ARB: Taking    Screening or Prevention Patient's value Goal Complete/Controlled?   HgA1C Testing and Control   Lab Results   Component Value Date    HGBA1C 6.5 (H) 03/07/2023      Annually/Less than 8% Yes   Lipid profile : 03/07/2023 Annually Yes   LDL control Lab Results   Component Value Date    LDLCALC 51.8 (L) 03/07/2023    Annually/Less than 100 mg/dl  Yes   Nephropathy screening Lab Results   Component Value Date    LABMICR 7.0 03/07/2023     Lab Results   Component Value Date    PROTEINUA Negative 02/17/2022     No results found for: UTPCR   Annually Yes   Blood pressure BP Readings from Last 1 Encounters:   07/06/23 (!) 152/84    Less than 140/90 No   Dilated retinal exam : 01/25/2023 Annually Yes   Foot exam   : 08/24/2022 Annually Yes       RTC 2 months for BP    Today's appointment was >25 minutes including chart review (such as review of consult notes, op notes, ER notes, labs, imaging, path, cultures, etc.), medication reconciliation and adjustment, and in some cases >50% time spent in counseling.

## 2023-07-06 NOTE — ASSESSMENT & PLAN NOTE
-not controlled  -prior 'difficulty' with HCTZ - was stopped d/t ?? elev Ca. See 8/3/2020 endocrine progress note, 7/14/2021 pat message, 7/19/2021 tel note    -on digital program, multiple prior med changes  -increase nifed to 60, cont coreg, avapro  -consider lasix

## 2023-07-17 DIAGNOSIS — E11.42 TYPE 2 DIABETES MELLITUS WITH DIABETIC POLYNEUROPATHY: ICD-10-CM

## 2023-07-17 DIAGNOSIS — E78.5 HYPERLIPIDEMIA, UNSPECIFIED HYPERLIPIDEMIA TYPE: ICD-10-CM

## 2023-07-17 RX ORDER — DULAGLUTIDE 4.5 MG/.5ML
INJECTION, SOLUTION SUBCUTANEOUS
Qty: 4 PEN | Refills: 7 | Status: SHIPPED | OUTPATIENT
Start: 2023-07-17

## 2023-07-17 NOTE — TELEPHONE ENCOUNTER
Care Due:                  Date            Visit Type   Department     Provider  --------------------------------------------------------------------------------                                MYCHART                              FOLLOWUP/OF  [facility] INTERNAL  Last Visit: 07-      FICE VISIT   MEDICINE       CORRINE DORAN                              EP -                              PRIMARY      [facility] INTERNAL  Next Visit: 09-      CARE ([facility])   MEDICINE       CORRINE DORAN                                                            Last  Test          Frequency    Reason                     Performed    Due Date  --------------------------------------------------------------------------------    HBA1C.......  6 months...  glipiZIDE, metFORMIN.....  03- 09-    [facility] Embedded Care Due Messages. Reference number: [id number].   7/17/2023 2:08:16 PM CDT

## 2023-07-18 RX ORDER — ATORVASTATIN CALCIUM 80 MG/1
TABLET, FILM COATED ORAL
Qty: 90 TABLET | Refills: 2 | Status: SHIPPED | OUTPATIENT
Start: 2023-07-18 | End: 2024-04-02

## 2023-07-18 NOTE — TELEPHONE ENCOUNTER
Provider Staff:  Action required for this patient     Please see care gap opportunities below in Care Due Message.    Thanks!  [facility]     Appointments      Date Provider   Last Visit   7/6/2023 Ned Hartley MD   Next Visit   9/7/2023 Ned Hartley MD     Refill Decision Note   Esteban Mike  is requesting a refill authorization.  Brief Assessment and Rationale for Refill:  Approve     Medication Therapy Plan:         Comments:     Note composed:8:56 AM 07/18/2023             Appointments     Last Visit   7/6/2023 Ned Hartley MD   Next Visit   9/7/2023 Ned Hartley MD

## 2023-07-24 DIAGNOSIS — G47.33 OSA (OBSTRUCTIVE SLEEP APNEA): Primary | ICD-10-CM

## 2023-08-04 ENCOUNTER — TELEPHONE (OUTPATIENT)
Dept: INTERNAL MEDICINE | Facility: CLINIC | Age: 73
End: 2023-08-04
Payer: MEDICARE

## 2023-08-04 VITALS — DIASTOLIC BLOOD PRESSURE: 72 MMHG | SYSTOLIC BLOOD PRESSURE: 140 MMHG

## 2023-08-04 NOTE — TELEPHONE ENCOUNTER
BP reading of 140/72 was taken from pt's Hypertension [facility] Medicine flow sheet on 8/1/2023. Pt is being followed by Hypertension Digital Medicine.

## 2023-08-08 ENCOUNTER — PATIENT OUTREACH (OUTPATIENT)
Dept: ADMINISTRATIVE | Facility: HOSPITAL | Age: 73
End: 2023-08-08
Payer: MEDICARE

## 2023-08-08 NOTE — PROGRESS NOTES
Health Maintenance Due   Topic Date Due    COVID-19 Vaccine (5 - Pfizer series) 06/22/2023    Foot Exam  08/24/2023    Hemoglobin A1c  09/07/2023     Triggered LINKS and reconciled immunizations. Updated Care Everywhere. Checked pt's Hypertension Digital Medicine flow sheet for an updated BP reading; no new BP readings recorded in flow sheet. Chart review completed.

## 2023-08-10 NOTE — ANESTHESIA PREPROCEDURE EVALUATION
04/01/2019  Esteban Mike is a 68 y.o., male.    Past Medical History:   Diagnosis Date    Aortic valve disease     Bilateral carotid artery stenosis 7/18/2018    CAD (coronary artery disease)     Carotid artery occlusion      LICA, 70-80% JULIUS    Diabetes mellitus     Diabetes mellitus type II     Elevated LFTs     GERD (gastroesophageal reflux disease)     Hyperlipidemia     Hypertension     PVD (peripheral vascular disease)      Past Surgical History:   Procedure Laterality Date    BIOPSY LIVER AND ULTRASOUND N/A 6/25/2014    Performed by [facility] at [facility] OR 2ND FLR    CARDIAC CATHETERIZATION      carotid      CEA      COLONOSCOPY N/A 1/23/2013    Performed by Dominik Howard MD at [facility] ENDO (4TH FLR)    CORONARY ARTERY BYPASS GRAFT  5/2/08    X4    EGD (ESOPHAGOGASTRODUODENOSCOPY) N/A 2/18/2013    Performed by Dontrell Grant MD at [facility] ENDO (4TH FLR)    HERNIA REPAIR      ica stent      TONSILLECTOMY           Anesthesia Evaluation    I have reviewed the Patient Summary Reports.    I have reviewed the Nursing Notes.   I have reviewed the Medications.     Review of Systems  Anesthesia Hx:  No problems with previous Anesthesia Denies Hx of Anesthetic complications  History of prior surgery of interest to airway management or planning: Denies Family Hx of Anesthesia complications.   Denies Personal Hx of Anesthesia complications.   Social:  Non-Smoker    Hematology/Oncology:  Hematology Normal   Oncology Normal     EENT/Dental:EENT/Dental Normal   Cardiovascular:   Hypertension CAD  CABG/stent   Denies Angina. hyperlipidemia     ECG has been reviewed. Carotid artery disease   Pulmonary:   Denies Shortness of breath. Sleep Apnea    Renal/:  Renal/ Normal     Hepatic/GI:   GERD, well controlled Liver Disease,    Musculoskeletal:  Musculoskeletal Normal     Neurological:  Neurology Normal    Endocrine:   Diabetes    Dermatological:  Skin Normal    Psych:  Psychiatric Normal           Physical Exam  General:  Well nourished    Airway/Jaw/Neck:  Airway Findings: Mouth Opening: Normal Tongue: Normal  General Airway Assessment: Adult  Mallampati: III  Improves to II with phonation.  TM Distance: Normal, at least 6 cm        Eyes/Ears/Nose:  EYES/EARS/NOSE FINDINGS: Normal   Dental:  Dental Findings: In tact   Chest/Lungs:  Chest/Lungs Findings: Normal Respiratory Rate     Heart/Vascular:  Heart Findings: Rate: Normal  Heart murmur: negative Vascular Findings: Normal    Abdomen:  Abdomen Findings: Normal    Musculoskeletal:  Musculoskeletal Findings: Normal   Skin:  Skin Findings: Normal    Mental Status:  Mental Status Findings:  Cooperative, Alert and Oriented         Anesthesia Plan  Type of Anesthesia, risks & benefits discussed:  Anesthesia Type:  general  Patient's Preference: General  Intra-op Monitoring Plan: standard ASA monitors  Intra-op Monitoring Plan Comments:   Post Op Pain Control Plan:   Post Op Pain Control Plan Comments:   Induction:   IV  Beta Blocker:  Patient is on a Beta-Blocker and has received one dose within the past 24 hours (No further documentation required).       Informed Consent: Patient understands risks and agrees with Anesthesia plan.  Questions answered. Anesthesia consent signed with patient.  ASA Score: 3     Day of Surgery Review of History & Physical:    H&P update referred to the provider.         Ready For Surgery From Anesthesia Perspective.        Bexarotene Pregnancy And Lactation Text: This medication is Pregnancy Category X and should not be given to women who are pregnant or may become pregnant. This medication should not be used if you are breast feeding.

## 2023-08-11 ENCOUNTER — PATIENT OUTREACH (OUTPATIENT)
Dept: ADMINISTRATIVE | Facility: HOSPITAL | Age: 73
End: 2023-08-11
Payer: MEDICARE

## 2023-08-21 ENCOUNTER — PATIENT MESSAGE (OUTPATIENT)
Dept: SLEEP MEDICINE | Facility: CLINIC | Age: 73
End: 2023-08-21
Payer: MEDICARE

## 2023-09-05 VITALS — DIASTOLIC BLOOD PRESSURE: 65 MMHG | SYSTOLIC BLOOD PRESSURE: 128 MMHG

## 2023-09-05 DIAGNOSIS — I10 HYPERTENSION, ESSENTIAL: ICD-10-CM

## 2023-09-05 RX ORDER — IRBESARTAN 300 MG/1
TABLET ORAL
Qty: 90 TABLET | Refills: 1 | Status: SHIPPED | OUTPATIENT
Start: 2023-09-05 | End: 2024-02-25

## 2023-09-05 NOTE — TELEPHONE ENCOUNTER
Refill Decision Note   Esteban Mike  is requesting a refill authorization.  Brief Assessment and Rationale for Refill:  Approve     Medication Therapy Plan:  BP reading of 128/65 was taken from pt's Hypertension Digital Medicine flow sheet on 9/1/2023.      Comments:     Note composed:1:24 PM 09/05/2023

## 2023-09-05 NOTE — TELEPHONE ENCOUNTER
No care due was identified.  [facility] Embedded Care Due Messages. Reference number: [id number].   9/05/2023 9:46:27 AM CDT

## 2023-09-07 ENCOUNTER — OFFICE VISIT (OUTPATIENT)
Dept: INTERNAL MEDICINE | Facility: CLINIC | Age: 73
End: 2023-09-07
Attending: FAMILY MEDICINE
Payer: MEDICARE

## 2023-09-07 ENCOUNTER — LAB VISIT (OUTPATIENT)
Dept: LAB | Facility: HOSPITAL | Age: 73
End: 2023-09-07
Attending: FAMILY MEDICINE
Payer: MEDICARE

## 2023-09-07 VITALS
HEART RATE: 58 BPM | SYSTOLIC BLOOD PRESSURE: 112 MMHG | WEIGHT: 171.75 LBS | OXYGEN SATURATION: 95 % | HEIGHT: 67 IN | BODY MASS INDEX: 26.96 KG/M2 | DIASTOLIC BLOOD PRESSURE: 50 MMHG

## 2023-09-07 DIAGNOSIS — I10 HYPERTENSION, ESSENTIAL: Primary | ICD-10-CM

## 2023-09-07 DIAGNOSIS — E11.69 TYPE 2 DIABETES MELLITUS WITH OTHER SPECIFIED COMPLICATION, WITHOUT LONG-TERM CURRENT USE OF INSULIN: ICD-10-CM

## 2023-09-07 DIAGNOSIS — I25.810 CORONARY ARTERY DISEASE INVOLVING AUTOLOGOUS VEIN CORONARY BYPASS GRAFT WITHOUT ANGINA PECTORIS: ICD-10-CM

## 2023-09-07 DIAGNOSIS — G47.33 OSA (OBSTRUCTIVE SLEEP APNEA): ICD-10-CM

## 2023-09-07 DIAGNOSIS — E83.52 HYPERCALCEMIA: ICD-10-CM

## 2023-09-07 LAB
ANION GAP SERPL CALC-SCNC: 16 MMOL/L (ref 8–16)
BUN SERPL-MCNC: 20 MG/DL (ref 8–23)
CALCIUM SERPL-MCNC: 10.4 MG/DL (ref 8.7–10.5)
CHLORIDE SERPL-SCNC: 103 MMOL/L (ref 95–110)
CO2 SERPL-SCNC: 21 MMOL/L (ref 23–29)
CREAT SERPL-MCNC: 1.2 MG/DL (ref 0.5–1.4)
EST. GFR  (NO RACE VARIABLE): >60 ML/MIN/1.73 M^2
ESTIMATED AVG GLUCOSE: 148 MG/DL (ref 68–131)
GLUCOSE SERPL-MCNC: 117 MG/DL (ref 70–110)
HBA1C MFR BLD: 6.8 % (ref 4–5.6)
POTASSIUM SERPL-SCNC: 4.4 MMOL/L (ref 3.5–5.1)
SODIUM SERPL-SCNC: 140 MMOL/L (ref 136–145)

## 2023-09-07 PROCEDURE — 99215 OFFICE O/P EST HI 40 MIN: CPT | Mod: PBBFAC | Performed by: FAMILY MEDICINE

## 2023-09-07 PROCEDURE — 83036 HEMOGLOBIN GLYCOSYLATED A1C: CPT | Performed by: FAMILY MEDICINE

## 2023-09-07 PROCEDURE — 99213 PR OFFICE/OUTPT VISIT, EST, LEVL III, 20-29 MIN: ICD-10-PCS | Mod: S$PBB,,, | Performed by: FAMILY MEDICINE

## 2023-09-07 PROCEDURE — 99213 OFFICE O/P EST LOW 20 MIN: CPT | Mod: S$PBB,,, | Performed by: FAMILY MEDICINE

## 2023-09-07 PROCEDURE — 99999 PR PBB SHADOW E&M-EST. PATIENT-LVL V: ICD-10-PCS | Mod: PBBFAC,,, | Performed by: FAMILY MEDICINE

## 2023-09-07 PROCEDURE — 99999 PR PBB SHADOW E&M-EST. PATIENT-LVL V: CPT | Mod: PBBFAC,,, | Performed by: FAMILY MEDICINE

## 2023-09-07 PROCEDURE — 80048 BASIC METABOLIC PNL TOTAL CA: CPT | Performed by: FAMILY MEDICINE

## 2023-09-07 PROCEDURE — 36415 COLL VENOUS BLD VENIPUNCTURE: CPT | Performed by: FAMILY MEDICINE

## 2023-09-07 NOTE — ASSESSMENT & PLAN NOTE
Controlled today, home readings often high. Cuff was confirmed last visit.    Continue:  -carvedilol 25 twice a day  -take nifedipine 60 in the evening  -take irbesartan 300 in the morning

## 2023-09-07 NOTE — PROGRESS NOTES
Subjective:       Patient ID: Esteban Mike is a 73 y.o. male.    Chief Complaint: Follow-up    Established patient follows up for management of chronic medical illnesses with complaints today. Please see dictation and ROS for interval problems, specific complaints and disease management discussion.    Past, Surgical, Family, Social, Histories; Medications, allergies reviewed and reconciled.  Health maintenance file reviewed and addressed items due. Recent applicable lab, imaging and cardiovascular results reviewed.  Problem list items reviewed and modified or added entries (in the overview section) may not be transcribed into this encounter note due to note writer format.    BP better today. Changes last month. Now on CPAP as of august.        Review of Systems   Constitutional:  Negative for appetite change, chills, diaphoresis, fatigue and fever.   HENT:  Negative for congestion, postnasal drip, rhinorrhea, sore throat and trouble swallowing.    Eyes:  Negative for visual disturbance.   Respiratory:  Negative for cough, choking, chest tightness, shortness of breath and wheezing.    Cardiovascular:  Negative for chest pain and leg swelling.   Gastrointestinal:  Negative for abdominal distention, abdominal pain, diarrhea, nausea and vomiting.   Genitourinary:  Negative for difficulty urinating and hematuria.   Musculoskeletal:  Negative for arthralgias and myalgias.   Skin:  Negative for rash.   Neurological:  Negative for weakness, light-headedness and headaches.   Psychiatric/Behavioral:  Negative for confusion and dysphoric mood.        Objective:      Physical Exam  Vitals and nursing note reviewed.   Constitutional:       General: He is not in acute distress.     Appearance: He is well-developed.   Pulmonary:      Effort: Pulmonary effort is normal.   Musculoskeletal:      Cervical back: Neck supple.      Right lower leg: No edema.      Left lower leg: No edema.   Skin:     General: Skin is warm and  dry.      Findings: No rash.   Neurological:      Mental Status: He is alert and oriented to person, place, and time.   Psychiatric:         Behavior: Behavior normal.         Thought Content: Thought content normal.         Judgment: Judgment normal.         Assessment:       1. Hypertension, essential    2. Type 2 diabetes mellitus with other specified complication, without long-term current use of insulin    3. NIDIA (obstructive sleep apnea)    4. Coronary artery disease involving autologous vein coronary bypass graft without angina pectoris    5. Hypercalcemia        Plan:     Medication List with Changes/Refills   Current Medications    ASPIRIN 81 MG CHEWABLE TABLET    Take 81 mg by mouth once daily.    ATORVASTATIN (LIPITOR) 80 MG TABLET    TAKE ONE TABLET BY MOUTH DAILY    BLOOD SUGAR DIAGNOSTIC STRP    To check BG 3 times daily, to use with insurance preferred meter    BLOOD-GLUCOSE METER KIT    To check BG 3 times daily, to use with insurance preferred meter    CARVEDILOL (COREG) 25 MG TABLET    Take 1 tablet (25 mg total) by mouth 2 (two) times daily with meals.    DULAGLUTIDE (TRULICITY) 4.5 MG/0.5 ML PEN INJECTOR    inject 4.5 MG into THE SKIN every SEVEN DAYS    FISH OIL-OMEGA-3 FATTY ACIDS 300-1,000 MG CAPSULE    Take 2 g by mouth once daily. 3 tablets    GLIPIZIDE (GLUCOTROL) 5 MG TABLET    2.5 mg with breakfast (1/2 tab) and 5 mg with dinner (1 tab)    IRBESARTAN (AVAPRO) 300 MG TABLET    TAKE ONE TABLET BY MOUTH EVERY EVENING    JARDIANCE 25 MG TABLET    Take 1 tablet (25 mg total) by mouth once daily.    LANCETS MISC    To check BG 3 times daily, to use with insurance preferred meter    METFORMIN (GLUCOPHAGE-XR) 500 MG ER 24HR TABLET    Take 2 tablets (1,000 mg total) by mouth 2 (two) times daily with meals.    MULTIVITAMIN CAPSULE    Take 1 capsule by mouth once daily.    NIFEDIPINE (PROCARDIA-XL) 60 MG (OSM) 24 HR TABLET    Take 1 tablet (60 mg total) by mouth once daily.    PANTOPRAZOLE  (PROTONIX) 40 MG TABLET    TAKE ONE TABLET BY MOUTH ONCE DAILY BEFORE BREAKFAST    TAMSULOSIN (FLOMAX) 0.4 MG CAP    Take 2 capsules (0.8 mg total) by mouth once daily.    VIT C-VIT E-LUTEIN-MIN-OM-3 116-16-9-150 MG-UNIT-MG-MG CAP    Take 1 tablet by mouth once daily.     1. Hypertension, essential  Overview:  -see 7/6/2023 a and p note  -on digital program    Assessment & Plan:  Controlled today, home readings often high. Cuff was confirmed last visit.    Continue:  -carvedilol 25 twice a day  -take nifedipine 60 in the evening  -take irbesartan 300 in the morning      2. Type 2 diabetes mellitus with other specified complication, without long-term current use of insulin  Overview:  -followed by endocrinology    Assessment & Plan:  -4 meds    Orders:  -     Hemoglobin A1C; Future; Expected date: 09/07/2023  -     Basic Metabolic Panel; Future; Expected date: 09/07/2023    3. NIDIA (obstructive sleep apnea)  Overview:  -managed by sleep medicine, 6/13/2023 - on CPAP  -test result located in media tab        4. Coronary artery disease involving autologous vein coronary bypass graft without angina pectoris  Overview:  -Followed in cardiology - CAD post CABG 2008, post right CEA 2006 and left ICA stent 2007.  -on statin      5. Hypercalcemia  Overview:  -?? HCTZ association in past        See meds, orders, follow up, routing and instructions sections of encounter and AVS. Discussed with patient and provided on AVS.    Discussed diet and exercise as therapeutic modalities for metabolic and other conditions. Provided patient information, which are included as links on the AVS for detailed information.    Lab Results   Component Value Date     03/07/2023    K 4.5 03/07/2023     03/07/2023    BUN 15 03/07/2023    CREATININE 1.1 03/07/2023     (H) 03/07/2023    HGBA1C 6.5 (H) 03/07/2023    HGBA1C 9.7 07/08/2019    MG 2.6 05/06/2008    AST 20 03/07/2023    ALT 34 03/07/2023    ALBUMIN 4.2 03/07/2023    PROT  "6.7 03/07/2023    BILITOT 0.8 03/07/2023    CHOL 120 03/07/2023    HDL 36 (L) 03/07/2023    HDL 46 07/08/2019    LDLCALC 51.8 (L) 03/07/2023    LDLCALC 85 07/20/2017    TRIG 161 (H) 03/07/2023    WBC 10.3 04/11/2018    HGB 14.2 04/11/2018    HCT 42.3 04/11/2018     04/11/2018    PSA 0.28 03/07/2023    PSADIAG 0.29 02/28/2022    TSH 0.69 04/11/2018         Diabetes Management Status    Statin: Taking  ACE/ARB: Taking    Screening or Prevention Patient's value Goal Complete/Controlled?   HgA1C Testing and Control   Lab Results   Component Value Date    HGBA1C 6.5 (H) 03/07/2023      Annually/Less than 8% Yes   Lipid profile : 03/07/2023 Annually Yes   LDL control Lab Results   Component Value Date    LDLCALC 51.8 (L) 03/07/2023    Annually/Less than 100 mg/dl  Yes   Nephropathy screening Lab Results   Component Value Date    LABMICR 7.0 03/07/2023     Lab Results   Component Value Date    PROTEINUA Negative 02/17/2022     No results found for: "UTPCR"   Annually Yes   Blood pressure BP Readings from Last 1 Encounters:   09/07/23 (!) 112/50    Less than 140/90 Yes   Dilated retinal exam : 01/25/2023 Annually Yes   Foot exam   : 08/24/2022 Annually No     "

## 2023-10-02 ENCOUNTER — OFFICE VISIT (OUTPATIENT)
Dept: PODIATRY | Facility: CLINIC | Age: 73
End: 2023-10-02
Payer: MEDICARE

## 2023-10-02 VITALS
HEIGHT: 67 IN | SYSTOLIC BLOOD PRESSURE: 136 MMHG | DIASTOLIC BLOOD PRESSURE: 71 MMHG | WEIGHT: 178 LBS | BODY MASS INDEX: 27.94 KG/M2 | HEART RATE: 60 BPM

## 2023-10-02 DIAGNOSIS — I73.9 PVD (PERIPHERAL VASCULAR DISEASE): ICD-10-CM

## 2023-10-02 DIAGNOSIS — E11.9 ENCOUNTER FOR DIABETIC FOOT EXAM: Primary | ICD-10-CM

## 2023-10-02 DIAGNOSIS — E11.42 TYPE 2 DIABETES MELLITUS WITH DIABETIC POLYNEUROPATHY, WITHOUT LONG-TERM CURRENT USE OF INSULIN: ICD-10-CM

## 2023-10-02 PROCEDURE — 99213 OFFICE O/P EST LOW 20 MIN: CPT | Mod: PBBFAC,PN | Performed by: PODIATRIST

## 2023-10-02 PROCEDURE — 99213 PR OFFICE/OUTPT VISIT, EST, LEVL III, 20-29 MIN: ICD-10-PCS | Mod: S$PBB,,, | Performed by: PODIATRIST

## 2023-10-02 PROCEDURE — 99213 OFFICE O/P EST LOW 20 MIN: CPT | Mod: S$PBB,,, | Performed by: PODIATRIST

## 2023-10-02 PROCEDURE — 99999 PR PBB SHADOW E&M-EST. PATIENT-LVL III: CPT | Mod: PBBFAC,,, | Performed by: PODIATRIST

## 2023-10-02 PROCEDURE — 99999 PR PBB SHADOW E&M-EST. PATIENT-LVL III: ICD-10-PCS | Mod: PBBFAC,,, | Performed by: PODIATRIST

## 2023-10-02 NOTE — PROGRESS NOTES
Subjective:      Patient ID: Esteban Mike is a 73 y.o. male.    Chief Complaint: Nail Care    Esteban is a 73 y.o. male who presents to the clinic for annual evaluation & tx of diabetic feet.  He was new on referral from his PCP last year on 08/24/2022 to establish care and for annual diabetic foot exam (prior to that he had not seen a podiatrist in 4 years).  No new concerns w/ his feet.    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    PCP: Ned Hartley MD DOL[facility] 9/7/23    Just got CPAP August w/ 73st b'day d/t sleep apnea.  Esteban has a past medical history of Aortic valve disease, Bilateral carotid artery stenosis (7/18/2018), CAD (coronary artery disease), Carotid artery occlusion, Diabetes mellitus, Diabetes mellitus type II, Elevated LFTs, GERD (gastroesophageal reflux disease), Hyperlipidemia, Hypertension, and PVD (peripheral vascular disease).   Retired from [employer].  Dx DM 2008 after quad bypass. 2006 R endarterectomy completely occluded; 2007 L carotid stent.  Patient Active Problem List   Diagnosis    Coronary artery disease involving autologous vein coronary bypass graft without angina pectoris    Hyperlipidemia    PVD (peripheral vascular disease)    Internal carotid artery occlusion, right    Left carotid artery stenosis    NAFLD (nonalcoholic fatty liver disease)    Hypertension, essential    Type 2 diabetes, with peripheral circulatory disorder not at goal    Diabetic neuropathy associated with type 2 diabetes mellitus    Noncompliance    Benign prostatic hyperplasia    NIDIA (obstructive sleep apnea)    Gastroesophageal reflux disease with esophagitis    Positive cardiac stress test    Hypercalcemia    Type 2 diabetes mellitus, without long-term current use of insulin      Hemoglobin A1C   Date Value Ref Range Status   09/07/2023 6.8 (H) 4.0 - 5.6 % Final     Comment:     ADA Screening Guidelines:  5.7-6.4%  Consistent with prediabetes  >or=6.5%   Consistent with diabetes    High levels of fetal hemoglobin interfere with the HbA1C  assay. Heterozygous hemoglobin variants (HbS, HgC, etc)do  not significantly interfere with this assay.   However, presence of multiple variants may affect accuracy.     03/07/2023 6.5 (H) 4.0 - 5.6 % Final     Comment:     ADA Screening Guidelines:  5.7-6.4%  Consistent with prediabetes  >or=6.5%  Consistent with diabetes    High levels of fetal hemoglobin interfere with the HbA1C  assay. Heterozygous hemoglobin variants (HbS, HgC, etc)do  not significantly interfere with this assay.   However, presence of multiple variants may affect accuracy.     01/31/2023 6.4 (H) <5.7 % of total Hgb Final     Comment:     For someone without known diabetes, a hemoglobin   A1c value between 5.7% and 6.4% is consistent with  prediabetes and should be confirmed with a   follow-up test.     For someone with known diabetes, a value <7%  indicates that their diabetes is well controlled. A1c  targets should be individualized based on duration of  diabetes, age, comorbid conditions, and other  considerations.     This assay result is consistent with an increased risk  of diabetes.     Currently, no consensus exists regarding use of  hemoglobin A1c for diagnosis of diabetes for children.        07/08/2019 9.7 % Final     Review of Systems   Constitutional: Positive for malaise/fatigue.   Cardiovascular:  Negative for leg swelling.   Skin:  Positive for color change (yellow nails) and dry skin. Negative for nail changes.   Musculoskeletal:  Positive for back pain and joint pain. Negative for arthritis, falls, gout, joint swelling, muscle weakness and myalgias.   Neurological:  Positive for paresthesias. Negative for focal weakness, numbness (DM neuropathy) and weakness.   Psychiatric/Behavioral:  The patient is not nervous/anxious.        Objective:      Physical Exam  Vitals reviewed.   Constitutional:       General: He is not in acute distress.      Appearance: He is well-developed and overweight.   HENT:      Head: Normocephalic.   Cardiovascular:      Pulses:           Dorsalis pedis pulses are 2+ on the right side and 2+ on the left side.        Posterior tibial pulses are 2+ on the right side and 2+ on the left side.      Comments: No edema noted to b/l LE.    Musculoskeletal:         General: No swelling or tenderness.      Right lower leg: No edema.      Left lower leg: No edema.      Right foot: Deformity present. No swelling or tenderness.      Left foot: Deformity (hammertoe 2-5 B/L) present. No swelling or tenderness.      Comments: MS strength 5/5 in DF/PF/Inv/Ev resistance with no reproduction of pain in any direction.   Passive ROM ankle and pedal joints is painless. Adequate pedal joint ROM with no crepitation bilateral.      Feet:      Right foot:      Protective Sensation: 2 sites tested.  2 sites sensed.      Skin integrity: Skin integrity normal.      Left foot:      Protective Sensation: 2 sites tested.  2 sites sensed.      Skin integrity: Skin integrity normal.   Skin:     General: Skin is warm and dry.      Capillary Refill: Capillary refill takes less than 2 seconds.      Findings: No bruising, erythema or lesion.      Comments: Nails are slightly dystrophic, discolored light yellow, sparing 4th B/L 3rd R.   Interdigital spaces CDI B/L.   Skin texture normal.   Pedal hair normal.   Neurological:      Mental Status: He is alert and oriented to person, place, and time.      Sensory: No sensory deficit.      Motor: Motor function is intact. No weakness.      Gait: Gait is intact. Gait normal.      Comments: Epicritic sensation grossly intact B/L despite h/o paresthesias including numbness & tingling B/L feet; no clearly identified trigger or source nor hyperemia.   Psychiatric:         Mood and Affect: Mood and affect normal.         Behavior: Behavior normal. Behavior is cooperative.         Assessment:       Encounter Diagnoses   Name  Primary?    Encounter for diabetic foot exam Yes    Type 2 diabetes mellitus with diabetic polyneuropathy, without long-term current use of insulin     PVD (peripheral vascular disease)        Plan:       Esteban was seen today for nail care.    Diagnoses and all orders for this visit:    Encounter for diabetic foot exam    Type 2 diabetes mellitus with diabetic polyneuropathy, without long-term current use of insulin    PVD (peripheral vascular disease)    I counseled the patient on his conditions, their implications and medical management.    - Shoe inspection. Diabetic Foot Education. Patient reminded of the importance of good nutrition & blood sugar control to help prevent podiatric complications of diabetes. Patient instructed on proper foot hygeine. We discussed wearing proper supportive shoe gear, daily foot inspections, never walking w/out protective shoe gear, annual diabetic foot exam, sooner p.r.n.         A total of 22 mins.was spent on chart review, patient visit & documentation.

## 2023-10-03 ENCOUNTER — OFFICE VISIT (OUTPATIENT)
Dept: SLEEP MEDICINE | Facility: CLINIC | Age: 73
End: 2023-10-03
Payer: MEDICARE

## 2023-10-03 VITALS
HEIGHT: 67 IN | HEART RATE: 69 BPM | WEIGHT: 178 LBS | BODY MASS INDEX: 27.94 KG/M2 | DIASTOLIC BLOOD PRESSURE: 79 MMHG | SYSTOLIC BLOOD PRESSURE: 130 MMHG

## 2023-10-03 DIAGNOSIS — Z78.9 INTOLERANCE OF CONTINUOUS POSITIVE AIRWAY PRESSURE (CPAP) VENTILATION: ICD-10-CM

## 2023-10-03 DIAGNOSIS — G47.33 OSA (OBSTRUCTIVE SLEEP APNEA): Primary | ICD-10-CM

## 2023-10-03 DIAGNOSIS — R09.81 NASAL CONGESTION: ICD-10-CM

## 2023-10-03 PROCEDURE — 99214 OFFICE O/P EST MOD 30 MIN: CPT | Mod: PBBFAC | Performed by: PHYSICIAN ASSISTANT

## 2023-10-03 PROCEDURE — 99999 PR PBB SHADOW E&M-EST. PATIENT-LVL IV: ICD-10-PCS | Mod: PBBFAC,,, | Performed by: PHYSICIAN ASSISTANT

## 2023-10-03 PROCEDURE — 99999 PR PBB SHADOW E&M-EST. PATIENT-LVL IV: CPT | Mod: PBBFAC,,, | Performed by: PHYSICIAN ASSISTANT

## 2023-10-03 PROCEDURE — 99214 PR OFFICE/OUTPT VISIT, EST, LEVL IV, 30-39 MIN: ICD-10-PCS | Mod: S$PBB,,, | Performed by: PHYSICIAN ASSISTANT

## 2023-10-03 PROCEDURE — 99214 OFFICE O/P EST MOD 30 MIN: CPT | Mod: S$PBB,,, | Performed by: PHYSICIAN ASSISTANT

## 2023-10-03 RX ORDER — FLUTICASONE PROPIONATE 50 MCG
1 SPRAY, SUSPENSION (ML) NASAL DAILY
Qty: 30 ML | Refills: 3 | Status: SHIPPED | OUTPATIENT
Start: 2023-10-03 | End: 2024-01-01

## 2023-10-03 NOTE — PROGRESS NOTES
Referred by No ref. provider found     ESTABLISHED PATIENT VISIT  New to me. Previously evaluated by Dr Pablito Orellana Eliseo Mike  is a pleasant 73 y.o. male  with PMH significant for HTN, HLD, CAD, PAD, DM II, neuropathy, BPH, NAFLD, GERD, BMI 27+, NIDIA     Here today for: CPAP follow-up     PLAN last visit 6/13/23:   -recommend sleep testing   -discussed trial therapy if NIDIA present and the patient is  open to a trial of CPAP therapy      Since last visit:   Reports mask interface is comfortable. Denies significant mask leak. Does report discomfort with pressures throughout the night. Sometimes feels like he cannot breath well with it in. Is also having some congestion, unsure if this is contributing. Trying to use nightly, but ends up having to take off after a couple hours due to the discomfort.       PAP history   Problems    Mask FFM   Pressure 6-12cwp   DME HME   Machine age AirSense 11 8/18/23   Download 10/3/23: 27/30 x 1hr 53mins, 6-12cwp (8.2/10.3/10.4), leak (0/5.3/49.8), AHI 1.7       SLEEP SCHEDULE   Environment     Bed Time 11:30P   Sleep Latency 45min   Arousals 3   Nocturia 3   Back to sleep 15 min    Wake time 6AM   Naps     Work          Past Medical History:   Diagnosis Date    Aortic valve disease     Bilateral carotid artery stenosis 7/18/2018    CAD (coronary artery disease)     Carotid artery occlusion      LICA, 70-80% JULIUS    Diabetes mellitus     Diabetes mellitus type II     Elevated LFTs     GERD (gastroesophageal reflux disease)     Hyperlipidemia     Hypertension     PVD (peripheral vascular disease)      Patient Active Problem List   Diagnosis    Coronary artery disease involving autologous vein coronary bypass graft without angina pectoris    Hyperlipidemia    PVD (peripheral vascular disease)    Internal carotid artery occlusion, right    Left carotid artery stenosis    NAFLD (nonalcoholic fatty liver disease)    Hypertension, essential    Type 2 diabetes, with  peripheral circulatory disorder not at goal    Diabetic neuropathy associated with type 2 diabetes mellitus    Noncompliance    Benign prostatic hyperplasia    NIDIA (obstructive sleep apnea)    Gastroesophageal reflux disease with esophagitis    Positive cardiac stress test    Hypercalcemia    Type 2 diabetes mellitus, without long-term current use of insulin       Current Outpatient Medications:     aspirin 81 MG chewable tablet, Take 81 mg by mouth once daily., Disp: , Rfl:     atorvastatin (LIPITOR) 80 MG tablet, TAKE ONE TABLET BY MOUTH DAILY, Disp: 90 tablet, Rfl: 2    blood sugar diagnostic Strp, To check BG 3 times daily, to use with insurance preferred meter, Disp: 200 strip, Rfl: 11    blood-glucose meter kit, To check BG 3 times daily, to use with insurance preferred meter, Disp: 1 each, Rfl: 0    carvediloL (COREG) 25 MG tablet, Take 1 tablet (25 mg total) by mouth 2 (two) times daily with meals., Disp: 180 tablet, Rfl: 3    dulaglutide (TRULICITY) 4.5 mg/0.5 mL pen injector, inject 4.5 MG into THE SKIN every SEVEN DAYS, Disp: 4 pen , Rfl: 7    fish oil-omega-3 fatty acids 300-1,000 mg capsule, Take 2 g by mouth once daily. 3 tablets, Disp: , Rfl:     glipiZIDE (GLUCOTROL) 5 MG tablet, 2.5 mg with breakfast (1/2 tab) and 5 mg with dinner (1 tab), Disp: 135 tablet, Rfl: 1    irbesartan (AVAPRO) 300 MG tablet, TAKE ONE TABLET BY MOUTH EVERY EVENING, Disp: 90 tablet, Rfl: 1    JARDIANCE 25 mg tablet, Take 1 tablet (25 mg total) by mouth once daily., Disp: 90 tablet, Rfl: 1    lancets Misc, To check BG 3 times daily, to use with insurance preferred meter, Disp: 200 each, Rfl: 11    metFORMIN (GLUCOPHAGE-XR) 500 MG ER 24hr tablet, TAKE TWO TABLETS BY MOUTH TWICE DAILY with meals, Disp: 360 tablet, Rfl: 1    multivitamin capsule, Take 1 capsule by mouth once daily., Disp: , Rfl:     NIFEdipine (PROCARDIA-XL) 60 MG (OSM) 24 hr tablet, Take 1 tablet (60 mg total) by mouth once daily., Disp: 90 tablet, Rfl: 1     "pantoprazole (PROTONIX) 40 MG tablet, TAKE ONE TABLET BY MOUTH ONCE DAILY BEFORE BREAKFAST, Disp: 90 tablet, Rfl: 3    tamsulosin (FLOMAX) 0.4 mg Cap, Take 2 capsules (0.8 mg total) by mouth once daily., Disp: 60 capsule, Rfl: 0    vit C-vit E-lutein-min-om-3 227-26-0-150 mg-unit-mg-mg Cap, Take 1 tablet by mouth once daily., Disp: , Rfl:        Vitals:    10/03/23 0945   BP: 130/79   BP Location: Right arm   Patient Position: Sitting   BP Method: Small (Automatic)   Pulse: 69   Weight: 80.7 kg (178 lb)   Height: 5' 7" (1.702 m)     Physical Exam:    GEN:   Well-appearing  Psych:  Appropriate affect, demonstrates insight  SKIN:  No rash on the face or bridge of the nose      LABS:   Lab Results   Component Value Date    CO2 21 (L) 09/07/2023       RECORDS REVIEWED PREVIOUSLY:     HST 6/19/23: AHI 26, RDI 39    ASSESSMENT    ESS today: 10/24     PROBLEM DESCRIPTION/ Sx on Presentation Interval Hx STATUS   Screening for NIDIA    No history snoring, no arousals, no  witnessed apneas   No current bed partner  Elevated blood pressure, sleepiness, nocturia, insomnia     working on usage, good efficiency when using Not yet controlled   Daytime Sx    + sleepiness when inactive   ESS 12/24 on intake (reviewed from 6/13/23)  less sleepy when able to wear for longer periods, 2 days when he was able to sleep for 5 hours with PAP he felt great Somewhat improved   Insomnia    Has a hard time getting more than 4 hours of good sleep  Trouble falling asleep: has to be very tired  Maintenance:         waking frequently  Prior hypnotics:        Current hypnotics:      still having difficulty maintaining sleep persists   Nocturia    x 3 per sleep period  persists persists   Other issues:     PLAN     -using and benefiting from CPAP therapy when able to tolerate  -work on using for longer periods and desensitization  -discussed recommended usage time; minimum of 4 hours 70% of nights, ideal 6+ hours 100% of the nights  -recommended CPAP " titration if CPAP intolerance persists  -trial of flonase nasal spray, use nightly for minimum of 2 weeks to determine efficacy; will see if this improves congestion/breathing on CPAP  -discussed NIDIA and CPAP with patient in detail, including possible complications of untreated NIDIA like heart attack/stroke  -advised on strict driving precautions; advised never to drive drowsy    Advised on plan of care. Answered all patient questions. Patient verbalized understanding and voiced agreement with plan of care.       RTC 4-8 weeks or as needed       The patient was given open opportunity to ask questions and/or express concerns about treatment plan. All questions/concerns were discussed.     Two patient identifiers used prior to evaluation.

## 2023-11-06 ENCOUNTER — OFFICE VISIT (OUTPATIENT)
Dept: SLEEP MEDICINE | Facility: CLINIC | Age: 73
End: 2023-11-06
Payer: MEDICARE

## 2023-11-06 VITALS
DIASTOLIC BLOOD PRESSURE: 74 MMHG | HEIGHT: 67 IN | BODY MASS INDEX: 27.94 KG/M2 | WEIGHT: 178 LBS | HEART RATE: 65 BPM | SYSTOLIC BLOOD PRESSURE: 133 MMHG

## 2023-11-06 DIAGNOSIS — G47.33 OSA (OBSTRUCTIVE SLEEP APNEA): Primary | ICD-10-CM

## 2023-11-06 PROCEDURE — 99214 PR OFFICE/OUTPT VISIT, EST, LEVL IV, 30-39 MIN: ICD-10-PCS | Mod: S$PBB,,, | Performed by: PHYSICIAN ASSISTANT

## 2023-11-06 PROCEDURE — 99999 PR PBB SHADOW E&M-EST. PATIENT-LVL III: CPT | Mod: PBBFAC,,, | Performed by: PHYSICIAN ASSISTANT

## 2023-11-06 PROCEDURE — 99214 OFFICE O/P EST MOD 30 MIN: CPT | Mod: S$PBB,,, | Performed by: PHYSICIAN ASSISTANT

## 2023-11-06 PROCEDURE — 99999 PR PBB SHADOW E&M-EST. PATIENT-LVL III: ICD-10-PCS | Mod: PBBFAC,,, | Performed by: PHYSICIAN ASSISTANT

## 2023-11-06 PROCEDURE — 99213 OFFICE O/P EST LOW 20 MIN: CPT | Mod: PBBFAC | Performed by: PHYSICIAN ASSISTANT

## 2023-11-06 NOTE — PROGRESS NOTES
Referred by No ref. provider found     ESTABLISHED PATIENT VISIT    Esteban Mike  is a pleasant 73 y.o. male  with PMH significant for HTN, HLD, CAD, PAD, DM II, neuropathy, BPH, NAFLD, GERD, BMI 27+, NIDIA        Here today for: CPAP follow-up     PLAN last visit:   -using and benefiting from CPAP therapy when able to tolerate  -work on using for longer periods and desensitization  -discussed recommended usage time; minimum of 4 hours 70% of nights, ideal 6+ hours 100% of the nights  -recommended CPAP titration if CPAP intolerance persists  -trial of flonase nasal spray, use nightly for minimum of 2 weeks to determine efficacy; will see if this improves congestion/breathing on CPAP  -discussed NIDIA and CPAP with patient in detail, including possible complications of untreated NIDIA like heart attack/stroke  -advised on strict driving precautions; advised never to drive drowsy      Since last visit:   Tolerating CPAP much better now. Using nightly, feels more rested. Also reports Flonase has been helping nasal congestion. Reports mask interface and pressure settings are comfortable. No complaints at this time.      PAP history   Problems    Mask FFM   Pressure 6-12cwp   DME HME   Machine age AirSense 11 8/18/23   Download 11/6/23: 30/30 x 4hrs 36mins, 6-12cwp (6.6/9.8/10.6), leak (0/0.3/70.6), AHI 1.1       SLEEP SCHEDULE   Environment     Bed Time 11:30P   Sleep Latency 45min   Arousals 3   Nocturia 3   Back to sleep 15 min    Wake time 6AM   Naps     Work          Past Medical History:   Diagnosis Date    Aortic valve disease     Bilateral carotid artery stenosis 7/18/2018    CAD (coronary artery disease)     Carotid artery occlusion      LICA, 70-80% JULIUS    Diabetes mellitus     Diabetes mellitus type II     Elevated LFTs     GERD (gastroesophageal reflux disease)     Hyperlipidemia     Hypertension     PVD (peripheral vascular disease)      Patient Active Problem List   Diagnosis    Coronary artery  disease involving autologous vein coronary bypass graft without angina pectoris    Hyperlipidemia    PVD (peripheral vascular disease)    Internal carotid artery occlusion, right    Left carotid artery stenosis    NAFLD (nonalcoholic fatty liver disease)    Hypertension, essential    Type 2 diabetes, with peripheral circulatory disorder not at goal    Diabetic neuropathy associated with type 2 diabetes mellitus    Noncompliance    Benign prostatic hyperplasia    NIDIA (obstructive sleep apnea)    Gastroesophageal reflux disease with esophagitis    Positive cardiac stress test    Hypercalcemia    Type 2 diabetes mellitus, without long-term current use of insulin       Current Outpatient Medications:     aspirin 81 MG chewable tablet, Take 81 mg by mouth once daily., Disp: , Rfl:     atorvastatin (LIPITOR) 80 MG tablet, TAKE ONE TABLET BY MOUTH DAILY, Disp: 90 tablet, Rfl: 2    blood sugar diagnostic Strp, To check BG 3 times daily, to use with insurance preferred meter, Disp: 200 strip, Rfl: 11    blood-glucose meter kit, To check BG 3 times daily, to use with insurance preferred meter, Disp: 1 each, Rfl: 0    carvediloL (COREG) 25 MG tablet, Take 1 tablet (25 mg total) by mouth 2 (two) times daily with meals., Disp: 180 tablet, Rfl: 3    dulaglutide (TRULICITY) 4.5 mg/0.5 mL pen injector, inject 4.5 MG into THE SKIN every SEVEN DAYS, Disp: 4 pen , Rfl: 7    fish oil-omega-3 fatty acids 300-1,000 mg capsule, Take 2 g by mouth once daily. 3 tablets, Disp: , Rfl:     fluticasone propionate (FLONASE) 50 mcg/actuation nasal spray, 1 spray (50 mcg total) by Each Nostril route once daily., Disp: 30 mL, Rfl: 3    glipiZIDE (GLUCOTROL) 5 MG tablet, 2.5 mg with breakfast (1/2 tab) and 5 mg with dinner (1 tab), Disp: 135 tablet, Rfl: 1    irbesartan (AVAPRO) 300 MG tablet, TAKE ONE TABLET BY MOUTH EVERY EVENING, Disp: 90 tablet, Rfl: 1    JARDIANCE 25 mg tablet, Take 1 tablet (25 mg total) by mouth once daily., Disp: 90 tablet,  "Rfl: 1    lancets Misc, To check BG 3 times daily, to use with insurance preferred meter, Disp: 200 each, Rfl: 11    metFORMIN (GLUCOPHAGE-XR) 500 MG ER 24hr tablet, TAKE TWO TABLETS BY MOUTH TWICE DAILY with meals, Disp: 360 tablet, Rfl: 1    multivitamin capsule, Take 1 capsule by mouth once daily., Disp: , Rfl:     NIFEdipine (PROCARDIA-XL) 60 MG (OSM) 24 hr tablet, Take 1 tablet (60 mg total) by mouth once daily., Disp: 90 tablet, Rfl: 1    pantoprazole (PROTONIX) 40 MG tablet, TAKE ONE TABLET BY MOUTH ONCE DAILY BEFORE BREAKFAST, Disp: 90 tablet, Rfl: 3    tamsulosin (FLOMAX) 0.4 mg Cap, Take 2 capsules (0.8 mg total) by mouth once daily., Disp: 60 capsule, Rfl: 0    vit C-vit E-lutein-min-om-3 646-54-9-150 mg-unit-mg-mg Cap, Take 1 tablet by mouth once daily., Disp: , Rfl:        Vitals:    23 0941   BP: 133/74   BP Location: Right arm   Patient Position: Sitting   BP Method: Small (Automatic)   Pulse: 65   Weight: 80.7 kg (178 lb)   Height: 5' 7" (1.702 m)     Physical Exam:    GEN:   Well-appearing  Psych:  Appropriate affect, demonstrates insight  SKIN:  No rash on the face or bridge of the nose      LABS:   Lab Results   Component Value Date    CO2 21 (L) 2023       RECORDS REVIEWED PREVIOUSLY:     HST 23: AHI 26, RDI 39    ASSESSMENT     [place] Sleepiness Scale:  Sitting and readin  Watching TV:    1  Passenger in a car x 1 hr:  0  Sitting quietly after lunch:  2  Lying down to rest in PM:  3  Sitting, inactive in public:  0  Sitting+ talking to someone:  0  Stopped in traffic:   0  Total         PROBLEM DESCRIPTION/ Sx on Presentation Interval Hx STATUS   Moderate NIDIA    No history snoring, no arousals, no  witnessed apneas   No current bed partner  Elevated blood pressure, sleepiness, nocturia, insomnia    good usage  and efficiency  controlled   Daytime Sx    + sleepiness when inactive   ESS  on intake (reviewed from 23)  less sleepy on CPAP improved   Insomnia    " Has a hard time getting more than 4 hours of good sleep  Trouble falling asleep: has to be very tired  Maintenance:         waking frequently  Prior hypnotics:        Current hypnotics:      still having some difficulty maintaining sleep slightly improved   Nocturia    x 3 per sleep period  2-3 x nightly  persists   Nasal congestion  Chronic nasal congestion Using flonase nightly with improvement in nasal congestion  stable   Other issues:     PLAN     -using and benefiting from CPAP therapy  -continue CPAP 6-12cwp nightly  -discussed recommended usage time; minimum of 4 hours 70% of nights, ideal 6+ hours 100% of the nights  -CPAP supplies ordered  -discussed NIDIA and CPAP with patient in detail, including possible complications of untreated NIDIA like heart attack/stroke  -advised on strict driving precautions; advised never to drive drowsy    Advised on plan of care. Answered all patient questions. Patient verbalized understanding and voiced agreement with plan of care.       RTC 12 months or as needed     The patient was given open opportunity to ask questions and/or express concerns about treatment plan. All questions/concerns were discussed.     Two patient identifiers used prior to evaluation.

## 2023-12-07 ENCOUNTER — OFFICE VISIT (OUTPATIENT)
Dept: URGENT CARE | Facility: CLINIC | Age: 73
End: 2023-12-07
Payer: MEDICARE

## 2023-12-07 VITALS
HEIGHT: 67 IN | RESPIRATION RATE: 16 BRPM | WEIGHT: 170 LBS | SYSTOLIC BLOOD PRESSURE: 145 MMHG | BODY MASS INDEX: 26.68 KG/M2 | HEART RATE: 57 BPM | TEMPERATURE: 98 F | OXYGEN SATURATION: 97 % | DIASTOLIC BLOOD PRESSURE: 77 MMHG

## 2023-12-07 DIAGNOSIS — S39.012A STRAIN OF MUSCLE, FASCIA AND TENDON OF LOWER BACK, INITIAL ENCOUNTER: Primary | ICD-10-CM

## 2023-12-07 PROCEDURE — 99213 OFFICE O/P EST LOW 20 MIN: CPT | Mod: S$GLB,,, | Performed by: PHYSICIAN ASSISTANT

## 2023-12-07 PROCEDURE — 99213 PR OFFICE/OUTPT VISIT, EST, LEVL III, 20-29 MIN: ICD-10-PCS | Mod: S$GLB,,, | Performed by: PHYSICIAN ASSISTANT

## 2023-12-07 RX ORDER — KETOROLAC TROMETHAMINE 30 MG/ML
30 INJECTION, SOLUTION INTRAMUSCULAR; INTRAVENOUS
Status: COMPLETED | OUTPATIENT
Start: 2023-12-07 | End: 2023-12-07

## 2023-12-07 RX ORDER — MELOXICAM 15 MG/1
15 TABLET ORAL DAILY
Qty: 7 TABLET | Refills: 0 | Status: SHIPPED | OUTPATIENT
Start: 2023-12-07 | End: 2023-12-14

## 2023-12-07 RX ADMIN — KETOROLAC TROMETHAMINE 30 MG: 30 INJECTION, SOLUTION INTRAMUSCULAR; INTRAVENOUS at 10:12

## 2023-12-07 NOTE — PROGRESS NOTES
"Subjective:      Patient ID: Esteban Mike is a 73 y.o. male.    Vitals:  height is 5' 7" (1.702 m) and weight is 77.1 kg (170 lb). His oral temperature is 97.8 °F (36.6 °C). His blood pressure is 145/77 (abnormal) and his pulse is 57 (abnormal). His respiration is 16 and oxygen saturation is 97%.     Chief Complaint: Back Pain    Patient reports doing some work for himself and now he is having left lower back pain.Patient reports he is using a back brace and used Salonpas Pain Patches.    Back Pain  This is a new problem. The current episode started yesterday. The problem occurs constantly. The problem is unchanged. The pain is present in the lumbar spine (left side). The pain does not radiate. The pain is at a severity of 6/10. The pain is The same all the time. The symptoms are aggravated by bending. Pertinent negatives include no abdominal pain, bladder incontinence, bowel incontinence, chest pain, dysuria, fever, numbness or tingling. Treatments tried: Salonpas Pain Patch;back brace. The treatment provided no relief.       Constitution: Negative for chills and fever.   Neck: Negative for neck pain and painful lymph nodes.   Cardiovascular:  Negative for chest pain.   Respiratory:  Negative for shortness of breath.    Gastrointestinal:  Negative for abdominal pain, nausea, vomiting, constipation, diarrhea and bowel incontinence.   Genitourinary:  Negative for dysuria and bladder incontinence.   Musculoskeletal:  Positive for pain, back pain and muscle ache. Negative for trauma, joint pain, joint swelling, abnormal ROM of joint, arthritis, pain with walking, muscle cramps and history of spine disorder.   Skin:  Negative for rash, wound, erythema and bruising.   Neurological:  Negative for dizziness, altered mental status, numbness, tingling and tremors.   Hematologic/Lymphatic: Negative for swollen lymph nodes.   Psychiatric/Behavioral:  Negative for altered mental status.       Past Medical History: "   Diagnosis Date    Aortic valve disease     Bilateral carotid artery stenosis 7/18/2018    CAD (coronary artery disease)     Carotid artery occlusion      LICA, 70-80% JULIUS    Diabetes mellitus     Diabetes mellitus type II     Elevated LFTs     GERD (gastroesophageal reflux disease)     Hyperlipidemia     Hypertension     PVD (peripheral vascular disease)        Past Surgical History:   Procedure Laterality Date    CARDIAC CATHETERIZATION      carotid      CEA      COLONOSCOPY N/A 12/4/2020    Procedure: COLONOSCOPY;  Surgeon: William Holman MD;  Location: [facility] ENDO (4TH FLR);  Service: Endoscopy;  Laterality: N/A;  merged orders together    CORONARY ARTERY BYPASS GRAFT  5/2/08    X4    ESOPHAGOGASTRODUODENOSCOPY N/A 4/1/2019    Procedure: EGD (ESOPHAGOGASTRODUODENOSCOPY);  Surgeon: William Holman MD;  Location: [facility] END[facility] (4TH FLR);  Service: Endoscopy;  Laterality: N/A;    ESOPHAGOGASTRODUODENOSCOPY N/A 12/4/2020    Procedure: EGD (ESOPHAGOGASTRODUODENOSCOPY);  Surgeon: William Holman MD;  Location: [facility] (4TH FLR);  Service: Endoscopy;  Laterality: N/A;  covid test [facility] 12/1    ESOPHAGOGASTRODUODENOSCOPY N/A 5/18/2022    Procedure: EGD (ESOPHAGOGASTRODUODENOSCOPY);  Surgeon: William Holman MD;  Location: [facility] END[facility] (2ND FLR);  Service: Endoscopy;  Laterality: N/A;  fully vaccinated  2nd floor availability/ inst. mailed-RB    HERNIA REPAIR      ica stent      TONSILLECTOMY         Family History   Problem Relation Age of Onset    Diabetes Mother     Heart disease Mother     Heart disease Father     Heart disease Maternal Grandfather     Heart disease Paternal Grandfather     Cirrhosis Paternal Grandfather     Colon cancer Neg Hx     Inflammatory bowel disease Neg Hx     Stomach cancer Neg Hx     Skin cancer Neg Hx        Social History     Socioeconomic History    Marital status:    Occupational History    Occupation: [employer]     Employer: [employer]   Tobacco  Use    Smoking status: Never    Smokeless tobacco: Never   Substance and Sexual Activity    Alcohol use: Yes     Comment: Socially     Drug use: No    Sexual activity: Yes     Partners: Female     Birth control/protection: None   Social History 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      atorvastatin (LIPITOR) 80 MG tablet TAKE ONE TABLET BY MOUTH DAILY 90 tablet 2    carvediloL (COREG) 25 MG tablet Take 1 tablet (25 mg total) by mouth 2 (two) times daily with meals. 180 tablet 3    dulaglutide (TRULICITY) 4.5 mg/0.5 mL pen injector inject 4.5 MG into THE SKIN every SEVEN DAYS 4 pen 7    fish oil-omega-3 fatty acids 300-1,000 mg capsule Take 2 g by mouth once daily. 3 tablets      fluticasone propionate (FLONASE) 50 mcg/actuation nasal spray 1 spray (50 mcg total) by Each Nostril route once daily. 30 mL 3    glipiZIDE (GLUCOTROL) 5 MG tablet 2.5 mg with breakfast (1/2 tab) and 5 mg with dinner (1 tab) 135 tablet 1    irbesartan (AVAPRO) 300 MG tablet TAKE ONE TABLET BY MOUTH EVERY EVENING 90 tablet 1    JARDIANCE 25 mg tablet Take 1 tablet (25 mg total) by mouth once daily. 90 tablet 1    metFORMIN (GLUCOPHAGE-XR) 500 MG ER 24hr tablet TAKE TWO TABLETS BY MOUTH TWICE DAILY with meals 360 tablet 1    multivitamin capsule Take 1 capsule by mouth once daily.      NIFEdipine (PROCARDIA-XL) 60 MG (OSM) 24 hr tablet Take 1 tablet (60 mg total) by mouth once daily. 90 tablet 1    pantoprazole (PROTONIX) 40 MG tablet TAKE ONE TABLET BY MOUTH ONCE DAILY BEFORE BREAKFAST 90 tablet 3    tamsulosin (FLOMAX) 0.4 mg Cap Take 2 capsules (0.8 mg total) by mouth once daily. 60 capsule 0    vit C-vit E-lutein-min-om-3 137-98-2-150 mg-unit-mg-mg Cap Take 1 tablet by mouth once daily.      blood sugar diagnostic Strp To check BG 3 times daily, to use with insurance preferred  meter 200 strip 11    blood-glucose meter kit To check BG 3 times daily, to use with insurance preferred meter 1 each 0    lancets Misc To check BG 3 times daily, to use with insurance preferred meter 200 each 11    meloxicam (MOBIC) 15 MG tablet Take 1 tablet (15 mg total) by mouth once daily. for 7 days 7 tablet 0     Current Facility-Administered Medications   Medication Dose Route Frequency Provider Last Rate Last Admin    ketorolac injection 30 mg  30 mg Intramuscular 1 time in Clinic/HOD Brandy Owen PA-C           Review of patient's allergies indicates:  No Known Allergies    Objective:     Physical Exam   Constitutional: He is oriented to person, place, and time.  Non-toxic appearance. He does not appear ill. No distress. normal  HENT:   Head: Normocephalic and atraumatic.   Eyes: Conjunctivae are normal.   Cardiovascular: Normal rate, regular rhythm and normal pulses.   Pulmonary/Chest: Effort normal. No respiratory distress.   Abdominal: Normal appearance.   Musculoskeletal: Normal range of motion.         General: Tenderness present. No swelling, deformity or signs of injury. Normal range of motion.      Thoracic back: Normal.      Lumbar back: He exhibits tenderness. He exhibits normal range of motion, no bony tenderness, no swelling, no edema, no deformity, no laceration and no spasm.        Back:    Neurological: no focal deficit. He is alert and oriented to person, place, and time. He displays no weakness. No sensory deficit. Coordination normal.   Skin: Skin is warm, dry and not diaphoretic. No erythema   Psychiatric: His behavior is normal. Mood, judgment and thought content normal.   Nursing note and vitals reviewed.      Assessment:     1. Strain of muscle, fascia and tendon of lower back, initial encounter        Plan:       Strain of muscle, fascia and tendon of lower back, initial encounter  -     ketorolac injection 30 mg  -     meloxicam (MOBIC) 15 MG tablet; Take 1 tablet (15 mg  total) by mouth once daily. for 7 days  Dispense: 7 tablet; Refill: 0    I have reviewed the patient chart and pertinent past imaging/labs.  Patient Instructions   You were given a Toradol injection today please do not start taking the meloxicam until tomorrow and take with food.  You can safely take Tylenol with both these medications however avoid ibuprofen/Advil.  Ice for 15 minutes at a time 3 to 4 times a day.  Continue wearing back brace for support.  If your symptoms do not improve please return for evaluation

## 2023-12-07 NOTE — PATIENT INSTRUCTIONS
You were given a Toradol injection today please do not start taking the meloxicam until tomorrow and take with food.  You can safely take Tylenol with both these medications however avoid ibuprofen/Advil.  Ice for 15 minutes at a time 3 to 4 times a day.  Continue wearing back brace for support.  If your symptoms do not improve please return for evaluation

## 2023-12-15 ENCOUNTER — OFFICE VISIT (OUTPATIENT)
Dept: DERMATOLOGY | Facility: CLINIC | Age: 73
End: 2023-12-15
Payer: MEDICARE

## 2023-12-15 DIAGNOSIS — D22.9 MULTIPLE BENIGN NEVI: ICD-10-CM

## 2023-12-15 DIAGNOSIS — L81.4 LENTIGO: ICD-10-CM

## 2023-12-15 DIAGNOSIS — D18.01 CHERRY ANGIOMA: ICD-10-CM

## 2023-12-15 DIAGNOSIS — D48.5 NEOPLASM OF UNCERTAIN BEHAVIOR OF SKIN: Primary | ICD-10-CM

## 2023-12-15 DIAGNOSIS — L82.1 SEBORRHEIC KERATOSES: ICD-10-CM

## 2023-12-15 DIAGNOSIS — Z12.83 SCREENING EXAM FOR SKIN CANCER: ICD-10-CM

## 2023-12-15 PROCEDURE — 88305 TISSUE EXAM BY PATHOLOGIST: CPT | Mod: 26,,, | Performed by: PATHOLOGY

## 2023-12-15 PROCEDURE — 88305 TISSUE EXAM BY PATHOLOGIST: CPT | Performed by: PATHOLOGY

## 2023-12-15 PROCEDURE — 99203 PR OFFICE/OUTPT VISIT, NEW, LEVL III, 30-44 MIN: ICD-10-PCS | Mod: 25,S$PBB,, | Performed by: STUDENT IN AN ORGANIZED HEALTH CARE EDUCATION/TRAINING PROGRAM

## 2023-12-15 PROCEDURE — 11102 TANGNTL BX SKIN SINGLE LES: CPT | Mod: S$PBB,,, | Performed by: STUDENT IN AN ORGANIZED HEALTH CARE EDUCATION/TRAINING PROGRAM

## 2023-12-15 PROCEDURE — 11102 PR TANGENTIAL BIOPSY, SKIN, SINGLE LESION: ICD-10-PCS | Mod: S$PBB,,, | Performed by: STUDENT IN AN ORGANIZED HEALTH CARE EDUCATION/TRAINING PROGRAM

## 2023-12-15 PROCEDURE — 99999 PR PBB SHADOW E&M-EST. PATIENT-LVL IV: ICD-10-PCS | Mod: PBBFAC,,, | Performed by: STUDENT IN AN ORGANIZED HEALTH CARE EDUCATION/TRAINING PROGRAM

## 2023-12-15 PROCEDURE — 99214 OFFICE O/P EST MOD 30 MIN: CPT | Mod: PBBFAC,25 | Performed by: STUDENT IN AN ORGANIZED HEALTH CARE EDUCATION/TRAINING PROGRAM

## 2023-12-15 PROCEDURE — 99999 PR PBB SHADOW E&M-EST. PATIENT-LVL IV: CPT | Mod: PBBFAC,,, | Performed by: STUDENT IN AN ORGANIZED HEALTH CARE EDUCATION/TRAINING PROGRAM

## 2023-12-15 PROCEDURE — 11102 TANGNTL BX SKIN SINGLE LES: CPT | Mod: PBBFAC | Performed by: STUDENT IN AN ORGANIZED HEALTH CARE EDUCATION/TRAINING PROGRAM

## 2023-12-15 PROCEDURE — 99203 OFFICE O/P NEW LOW 30 MIN: CPT | Mod: 25,S$PBB,, | Performed by: STUDENT IN AN ORGANIZED HEALTH CARE EDUCATION/TRAINING PROGRAM

## 2023-12-15 PROCEDURE — 88305 TISSUE EXAM BY PATHOLOGIST: ICD-10-PCS | Mod: 26,,, | Performed by: PATHOLOGY

## 2023-12-15 NOTE — PROGRESS NOTES
Subjective:      Patient ID:  Esteban Mike is a 73 y.o. male who presents for   Chief Complaint   Patient presents with    Skin Check     UBSE      Patient here for Upper Body Skin Exam    Last seen by dermatologist: 2018    yes - personal history of atypical moles removed  None - personal history of MM   none - family history of MM  none - childhood blistering sunburns  none - tanning bed use  none - personal history of NMSC    Patient with specific complaint of lesion(s)  Location: L-upper arm   Duration:  3 yrs   Symptoms: intermittent itch and flakes   Relieving factors/Previous treatments: none               Review of Systems   Skin:  Positive for wears hat. Negative for daily sunscreen use, activity-related sunscreen use and recent sunburn.   Hematologic/Lymphatic: Does not bruise/bleed easily.       Objective:   Physical Exam   Constitutional: He appears well-developed and well-nourished. No distress.   Neurological: He is alert and oriented to person, place, and time. He is not disoriented.   Psychiatric: He has a normal mood and affect.   Skin:   Areas Examined (abnormalities noted in diagram):   Scalp / Hair Palpated and Inspected  Head / Face Inspection Performed  Neck Inspection Performed  Chest / Axilla Inspection Performed  Back Inspection Performed  RUE Inspected  LUE Inspection Performed                 Diagram Legend     Erythematous scaling macule/papule c/w actinic keratosis       Vascular papule c/w angioma      Pigmented verrucoid papule/plaque c/w seborrheic keratosis      Yellow umbilicated papule c/w sebaceous hyperplasia      Irregularly shaped tan macule c/w lentigo     1-2 mm smooth white papules consistent with Milia      Movable subcutaneous cyst with punctum c/w epidermal inclusion cyst      Subcutaneous movable cyst c/w pilar cyst      Firm pink to brown papule c/w dermatofibroma      Pedunculated fleshy papule(s) c/w skin tag(s)      Evenly pigmented macule c/w  junctional nevus     Mildly variegated pigmented, slightly irregular-bordered macule c/w mildly atypical nevus      Flesh colored to evenly pigmented papule c/w intradermal nevus       Pink pearly papule/plaque c/w basal cell carcinoma      Erythematous hyperkeratotic cursted plaque c/w SCC      Surgical scar with no sign of skin cancer recurrence      Open and closed comedones      Inflammatory papules and pustules      Verrucoid papule consistent consistent with wart     Erythematous eczematous patches and plaques     Dystrophic onycholytic nail with subungual debris c/w onychomycosis     Umbilicated papule    Erythematous-base heme-crusted tan verrucoid plaque consistent with inflamed seborrheic keratosis     Erythematous Silvery Scaling Plaque c/w Psoriasis     See annotation            Assessment / Plan:      Pathology Orders:       Normal Orders This Visit    Specimen to Pathology, Dermatology     Questions:    Procedure Type: Dermatology and skin neoplasms    Number of Specimens: 1    ------------------------: -------------------------    Spec 1 Procedure: Biopsy    Spec 1 Clinical Impression: sebaceous hyperplasia vs bcc    Spec 1 Source: mid forehead    Release to patient: Immediate          Neoplasm of uncertain behavior of skin  -     Specimen to Pathology, Dermatology    Shave biopsy procedure note:    Shave biopsy performed after verbal consent including risk of infection, scar, recurrence, need for additional treatment of site. Area prepped with alcohol, anesthetized with approximately 1.0cc of 1% lidocaine with epinephrine. Lesional tissue shaved with razor blade. Hemostasis achieved with application of aluminum chloride followed by hyfrecation. No complications. Dressing applied. Wound care explained.    Lentigo  Cesar angioma  Multiple benign nevi  Seborrheic keratoses  Reassurance given to patient. No treatment is necessary.   Treatment of benign, asymptomatic lesions may be considered  cosmetic.    Screening exam for skin cancer  Upper body skin examination performed today including at least 6 points as noted in physical examination. Suspicious lesions noted.    Recommend daily sun protection/avoidance and use of at least SPF 30, broad spectrum sunscreen (OTC drug).              Follow up in about 1 year (around 12/15/2024) for TBSE.

## 2023-12-15 NOTE — PATIENT INSTRUCTIONS
Shave Biopsy Wound Care    Your doctor has performed a shave biopsy today.  A band aid and vaseline ointment has been placed over the site.  This should remain in place for NO LONGER THAN 48 hours.  It is fine to remove the bandaid after 24 hours, if the area is no longer bleeding. It is recommended that you keep the area dry (do not wet)) for the first 24 hours.  After 24 hours, wash the area with warm soap and water and apply Vaseline jelly.  Many patients prefer to use Neosporin or Bacitracin ointment.  This is acceptable; however, know that you can develop an allergy to this medication even if you have used it safely for years.  It is important to keep the area moist.  Letting it dry out and get air slows healing time, and will worsen the scar.        If you notice increasing redness, tenderness, pain, or yellow drainage at the biopsy site, please notify your doctor.  These are signs of an infection.    If your biopsy site is bleeding, apply firm pressure for 15 minutes straight.  Repeat for another 15 minutes, if it is still bleeding.   If the surgical site continues to bleed, then please contact your doctor.      For [facility] users:   You will receive your biopsy results in [facility] as soon as they are available. Please be assured that your physician/provider will review your results and will then determine what further treatment, evaluation, or planning is required. You should be contacted by your physician's/provider's office within 5 business days of receiving your results; If not, please reach out to directly. This is one more way [email or web address] is putting you first.     [facility] [place] [address]/ [phone] FAX/ [email or web address]

## 2023-12-22 LAB
FINAL PATHOLOGIC DIAGNOSIS: NORMAL
GROSS: NORMAL
Lab: NORMAL
MICROSCOPIC EXAM: NORMAL

## 2024-01-18 ENCOUNTER — OFFICE VISIT (OUTPATIENT)
Dept: INTERNAL MEDICINE | Facility: CLINIC | Age: 74
End: 2024-01-18
Payer: MEDICARE

## 2024-01-18 VITALS
DIASTOLIC BLOOD PRESSURE: 70 MMHG | BODY MASS INDEX: 27.64 KG/M2 | HEART RATE: 79 BPM | WEIGHT: 176.13 LBS | OXYGEN SATURATION: 98 % | HEIGHT: 67 IN | SYSTOLIC BLOOD PRESSURE: 142 MMHG

## 2024-01-18 DIAGNOSIS — I10 HYPERTENSION, ESSENTIAL: ICD-10-CM

## 2024-01-18 DIAGNOSIS — A08.4 VIRAL GASTROENTERITIS: Primary | ICD-10-CM

## 2024-01-18 PROCEDURE — 99213 OFFICE O/P EST LOW 20 MIN: CPT | Mod: S$PBB,,, | Performed by: STUDENT IN AN ORGANIZED HEALTH CARE EDUCATION/TRAINING PROGRAM

## 2024-01-18 PROCEDURE — 99999 PR PBB SHADOW E&M-EST. PATIENT-LVL V: CPT | Mod: PBBFAC,,, | Performed by: STUDENT IN AN ORGANIZED HEALTH CARE EDUCATION/TRAINING PROGRAM

## 2024-01-18 PROCEDURE — 99215 OFFICE O/P EST HI 40 MIN: CPT | Mod: PBBFAC | Performed by: STUDENT IN AN ORGANIZED HEALTH CARE EDUCATION/TRAINING PROGRAM

## 2024-01-18 NOTE — PATIENT INSTRUCTIONS
DIARRHEA  Ensure you drink plenty of water. You may try powerade, gatorade, soups, and broth as well. I recommend bland foods. Avoid spicy food.  Add over the counter probiotic or try eating foods rich in probiotics such as yogurt.   Loperamide (imodium) as needed for severe diarrhea - avoid overuse.   Please go to the ER if you experience worsening symptoms or new symptoms of concern.

## 2024-01-18 NOTE — PROGRESS NOTES
[facility] VIS[facility]    CHIEF COMPLAINT:   Chief Complaint   Patient presents with    Diarrhea     Pt states that he started having stomach aches yesterday and last night he was up using the restroom all night.       HISTORY OF PRESENT ILLNESS: Esteban Mike is a 73 y.o. male who presents here today for evaluation of Diarrhea. Patient started having abdominal pain and bloating a couple days ago and was then up all night with diarrhea. He reports 3-5 episodes of diarrhea. He had fecal urgency and incontinence x 1. He also had an episode of nausea and vomiting 2 days ago as well - no episodes since. Denies any blood in stool or vomit. He took imodium early this morning and has had no further episodes. His appetite has been down a little but overall he is able to eat and drink. Denies stool softener or laxative use. Denies recent travel. Denies new foods. Denies sick contacts. Home COVID test was negative.       REVIEW OF SYSTEMS:    Review of Systems   Constitutional:  Negative for chills, fever and weight loss.   HENT:  Negative for congestion and sore throat.    Respiratory:  Negative for cough and hemoptysis.    Cardiovascular:  Negative for chest pain.   Gastrointestinal:  Positive for abdominal pain, diarrhea, nausea and vomiting. Negative for blood in stool, constipation and melena.   Musculoskeletal:  Negative for myalgias.   Neurological:  Negative for dizziness and weakness.       MEDICAL HISTORY:    Past Medical History:   Diagnosis Date    Aortic valve disease     Bilateral carotid artery stenosis 7/18/2018    CAD (coronary artery disease)     Carotid artery occlusion      LICA, 70-80% JULIUS    Diabetes mellitus     Diabetes mellitus type II     Elevated LFTs     GERD (gastroesophageal reflux disease)     Hyperlipidemia     Hypertension     PVD (peripheral vascular disease)        MEDICATIONS:    Current Outpatient Medications on File Prior to Visit   Medication Sig Dispense Refill  "   aspirin 81 MG chewable tablet Take 81 mg by mouth once daily.      atorvastatin (LIPITOR) 80 MG tablet TAKE ONE TABLET BY MOUTH DAILY 90 tablet 2    blood sugar diagnostic Strp To check BG 3 times daily, to use with insurance preferred meter 200 strip 11    blood-glucose meter kit To check BG 3 times daily, to use with insurance preferred meter 1 each 0    carvediloL (COREG) 25 MG tablet Take 1 tablet (25 mg total) by mouth 2 (two) times daily with meals. 180 tablet 3    dulaglutide (TRULICITY) 4.5 mg/0.5 mL pen injector inject 4.5 MG into THE SKIN every SEVEN DAYS 4 pen 7    fish oil-omega-3 fatty acids 300-1,000 mg capsule Take 2 g by mouth once daily. 3 tablets      glipiZIDE (GLUCOTROL) 5 MG tablet Take 0.5 tablets (2.5 mg total) by mouth every morning AND 1 tablet (5 mg total) daily with dinner or evening meal. 135 tablet 1    irbesartan (AVAPRO) 300 MG tablet TAKE ONE TABLET BY MOUTH EVERY EVENING 90 tablet 1    JARDIANCE 25 mg tablet TAKE ONE TABLET BY MOUTH ONCE DAILY 90 tablet 1    lancets Misc To check BG 3 times daily, to use with insurance preferred meter 200 each 11    metFORMIN (GLUCOPHAGE-XR) 500 MG ER 24hr tablet TAKE TWO TABLETS BY MOUTH TWICE DAILY with meals 360 tablet 1    multivitamin capsule Take 1 capsule by mouth once daily.      NIFEdipine (PROCARDIA-XL) 60 MG (OSM) 24 hr tablet Take 1 tablet (60 mg total) by mouth once daily. 90 tablet 1    pantoprazole (PROTONIX) 40 MG tablet TAKE ONE TABLET BY MOUTH ONCE DAILY BEFORE BREAKFAST 90 tablet 3    tamsulosin (FLOMAX) 0.4 mg Cap Take 2 capsules (0.8 mg total) by mouth once daily. 60 capsule 0    vit C-vit E-lutein-min-om-3 228-30-1-150 mg-unit-mg-mg Cap Take 1 tablet by mouth once daily.       No current facility-administered medications on file prior to visit.       PHYSICAL EXAM:    BP (!) 142/70 (BP Location: Right arm, Patient Position: Sitting)   Pulse 79   Ht 5' 7" (1.702 m)   Wt 79.9 kg (176 lb 2.4 oz)   SpO2 98%   BMI 27.59 kg/m² "     Physical Exam  Vitals and nursing note reviewed.   Constitutional:       General: He is not in acute distress.     Appearance: He is not ill-appearing, toxic-appearing or diaphoretic.   HENT:      Head: Normocephalic and atraumatic.      Nose: Nose normal.   Eyes:      Extraocular Movements: Extraocular movements intact.      Conjunctiva/sclera: Conjunctivae normal.      Pupils: Pupils are equal, round, and reactive to light.   Cardiovascular:      Rate and Rhythm: Normal rate and regular rhythm.      Heart sounds: Normal heart sounds. No murmur heard.  Pulmonary:      Effort: Pulmonary effort is normal. No respiratory distress.      Breath sounds: Normal breath sounds.   Abdominal:      General: Bowel sounds are normal. There is no distension.      Palpations: Abdomen is soft. There is no mass.      Tenderness: There is no abdominal tenderness. There is no guarding or rebound.   Musculoskeletal:         General: Normal range of motion.   Skin:     Findings: No lesion or rash.   Neurological:      General: No focal deficit present.      Mental Status: He is alert.      Motor: No weakness.      Gait: Gait normal.   Psychiatric:         Mood and Affect: Mood normal.         Behavior: Behavior normal.         Thought Content: Thought content normal.         Judgment: Judgment normal.             ASSESSMENT & PLAN:    Esteban was seen today for diarrhea.    Diagnoses and all orders for this visit:    Viral gastroenteritis  Patient presents with diarrhea of 3-5 episodes daily for past 24 hours, as well as 2 days of mild abdominal discomfort and bloating. No symptoms since taking imodium early this morning.  No alarm symptoms - denies blood in stool and weight loss. No associated triggers such as recent travel, new medications, antibiotic use, sick contacts, dietary changes.   On examination, bowel sounds are normal. There is no tenderness, guarding, or rebound with palpation of abdomen. Mucus membranes are moist and  patient appears well hydrated.   Presentation consistent with viral gastroenteritis  Discussed symptoms will likely improve/resolve within next 48 hours  Continue adequate oral hydration and bland diet  Advised imodium use with caution   ER precautions discussed            Sofia Inman MD  [facility]

## 2024-01-18 NOTE — TELEPHONE ENCOUNTER
No care due was identified.  [facility] Embedded Care Due Messages. Reference number: [id number].   1/18/2024 8:12:59 AM CST

## 2024-01-18 NOTE — TELEPHONE ENCOUNTER
Refill Routing Note   Medication(s) are not appropriate for processing by [facility] for the following reason(s):        Outside of protocol    [facility] action(s):  Route             Appointments  past 12m or future 3m with PCP    Date Provider   Last Visit   9/7/2023 Ned Hartley MD   Next Visit   Visit date not found Ned Hartley MD   ED visits in past 90 days: 0        Note composed:10:20 AM 01/18/2024

## 2024-01-19 RX ORDER — NIFEDIPINE 60 MG/1
60 TABLET, EXTENDED RELEASE ORAL
Qty: 90 TABLET | Refills: 0 | Status: SHIPPED | OUTPATIENT
Start: 2024-01-19 | End: 2024-05-07 | Stop reason: SDUPTHER

## 2024-02-06 ENCOUNTER — TELEPHONE (OUTPATIENT)
Dept: INTERNAL MEDICINE | Facility: CLINIC | Age: 74
End: 2024-02-06
Payer: MEDICARE

## 2024-02-06 VITALS — SYSTOLIC BLOOD PRESSURE: 130 MMHG | DIASTOLIC BLOOD PRESSURE: 74 MMHG

## 2024-02-21 ENCOUNTER — PATIENT MESSAGE (OUTPATIENT)
Dept: ADMINISTRATIVE | Facility: HOSPITAL | Age: 74
End: 2024-02-21
Payer: MEDICARE

## 2024-02-25 DIAGNOSIS — I10 HYPERTENSION, ESSENTIAL: ICD-10-CM

## 2024-02-25 RX ORDER — IRBESARTAN 300 MG/1
TABLET ORAL
Qty: 90 TABLET | Refills: 1 | Status: SHIPPED | OUTPATIENT
Start: 2024-02-25 | End: 2024-05-07 | Stop reason: SDUPTHER

## 2024-02-25 NOTE — TELEPHONE ENCOUNTER
Provider Staff:  Action required for this patient    Requires labs      Please see care gap opportunities below in Care Due Message.    Thanks!  [facility]     Appointments      Date Provider   Last Visit   9/7/2023 Ned Hartley MD   Next Visit   5/7/2024 Ned Hartley MD     Refill Decision Note   Esteban Mike  is requesting a refill authorization.  Brief Assessment and Rationale for Refill:  Approve     Medication Therapy Plan:         Comments:     Note composed:9:21 AM 02/25/2024             Appointments     Last Visit   9/7/2023 Ned Hartley MD   Next Visit   5/7/2024 Ned Hartley MD

## 2024-02-25 NOTE — TELEPHONE ENCOUNTER
Care Due:                  Date            Visit Type   Department     Provider  --------------------------------------------------------------------------------                                SAME DAY -                              ESTABLISHED   [facility] INTERNAL  Last Visit: 01-      PATIENT      MEDICINE       Sofia LAUGHLIN                              ANNUAL                              CHECKUP/PHY  [facility] INTERNAL  Next Visit: 05-      S            MEDICINE       CORRINE DORAN                                                            Last  Test          Frequency    Reason                     Performed    Due Date  --------------------------------------------------------------------------------    CMP.........  12 months..  atorvastatin.............  03- 03-    HBA1C.......  6 months...  glipiZIDE, metFORMIN.....  09- 03-    Lipid Panel.  12 months..  atorvastatin.............  03- 03-    [facility] Embedded Care Due Messages. Reference number: [id number].   2/25/2024 8:03:15 AM CST

## 2024-03-15 DIAGNOSIS — E11.65 TYPE 2 DIABETES MELLITUS WITH HYPERGLYCEMIA, WITHOUT LONG-TERM CURRENT USE OF INSULIN: ICD-10-CM

## 2024-03-15 NOTE — TELEPHONE ENCOUNTER
No care due was identified.  [facility] Embedded Care Due Messages. Reference number: [id number].   3/15/2024 8:02:46 AM CDT

## 2024-03-15 NOTE — TELEPHONE ENCOUNTER
Refill Routing Note   Medication(s) are not appropriate for processing by [facility] for the following reason(s):        Required labs outdated    [facility] action(s):  Defer               Appointments  past 12m or future 3m with PCP    Date Provider   Last Visit   9/7/2023 Ned Hartley MD   Next Visit   5/7/2024 Ned Hartley MD   ED visits in past 90 days: 0        Note composed:1:24 PM 03/15/2024

## 2024-03-17 RX ORDER — METFORMIN HYDROCHLORIDE 500 MG/1
1000 TABLET, EXTENDED RELEASE ORAL 2 TIMES DAILY WITH MEALS
Qty: 360 TABLET | Refills: 0 | Status: SHIPPED | OUTPATIENT
Start: 2024-03-17 | End: 2024-06-13

## 2024-03-26 ENCOUNTER — OFFICE VISIT (OUTPATIENT)
Dept: OPTOMETRY | Facility: CLINIC | Age: 74
End: 2024-03-26
Payer: MEDICARE

## 2024-03-26 DIAGNOSIS — Z98.890 HISTORY OF REPAIR OF RETINAL DEFECT BY LASER PHOTOCOAGULATION: ICD-10-CM

## 2024-03-26 DIAGNOSIS — H04.123 DRY EYE SYNDROME OF BOTH EYES: Primary | ICD-10-CM

## 2024-03-26 DIAGNOSIS — H25.813 COMBINED FORM OF AGE-RELATED CATARACT, BOTH EYES: ICD-10-CM

## 2024-03-26 DIAGNOSIS — E11.9 TYPE 2 DIABETES MELLITUS WITHOUT RETINOPATHY: ICD-10-CM

## 2024-03-26 DIAGNOSIS — H52.4 PRESBYOPIA: ICD-10-CM

## 2024-03-26 PROCEDURE — 99999 PR PBB SHADOW E&M-EST. PATIENT-LVL III: CPT | Mod: PBBFAC,,, | Performed by: OPTOMETRIST

## 2024-03-26 PROCEDURE — 99213 OFFICE O/P EST LOW 20 MIN: CPT | Mod: PBBFAC | Performed by: OPTOMETRIST

## 2024-03-26 PROCEDURE — 92014 COMPRE OPH EXAM EST PT 1/>: CPT | Mod: S$PBB,,, | Performed by: OPTOMETRIST

## 2024-03-26 NOTE — PROGRESS NOTES
HPI    DLS: 1/25/23 w/ Dr. Braun    73 y.o. male presents for annual diabetic eye exam. Patient complains of   blurred peripheral vision that comes and goes. He reports if he blinks or   rubs his eyes, it gets better. Denies eye pain, trouble driving at night,   headaches, flashes, and floaters.     Gtts:  Pataday PRN QD OU      Last edited by Stephani Braun, OD on 3/26/2024 10:46 AM.            Assessment /Plan     For exam results, see Encounter Report.    Dry eye syndrome of both eyes    Type 2 diabetes mellitus without retinopathy    History of repair of retinal defect by laser photocoagulation    Combined form of age-related cataract, both eyes    Presbyopia      Educated pt on findings. Recommended ATs TID-QID for added lubrication and comfort. Discussed dryness is cause for intermittent blurred vision. If symptoms worsen or dont improve, RTC. Monitor.      2. No retinopathy noted, OU. Continue proper BS control and annual diabetic eye exams. Monitor yearly.      3. 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 Performed by [facility]. Stable since CINDI (01/2023). No holes, tears, detachments noted 360 OU. Discussed s/s of RD and to RTC ASAP if occur. Monitor yearly.     4. Educated pt on findings. Mildly visually significant, however, okay to wait on removal at this time. Monitor yearly.      5. Continue use of OTC reading glasses prn. Monitor yearly.        RTC in 1 year for annual DM eye exam or sooner if needed.

## 2024-03-28 DIAGNOSIS — I10 HYPERTENSION, ESSENTIAL: ICD-10-CM

## 2024-03-28 NOTE — TELEPHONE ENCOUNTER
No care due was identified.  [facility] Embedded Care Due Messages. Reference number: [id number].   3/28/2024 10:23:17 AM CDT

## 2024-03-29 RX ORDER — CARVEDILOL 25 MG/1
25 TABLET ORAL 2 TIMES DAILY WITH MEALS
Qty: 180 TABLET | Refills: 1 | Status: SHIPPED | OUTPATIENT
Start: 2024-03-29 | End: 2024-05-07 | Stop reason: SDUPTHER

## 2024-03-29 NOTE — TELEPHONE ENCOUNTER
Refill Decision Note   Esteban Mike  is requesting a refill authorization.  Brief Assessment and Rationale for Refill:  Approve     Medication Therapy Plan:        Comments:     Note composed:12:04 AM 03/29/2024

## 2024-03-30 DIAGNOSIS — E78.5 HYPERLIPIDEMIA, UNSPECIFIED HYPERLIPIDEMIA TYPE: ICD-10-CM

## 2024-03-30 NOTE — TELEPHONE ENCOUNTER
No care due was identified.  [facility] Embedded Care Due Messages. Reference number: [id number].   3/30/2024 8:04:42 AM CDT

## 2024-04-01 NOTE — TELEPHONE ENCOUNTER
Refill Routing Note   Medication(s) are not appropriate for processing by [facility] for the following reason(s):        Required labs outdated    [facility] action(s):  Defer             Appointments  past 12m or future 3m with PCP    Date Provider   Last Visit   9/7/2023 Ned Hartley MD   Next Visit   5/7/2024 Ned Hartley MD   ED visits in past 90 days: 0        Note composed:11:20 PM 03/31/2024

## 2024-04-02 RX ORDER — ATORVASTATIN CALCIUM 80 MG/1
TABLET, FILM COATED ORAL
Qty: 90 TABLET | Refills: 0 | Status: SHIPPED | OUTPATIENT
Start: 2024-04-02 | End: 2024-05-07 | Stop reason: SDUPTHER

## 2024-05-03 DIAGNOSIS — K22.10 ESOPHAGITIS, EROSIVE: ICD-10-CM

## 2024-05-03 DIAGNOSIS — Z13.820 OSTEOPOROSIS SCREENING: ICD-10-CM

## 2024-05-03 DIAGNOSIS — Z51.81 ENCOUNTER FOR MONITORING LONG-TERM PROTON PUMP INHIBITOR THERAPY: ICD-10-CM

## 2024-05-03 DIAGNOSIS — K44.9 HIATAL HERNIA: ICD-10-CM

## 2024-05-03 DIAGNOSIS — Z79.899 ENCOUNTER FOR MONITORING LONG-TERM PROTON PUMP INHIBITOR THERAPY: ICD-10-CM

## 2024-05-03 NOTE — TELEPHONE ENCOUNTER
----- Message from Sudhakar Camarena sent at 5/3/2024 11:08 AM CDT -----  Contact: [phone]  Requesting an RX refill or new RX.  Is this a refill or new RX: refill   RX name and strength (copy/paste from chart):  pantoprazole (PROTONIX) 40 MG tablet  Is this a 30 day or 90 day RX:   Pharmacy name and phone # (copy/paste from chart):    [facility] [address] [place] [address]  Phone: [phone] Fax: [phone]     The doctors have asked that we provide their patients with the following 2 reminders -- prescription refills can take up to 72 hours, and a friendly reminder that in the future you can use your [facility] account to request refills: y

## 2024-05-03 NOTE — TELEPHONE ENCOUNTER
Refill Routing Note   Medication(s) are not appropriate for processing by [facility] for the following reason(s):        No active prescription written by provider    [facility] action(s):  Defer     Requires labs : Yes             Appointments  past 12m or future 3m with PCP    Date Provider   Last Visit   9/7/2023 Ned Hartley MD   Next Visit   5/7/2024 Ned Hartley MD   ED visits in past 90 days: 0        Note composed:11:21 AM 05/03/2024

## 2024-05-03 NOTE — TELEPHONE ENCOUNTER
Care Due:                  Date            Visit Type   Department     Provider  --------------------------------------------------------------------------------                                SAME DAY -                              ESTABLISHED   [facility] INTERNAL  Last Visit: 01-      PATIENT      MEDICINE       Sofia LAUGHLIN                              ANNUAL                              CHECKUP/PHY  [facility] INTERNAL  Next Visit: 05-      S            MEDICINE       CORRINE DORAN                                                            Last  Test          Frequency    Reason                     Performed    Due Date  --------------------------------------------------------------------------------    CMP.........  12 months..  atorvastatin.............  03- 03-    HBA1C.......  6 months...  glipiZIDE, metFORMIN.....  09- 03-    Lipid Panel.  12 months..  atorvastatin.............  03- 03-    [facility] Embedded Care Due Messages. Reference number: [id number].   5/03/2024 11:15:09 AM CDT

## 2024-05-05 RX ORDER — PANTOPRAZOLE SODIUM 40 MG/1
40 TABLET, DELAYED RELEASE ORAL
Qty: 90 TABLET | Refills: 1 | Status: SHIPPED | OUTPATIENT
Start: 2024-05-05 | End: 2024-05-07 | Stop reason: SDUPTHER

## 2024-05-07 ENCOUNTER — OFFICE VISIT (OUTPATIENT)
Dept: INTERNAL MEDICINE | Facility: CLINIC | Age: 74
End: 2024-05-07
Attending: FAMILY MEDICINE
Payer: MEDICARE

## 2024-05-07 VITALS
HEART RATE: 64 BPM | OXYGEN SATURATION: 96 % | DIASTOLIC BLOOD PRESSURE: 58 MMHG | HEIGHT: 66 IN | WEIGHT: 169.75 LBS | BODY MASS INDEX: 27.28 KG/M2 | SYSTOLIC BLOOD PRESSURE: 120 MMHG

## 2024-05-07 DIAGNOSIS — K22.10 ESOPHAGITIS, EROSIVE: ICD-10-CM

## 2024-05-07 DIAGNOSIS — K21.00 GASTROESOPHAGEAL REFLUX DISEASE WITH ESOPHAGITIS, UNSPECIFIED WHETHER HEMORRHAGE: ICD-10-CM

## 2024-05-07 DIAGNOSIS — I65.21 INTERNAL CAROTID ARTERY OCCLUSION, RIGHT: ICD-10-CM

## 2024-05-07 DIAGNOSIS — E78.5 HYPERLIPIDEMIA, UNSPECIFIED HYPERLIPIDEMIA TYPE: ICD-10-CM

## 2024-05-07 DIAGNOSIS — Z51.81 ENCOUNTER FOR MONITORING LONG-TERM PROTON PUMP INHIBITOR THERAPY: ICD-10-CM

## 2024-05-07 DIAGNOSIS — I65.22 LEFT CAROTID ARTERY STENOSIS: ICD-10-CM

## 2024-05-07 DIAGNOSIS — E11.42 TYPE 2 DIABETES MELLITUS WITH DIABETIC POLYNEUROPATHY, WITHOUT LONG-TERM CURRENT USE OF INSULIN: ICD-10-CM

## 2024-05-07 DIAGNOSIS — I73.9 PVD (PERIPHERAL VASCULAR DISEASE): ICD-10-CM

## 2024-05-07 DIAGNOSIS — E78.2 MIXED HYPERLIPIDEMIA: ICD-10-CM

## 2024-05-07 DIAGNOSIS — K44.9 HIATAL HERNIA: ICD-10-CM

## 2024-05-07 DIAGNOSIS — I25.810 CORONARY ARTERY DISEASE INVOLVING AUTOLOGOUS VEIN CORONARY BYPASS GRAFT WITHOUT ANGINA PECTORIS: ICD-10-CM

## 2024-05-07 DIAGNOSIS — Z79.899 ENCOUNTER FOR MONITORING LONG-TERM PROTON PUMP INHIBITOR THERAPY: ICD-10-CM

## 2024-05-07 DIAGNOSIS — Z00.00 ANNUAL PHYSICAL EXAM: Primary | ICD-10-CM

## 2024-05-07 DIAGNOSIS — Z13.820 OSTEOPOROSIS SCREENING: ICD-10-CM

## 2024-05-07 DIAGNOSIS — I10 HYPERTENSION, ESSENTIAL: ICD-10-CM

## 2024-05-07 DIAGNOSIS — G47.33 OSA (OBSTRUCTIVE SLEEP APNEA): ICD-10-CM

## 2024-05-07 PROCEDURE — 99215 OFFICE O/P EST HI 40 MIN: CPT | Mod: PBBFAC | Performed by: FAMILY MEDICINE

## 2024-05-07 PROCEDURE — 99999 PR PBB SHADOW E&M-EST. PATIENT-LVL V: CPT | Mod: PBBFAC,,, | Performed by: FAMILY MEDICINE

## 2024-05-07 PROCEDURE — 99214 OFFICE O/P EST MOD 30 MIN: CPT | Mod: S$PBB,,, | Performed by: FAMILY MEDICINE

## 2024-05-07 RX ORDER — ATORVASTATIN CALCIUM 80 MG/1
80 TABLET, FILM COATED ORAL DAILY
Qty: 90 TABLET | Refills: 3 | Status: SHIPPED | OUTPATIENT
Start: 2024-05-07

## 2024-05-07 RX ORDER — PANTOPRAZOLE SODIUM 40 MG/1
40 TABLET, DELAYED RELEASE ORAL
Qty: 90 TABLET | Refills: 3 | Status: SHIPPED | OUTPATIENT
Start: 2024-05-07

## 2024-05-07 RX ORDER — CARVEDILOL 25 MG/1
25 TABLET ORAL 2 TIMES DAILY WITH MEALS
Qty: 180 TABLET | Refills: 3 | Status: SHIPPED | OUTPATIENT
Start: 2024-05-07

## 2024-05-07 RX ORDER — IRBESARTAN 300 MG/1
300 TABLET ORAL NIGHTLY
Qty: 90 TABLET | Refills: 3 | Status: SHIPPED | OUTPATIENT
Start: 2024-05-07

## 2024-05-07 RX ORDER — NIFEDIPINE 60 MG/1
60 TABLET, EXTENDED RELEASE ORAL DAILY
Qty: 90 TABLET | Refills: 3 | Status: SHIPPED | OUTPATIENT
Start: 2024-05-07

## 2024-05-07 NOTE — PROGRESS NOTES
Subjective:       Patient ID: Esteban Mike is a 73 y.o. male.    Chief Complaint: Annual Exam    Established patient for an annual wellness check/physical exam and also chronic disease management. Specific complaints - see dictation, M*model entries and please see ROS.  Past, Surgical, Family, Social Histories; Medications, Allergies reviewed and reconciled.  Health maintenance file reviewed and addressed items due. Recent applicable lab, imaging and cardiovascular results reviewed.  Problem list items reviewed and modified or added entries (in the overview section) may not be transcribed into this encounter note due to note writer format.        Established patient follows up for management of chronic medical illnesses with complaints today. Please see dictation and ROS for interval problems, specific complaints and disease management discussion.    Past, Surgical, Family, Social, Histories; Medications, allergies reviewed and reconciled.  Health maintenance file reviewed and addressed items due. Recent applicable lab, imaging and cardiovascular results reviewed.  Problem list items reviewed and modified or added entries (in the overview section) may not be transcribed into this encounter note due to note writer format.        NSIHx      [facility] labs 5/6/2024:  Tot chol 118  HDL 43  LDL 54  A1C 6.4    LFT's nml        Review of Systems   Constitutional:  Negative for appetite change, chills, diaphoresis, fatigue and fever.   HENT:  Negative for congestion, postnasal drip, rhinorrhea, sore throat and trouble swallowing.    Eyes:  Negative for visual disturbance.   Respiratory:  Negative for cough, choking, chest tightness, shortness of breath and wheezing.    Cardiovascular:  Negative for chest pain and leg swelling.   Gastrointestinal:  Positive for diarrhea. Negative for abdominal pain, nausea and vomiting.        Occ    Reflux if doesn't take PPI   Genitourinary:  Negative for difficulty  urinating and hematuria.   Musculoskeletal:  Negative for arthralgias and myalgias.   Skin:  Negative for rash.   Neurological:  Negative for weakness, light-headedness and headaches.   Psychiatric/Behavioral:  Negative for confusion and dysphoric mood.        Objective:      Physical Exam  Vitals and nursing note reviewed.   Constitutional:       Appearance: He is well-developed. He is not diaphoretic.   HENT:      Head: Normocephalic and atraumatic.   Eyes:      General: No scleral icterus.     Conjunctiva/sclera: Conjunctivae normal.   Cardiovascular:      Rate and Rhythm: Normal rate and regular rhythm.      Heart sounds: Normal heart sounds. No murmur heard.     No friction rub. No gallop.   Pulmonary:      Effort: Pulmonary effort is normal. No respiratory distress.      Breath sounds: Normal breath sounds. No wheezing or rales.   Abdominal:      General: There is no distension.      Tenderness: There is no abdominal tenderness.   Musculoskeletal:         General: No deformity.      Cervical back: Normal range of motion and neck supple.   Skin:     General: Skin is warm and dry.      Findings: No erythema or rash.   Neurological:      Mental Status: He is alert and oriented to person, place, and time.      Cranial Nerves: No cranial nerve deficit.      Motor: No tremor.      Coordination: Coordination normal.      Gait: Gait normal.   Psychiatric:         Behavior: Behavior normal.         Thought Content: Thought content normal.         Judgment: Judgment normal.         Assessment:       1. Annual physical exam    2. Type 2 diabetes mellitus with diabetic polyneuropathy, without long-term current use of insulin    3. PVD (peripheral vascular disease)    4. Gastroesophageal reflux disease with esophagitis, unspecified whether hemorrhage    5. Hypertension, essential    6. Mixed hyperlipidemia    7. Internal carotid artery occlusion, right    8. Left carotid artery stenosis    9. Coronary artery disease  involving autologous vein coronary bypass graft without angina pectoris    10. Esophagitis, erosive    11. Hiatal hernia    12. Encounter for monitoring long-term proton pump inhibitor therapy    13. Osteoporosis screening    14. Hyperlipidemia, unspecified hyperlipidemia type    15. NIDIA (obstructive sleep apnea)        Plan:     Medication List with Changes/Refills   Current Medications    ASPIRIN 81 MG CHEWABLE TABLET    Take 81 mg by mouth once daily.    BLOOD SUGAR DIAGNOSTIC STRP    To check BG 3 times daily, to use with insurance preferred meter    BLOOD-GLUCOSE METER KIT    To check BG 3 times daily, to use with insurance preferred meter    DULAGLUTIDE (TRULICITY) 4.5 MG/0.5 ML PEN INJECTOR    inject 4.5 MG into THE SKIN every SEVEN DAYS    FISH OIL-OMEGA-3 FATTY ACIDS 300-1,000 MG CAPSULE    Take 2 g by mouth once daily. 3 tablets    GLIPIZIDE (GLUCOTROL) 5 MG TABLET    Take 0.5 tablets (2.5 mg total) by mouth every morning AND 1 tablet (5 mg total) daily with dinner or evening meal.    JARDIANCE 25 MG TABLET    TAKE ONE TABLET BY MOUTH ONCE DAILY    LANCETS MISC    To check BG 3 times daily, to use with insurance preferred meter    METFORMIN (GLUCOPHAGE-XR) 500 MG ER 24HR TABLET    TAKE TWO TABLETS BY MOUTH TWICE DAILY with meals    MULTIVITAMIN CAPSULE    Take 1 capsule by mouth once daily.    TAMSULOSIN (FLOMAX) 0.4 MG CAP    Take 2 capsules (0.8 mg total) by mouth once daily.    VIT C-VIT E-LUTEIN-MIN-OM-3 682-53-5-150 MG-UNIT-MG-MG CAP    Take 1 tablet by mouth once daily.   Changed and/or Refilled Medications    Modified Medication Previous Medication    ATORVASTATIN (LIPITOR) 80 MG TABLET atorvastatin (LIPITOR) 80 MG tablet       Take 1 tablet (80 mg total) by mouth once daily.    TAKE ONE TABLET BY MOUTH DAILY    CARVEDILOL (COREG) 25 MG TABLET carvediloL (COREG) 25 MG tablet       Take 1 tablet (25 mg total) by mouth 2 (two) times daily with meals.    TAKE ONE TABLET BY MOUTH TWICE DAILY with meals     IRBESARTAN (AVAPRO) 300 MG TABLET irbesartan (AVAPRO) 300 MG tablet       Take 1 tablet (300 mg total) by mouth every evening.    TAKE ONE TABLET BY MOUTH EVERY EVENING    NIFEDIPINE (PROCARDIA-XL) 60 MG (OSM) 24 HR TABLET NIFEdipine (PROCARDIA-XL) 60 MG (OSM) 24 hr tablet       Take 1 tablet (60 mg total) by mouth once daily.    TAKE ONE TABLET BY MOUTH ONCE DAILY    PANTOPRAZOLE (PROTONIX) 40 MG TABLET pantoprazole (PROTONIX) 40 MG tablet       Take 1 tablet (40 mg total) by mouth before breakfast.    Take 1 tablet (40 mg total) by mouth before breakfast.     1. Annual physical exam    2. Type 2 diabetes mellitus with diabetic polyneuropathy, without long-term current use of insulin  Overview:  -followed by endocrinology      3. PVD (peripheral vascular disease)  Overview:  9/21/2020 - TEMI - Normal resting TEMI's. Reduced exercise TEMI's consistent with mild bilateral LE PAD.          4. Gastroesophageal reflux disease with esophagitis, unspecified whether hemorrhage  Overview:  Repeat EGD due in 3 year (5/2025)      5. Hypertension, essential  Overview:  -see 7/6/2023 a and p note  -on digital program    Orders:  -     carvediloL (COREG) 25 MG tablet; Take 1 tablet (25 mg total) by mouth 2 (two) times daily with meals.  Dispense: 180 tablet; Refill: 3  -     irbesartan (AVAPRO) 300 MG tablet; Take 1 tablet (300 mg total) by mouth every evening.  Dispense: 90 tablet; Refill: 3  -     NIFEdipine (PROCARDIA-XL) 60 MG (OSM) 24 hr tablet; Take 1 tablet (60 mg total) by mouth once daily.  Dispense: 90 tablet; Refill: 3    6. Mixed hyperlipidemia    7. Internal carotid artery occlusion, right  Overview:  CEA 2006 - occluded subsequently.    -US 5/11/2021 - There is right Internal Carotid Artery occlusion. Patent left ICA stent without hemodynamically significant restenosis.        Orders:  -     CV Ultrasound Bilateral Doppler Carotid; Future    8. Left carotid artery stenosis  Overview:  Stented - Dr. Case  2007    Orders:  -     CV Ultrasound Bilateral Doppler Carotid; Future    9. Coronary artery disease involving autologous vein coronary bypass graft without angina pectoris  Overview:  -Followed in cardiology - CAD post CABG 2008, post right CEA 2006 and left ICA stent 2007.  -on statin    Orders:  -     Ambulatory referral/consult to Cardiology; Future; Expected date: 05/14/2024    10. Esophagitis, erosive  -     pantoprazole (PROTONIX) 40 MG tablet; Take 1 tablet (40 mg total) by mouth before breakfast.  Dispense: 90 tablet; Refill: 3    11. Hiatal hernia  -     pantoprazole (PROTONIX) 40 MG tablet; Take 1 tablet (40 mg total) by mouth before breakfast.  Dispense: 90 tablet; Refill: 3    12. Encounter for monitoring long-term proton pump inhibitor therapy  -     pantoprazole (PROTONIX) 40 MG tablet; Take 1 tablet (40 mg total) by mouth before breakfast.  Dispense: 90 tablet; Refill: 3    13. Osteoporosis screening  -     pantoprazole (PROTONIX) 40 MG tablet; Take 1 tablet (40 mg total) by mouth before breakfast.  Dispense: 90 tablet; Refill: 3    14. Hyperlipidemia, unspecified hyperlipidemia type  -     atorvastatin (LIPITOR) 80 MG tablet; Take 1 tablet (80 mg total) by mouth once daily.  Dispense: 90 tablet; Refill: 3    15. NIDIA (obstructive sleep apnea)  Overview:  -managed by sleep medicine, 6/13/2023 - on CPAP  -test result located in media tab          See meds, orders, follow up, routing and instructions sections of encounter and AVS. Discussed with patient and provided on AVS.    Discussed diet and exercise as therapeutic modalities for metabolic and other conditions. Provided patient information, which are included as links on the AVS for detailed information.    Lab Results   Component Value Date     09/07/2023    K 4.4 09/07/2023     09/07/2023    BUN 20 09/07/2023    CREATININE 1.2 09/07/2023     (H) 09/07/2023    HGBA1C 6.8 (H) 09/07/2023    HGBA1C 9.7 07/08/2019    MG 2.6  "05/06/2008    AST 20 03/07/2023    ALT 34 03/07/2023    ALBUMIN 4.2 03/07/2023    PROT 6.7 03/07/2023    BILITOT 0.8 03/07/2023    CHOL 120 03/07/2023    HDL 36 (L) 03/07/2023    HDL 46 07/08/2019    LDLCALC 51.8 (L) 03/07/2023    LDLCALC 85 07/20/2017    TRIG 161 (H) 03/07/2023    WBC 10.3 04/11/2018    HGB 14.2 04/11/2018    HCT 42.3 04/11/2018     04/11/2018    PSA 0.28 03/07/2023    PSADIAG 0.29 02/28/2022    TSH 0.69 04/11/2018         Diabetes Management Status    Statin: Taking  ACE/ARB: Taking    Screening or Prevention Patient's value Goal Complete/Controlled?   HgA1C Testing and Control   Lab Results   Component Value Date    HGBA1C 6.8 (H) 09/07/2023      Annually/Less than 8% Yes   Lipid profile : 03/07/2023 Annually No   LDL control Lab Results   Component Value Date    LDLCALC 51.8 (L) 03/07/2023    Annually/Less than 100 mg/dl  No   Nephropathy screening Lab Results   Component Value Date    LABMICR 7.0 03/07/2023     Lab Results   Component Value Date    PROTEINUA Negative 02/17/2022     No results found for: "UTPCR"   Annually No   Blood pressure BP Readings from Last 1 Encounters:   05/07/24 (!) 120/58    Less than 140/90 Yes   Dilated retinal exam : 03/26/2024 Annually Yes   Foot exam   : 10/02/2023 Annually Yes     "

## 2024-05-13 ENCOUNTER — HOSPITAL ENCOUNTER (OUTPATIENT)
Dept: CARDIOLOGY | Facility: HOSPITAL | Age: 74
Discharge: HOME | End: 2024-05-13
Attending: FAMILY MEDICINE
Payer: MEDICARE

## 2024-05-13 DIAGNOSIS — I65.22 LEFT CAROTID ARTERY STENOSIS: ICD-10-CM

## 2024-05-13 DIAGNOSIS — I65.21 INTERNAL CAROTID ARTERY OCCLUSION, RIGHT: ICD-10-CM

## 2024-05-13 LAB
LEFT ARM DIASTOLIC BLOOD PRESSURE: 64 MMHG
LEFT ARM SYSTOLIC BLOOD PRESSURE: 135 MMHG
LEFT CBA DIAS: 22 CM/S
LEFT CBA SYS: 51 CM/S
LEFT CCA DIST DIAS: 22 CM/S
LEFT CCA DIST SYS: 63 CM/S
LEFT CCA MID DIAS: 14 CM/S
LEFT CCA MID SYS: 41 CM/S
LEFT CCA PROX DIAS: 13 CM/S
LEFT CCA PROX SYS: 45 CM/S
LEFT ECA DIAS: 7 CM/S
LEFT ECA SYS: 55 CM/S
LEFT ICA DIST DIAS: 55 CM/S
LEFT ICA DIST SYS: 163 CM/S
LEFT ICA MID DIAS: 40 CM/S
LEFT ICA MID SYS: 212 CM/S
LEFT ICA PROX DIAS: 27 CM/S
LEFT ICA PROX SYS: 88 CM/S
LEFT VERTEBRAL DIAS: 16 CM/S
LEFT VERTEBRAL SYS: 73 CM/S
OHS CV CAROTID RIGHT ICA EDV HIGHEST: 0
OHS CV CAROTID ULTRASOUND LEFT ICA/CCA RATIO: 3.37
OHS CV CAROTID ULTRASOUND RIGHT ICA/CCA RATIO: 0
OHS CV PV CAROTID LEFT HIGHEST CCA: 63
OHS CV PV CAROTID LEFT HIGHEST ICA: 212
OHS CV PV CAROTID RIGHT HIGHEST CCA: 21
OHS CV PV CAROTID RIGHT HIGHEST ICA: 0
OHS CV US CAROTID LEFT HIGHEST EDV: 55
RIGHT ARM DIASTOLIC BLOOD PRESSURE: 61 MMHG
RIGHT ARM SYSTOLIC BLOOD PRESSURE: 130 MMHG
RIGHT CBA DIAS: 14 CM/S
RIGHT CBA SYS: 53 CM/S
RIGHT CCA DIST DIAS: 0 CM/S
RIGHT CCA DIST SYS: 9 CM/S
RIGHT CCA MID DIAS: 0 CM/S
RIGHT CCA MID SYS: 11 CM/S
RIGHT CCA PROX DIAS: 0 CM/S
RIGHT CCA PROX SYS: 21 CM/S
RIGHT ECA DIAS: 0 CM/S
RIGHT ECA SYS: 0 CM/S
RIGHT ICA DIST DIAS: 0 CM/S
RIGHT ICA DIST SYS: 0 CM/S
RIGHT ICA MID DIAS: 0 CM/S
RIGHT ICA MID SYS: 0 CM/S
RIGHT ICA PROX DIAS: 0 CM/S
RIGHT ICA PROX SYS: 0 CM/S
RIGHT VERTEBRAL DIAS: 14 CM/S
RIGHT VERTEBRAL SYS: 53 CM/S

## 2024-05-13 PROCEDURE — 93880 EXTRACRANIAL BILAT STUDY: CPT

## 2024-05-13 PROCEDURE — 93880 EXTRACRANIAL BILAT STUDY: CPT | Mod: 26,,, | Performed by: INTERNAL MEDICINE

## 2024-05-15 ENCOUNTER — PATIENT MESSAGE (OUTPATIENT)
Dept: CARDIOLOGY | Facility: CLINIC | Age: 74
End: 2024-05-15
Payer: MEDICARE

## 2024-05-17 ENCOUNTER — PATIENT MESSAGE (OUTPATIENT)
Dept: INTERNAL MEDICINE | Facility: CLINIC | Age: 74
End: 2024-05-17
Payer: MEDICARE

## 2024-05-18 ENCOUNTER — TELEPHONE (OUTPATIENT)
Dept: INTERNAL MEDICINE | Facility: CLINIC | Age: 74
End: 2024-05-18
Payer: MEDICARE

## 2024-05-18 DIAGNOSIS — I65.21 INTERNAL CAROTID ARTERY OCCLUSION, RIGHT: ICD-10-CM

## 2024-05-18 DIAGNOSIS — I65.22 LEFT CAROTID ARTERY STENOSIS: Primary | ICD-10-CM

## 2024-05-18 NOTE — TELEPHONE ENCOUNTER
Please call patient and explain that the test(s) show possible recurrent narrowing (mild) to internal carotid artery,.    I would like to refer to the vascular surgery department for further evaluation and treatment.    Please see referral orders and please call patient to schedule.     Thank you.

## 2024-05-29 ENCOUNTER — TELEPHONE (OUTPATIENT)
Dept: ADMINISTRATIVE | Facility: CLINIC | Age: 74
End: 2024-05-29
Payer: MEDICARE

## 2024-05-30 ENCOUNTER — CLINICAL SUPPORT (OUTPATIENT)
Dept: AUDIOLOGY | Facility: CLINIC | Age: 74
End: 2024-05-30
Payer: MEDICARE

## 2024-05-30 ENCOUNTER — INITIAL CONSULT (OUTPATIENT)
Dept: VASCULAR SURGERY | Facility: CLINIC | Age: 74
End: 2024-05-30
Attending: FAMILY MEDICINE
Payer: MEDICARE

## 2024-05-30 ENCOUNTER — OFFICE VISIT (OUTPATIENT)
Dept: INTERNAL MEDICINE | Facility: CLINIC | Age: 74
End: 2024-05-30
Payer: MEDICARE

## 2024-05-30 ENCOUNTER — LAB VISIT (OUTPATIENT)
Dept: LAB | Facility: HOSPITAL | Age: 74
End: 2024-05-30
Payer: MEDICARE

## 2024-05-30 VITALS
WEIGHT: 171.06 LBS | BODY MASS INDEX: 27.49 KG/M2 | HEART RATE: 60 BPM | HEIGHT: 66 IN | SYSTOLIC BLOOD PRESSURE: 132 MMHG | DIASTOLIC BLOOD PRESSURE: 74 MMHG | OXYGEN SATURATION: 100 %

## 2024-05-30 VITALS
RESPIRATION RATE: 16 BRPM | BODY MASS INDEX: 26.89 KG/M2 | DIASTOLIC BLOOD PRESSURE: 80 MMHG | HEIGHT: 67 IN | WEIGHT: 171.31 LBS | HEART RATE: 61 BPM | SYSTOLIC BLOOD PRESSURE: 158 MMHG | TEMPERATURE: 98 F

## 2024-05-30 DIAGNOSIS — Z00.00 ENCOUNTER FOR PREVENTIVE HEALTH EXAMINATION: Primary | ICD-10-CM

## 2024-05-30 DIAGNOSIS — E78.2 MIXED HYPERLIPIDEMIA: ICD-10-CM

## 2024-05-30 DIAGNOSIS — H90.3 SENSORINEURAL HEARING LOSS (SNHL), BILATERAL: Primary | ICD-10-CM

## 2024-05-30 DIAGNOSIS — I65.21 INTERNAL CAROTID ARTERY OCCLUSION, RIGHT: ICD-10-CM

## 2024-05-30 DIAGNOSIS — E66.3 OVERWEIGHT (BMI 25.0-29.9): ICD-10-CM

## 2024-05-30 DIAGNOSIS — H91.92 HEARING LOSS OF LEFT EAR, UNSPECIFIED HEARING LOSS TYPE: ICD-10-CM

## 2024-05-30 DIAGNOSIS — I25.810 CORONARY ARTERY DISEASE INVOLVING AUTOLOGOUS VEIN CORONARY BYPASS GRAFT WITHOUT ANGINA PECTORIS: ICD-10-CM

## 2024-05-30 DIAGNOSIS — N40.0 BENIGN PROSTATIC HYPERPLASIA WITHOUT LOWER URINARY TRACT SYMPTOMS: ICD-10-CM

## 2024-05-30 DIAGNOSIS — E11.42 DIABETIC POLYNEUROPATHY ASSOCIATED WITH TYPE 2 DIABETES MELLITUS: ICD-10-CM

## 2024-05-30 DIAGNOSIS — I73.9 PVD (PERIPHERAL VASCULAR DISEASE): ICD-10-CM

## 2024-05-30 DIAGNOSIS — E11.51 TYPE 2 DIABETES, WITH PERIPHERAL CIRCULATORY DISORDER NOT AT GOAL: ICD-10-CM

## 2024-05-30 DIAGNOSIS — E11.42 TYPE 2 DIABETES MELLITUS WITH DIABETIC POLYNEUROPATHY, WITHOUT LONG-TERM CURRENT USE OF INSULIN: ICD-10-CM

## 2024-05-30 DIAGNOSIS — I10 HYPERTENSION, ESSENTIAL: ICD-10-CM

## 2024-05-30 DIAGNOSIS — I65.22 LEFT CAROTID ARTERY STENOSIS: ICD-10-CM

## 2024-05-30 LAB — COMPLEXED PSA SERPL-MCNC: 0.26 NG/ML (ref 0–4)

## 2024-05-30 PROCEDURE — 99211 OFF/OP EST MAY X REQ PHY/QHP: CPT | Mod: PBBFAC,27,25

## 2024-05-30 PROCEDURE — 99999 PR PBB SHADOW E&M-EST. PATIENT-LVL I: CPT | Mod: PBBFAC,,,

## 2024-05-30 PROCEDURE — 84153 ASSAY OF PSA TOTAL: CPT | Performed by: NURSE PRACTITIONER

## 2024-05-30 PROCEDURE — 36415 COLL VENOUS BLD VENIPUNCTURE: CPT | Performed by: NURSE PRACTITIONER

## 2024-05-30 PROCEDURE — 99215 OFFICE O/P EST HI 40 MIN: CPT | Mod: PBBFAC | Performed by: NURSE PRACTITIONER

## 2024-05-30 PROCEDURE — 92567 TYMPANOMETRY: CPT | Mod: 52,PBBFAC

## 2024-05-30 PROCEDURE — 99999 PR PBB SHADOW E&M-EST. PATIENT-LVL IV: CPT | Mod: PBBFAC,,, | Performed by: SURGERY

## 2024-05-30 PROCEDURE — 99214 OFFICE O/P EST MOD 30 MIN: CPT | Mod: PBBFAC,27 | Performed by: SURGERY

## 2024-05-30 PROCEDURE — 99999 PR PBB SHADOW E&M-EST. PATIENT-LVL V: CPT | Mod: PBBFAC,,, | Performed by: NURSE PRACTITIONER

## 2024-05-30 PROCEDURE — 99202 OFFICE O/P NEW SF 15 MIN: CPT | Mod: S$PBB,,, | Performed by: SURGERY

## 2024-05-30 PROCEDURE — 92557 COMPREHENSIVE HEARING TEST: CPT | Mod: PBBFAC

## 2024-05-30 PROCEDURE — G0439 PPPS, SUBSEQ VISIT: HCPCS | Mod: ,,, | Performed by: NURSE PRACTITIONER

## 2024-05-30 RX ORDER — FLUTICASONE PROPIONATE 50 MCG
1 SPRAY, SUSPENSION (ML) NASAL
COMMUNITY
Start: 2024-02-05

## 2024-05-30 NOTE — PROGRESS NOTES
This is a very pleasant 73-year-old gentleman who is referred in for carotid artery stenosis.  We have reviewed his history in depth.  I reviewed it on the chart and then with him.  He has never had a stroke never had a TIA never had an amaurosis fugax.  However in 2006 he had a right carotid endarterectomy at [facility].  It occluded very shortly thereafter.  He was rather upset but it occluded and he did not have a neurologic symptoms.  He then had in 2007 a stent placed in his left carotid by Dr. Case.  He is done well since then in terms of neurologic disease and denies CVA amaurosis fugax or TIA.  He has been followed on and off.  In addition to this he has severe diabetes and he had a CABG in 2008 done by Dr. Be.  At this point he remains very active.    He is chronically on Plavix and on an aspirin.  He is also on a statin.  Well.  And he has a totally occluded right carotid and 50-59% stenosis on the left side.    In view of these issues I would not proceed with any intervention at this point.  I have him return in 1 year with a repeat ultrasound.    I have reviewed his medications with him.  And reviewed his allergies.  We will do him back in 1 year

## 2024-05-30 NOTE — PROGRESS NOTES
"  Esteban Mike presented for a  Medicare AWV and comprehensive Health Risk Assessment today. The following components were reviewed and updated:    Medical history  Family History  Social history  Allergies and Current Medications  Health Risk Assessment  Health Maintenance  Care Team         ** See Completed Assessments for Annual Wellness Visit within the encounter summary.**         The following assessments were completed:  Living Situation  CAGE  Depression Screening  Timed Get Up and Go  Whisper Test--left ear abnormal  Cognitive Function Screening    Nutrition Screening  ADL Screening  PAQ Screening      Opioid documentation:      Patient does not have a current opioid prescription.        Vitals:    05/30/24 0912   BP: 132/74   BP Location: Left arm   Patient Position: Sitting   BP Method: Large (Manual)   Pulse: 60   SpO2: 100%   Weight: 77.6 kg (171 lb 1.2 oz)   Height: 5' 6" (1.676 m)     Body mass index is 27.61 kg/m².  Physical Exam  Vitals and nursing note reviewed.   Constitutional:       Appearance: Normal appearance.   HENT:      Head: Normocephalic.      Nose: Nose normal.      Mouth/Throat:      Mouth: Mucous membranes are moist.   Eyes:      Conjunctiva/sclera: Conjunctivae normal.   Cardiovascular:      Rate and Rhythm: Normal rate and regular rhythm.      Heart sounds: Normal heart sounds.   Pulmonary:      Effort: Pulmonary effort is normal.      Breath sounds: Normal breath sounds.   Musculoskeletal:         General: Normal range of motion.      Cervical back: Normal range of motion.   Skin:     General: Skin is warm and dry.   Neurological:      General: No focal deficit present.      Mental Status: He is alert and oriented to person, place, and time.   Psychiatric:         Mood and Affect: Mood normal.         Behavior: Behavior normal.         Thought Content: Thought content normal.         Judgment: Judgment normal.             Diagnoses and health risks identified today and " associated recommendations/orders:    1. Encounter for preventive health examination  Exam done    Health Maintenance updated    Records reviewed    2. Diabetic polyneuropathy associated with type 2 diabetes mellitus  Chronic, followed by PCP    A1C goal < 7.0, BP goal < 140/80, LDL goal < 100.    Adhere to ADA diet.    Take meds as prescribed, check BS daily. Notify if sugars are out of range discussed during visit.    Yearly eye exam discussed.    Yearly foot exam discussed.    - Microalbumin/Creatinine Ratio, Urine; Future    3. Coronary artery disease involving autologous vein coronary bypass graft without angina pectoris  Sees Cardiology, chronic, on statin    Follow low fat, low carb, high fiber diet and exercise.    4. Hypertension, essential  Chronic, followed by PCP    Take medications as prescribed.    Monitor BP at home, goal BP < or = 140/80, call office if consistently above this range.    Follow low salt DASH diet and exercise.    BMI reviewed.    Go to ED if Headaches, blurred vision, chest pain, or SOB occurs along with elevated readings > or = 160/90.    5. Mixed hyperlipidemia  Chronic, followed by PCP    Continue cholesterol med, low fat diet, and exercise.     6. Benign prostatic hyperplasia without lower urinary tract symptoms  Chronic, follow by urology    - Prostate Specific Antigen, Diagnostic; Future    7. PVD (peripheral vascular disease)  Chronic, followed by PCP    On statin    8. Type 2 diabetes, with peripheral circulatory disorder not at goal  Chronic, followed by PCP    A1C goal < 7.0, BP goal < 140/80, LDL goal < 100.    Adhere to ADA diet.    Take meds as prescribed, check BS daily. Notify if sugars are out of range discussed during visit.    Yearly eye exam discussed.    Yearly foot exam discussed.    - Microalbumin/Creatinine Ratio, Urine; Future  9. Type 2 diabetes mellitus with diabetic polyneuropathy, without long-term current use of insulin  Chronic, followed by PCP    A1C  goal < 7.0, BP goal < 140/80, LDL goal < 100.    Adhere to ADA diet.    Take meds as prescribed, check BS daily. Notify if sugars are out of range discussed during visit.    Yearly eye exam discussed.    Yearly foot exam discussed.    - Microalbumin/Creatinine Ratio, Urine; Future    10. BMI 27.0-27.9,adult  BMI reviewed    11. Overweight (BMI 25.0-29.9)  BMI reviewed.    Diet and exercise to lose weight.    12. Hearing loss of left ear, unspecified hearing loss type  - Ambulatory referral/consult to Audiology; Future        Provided Esteban with a 5-10 year written screening schedule and personal prevention plan. Recommendations were developed using the USPSTF age appropriate recommendations. Education, counseling, and referrals were provided as needed. After Visit Summary printed and given to patient which includes a list of additional screenings\tests needed.    Follow up in about 1 year (around 5/30/2025) for annual or sooner as needed with PCP Dr. Hartely.    Sarah Salgado, DNP      I offered to discuss advanced care planning, including how to pick a person who would make decisions for you if you were unable to make them for yourself, called a health care power of , and what kind of decisions you might make such as use of life sustaining treatments such as ventilators and tube feeding when faced with a life limiting illness recorded on a living will that they will need to know. (How you want to be cared for as you near the end of your natural life)     X Patient is interested in learning more about how to make advanced directives.  I provided them paperwork and offered to discuss this with them.

## 2024-05-30 NOTE — Clinical Note
Good afternoon,   Here are the results from Mr. Mike's hearing evaluation. Please let me know if you need any further information or if you have any questions. Thanks!  Danuta Quinteros, CCC-A

## 2024-05-30 NOTE — PATIENT INSTRUCTIONS
Counseling and Referral of Other Preventative  (Italic type indicates deductible and co-insurance are waived)    Patient Name: Esteban Mike  Today's Date: 5/30/2024    Health Maintenance         Date Due Completion Date    PROSTATE-SPECIFIC ANTIGEN Ordered today 3/7/2023    Diabetes Urine Screening Ordered today 3/7/2023    Hemoglobin A1c 08/16/2024 (Originally 3/7/2024) 9/7/2023    Override on 2/7/2019: Done ([facility])    Override on 8/11/2017: Done    Lipid Panel 09/13/2024 (Originally 3/7/2024) 3/7/2023    COVID-19 Vaccine (5 - 2023-24 season) 05/30/2025 (Originally 9/1/2023) 2/22/2023    Foot Exam 10/02/2024 10/2/2023    Override on 1/14/2020: Done    Eye Exam 03/26/2025 3/26/2024    Override on 8/11/2017: Done ([facility])    High Dose Statin 05/30/2025 5/30/2024    Aspirin/Antiplatelet Therapy 05/30/2025 5/30/2024    TETANUS VACCINE 10/12/2028 10/12/2018    Colorectal Cancer Screening 12/04/2030 12/4/2020          Orders Placed This Encounter   Procedures    Microalbumin/Creatinine Ratio, Urine    Prostate Specific Antigen, Diagnostic    Ambulatory referral/consult to Audiology       The following information is provided to all patients.  This information is to help you find resources for any of the problems found today that may be affecting your health:                  Living healthy guide: www.[facility].[place].gov      Understanding Diabetes: www.diabetes.org      Eating healthy: www.cdc.gov/healthyweight      CDC home safety checklist: www.cdc.gov/steadi/patient.html      Agency on Aging: www.goea.[place].[facility]      Alcoholics anonymous (AA): www.aa.org      Physical Activity: www.ravin.nih.gov/rv8ydjt      Tobacco use: www.quitwithusla.org

## 2024-05-30 NOTE — PROGRESS NOTES
Esteban Mike, a 73 y.o. male, was seen today in the clinic for an audiologic evaluation.  The patient denied aural fullness, otalgia, tinnitus, and dizziness.    Tympanometry revealed Type A in the left ear and was attempted in the right ear but a seal could not be obtained due to hair in the ear canal.  Audiogram results revealed a normal borderline mild sensorineural hearing loss (SNHL) from 250-2000 Hz sloping to a severe SNHL by 8000 Hz in the right ear and normal hearing sensitivity from 250-2000 Hz sloping to a severe SNHL by 8000 Hz in the left ear.  Speech reception thresholds were noted at 20 dB in the right ear and 20 dB in the left ear.  Speech discrimination scores were 84% in the right ear and 100% in the left ear.    I advised the patient that we sell hearing aids at our clinic but that we cannot accept insurance for hearing aids.  I advised the patient to contact his insurance to inquire if he has any hearing aid coverage, and if he does, to ask where he can use it.    Recommendations:  Otologic evaluation  Hearing protection when in noise  Hearing aid consultation after medical clearance due to asymmetry in speech scores between ears  Annual audiogram or sooner if change in hearing is perceived

## 2024-06-10 ENCOUNTER — PATIENT MESSAGE (OUTPATIENT)
Dept: INTERNAL MEDICINE | Facility: CLINIC | Age: 74
End: 2024-06-10
Payer: MEDICARE

## 2024-06-13 DIAGNOSIS — E11.65 TYPE 2 DIABETES MELLITUS WITH HYPERGLYCEMIA, WITHOUT LONG-TERM CURRENT USE OF INSULIN: ICD-10-CM

## 2024-06-13 RX ORDER — METFORMIN HYDROCHLORIDE 500 MG/1
1000 TABLET, EXTENDED RELEASE ORAL 2 TIMES DAILY WITH MEALS
Qty: 360 TABLET | Refills: 0 | Status: SHIPPED | OUTPATIENT
Start: 2024-06-13

## 2024-06-13 NOTE — TELEPHONE ENCOUNTER
No care due was identified.  [facility] Embedded Care Due Messages. Reference number: [id number].   6/13/2024 2:06:27 PM CDT

## 2024-06-13 NOTE — TELEPHONE ENCOUNTER
Refill Routing Note   Medication(s) are not appropriate for processing by [facility] for the following reason(s):        Required labs outdated    [facility] action(s):  Defer               Appointments  past 12m or future 3m with PCP    Date Provider   Last Visit   5/7/2024 Ned Hartley MD   Next Visit   Visit date not found Ned Hartley MD   ED visits in past 90 days: 0        Note composed:2:12 PM 06/13/2024

## 2024-07-09 ENCOUNTER — OFFICE VISIT (OUTPATIENT)
Dept: CARDIOLOGY | Facility: CLINIC | Age: 74
End: 2024-07-09
Attending: FAMILY MEDICINE
Payer: MEDICARE

## 2024-07-09 VITALS
WEIGHT: 166.69 LBS | HEIGHT: 67 IN | BODY MASS INDEX: 26.16 KG/M2 | HEART RATE: 71 BPM | OXYGEN SATURATION: 99 % | SYSTOLIC BLOOD PRESSURE: 118 MMHG | DIASTOLIC BLOOD PRESSURE: 70 MMHG

## 2024-07-09 DIAGNOSIS — K21.00 GASTROESOPHAGEAL REFLUX DISEASE WITH ESOPHAGITIS WITHOUT HEMORRHAGE: ICD-10-CM

## 2024-07-09 DIAGNOSIS — G47.33 OSA (OBSTRUCTIVE SLEEP APNEA): ICD-10-CM

## 2024-07-09 DIAGNOSIS — I65.22 LEFT CAROTID ARTERY STENOSIS: ICD-10-CM

## 2024-07-09 DIAGNOSIS — I10 HYPERTENSION, ESSENTIAL: ICD-10-CM

## 2024-07-09 DIAGNOSIS — I73.9 PVD (PERIPHERAL VASCULAR DISEASE): ICD-10-CM

## 2024-07-09 DIAGNOSIS — I65.23 BILATERAL CAROTID ARTERY STENOSIS: ICD-10-CM

## 2024-07-09 DIAGNOSIS — I25.810 CORONARY ARTERY DISEASE INVOLVING AUTOLOGOUS VEIN CORONARY BYPASS GRAFT WITHOUT ANGINA PECTORIS: Primary | ICD-10-CM

## 2024-07-09 DIAGNOSIS — E78.00 PURE HYPERCHOLESTEROLEMIA: ICD-10-CM

## 2024-07-09 DIAGNOSIS — I65.21 INTERNAL CAROTID ARTERY OCCLUSION, RIGHT: ICD-10-CM

## 2024-07-09 DIAGNOSIS — E11.41 DIABETIC MONONEUROPATHY ASSOCIATED WITH TYPE 2 DIABETES MELLITUS: ICD-10-CM

## 2024-07-09 DIAGNOSIS — E11.65 TYPE 2 DIABETES MELLITUS WITH HYPERGLYCEMIA, WITHOUT LONG-TERM CURRENT USE OF INSULIN: ICD-10-CM

## 2024-07-09 PROCEDURE — 99214 OFFICE O/P EST MOD 30 MIN: CPT | Mod: S$PBB,,, | Performed by: INTERNAL MEDICINE

## 2024-07-09 PROCEDURE — 99215 OFFICE O/P EST HI 40 MIN: CPT | Mod: PBBFAC | Performed by: INTERNAL MEDICINE

## 2024-07-09 PROCEDURE — 99999 PR PBB SHADOW E&M-EST. PATIENT-LVL V: CPT | Mod: PBBFAC,,, | Performed by: INTERNAL MEDICINE

## 2024-07-09 NOTE — PROGRESS NOTES
Subjective:    Patient ID:  Esteban Mike is a 73 y.o. male who presents for follow-up of CAD hypertension    HPI      The patient is a 73 year old male  is followed with CAD post CABG 2008, post right CEA 2006 and left ICA stent 2007. He is followed by digital medicine and systolic BP ranges 116 - 141. 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    Interpretation Summary 5/7/24         There is right Internal Carotid Artery occlusion.    The left carotid stent is patent with with peak systolic velocity suggestive of less than 50% InStent restenosis.  Recommend correlation with CTA or MRA.    There is antegrade flow in the vertebral arteries bilaterally.     Lab Results   Component Value Date     09/07/2023    K 4.4 09/07/2023     09/07/2023    CO2 21 (L) 09/07/2023    BUN 20 09/07/2023    CREATININE 1.2 09/07/2023     (H) 09/07/2023    HGBA1C 6.8 (H) 09/07/2023    HGBA1C 9.7 07/08/2019    MG 2.6 05/06/2008    AST 20 03/07/2023    ALT 34 03/07/2023    ALBUMIN 4.2 03/07/2023    PROT 6.7 03/07/2023    BILITOT 0.8 03/07/2023    WBC 10.3 04/11/2018    HGB 14.2 04/11/2018    HCT 42.3 04/11/2018    MCV 90.8 04/11/2018     04/11/2018    INR 1.0 06/25/2014    PSA 0.28 03/07/2023    TSH 0.69 04/11/2018         Lab Results   Component Value Date    CHOL 120 03/07/2023    HDL 36 (L) 03/07/2023    HDL 46 07/08/2019    TRIG 161 (H) 03/07/2023       Lab Results   Component Value Date    LDLCALC 51.8 (L) 03/07/2023       Past Medical History:   Diagnosis Date    Aortic valve disease     Bilateral carotid artery stenosis 07/18/2018    CAD (coronary artery disease)     Carotid artery occlusion      LICA, 70-80% JULIUS    Cataract     Diabetes mellitus     Diabetes mellitus type II     Elevated LFTs     GERD (gastroesophageal reflux disease)     Hyperlipidemia     Hypertension     PVD (peripheral vascular disease)        Current Outpatient Medications:     aspirin 81 MG chewable tablet,  Take 81 mg by mouth once daily., Disp: , Rfl:     atorvastatin (LIPITOR) 80 MG tablet, Take 1 tablet (80 mg total) by mouth once daily., Disp: 90 tablet, Rfl: 3    blood sugar diagnostic Strp, To check BG 3 times daily, to use with insurance preferred meter, Disp: 200 strip, Rfl: 11    blood-glucose meter kit, To check BG 3 times daily, to use with insurance preferred meter, Disp: 1 each, Rfl: 0    carvediloL (COREG) 25 MG tablet, Take 1 tablet (25 mg total) by mouth 2 (two) times daily with meals., Disp: 180 tablet, Rfl: 3    dulaglutide (TRULICITY) 4.5 mg/0.5 mL pen injector, inject 4.5 MG into THE SKIN every SEVEN DAYS, Disp: 4 pen , Rfl: 7    fish oil-omega-3 fatty acids 300-1,000 mg capsule, Take 2 g by mouth once daily. 3 tablets, Disp: , Rfl:     glipiZIDE (GLUCOTROL) 5 MG tablet, Take 0.5 tablets (2.5 mg total) by mouth every morning AND 1 tablet (5 mg total) daily with dinner or evening meal., Disp: 135 tablet, Rfl: 1    irbesartan (AVAPRO) 300 MG tablet, Take 1 tablet (300 mg total) by mouth every evening., Disp: 90 tablet, Rfl: 3    JARDIANCE 25 mg tablet, TAKE ONE TABLET BY MOUTH ONCE DAILY, Disp: 90 tablet, Rfl: 1    lancets Misc, To check BG 3 times daily, to use with insurance preferred meter, Disp: 200 each, Rfl: 11    metFORMIN (GLUCOPHAGE-XR) 500 MG ER 24hr tablet, TAKE TWO TABLETS BY MOUTH TWICE DAILY with meals, Disp: 360 tablet, Rfl: 0    multivitamin capsule, Take 1 capsule by mouth once daily., Disp: , Rfl:     NIFEdipine (PROCARDIA-XL) 60 MG (OSM) 24 hr tablet, Take 1 tablet (60 mg total) by mouth once daily., Disp: 90 tablet, Rfl: 3    pantoprazole (PROTONIX) 40 MG tablet, Take 1 tablet (40 mg total) by mouth before breakfast., Disp: 90 tablet, Rfl: 3    vit C-vit E-lutein-min-om-3 735-00-0-150 mg-unit-mg-mg Cap, Take 1 tablet by mouth once daily., Disp: , Rfl:     fluticasone propionate (FLONASE) 50 mcg/actuation nasal spray, 1 spray by Each Nostril route. (Patient not taking: Reported on  "7/9/2024), Disp: , Rfl:     tamsulosin (FLOMAX) 0.4 mg Cap, Take 2 capsules (0.8 mg total) by mouth once daily. (Patient not taking: Reported on 7/9/2024), Disp: 60 capsule, Rfl: 0          Review of Systems   Constitutional: Negative for decreased appetite, diaphoresis, fever, malaise/fatigue, weight gain and weight loss.   HENT:  Negative for congestion, ear discharge, ear pain and nosebleeds.    Eyes:  Negative for blurred vision, double vision and visual disturbance.   Cardiovascular:  Negative for chest pain, claudication, cyanosis, dyspnea on exertion, irregular heartbeat, leg swelling, near-syncope, orthopnea, palpitations, paroxysmal nocturnal dyspnea and syncope.   Respiratory:  Negative for cough, hemoptysis, shortness of breath, sleep disturbances due to breathing, snoring, sputum production and wheezing.    Endocrine: Negative for polydipsia, polyphagia and polyuria.   Hematologic/Lymphatic: Negative for adenopathy and bleeding problem. Does not bruise/bleed easily.   Skin:  Negative for color change, nail changes, poor wound healing and rash.   Musculoskeletal:  Negative for muscle cramps and muscle weakness.   Gastrointestinal:  Negative for abdominal pain, anorexia, change in bowel habit, hematochezia, nausea and vomiting.   Genitourinary:  Negative for dysuria, frequency and hematuria.   Neurological:  Negative for brief paralysis, difficulty with concentration, excessive daytime sleepiness, dizziness, focal weakness, headaches, light-headedness, seizures, vertigo and weakness.   Psychiatric/Behavioral:  Negative for altered mental status and depression.    Allergic/Immunologic: Negative for persistent infections.        Objective:/70   Pulse 71   Ht 5' 7" (1.702 m)   Wt 75.6 kg (166 lb 10.7 oz)   SpO2 99%   BMI 26.10 kg/m²             Physical Exam  Constitutional:       Appearance: Normal appearance. He is well-developed.   HENT:      Head: Normocephalic.      Right Ear: External ear " normal.      Left Ear: External ear normal.      Nose: Nose normal.   Eyes:      General: No scleral icterus.     Pupils: Pupils are equal, round, and reactive to light.   Neck:      Thyroid: No thyromegaly.      Vascular: No JVD.      Trachea: No tracheal deviation.        Comments: CEA  Cardiovascular:      Rate and Rhythm: Normal rate and regular rhythm.      Pulses: Intact distal pulses.           Carotid pulses are 2+ on the left side.       Dorsalis pedis pulses are 2+ on the right side and 2+ on the left side.        Posterior tibial pulses are 2+ on the right side and 0 on the left side.      Heart sounds: No murmur heard.     No friction rub. No gallop.   Pulmonary:      Effort: Pulmonary effort is normal.      Breath sounds: Normal breath sounds.   Abdominal:      General: Bowel sounds are normal. There is no distension.      Tenderness: There is no abdominal tenderness. There is no guarding.   Musculoskeletal:         General: No tenderness. Normal range of motion.      Cervical back: Normal range of motion and neck supple.   Lymphadenopathy:      Comments: Palpation of neck and groin lymph nodes normal   Skin:     General: Skin is dry.      Comments: Palpation of skin normal   Neurological:      Mental Status: He is alert and oriented to person, place, and time.      Cranial Nerves: No cranial nerve deficit.      Motor: No abnormal muscle tone.      Coordination: Coordination normal.   Psychiatric:         Behavior: Behavior normal.         Thought Content: Thought content normal.         Judgment: Judgment normal.         Assessment:       1. Coronary artery disease involving autologous vein coronary bypass graft without angina pectoris    2. Diabetic mononeuropathy associated with type 2 diabetes mellitus    3. Bilateral carotid artery stenosis    4. Hypertension, essential    5. Pure hypercholesterolemia    6. Internal carotid artery occlusion, right    7. Left carotid artery stenosis    8. PVD  (peripheral vascular disease)    9. Type 2 diabetes mellitus with hyperglycemia, without long-term current use of insulin    10. Gastroesophageal reflux disease with esophagitis without hemorrhage    11. NIDIA (obstructive sleep apnea)         Plan:       Esteban was seen today for coronary artery disease.    Diagnoses and all orders for this visit:    Coronary artery disease involving autologous vein coronary bypass graft without angina pectoris  -     Ambulatory referral/consult to Cardiology  -     Lipid Panel; Future    Diabetic mononeuropathy associated with type 2 diabetes mellitus    Bilateral carotid artery stenosis    Hypertension, essential    Pure hypercholesterolemia  -     Lipid Panel; Future    Internal carotid artery occlusion, right    Left carotid artery stenosis    PVD (peripheral vascular disease)    Type 2 diabetes mellitus with hyperglycemia, without long-term current use of insulin    Gastroesophageal reflux disease with esophagitis without hemorrhage    NIDIA (obstructive sleep apnea)

## 2024-07-16 ENCOUNTER — LAB VISIT (OUTPATIENT)
Dept: LAB | Facility: HOSPITAL | Age: 74
End: 2024-07-16
Attending: FAMILY MEDICINE
Payer: MEDICARE

## 2024-07-16 DIAGNOSIS — E78.00 PURE HYPERCHOLESTEROLEMIA: ICD-10-CM

## 2024-07-16 DIAGNOSIS — E11.51 TYPE 2 DIABETES, WITH PERIPHERAL CIRCULATORY DISORDER NOT AT GOAL: ICD-10-CM

## 2024-07-16 DIAGNOSIS — I25.810 CORONARY ARTERY DISEASE INVOLVING AUTOLOGOUS VEIN CORONARY BYPASS GRAFT WITHOUT ANGINA PECTORIS: ICD-10-CM

## 2024-07-16 LAB
CHOLEST SERPL-MCNC: 120 MG/DL (ref 120–199)
CHOLEST/HDLC SERPL: 3 {RATIO} (ref 2–5)
ESTIMATED AVG GLUCOSE: 134 MG/DL (ref 68–131)
HBA1C MFR BLD: 6.3 % (ref 4–5.6)
HDLC SERPL-MCNC: 40 MG/DL (ref 40–75)
HDLC SERPL: 33.3 % (ref 20–50)
LDLC SERPL CALC-MCNC: 60 MG/DL (ref 63–159)
NONHDLC SERPL-MCNC: 80 MG/DL
TRIGL SERPL-MCNC: 100 MG/DL (ref 30–150)

## 2024-07-16 PROCEDURE — 83036 HEMOGLOBIN GLYCOSYLATED A1C: CPT | Performed by: FAMILY MEDICINE

## 2024-07-16 PROCEDURE — 80061 LIPID PANEL: CPT | Performed by: INTERNAL MEDICINE

## 2024-07-16 PROCEDURE — 36415 COLL VENOUS BLD VENIPUNCTURE: CPT | Mod: PN | Performed by: INTERNAL MEDICINE

## 2024-07-30 NOTE — PROGRESS NOTES
Digital Medicine: Health  Follow-Up    The history is provided by the patient.     Follow Up  Follow-up reason(s): reading review and goal follow-up      Readings are trending up due to inaccurate technique.      Mr. Mike is doing okay. He attributes spikes in BP to not resting before monitoring.     He is pleased with BG readings.      INTERVENTION(S)  reviewed monitoring technique, encouragement/support and denied questions    PLAN  continue monitoring          Topic    Hemoglobin A1C        Last 5 Patient Entered Readings                                      Current 30 Day Average: 138/76     Recent Readings 5/19/2020 5/16/2020 5/15/2020 5/9/2020 5/7/2020    SBP (mmHg) 147 137 123 145 139    DBP (mmHg) 79 79 65 76 79    Pulse 53 54 53 54 57        Last 6 Patient Entered Readings                                          Most Recent A1c: 9.3% on 2/3/2020  (Goal: 8%)     Recent Readings 5/20/2020 5/19/2020 5/18/2020 5/17/2020 5/16/2020    Blood Glucose (mg/dL) 104 117 100 167 108               Diet Screening   No change to diet.      Physical Activity Screening   When asked if exercising, patient responded: yesHis level of intensity when exercising is moderate.    Patient participates in the following activities: yard/housework        
[PMH Reviewed and Updated] : past medical history reviewed and updated
[PMH Reviewed and Updated] : past medical history reviewed and updated

## 2024-08-16 ENCOUNTER — PATIENT MESSAGE (OUTPATIENT)
Dept: ENDOCRINOLOGY | Facility: CLINIC | Age: 74
End: 2024-08-16

## 2024-08-16 ENCOUNTER — OFFICE VISIT (OUTPATIENT)
Dept: ENDOCRINOLOGY | Facility: CLINIC | Age: 74
End: 2024-08-16
Payer: MEDICARE

## 2024-08-16 DIAGNOSIS — E11.51 TYPE 2 DIABETES, WITH PERIPHERAL CIRCULATORY DISORDER NOT AT GOAL: ICD-10-CM

## 2024-08-16 DIAGNOSIS — N18.1 TYPE 2 DIABETES MELLITUS WITH STAGE 1 CHRONIC KIDNEY DISEASE, WITHOUT LONG-TERM CURRENT USE OF INSULIN: ICD-10-CM

## 2024-08-16 DIAGNOSIS — E11.69 TYPE 2 DIABETES MELLITUS WITH OTHER SPECIFIED COMPLICATION, WITHOUT LONG-TERM CURRENT USE OF INSULIN: Primary | ICD-10-CM

## 2024-08-16 DIAGNOSIS — I10 HYPERTENSION, ESSENTIAL: ICD-10-CM

## 2024-08-16 DIAGNOSIS — E78.2 MIXED HYPERLIPIDEMIA: ICD-10-CM

## 2024-08-16 DIAGNOSIS — E11.22 TYPE 2 DIABETES MELLITUS WITH STAGE 1 CHRONIC KIDNEY DISEASE, WITHOUT LONG-TERM CURRENT USE OF INSULIN: ICD-10-CM

## 2024-08-16 DIAGNOSIS — E78.5 HYPERLIPIDEMIA, UNSPECIFIED HYPERLIPIDEMIA TYPE: ICD-10-CM

## 2024-08-16 DIAGNOSIS — I25.810 CORONARY ARTERY DISEASE INVOLVING AUTOLOGOUS VEIN CORONARY BYPASS GRAFT WITHOUT ANGINA PECTORIS: ICD-10-CM

## 2024-08-16 DIAGNOSIS — E83.52 HYPERCALCEMIA: ICD-10-CM

## 2024-08-16 DIAGNOSIS — E11.65 TYPE 2 DIABETES MELLITUS WITH HYPERGLYCEMIA, WITHOUT LONG-TERM CURRENT USE OF INSULIN: ICD-10-CM

## 2024-08-16 PROBLEM — E11.42 TYPE 2 DIABETES MELLITUS WITH DIABETIC POLYNEUROPATHY: Status: ACTIVE | Noted: 2023-07-06

## 2024-08-16 RX ORDER — DULAGLUTIDE 4.5 MG/.5ML
4.5 INJECTION, SOLUTION SUBCUTANEOUS
Qty: 4 PEN | Refills: 11 | Status: SHIPPED | OUTPATIENT
Start: 2024-08-16

## 2024-08-16 RX ORDER — EMPAGLIFLOZIN 25 MG/1
25 TABLET, FILM COATED ORAL DAILY
Qty: 90 TABLET | Refills: 3 | Status: SHIPPED | OUTPATIENT
Start: 2024-08-16

## 2024-08-16 RX ORDER — METFORMIN HYDROCHLORIDE 500 MG/1
1000 TABLET, EXTENDED RELEASE ORAL 2 TIMES DAILY WITH MEALS
Qty: 360 TABLET | Refills: 3 | Status: SHIPPED | OUTPATIENT
Start: 2024-08-16

## 2024-08-16 RX ORDER — ATORVASTATIN CALCIUM 80 MG/1
80 TABLET, FILM COATED ORAL DAILY
Qty: 90 TABLET | Refills: 3 | Status: SHIPPED | OUTPATIENT
Start: 2024-08-16

## 2024-08-16 RX ORDER — GLIPIZIDE 5 MG/1
TABLET ORAL
Qty: 135 TABLET | Refills: 3 | Status: SHIPPED | OUTPATIENT
Start: 2024-08-16

## 2024-08-16 NOTE — ASSESSMENT & PLAN NOTE
Elevated calcium dating back to 2015; highest of 11.1  No longer on HCTZ and hypercalcemia likely due to HCTZ  PTH normal   Check calcium levels at least every 6 months

## 2024-08-16 NOTE — ASSESSMENT & PLAN NOTE
-- Reviewed goals of therapy are to get the best control we can without hypoglycemia  A1c at goal   Will avoid changing to costly medications at this time  Declines personal CGM but reports falls off as he is sweating with grass cutting    Medication Changes  Continue  Metformin 1000mg twice daily  Jardiance 25mg daily   Trulicity 4.5mg weekly  Glipizide 2.5mg with breakfast and 5mg with dinner    -- Advised frequent self blood glucose monitoring.  Patient encouraged to document glucose results and bring them to every clinic visit    -- Hypoglycemia precautions discussed. Instructed on precautions before driving.    -- Call for Bg repeatedly < 90 or > 180.   -- Close adherence to lifestyle changes recommended.   -- Periodic follow ups for eye evaluations, foot care and dental care suggested.

## 2024-08-16 NOTE — PATIENT INSTRUCTIONS
Cholesterol    LDL at goal    Continue atorvastatin 80 mg daily     Diabetes  A1c at goal   Will avoid changing to costly medications at this time  Declines personal CGM but reports falls off as he is sweating with grass cutting    Medication Changes  Continue  Metformin 1000mg twice daily  Jardiance 25mg daily   Trulicity 4.5mg weekly  Glipizide 2.5mg with breakfast and 5mg with dinner

## 2024-08-21 ENCOUNTER — LAB VISIT (OUTPATIENT)
Dept: LAB | Facility: HOSPITAL | Age: 74
End: 2024-08-21
Attending: NURSE PRACTITIONER
Payer: MEDICARE

## 2024-08-21 DIAGNOSIS — E83.52 HYPERCALCEMIA: ICD-10-CM

## 2024-08-21 LAB
ALBUMIN SERPL BCP-MCNC: 4 G/DL (ref 3.5–5.2)
ALP SERPL-CCNC: 81 U/L (ref 55–135)
ALT SERPL W/O P-5'-P-CCNC: 24 U/L (ref 10–44)
ANION GAP SERPL CALC-SCNC: 9 MMOL/L (ref 8–16)
AST SERPL-CCNC: 16 U/L (ref 10–40)
BILIRUB SERPL-MCNC: 0.4 MG/DL (ref 0.1–1)
BUN SERPL-MCNC: 17 MG/DL (ref 8–23)
CALCIUM SERPL-MCNC: 10 MG/DL (ref 8.7–10.5)
CHLORIDE SERPL-SCNC: 104 MMOL/L (ref 95–110)
CO2 SERPL-SCNC: 25 MMOL/L (ref 23–29)
CREAT SERPL-MCNC: 1 MG/DL (ref 0.5–1.4)
EST. GFR  (NO RACE VARIABLE): >60 ML/MIN/1.73 M^2
GLUCOSE SERPL-MCNC: 130 MG/DL (ref 70–110)
POTASSIUM SERPL-SCNC: 4.3 MMOL/L (ref 3.5–5.1)
PROT SERPL-MCNC: 6.6 G/DL (ref 6–8.4)
SODIUM SERPL-SCNC: 138 MMOL/L (ref 136–145)

## 2024-08-21 PROCEDURE — 36415 COLL VENOUS BLD VENIPUNCTURE: CPT | Mod: PN | Performed by: NURSE PRACTITIONER

## 2024-08-21 PROCEDURE — 80053 COMPREHEN METABOLIC PANEL: CPT | Performed by: NURSE PRACTITIONER

## 2024-10-21 ENCOUNTER — PATIENT MESSAGE (OUTPATIENT)
Dept: SLEEP MEDICINE | Facility: CLINIC | Age: 74
End: 2024-10-21
Payer: MEDICARE

## 2024-12-23 DIAGNOSIS — E11.65 TYPE 2 DIABETES MELLITUS WITH HYPERGLYCEMIA, WITHOUT LONG-TERM CURRENT USE OF INSULIN: ICD-10-CM

## 2024-12-23 RX ORDER — GLIPIZIDE 5 MG/1
TABLET ORAL
Qty: 135 TABLET | Refills: 0 | Status: SHIPPED | OUTPATIENT
Start: 2024-12-23

## 2024-12-23 NOTE — TELEPHONE ENCOUNTER
Refill Decision Note   Esteban Mike  is requesting a refill authorization.  Brief Assessment and Rationale for Refill:  Approve     Medication Therapy Plan:         Comments:     Note composed:11:59 AM 12/23/2024

## 2024-12-23 NOTE — TELEPHONE ENCOUNTER
No care due was identified.  [facility] Embedded Care Due Messages. Reference number: [id number].   12/23/2024 8:02:13 AM CST

## 2025-04-28 ENCOUNTER — OFFICE VISIT (OUTPATIENT)
Dept: OPTOMETRY | Facility: CLINIC | Age: 75
End: 2025-04-28
Payer: MEDICARE

## 2025-04-28 DIAGNOSIS — H52.4 PRESBYOPIA: ICD-10-CM

## 2025-04-28 DIAGNOSIS — E11.9 TYPE 2 DIABETES MELLITUS WITHOUT RETINOPATHY: ICD-10-CM

## 2025-04-28 DIAGNOSIS — H04.123 DRY EYE SYNDROME OF BOTH EYES: ICD-10-CM

## 2025-04-28 DIAGNOSIS — H25.813 COMBINED FORM OF AGE-RELATED CATARACT, BOTH EYES: Primary | ICD-10-CM

## 2025-04-28 DIAGNOSIS — Z98.890 HISTORY OF REPAIR OF RETINAL DEFECT BY LASER PHOTOCOAGULATION: ICD-10-CM

## 2025-04-28 PROCEDURE — 99212 OFFICE O/P EST SF 10 MIN: CPT | Mod: PBBFAC | Performed by: OPTOMETRIST

## 2025-04-28 PROCEDURE — 99999 PR PBB SHADOW E&M-EST. PATIENT-LVL II: CPT | Mod: PBBFAC,,, | Performed by: OPTOMETRIST

## 2025-04-28 PROCEDURE — 92014 COMPRE OPH EXAM EST PT 1/>: CPT | Mod: S$PBB,,, | Performed by: OPTOMETRIST

## 2025-04-28 NOTE — PROGRESS NOTES
HPI    Esteban Mike is a 74 y.o. male who comes in for diabetic eye exam.   Pt. States vision has remained stable since last visit.  Pt. Denies pain or any other ocular health complaints today.    (--)blurred vision(--)Headaches(--)diplopia  (--)flashes(--)floaters(--)pain  (+)Itching(--)tearing(+)burning  (-)Dryness(+) OTC Drops(--)Photophobia    (+)DM  Hemoglobin A1C       Date                     Value               Ref Range             Status                01/16/2025               6.3 (H)             4.0 - 5.6 %           Final              Gtt's:  Refresh PRN OU   Last edited by Stephani Braun, OD on 4/28/2025  1:19 PM.            Assessment /Plan     For exam results, see Encounter Report.    Combined form of age-related cataract, both eyes    Type 2 diabetes mellitus without retinopathy    History of repair of retinal defect by laser photocoagulation    Dry eye syndrome of both eyes    Presbyopia      Educated pt on findings. Mildly visually significant, however, okay to wait on removal at this time. Monitor yearly.      2. No retinopathy noted, OU. Continue proper BS control and annual diabetic eye exams. Monitor yearly.      3. 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 Performed by [facility]. Stable since CINDI. No holes, tears, detachments noted 360 OU. Discussed s/s of RD and to RTC ASAP if occur. Monitor yearly.     4. Educated pt on findings. Recommended ATs TID-QID + karen/gel QHS for added lubrication and comfort. If symptoms worsen or dont improve, RTC. Monitor.      5. Continue use of OTC reading glasses prn. Monitor yearly.        RTC in 1 year for annual eye exam or sooner if needed.

## 2025-04-30 ENCOUNTER — TELEPHONE (OUTPATIENT)
Dept: INTERNAL MEDICINE | Facility: CLINIC | Age: 75
End: 2025-04-30
Payer: MEDICARE

## 2025-04-30 DIAGNOSIS — I10 HYPERTENSION, ESSENTIAL: ICD-10-CM

## 2025-04-30 DIAGNOSIS — E11.42 TYPE 2 DIABETES MELLITUS WITH DIABETIC POLYNEUROPATHY, UNSPECIFIED WHETHER LONG TERM INSULIN USE: Primary | ICD-10-CM

## 2025-04-30 DIAGNOSIS — Z12.5 PROSTATE CANCER SCREENING: ICD-10-CM

## 2025-04-30 DIAGNOSIS — E78.2 MIXED HYPERLIPIDEMIA: ICD-10-CM

## 2025-04-30 NOTE — TELEPHONE ENCOUNTER
"----- Message from Berkley sent at 4/30/2025 12:41 PM CDT -----  Contact: [phone]  type: [facility] is requesting to schedule their Lab appointment prior to an appointment.Order is not listed in EPIC.  Please enter order and contact patient to schedule.Preferred Date and Time of Labs: N/ADate of Appointment: 05/13/2025Where would they like the lab performed? N/AWould the patient rather a call back or a response via [facility]?  Call"Do not send messages requesting lab orders prior to Physical appt on these providers: Zee Soriano, Liborio Gold,  Romelia Prince and Isma."  "

## 2025-05-02 PROCEDURE — 82570 ASSAY OF URINE CREATININE: CPT | Performed by: FAMILY MEDICINE

## 2025-05-03 ENCOUNTER — RESULTS FOLLOW-UP (OUTPATIENT)
Dept: INTERNAL MEDICINE | Facility: CLINIC | Age: 75
End: 2025-05-03

## 2025-05-09 DIAGNOSIS — I10 HYPERTENSION, ESSENTIAL: ICD-10-CM

## 2025-05-09 RX ORDER — NIFEDIPINE 60 MG/1
60 TABLET, EXTENDED RELEASE ORAL DAILY
Qty: 90 TABLET | Refills: 0 | Status: SHIPPED | OUTPATIENT
Start: 2025-05-09

## 2025-05-09 NOTE — TELEPHONE ENCOUNTER
Refill Decision Note   Esteban Mike  is requesting a refill authorization.  Brief Assessment and Rationale for Refill:  Approve     Medication Therapy Plan:         Comments:     Note composed:2:08 PM 05/09/2025

## 2025-05-09 NOTE — TELEPHONE ENCOUNTER
No care due was identified.  [facility] Embedded Care Due Messages. Reference number: [id number].   5/09/2025 8:03:12 AM CDT

## 2025-05-12 ENCOUNTER — TELEPHONE (OUTPATIENT)
Dept: INTERNAL MEDICINE | Facility: CLINIC | Age: 75
End: 2025-05-12
Payer: MEDICARE

## 2025-05-13 ENCOUNTER — OFFICE VISIT (OUTPATIENT)
Dept: INTERNAL MEDICINE | Facility: CLINIC | Age: 75
End: 2025-05-13
Attending: FAMILY MEDICINE
Payer: MEDICARE

## 2025-05-13 ENCOUNTER — PATIENT MESSAGE (OUTPATIENT)
Dept: BEHAVIORAL HEALTH | Facility: CLINIC | Age: 75
End: 2025-05-13
Payer: MEDICARE

## 2025-05-13 VITALS
HEART RATE: 79 BPM | DIASTOLIC BLOOD PRESSURE: 66 MMHG | OXYGEN SATURATION: 100 % | WEIGHT: 171.31 LBS | HEIGHT: 67 IN | BODY MASS INDEX: 26.89 KG/M2 | SYSTOLIC BLOOD PRESSURE: 140 MMHG

## 2025-05-13 DIAGNOSIS — G47.33 OSA (OBSTRUCTIVE SLEEP APNEA): ICD-10-CM

## 2025-05-13 DIAGNOSIS — I25.810 CORONARY ARTERY DISEASE INVOLVING AUTOLOGOUS VEIN CORONARY BYPASS GRAFT WITHOUT ANGINA PECTORIS: ICD-10-CM

## 2025-05-13 DIAGNOSIS — F43.23 ADJUSTMENT DISORDER WITH MIXED ANXIETY AND DEPRESSED MOOD: ICD-10-CM

## 2025-05-13 DIAGNOSIS — K21.00 GASTROESOPHAGEAL REFLUX DISEASE WITH ESOPHAGITIS, UNSPECIFIED WHETHER HEMORRHAGE: ICD-10-CM

## 2025-05-13 DIAGNOSIS — E78.2 MIXED HYPERLIPIDEMIA: ICD-10-CM

## 2025-05-13 DIAGNOSIS — E11.51 TYPE 2 DIABETES, WITH PERIPHERAL CIRCULATORY DISORDER NOT AT GOAL: Primary | ICD-10-CM

## 2025-05-13 DIAGNOSIS — I10 HYPERTENSION, ESSENTIAL: ICD-10-CM

## 2025-05-13 PROCEDURE — 99215 OFFICE O/P EST HI 40 MIN: CPT | Mod: PBBFAC | Performed by: FAMILY MEDICINE

## 2025-05-13 PROCEDURE — 99999 PR PBB SHADOW E&M-EST. PATIENT-LVL V: CPT | Mod: PBBFAC,,, | Performed by: FAMILY MEDICINE

## 2025-05-13 PROCEDURE — 99214 OFFICE O/P EST MOD 30 MIN: CPT | Mod: S$PBB,,, | Performed by: FAMILY MEDICINE

## 2025-05-13 PROCEDURE — G2211 COMPLEX E/M VISIT ADD ON: HCPCS | Mod: S$PBB,,, | Performed by: FAMILY MEDICINE

## 2025-05-13 RX ORDER — PANTOPRAZOLE SODIUM 40 MG/1
40 TABLET, DELAYED RELEASE ORAL
Qty: 90 TABLET | Refills: 3 | Status: SHIPPED | OUTPATIENT
Start: 2025-05-13

## 2025-05-13 RX ORDER — CARVEDILOL 25 MG/1
25 TABLET ORAL 2 TIMES DAILY WITH MEALS
Qty: 180 TABLET | Refills: 3 | Status: SHIPPED | OUTPATIENT
Start: 2025-05-13

## 2025-05-13 RX ORDER — IRBESARTAN 300 MG/1
300 TABLET ORAL NIGHTLY
Qty: 90 TABLET | Refills: 3 | Status: SHIPPED | OUTPATIENT
Start: 2025-05-13

## 2025-05-13 RX ORDER — NIFEDIPINE 60 MG/1
60 TABLET, EXTENDED RELEASE ORAL DAILY
Qty: 90 TABLET | Refills: 0 | Status: SHIPPED | OUTPATIENT
Start: 2025-05-13

## 2025-05-13 NOTE — ASSESSMENT & PLAN NOTE
Metformin 1000mg twice daily  Jardiance 25mg daily   Trulicity 4.5mg weekly  Glipizide 2.5mg with breakfast and 5mg with dinner

## 2025-05-13 NOTE — PROGRESS NOTES
Subjective:       Patient ID: Esteban Mike is a 74 y.o. male.    Chief Complaint: Annual Exam    Established patient follows up for management of chronic medical illnesses with complaints today. Please see dictation and ROS for interval problems, specific complaints and disease management discussion.    Past, Surgical, Family, Social, Histories; Medications, allergies reviewed and reconciled.  Health maintenance file reviewed and addressed items due. Recent applicable lab, imaging and cardiovascular results reviewed.  Problem list items reviewed and modified or added entries (in the overview section) may not be transcribed into this encounter note due to note writer format.      Concerns for occassional fecal soiling, relates to MTF. Would like to change trulicity to ozempic and see if can decrease MTF.    Due for cardiology and prim care DM f/u's.    Some fatigue and dysphoria. Some 'depression', decr motivation and distraction.    Diabetes  He has type 2 diabetes mellitus. No MedicAlert identification noted. Hypoglycemia symptoms include confusion, mood changes, nervousness/anxiousness and sleepiness. Pertinent negatives for hypoglycemia include no dizziness, headaches, hunger, pallor, seizures, speech difficulty, sweats or tremors. Associated symptoms include fatigue and polyuria. Pertinent negatives for diabetes include no blurred vision, no chest pain, no foot paresthesias, no foot ulcerations, no polydipsia, no polyphagia, no visual change, no weakness and no weight loss. Pertinent negatives for hypoglycemia complications include no blackouts, no hospitalization, no nocturnal hypoglycemia, no required assistance and no required glucagon injection. Symptoms are worsening. Diabetic complications include heart disease. Pertinent negatives for diabetic complications include no autonomic neuropathy, CVA, impotence, nephropathy, peripheral neuropathy, PVD or retinopathy. Risk factors for coronary artery  disease include dyslipidemia, family history, hypertension, stress and diabetes mellitus. Current diabetic treatment includes oral agent (triple therapy). He is compliant with treatment most of the time. He is currently taking insulin at bedtime. Insulin injections are given by patient. Rotation sites for injection include the abdominal wall. His weight is stable. He is following a generally healthy diet. When asked about meal planning, he reported none. He has not had a previous visit with a dietitian. He participates in exercise daily. He monitors blood glucose at home 1-2 x per week. Blood glucose monitoring compliance is adequate. There is no change in his home blood glucose trend. He sees a podiatrist.Eye exam is current.       Review of Systems   Constitutional:  Positive for fatigue. Negative for appetite change, chills, diaphoresis, fever and weight loss.   HENT:  Negative for congestion, postnasal drip, rhinorrhea, sore throat and trouble swallowing.    Eyes:  Negative for blurred vision and visual disturbance.   Respiratory:  Negative for cough, choking, chest tightness, shortness of breath and wheezing.    Cardiovascular:  Negative for chest pain and leg swelling.   Gastrointestinal:  Positive for diarrhea. Negative for abdominal distention, abdominal pain, nausea and vomiting.   Endocrine: Positive for polyuria. Negative for polydipsia and polyphagia.   Genitourinary:  Negative for difficulty urinating, hematuria and impotence.   Musculoskeletal:  Negative for arthralgias and myalgias.   Skin:  Negative for pallor and rash.   Neurological:  Negative for dizziness, tremors, seizures, speech difficulty, weakness, light-headedness and headaches.   Psychiatric/Behavioral:  Positive for confusion. Negative for dysphoric mood. The patient is nervous/anxious.        Objective:      Physical Exam  Vitals and nursing note reviewed.   Constitutional:       Appearance: He is well-developed. He is not diaphoretic.    Eyes:      General: No scleral icterus.  Neck:      Thyroid: No thyromegaly.      Vascular: No JVD.      Trachea: No tracheal deviation.   Cardiovascular:      Rate and Rhythm: Normal rate and regular rhythm.      Heart sounds: Heart sounds are distant. No murmur heard.     No friction rub. No gallop.   Pulmonary:      Effort: Pulmonary effort is normal. No respiratory distress.      Breath sounds: Normal breath sounds. No wheezing or rales.   Abdominal:      General: There is no distension.      Palpations: Abdomen is soft. There is no mass.      Tenderness: There is no abdominal tenderness. There is no guarding or rebound.   Musculoskeletal:      Cervical back: Normal range of motion and neck supple.   Lymphadenopathy:      Cervical: No cervical adenopathy.   Skin:     General: Skin is warm and dry.      Findings: No erythema or rash.   Neurological:      Mental Status: He is alert and oriented to person, place, and time.      Cranial Nerves: No cranial nerve deficit.      Motor: No tremor.      Coordination: Coordination normal.      Gait: Gait normal.   Psychiatric:         Behavior: Behavior normal.         Thought Content: Thought content normal.         Judgment: Judgment normal.         Assessment:       1. Type 2 diabetes, with peripheral circulatory disorder not at goal    2. Hypertension, essential    3. Mixed hyperlipidemia    4. Coronary artery disease involving autologous vein coronary bypass graft without angina pectoris    5. Gastroesophageal reflux disease with esophagitis, unspecified whether hemorrhage    6. NIDIA (obstructive sleep apnea)    7. Adjustment disorder with mixed anxiety and depressed mood        Plan:     Medication List with Changes/Refills   Current Medications    ASPIRIN 81 MG CHEWABLE TABLET    Take 81 mg by mouth once daily.    ATORVASTATIN (LIPITOR) 80 MG TABLET    Take 1 tablet (80 mg total) by mouth once daily.    BLOOD SUGAR DIAGNOSTIC STRP    To check BG 3 times daily, to  use with insurance preferred meter    BLOOD-GLUCOSE METER KIT    To check BG 3 times daily, to use with insurance preferred meter    DULAGLUTIDE (TRULICITY) 4.5 MG/0.5 ML PEN INJECTOR    Inject 4.5 mg into the skin every 7 days.    FISH OIL-OMEGA-3 FATTY ACIDS 300-1,000 MG CAPSULE    Take 2 g by mouth once daily. 3 tablets    FLUTICASONE PROPIONATE (FLONASE) 50 MCG/ACTUATION NASAL SPRAY    1 spray by Each Nostril route.    GLIPIZIDE (GLUCOTROL) 5 MG TABLET    take ONE-HALF TABLET BY MOUTH EVERY MORNING with breakfast AND TAKE ONE TABLET BY MOUTH with dinner    JARDIANCE 25 MG TABLET    Take 1 tablet (25 mg total) by mouth once daily.    LANCETS MISC    To check BG 3 times daily, to use with insurance preferred meter    METFORMIN (GLUCOPHAGE-XR) 500 MG ER 24HR TABLET    Take 2 tablets (1,000 mg total) by mouth 2 (two) times daily with meals.    MULTIVITAMIN CAPSULE    Take 1 capsule by mouth once daily.    TAMSULOSIN (FLOMAX) 0.4 MG CAP    Take 2 capsules (0.8 mg total) by mouth once daily.    VIT C-VIT E-LUTEIN-MIN-OM-3 245-60-0-150 MG-UNIT-MG-MG CAP    Take 1 tablet by mouth once daily.   Changed and/or Refilled Medications    Modified Medication Previous Medication    CARVEDILOL (COREG) 25 MG TABLET carvediloL (COREG) 25 MG tablet       Take 1 tablet (25 mg total) by mouth 2 (two) times daily with meals.    Take 1 tablet (25 mg total) by mouth 2 (two) times daily with meals.    IRBESARTAN (AVAPRO) 300 MG TABLET irbesartan (AVAPRO) 300 MG tablet       Take 1 tablet (300 mg total) by mouth every evening.    Take 1 tablet (300 mg total) by mouth every evening.    NIFEDIPINE (PROCARDIA-XL) 60 MG (OSM) 24 HR TABLET NIFEdipine (PROCARDIA-XL) 60 MG (OSM) 24 hr tablet       Take 1 tablet (60 mg total) by mouth once daily.    Take 1 tablet (60 mg total) by mouth once daily.    PANTOPRAZOLE (PROTONIX) 40 MG TABLET pantoprazole (PROTONIX) 40 MG tablet       Take 1 tablet (40 mg total) by mouth before breakfast.    Take 1  tablet (40 mg total) by mouth before breakfast.     1. Type 2 diabetes, with peripheral circulatory disorder not at goal  Overview:  >>OVERVIEW FOR TYPE 2 DIABETES MELLITUS WITH DIABETIC POLYNEUROPATHY WRITTEN ON 9/7/2023 11:08 AM BY CORRINE DORAN MD    -followed by endocrinology    Assessment & Plan:  Metformin 1000mg twice daily  Jardiance 25mg daily   Trulicity 4.5mg weekly  Glipizide 2.5mg with breakfast and 5mg with dinner      Orders:  -     Ambulatory Referral/Consult to Primary Care Diabetic Management; Future; Expected date: 05/20/2025  -     Ambulatory referral/consult to Podiatry; Future; Expected date: 05/20/2025    2. Hypertension, essential  Overview:  -see 7/6/2023 a and p note  -on digital program    Assessment & Plan:  Rtc 1 month to APRN, increase nifedipine to 90 if still high    Orders:  -     carvediloL (COREG) 25 MG tablet; Take 1 tablet (25 mg total) by mouth 2 (two) times daily with meals.  Dispense: 180 tablet; Refill: 3  -     irbesartan (AVAPRO) 300 MG tablet; Take 1 tablet (300 mg total) by mouth every evening.  Dispense: 90 tablet; Refill: 3  -     NIFEdipine (PROCARDIA-XL) 60 MG (OSM) 24 hr tablet; Take 1 tablet (60 mg total) by mouth once daily.  Dispense: 90 tablet; Refill: 0    3. Mixed hyperlipidemia    4. Coronary artery disease involving autologous vein coronary bypass graft without angina pectoris  Overview:  -Followed in cardiology - CAD post CABG 2008, post right CEA 2006 and left ICA stent 2007.  -on statin    Orders:  -     Ambulatory referral/consult to Cardiology; Future; Expected date: 05/20/2025    5. Gastroesophageal reflux disease with esophagitis, unspecified whether hemorrhage  Overview:  -chronic PPI use    Assessment & Plan:  -last EGD 2022, due for GI f/u    Orders:  -     pantoprazole (PROTONIX) 40 MG tablet; Take 1 tablet (40 mg total) by mouth before breakfast.  Dispense: 90 tablet; Refill: 3  -     Ambulatory referral/consult to Gastroenterology; Future;  Expected date: 05/20/2025    6. NIDIA (obstructive sleep apnea)  Overview:  -managed by sleep medicine, 6/13/2023 - on CPAP  -test result located in media tab      Assessment & Plan:  -not using CPAP now d/t dry mouth      7. Adjustment disorder with mixed anxiety and depressed mood  -     Ambulatory referral/consult to Primary Care Behavioral Health (Non-Opioids); Future; Expected date: 05/20/2025      See meds, orders, follow up, routing and instructions sections of encounter and AVS. Discussed with patient and provided on AVS.    Discussed diet and exercise as therapeutic modalities for metabolic and other conditions. Provided patient information, which are included as links on the AVS for detailed information.    Lab Results   Component Value Date     05/02/2025     08/21/2024    K 4.5 05/02/2025    K 4.3 08/21/2024     05/02/2025     08/21/2024    BUN 16 05/02/2025    CREATININE 0.9 05/02/2025     (H) 05/02/2025     (H) 08/21/2024    HGBA1C 6.7 (H) 05/02/2025    HGBA1C 6.3 (H) 01/16/2025    HGBA1C 9.7 07/08/2019    MG 2.6 05/06/2008    AST 15 05/02/2025    AST 16 08/21/2024    ALT 20 05/02/2025    ALT 24 08/21/2024    ALBUMIN 4.1 05/02/2025    ALBUMIN 4.0 08/21/2024    PROT 6.9 05/02/2025    PROT 6.6 08/21/2024    BILITOT 0.5 05/02/2025    BILITOT 0.4 08/21/2024    CHOL 122 05/02/2025    CHOL 120 07/16/2024    HDL 39 (L) 05/02/2025    HDL 46 07/08/2019    LDLCALC 66.2 05/02/2025    TRIG 84 05/02/2025    TRIG 100 07/16/2024    WBC 10.3 04/11/2018    HGB 14.2 04/11/2018    HCT 42.3 04/11/2018     04/11/2018    PSA 0.99 05/02/2025    PSA 0.28 03/07/2023    PSADIAG 0.26 05/30/2024    PSADIAG 0.29 02/28/2022    TSH 0.69 04/11/2018         Diabetes Management Status    Statin: Taking  ACE/ARB: Taking    Screening or Prevention Patient's value Goal Complete/Controlled?   HgA1C Testing and Control   Lab Results   Component Value Date    HGBA1C 6.7 (H) 05/02/2025       "Annually/Less than 8% Yes   Lipid profile : 05/02/2025 Annually Yes   LDL control Lab Results   Component Value Date    LDLCALC 66.2 05/02/2025    Annually/Less than 100 mg/dl  Yes   Nephropathy screening Lab Results   Component Value Date    LABMICR 7.0 05/02/2025     Lab Results   Component Value Date    PROTEINUA Negative 02/17/2022     No results found for: "UTPCR"   Annually Yes   Blood pressure BP Readings from Last 1 Encounters:   05/13/25 (!) 140/66    Less than 140/90 Yes   Dilated retinal exam : 04/28/2025 Annually Yes   Foot exam   : 10/02/2023 Annually No     As this patient's primary care physician, I am actively managing and/or treating his/her chronic medical conditions now and in the future. This includes, but is not limited to, medication management, coordination of care, documentation review from his/her specialists, and labs/imaging review that I have performed to prepare for this visit as well as will do so in the future as part of my care continuity for this patient.    "

## 2025-05-15 ENCOUNTER — PATIENT MESSAGE (OUTPATIENT)
Dept: VASCULAR SURGERY | Facility: CLINIC | Age: 75
End: 2025-05-15
Payer: MEDICARE

## 2025-05-15 DIAGNOSIS — I65.21 INTERNAL CAROTID ARTERY OCCLUSION, RIGHT: Primary | ICD-10-CM

## 2025-05-15 NOTE — PROGRESS NOTES
CHIEF COMPLAINT: Type 2 Diabetes     HPI: Mr. Esteban Mike is a 74 y.o. male who was diagnosed with Type 2 DM in 2008.  CABG x 4 in 2008   H/o carotid occlusion/blockage  F/u with Dr. Case.  Initially started insulin 4x a day.   Gap of not having any insulin, oral antihyperglycemic agents.   Having adverse reactions w/ metformin.   Seen by STIVEN Saab NP in the past-per endocrinology.    Active : 22 yards a week    Bg check 1-2 x a week  Digital medicine    3/20/2025 3/26/2025 3/28/2025 4/2/2025 4/9/2025   Recent Readings        Blood Glucose (mg/dL) 102 mg/dL  153 mg/dL  102 mg/dL  179 mg/dL  179 mg/dL       4/19/2025 4/22/2025 4/24/2025 4/26/2025 5/3/2025   Recent Readings        Blood Glucose (mg/dL) 166 mg/dL  190 mg/dL  132 mg/dL  221 mg/dL  112 mg/dL       5/5/2025 5/7/2025 5/11/2025   Recent Readings      Blood Glucose (mg/dL) 113 mg/dL  175 mg/dL  256 mg/dL         Lab Results   Component Value Date    LABA1C 6.8 (H) 04/11/2018    HGBA1C 6.7 (H) 05/02/2025       PREVIOUS DIABETES MEDICATIONS TRIED  Metformin   Trulicity   Jardiance  Glipizide       CURRENT DIABETIC MEDS: trulicity  4.5 mg weekly  -Sunday , metformin 1000 mg bid , jardiance 25 mg daily, glipizide 2.5 mg w/ breakfast, 5 mg w/ dinner     See above     Diabetes Management Status    Statin: Taking  ACE/ARB: Taking    Screening or Prevention Patient's value Goal Complete/Controlled?   HgA1C Testing and Control   Lab Results   Component Value Date    HGBA1C 6.7 (H) 05/02/2025      Annually/Less than 8% Yes   Lipid profile : 05/02/2025 Annually Yes   LDL control Lab Results   Component Value Date    LDLCALC 66.2 05/02/2025    Annually/Less than 100 mg/dl  Yes   Nephropathy screening Lab Results   Component Value Date    LABMICR 7.0 05/02/2025     Lab Results   Component Value Date    PROTEINUA Negative 02/17/2022    Annually Yes   Blood pressure BP Readings from Last 1 Encounters:   05/16/25 (!) 162/86    Less than 140/90 Yes   Dilated  "retinal exam : 04/28/2025 Annually Yes   Foot exam   : 10/02/2023 Annually No     REVIEW OF SYSTEMS  General: no weakness, fatigue, +weight changes 4#   Eyes: no double or blurred vision, eye pain, or redness  Cardiovascular: no chest pain, palpitations, edema, or murmurs.   Respiratory: no cough or dyspnea.   GI: no heartburn, nausea, or changes in bowel patterns; good appetite.   Skin: no rashes, dryness, itching, or reactions at insulin injection sites.  Neuro: no numbness, tingling, tremors, or vertigo.   Endocrine: no polyuria, polydipsia, polyphagia, heat or cold intolerance.     Vital Signs  BP (!) 162/86 (BP Location: Left arm, Patient Position: Sitting)   Pulse 64   Ht 5' 7" (1.702 m)   Wt 77.3 kg (170 lb 6.7 oz)   SpO2 100%   BMI 26.69 kg/m²     Hemoglobin A1C   Date Value Ref Range Status   01/16/2025 6.3 (H) 4.0 - 5.6 % Final     Comment:     ADA Screening Guidelines:  5.7-6.4%  Consistent with prediabetes  >or=6.5%  Consistent with diabetes    High levels of fetal hemoglobin interfere with the HbA1C  assay. Heterozygous hemoglobin variants (HbS, HgC, etc)do  not significantly interfere with this assay.   However, presence of multiple variants may affect accuracy.     07/16/2024 6.3 (H) 4.0 - 5.6 % Final     Comment:     ADA Screening Guidelines:  5.7-6.4%  Consistent with prediabetes  >or=6.5%  Consistent with diabetes    High levels of fetal hemoglobin interfere with the HbA1C  assay. Heterozygous hemoglobin variants (HbS, HgC, etc)do  not significantly interfere with this assay.   However, presence of multiple variants may affect accuracy.     09/07/2023 6.8 (H) 4.0 - 5.6 % Final     Comment:     ADA Screening Guidelines:  5.7-6.4%  Consistent with prediabetes  >or=6.5%  Consistent with diabetes    High levels of fetal hemoglobin interfere with the HbA1C  assay. Heterozygous hemoglobin variants (HbS, HgC, etc)do  not significantly interfere with this assay.   However, presence of multiple " variants may affect accuracy.     07/08/2019 9.7 % Final     Hemoglobin A1c   Date Value Ref Range Status   05/02/2025 6.7 (H) 4.0 - 5.6 % Final     Comment:     ADA Screening Guidelines:  5.7-6.4%  Consistent with prediabetes  >=6.5%  Consistent with diabetes    High levels of fetal hemoglobin interfere with the HbA1C  assay. Heterozygous hemoglobin variants (HbS, HgC, etc)do  not significantly interfere with this assay.   However, presence of multiple variants may affect accuracy.        Chemistry        Component Value Date/Time     05/02/2025 0659     08/21/2024 0728    K 4.5 05/02/2025 0659    K 4.3 08/21/2024 0728     05/02/2025 0659     08/21/2024 0728    CO2 25 05/02/2025 0659    CO2 25 08/21/2024 0728    BUN 16 05/02/2025 0659    CREATININE 0.9 05/02/2025 0659     (H) 05/02/2025 0659     (H) 08/21/2024 0728        Component Value Date/Time    CALCIUM 9.3 05/02/2025 0659    CALCIUM 10.0 08/21/2024 0728    ALKPHOS 89 05/02/2025 0659    ALKPHOS 81 08/21/2024 0728    AST 15 05/02/2025 0659    AST 16 08/21/2024 0728    ALT 20 05/02/2025 0659    ALT 24 08/21/2024 0728    BILITOT 0.5 05/02/2025 0659    BILITOT 0.4 08/21/2024 0728    ESTGFRAFRICA 99 07/06/2022 0730    EGFRNONAA 86 07/06/2022 0730           Lab Results   Component Value Date    TSH 0.69 04/11/2018      Lab Results   Component Value Date    CHOL 122 05/02/2025    CHOL 120 07/16/2024    CHOL 120 03/07/2023     Lab Results   Component Value Date    HDL 39 (L) 05/02/2025    HDL 40 07/16/2024    HDL 36 (L) 03/07/2023     Lab Results   Component Value Date    LDLCALC 66.2 05/02/2025    LDLCALC 60.0 (L) 07/16/2024    LDLCALC 51.8 (L) 03/07/2023     Lab Results   Component Value Date    TRIG 84 05/02/2025    TRIG 100 07/16/2024    TRIG 161 (H) 03/07/2023     Lab Results   Component Value Date    CHOLHDL 32.0 05/02/2025    CHOLHDL 33.3 07/16/2024    CHOLHDL 30.0 03/07/2023         PHYSICAL EXAMINATION  Constitutional:  "Appears well, no distress Reviewed vitals above.  Eyes: conjunctivae & lids intact; PERRLA, EOMs intact; optic discs   Neck: Supple, trachea midline.   Respiratory: CTA without wheezes, even and unlabored, upon palpation wnl, percussion wnl  Cardiovascular: RRR;  no edema or varicosities  Lymph: no lymphadenopathy palpated  Skin: warm and dry; no injection site reactions, no acanthosis nigracans observed.  Neuro:patient alert and cooperative, normal affect; steady gait.  Psychiatric: judgement & insight intact, orientation of time, place & person intact, memory; mood & affect wnl     Diabetes Foot Exam:   Protective Sensation (w/ 10 gram monofilament):  Right: Intact  Left: Intact    Visual Inspection:  Normal -  Bilateral and Dry Skin -  Bilateral    Pedal Pulses:   Right: Present  Left: Present        Assessment/Plan  1. Type 2 diabetes, with peripheral circulatory disorder not at goal  Hemoglobin A1C next time   F/u in 4 months   Change metformin to 500 mg bid  Continue trulicity 4.5 mg weekly   Continue jardiance 25 mg daily   Change glipizide to 5 mg xr w/ 1st meal      2. Diabetic mononeuropathy associated with type 2 diabetes mellitus  Stable        3. NIDIA (obstructive sleep apnea)  Off and on with cpap  May affect cardiovascular system, metabolism , bg       4. NAFLD (nonalcoholic fatty liver disease)  F/u with hepatology  Stable   On trulicity       5. Hypertension, essential  Bp managed per pcp   On arb/acei  On bb  Elevated today -did not take meds      6. Mixed hyperlipidemia  No results found for: "LDL"  Goal < 100   On statin       7. Coronary artery disease involving autologous vein coronary bypass graft without angina pectoris  Avoid hypoglycemia  Stable       8. PVD (peripheral vascular disease)  F/u with vascular ,stable       9. Type 2 diabetes mellitus with hyperglycemia, without long-term current use of insulin  metFORMIN (GLUCOPHAGE-XR) 500 MG ER 24hr tablet-change 500 mg xr bid          FOLLOW " UP  In 4 months

## 2025-05-16 ENCOUNTER — OFFICE VISIT (OUTPATIENT)
Dept: INTERNAL MEDICINE | Facility: CLINIC | Age: 75
End: 2025-05-16
Payer: MEDICARE

## 2025-05-16 VITALS
SYSTOLIC BLOOD PRESSURE: 178 MMHG | HEART RATE: 64 BPM | BODY MASS INDEX: 26.75 KG/M2 | HEIGHT: 67 IN | WEIGHT: 170.44 LBS | OXYGEN SATURATION: 100 % | DIASTOLIC BLOOD PRESSURE: 82 MMHG

## 2025-05-16 DIAGNOSIS — E11.51 TYPE 2 DIABETES, WITH PERIPHERAL CIRCULATORY DISORDER NOT AT GOAL: Primary | ICD-10-CM

## 2025-05-16 DIAGNOSIS — E11.65 TYPE 2 DIABETES MELLITUS WITH HYPERGLYCEMIA, WITHOUT LONG-TERM CURRENT USE OF INSULIN: ICD-10-CM

## 2025-05-16 DIAGNOSIS — G47.33 OSA (OBSTRUCTIVE SLEEP APNEA): ICD-10-CM

## 2025-05-16 DIAGNOSIS — E78.2 MIXED HYPERLIPIDEMIA: ICD-10-CM

## 2025-05-16 DIAGNOSIS — I10 HYPERTENSION, ESSENTIAL: ICD-10-CM

## 2025-05-16 DIAGNOSIS — I73.9 PVD (PERIPHERAL VASCULAR DISEASE): ICD-10-CM

## 2025-05-16 DIAGNOSIS — I25.810 CORONARY ARTERY DISEASE INVOLVING AUTOLOGOUS VEIN CORONARY BYPASS GRAFT WITHOUT ANGINA PECTORIS: ICD-10-CM

## 2025-05-16 DIAGNOSIS — E11.41 DIABETIC MONONEUROPATHY ASSOCIATED WITH TYPE 2 DIABETES MELLITUS: ICD-10-CM

## 2025-05-16 DIAGNOSIS — K76.0 NAFLD (NONALCOHOLIC FATTY LIVER DISEASE): ICD-10-CM

## 2025-05-16 PROCEDURE — 99999 PR PBB SHADOW E&M-EST. PATIENT-LVL IV: CPT | Mod: PBBFAC,,, | Performed by: NURSE PRACTITIONER

## 2025-05-16 PROCEDURE — 99214 OFFICE O/P EST MOD 30 MIN: CPT | Mod: PBBFAC | Performed by: NURSE PRACTITIONER

## 2025-05-16 RX ORDER — METFORMIN HYDROCHLORIDE 500 MG/1
500 TABLET, EXTENDED RELEASE ORAL 2 TIMES DAILY WITH MEALS
Qty: 180 TABLET | Refills: 3
Start: 2025-05-16

## 2025-05-16 RX ORDER — DULAGLUTIDE 4.5 MG/.5ML
4.5 INJECTION, SOLUTION SUBCUTANEOUS
Qty: 4 PEN | Refills: 11 | Status: SHIPPED | OUTPATIENT
Start: 2025-05-16

## 2025-05-16 RX ORDER — GLIPIZIDE 5 MG/1
5 TABLET, FILM COATED, EXTENDED RELEASE ORAL
Qty: 90 TABLET | Refills: 3 | Status: SHIPPED | OUTPATIENT
Start: 2025-05-16 | End: 2026-05-16

## 2025-05-16 RX ORDER — EMPAGLIFLOZIN 25 MG/1
25 TABLET, FILM COATED ORAL DAILY
Qty: 90 TABLET | Refills: 3 | Status: SHIPPED | OUTPATIENT
Start: 2025-05-16

## 2025-05-16 NOTE — PATIENT INSTRUCTIONS
Follow up in 4 months w/Irielle  A1c prior -4 months     Lab Results   Component Value Date    LABA1C 6.8 (H) 04/11/2018    HGBA1C 6.7 (H) 05/02/2025     Goal less than 7/7.5%     [email or web address]  Eat fit noreen  [email or web address] noreen  [email or web address]    Mysugr noreen   Glucose guide noreen     Goal  no higher than 180    Trulicity 4.5 mg weekly  Jardiance 25 mg daily  Glipizide 5 mg xr 15 mins prior to meal -new rx  Stop regular ones- glipizide   Metformin 500 mg twice a day

## 2025-05-16 NOTE — PROGRESS NOTES
Trulicity was last filled on 05/03/25- no PA is needed.  Glipizide was last filled on 05/07/25- no PA is needed.

## 2025-05-16 NOTE — Clinical Note
Cutting metformin Changing glipizide to xr  Bp elevated- did not take meds this am Nice emily Will upload later today bp after meds of course

## 2025-05-21 ENCOUNTER — TELEPHONE (OUTPATIENT)
Dept: INTERNAL MEDICINE | Facility: CLINIC | Age: 75
End: 2025-05-21
Payer: MEDICARE

## 2025-05-21 ENCOUNTER — OFFICE VISIT (OUTPATIENT)
Dept: VASCULAR SURGERY | Facility: CLINIC | Age: 75
End: 2025-05-21
Attending: SURGERY
Payer: MEDICARE

## 2025-05-21 ENCOUNTER — HOSPITAL ENCOUNTER (OUTPATIENT)
Dept: VASCULAR SURGERY | Facility: CLINIC | Age: 75
Discharge: HOME | End: 2025-05-21
Attending: SURGERY
Payer: MEDICARE

## 2025-05-21 VITALS
TEMPERATURE: 98 F | DIASTOLIC BLOOD PRESSURE: 84 MMHG | WEIGHT: 169.75 LBS | SYSTOLIC BLOOD PRESSURE: 168 MMHG | HEART RATE: 62 BPM | BODY MASS INDEX: 26.64 KG/M2 | HEIGHT: 67 IN

## 2025-05-21 DIAGNOSIS — I65.21 INTERNAL CAROTID ARTERY OCCLUSION, RIGHT: Primary | ICD-10-CM

## 2025-05-21 DIAGNOSIS — I65.21 INTERNAL CAROTID ARTERY OCCLUSION, RIGHT: ICD-10-CM

## 2025-05-21 PROCEDURE — 99214 OFFICE O/P EST MOD 30 MIN: CPT | Mod: PBBFAC | Performed by: SURGERY

## 2025-05-21 PROCEDURE — 99213 OFFICE O/P EST LOW 20 MIN: CPT | Mod: S$PBB,,, | Performed by: SURGERY

## 2025-05-21 PROCEDURE — 99999 PR PBB SHADOW E&M-EST. PATIENT-LVL IV: CPT | Mod: PBBFAC,,, | Performed by: SURGERY

## 2025-05-21 PROCEDURE — 93880 EXTRACRANIAL BILAT STUDY: CPT | Mod: 26,S$PBB,, | Performed by: SURGERY

## 2025-05-21 PROCEDURE — 93880 EXTRACRANIAL BILAT STUDY: CPT | Mod: PBBFAC | Performed by: SURGERY

## 2025-05-21 NOTE — PROGRESS NOTES
Patient ID: Esteban Mike is a 74 y.o. male.    I. HISTORY     Chief Complaint: Follow-up      HPI: Esteban Mike is a 74 y.o. male who is here today for follow up appointment for evaluation of carotid stenosis. The patient has history of stroke . In 2006 he had a right carotid endarterectomy at [facility]. It occluded very shortly thereafter.  He then had in 2007 a stent placed in his left carotid by Dr. Case. He is done well since then in terms of neurologic disease and denies CVA amaurosis fugax or TIA. He has been followed on and off. In addition to this he has severe diabetes and he had a CABG in 2008 done by Dr. Be. He is taking Aspirin and Statin.         Past Medical History:   Diagnosis Date    Aortic valve disease     Bilateral carotid artery stenosis 07/18/2018    CAD (coronary artery disease)     Carotid artery occlusion      LICA, 70-80% JULIUS    Cataract     Diabetes mellitus     Diabetes mellitus type II     Elevated LFTs     GERD (gastroesophageal reflux disease)     Hyperlipidemia     Hypertension     PVD (peripheral vascular disease)         Past Surgical History:   Procedure Laterality Date    CARDIAC CATHETERIZATION      carotid      CEA      COLONOSCOPY N/A 12/4/2020    Procedure: COLONOSCOPY;  Surgeon: William Holman MD;  [facility]  Service: Endoscopy;  Laterality: N/A;  merged orders together    CORONARY ARTERY BYPASS GRAFT  5/2/08    X4    ESOPHAGOGASTRODUODENOSCOPY N/A 4/1/2019    Procedure: EGD (ESOPHAGOGASTRODUODENOSCOPY);  Surgeon: William Holman MD;  [facility]  Service: Endoscopy;  Laterality: N/A;    ESOPHAGOGASTRODUODENOSCOPY N/A 12/4/2020    Procedure: EGD (ESOPHAGOGASTRODUODENOSCOPY);  Surgeon: William Holman MD;  [facility]  Service: Endoscopy;  Laterality: N/A;  covid test [facility] 12/1    ESOPHAGOGASTRODUODENOSCOPY N/A 5/18/2022    Procedure: EGD  (ESOPHAGOGASTRODUODENOSCOPY);  Surgeon: William Holman MD;  Location: [facility] ([address] [place] [address]);  Service: Endoscopy;  Laterality: N/A;  fully vaccinated  2nd floor availability/ inst. mailed-RB    HERNIA REPAIR      ica stent      TONSILLECTOMY         Tobacco Use History[1]    Review of Systems   Constitutional: Negative for decreased appetite.   Eyes:  Negative for blurred vision.   Cardiovascular:  Negative for chest pain and claudication.   Endocrine: Negative for cold intolerance and heat intolerance.   Skin:  Negative for color change and rash.       ASA: 81 mg daily  High Intensity Statin: 80 mg daily      II. PHYSICAL EXAM     Physical Exam  Constitutional:       Appearance: Normal appearance.   Cardiovascular:      Rate and Rhythm: Normal rate and regular rhythm.   Pulmonary:      Effort: Pulmonary effort is normal.      Breath sounds: Normal breath sounds.   Abdominal:      General: Abdomen is flat.   Skin:     General: Skin is warm and dry.   Neurological:      General: No focal deficit present.      Mental Status: He is alert. Mental status is at baseline.       III. ASSESSMENT & PLAN (MEDICAL DECISION MAKING)     No diagnosis found.    Imaging Results:   Carotid Duplex 5/21/2025:  R L   CCA: 25 cm/s  ICA PSV: 0cm/s  ICA EDV: 0cm/s  Vert: 66  ICA/CCA ratio: 1.6  Impression: Occluded  CCA: 62 cm/s  Vert: 93   ICA/CCA ratio: 1.0   Impression: Stent patent         Assessment/Diagnosis and Plan:  74 y.o. male with HLD, RCEA and Left Carotid Stent comes for evaluation of Asymptomatic Carotid Stenosis. He has occluded Right Carotid Stenosis and Left patent stented Left Carotid.    Continue Aspirin 81 mg   Continue Atorvastatin 80 mg daily   Follow up 2 years with Carotid US.     Dr. Benz   Vascular Surgery                   [1]   Social History  Tobacco Use   Smoking Status Never   Smokeless Tobacco Never

## 2025-05-26 DIAGNOSIS — Z00.00 ENCOUNTER FOR MEDICARE ANNUAL WELLNESS EXAM: ICD-10-CM

## 2025-06-05 ENCOUNTER — TELEPHONE (OUTPATIENT)
Dept: PODIATRY | Facility: CLINIC | Age: 75
End: 2025-06-05
Payer: MEDICARE

## 2025-06-11 ENCOUNTER — OFFICE VISIT (OUTPATIENT)
Dept: INTERNAL MEDICINE | Facility: CLINIC | Age: 75
End: 2025-06-11
Payer: MEDICARE

## 2025-06-11 VITALS
OXYGEN SATURATION: 99 % | WEIGHT: 164.69 LBS | SYSTOLIC BLOOD PRESSURE: 130 MMHG | HEART RATE: 63 BPM | BODY MASS INDEX: 25.79 KG/M2 | DIASTOLIC BLOOD PRESSURE: 80 MMHG

## 2025-06-11 DIAGNOSIS — L98.9 SKIN LESION: ICD-10-CM

## 2025-06-11 DIAGNOSIS — R45.89 DEPRESSED MOOD: ICD-10-CM

## 2025-06-11 DIAGNOSIS — I10 HYPERTENSION, ESSENTIAL: Primary | ICD-10-CM

## 2025-06-11 DIAGNOSIS — G47.33 OSA (OBSTRUCTIVE SLEEP APNEA): ICD-10-CM

## 2025-06-11 DIAGNOSIS — E11.69 TYPE 2 DIABETES MELLITUS WITH OTHER SPECIFIED COMPLICATION, WITHOUT LONG-TERM CURRENT USE OF INSULIN: ICD-10-CM

## 2025-06-11 PROCEDURE — 99215 OFFICE O/P EST HI 40 MIN: CPT | Mod: PBBFAC

## 2025-06-11 PROCEDURE — 99214 OFFICE O/P EST MOD 30 MIN: CPT | Mod: S$PBB,,,

## 2025-06-11 PROCEDURE — 99999 PR PBB SHADOW E&M-EST. PATIENT-LVL V: CPT | Mod: PBBFAC,,,

## 2025-06-11 NOTE — PROGRESS NOTES
INTERNAL MEDICINE PROGRESS NOTE    CHIEF COMPLAINT     Esteban presents today for follow up of elevated blood pressure readings.    HPI     Esteban Mike is a 74 y.o. male who presents for a follow up visit today.    HYPERTENSION:  Recently (5/13/25) seen by Dr. Hartley for annual, with elevated BP. He is enrolled in a digital medicine program for blood pressure monitoring, which has shown BP improvement. Recent averages are: last three measurements 118/67, last seven days 116/61, and last 30 days 126/70. Denies vision changes, CP, SOB, lower extremity edema, palpitations, or headaches.    DIABETES:  His recent A1C was approximately 6. He continues weekly Trulicity injections and extended-release Glipizide. Metformin was reduced to 500 mg twice daily from 1000 mg twice daily due to GI issues.    SLEEP APNEA:  He was diagnosed with sleep apnea at age 73. He initially used CPAP therapy but discontinued due to dental complications requiring three root canals in the past year.    DEPRESSION:  He reports feeling unmotivated and mildly depressed, but denies suicidal ideation.    SKIN:  He noticed a new lesion on his earlobe approximately 2 months ago.    ROS:  General: -fever, -chills, -fatigue, -weight gain, -weight loss  Eyes: -vision changes, -redness, -discharge  ENT: -ear pain, -nasal congestion, -sore throat  Cardiovascular: -chest pain, -palpitations, -lower extremity edema,   Respiratory: -cough, -shortness of breath  Gastrointestinal: -abdominal pain, -nausea, -vomiting, +diarrhea, -constipation, -blood in stool  Genitourinary: -dysuria, -hematuria, -frequency  Musculoskeletal: -joint pain, -muscle pain  Skin: -rash, +lesion  Neurological: -headache, -dizziness, -numbness, -tingling  Psychiatric: -anxiety, +depression, -sleep difficulty        Past Medical History:  Past Medical History:   Diagnosis Date    Aortic valve disease     Bilateral carotid artery stenosis 07/18/2018    CAD (coronary artery  disease)     Carotid artery occlusion      LICA, 70-80% JULIUS    Cataract     Diabetes mellitus     Diabetes mellitus type II     Elevated LFTs     GERD (gastroesophageal reflux disease)     Hyperlipidemia     Hypertension     PVD (peripheral vascular disease)      Home Medications:  Prior to Admission medications    Medication Sig Start Date End Date Taking? Authorizing Provider   aspirin 81 MG chewable tablet Take 81 mg by mouth once daily.    Provider, Historical   atorvastatin (LIPITOR) 80 MG tablet Take 1 tablet (80 mg total) by mouth once daily. 8/16/24   Joan Saab NP   blood sugar diagnostic Strp To check BG 3 times daily, to use with insurance preferred meter 7/3/18   Ned Hartley MD   blood-glucose meter kit To check BG 3 times daily, to use with insurance preferred meter 11/17/17   Ned Hartley MD   carvediloL (COREG) 25 MG tablet Take 1 tablet (25 mg total) by mouth 2 (two) times daily with meals. 5/13/25   Ned Hartley MD   dulaglutide (TRULICITY) 4.5 mg/0.5 mL pen injector Inject 4.5 mg into the skin every 7 days. 5/16/25   Gerri Palacios APRN, DAVID   fish oil-omega-3 fatty acids 300-1,000 mg capsule Take 2 g by mouth once daily. 3 tablets    Provider, Historical   glipiZIDE 5 MG TR24 Take 1 tablet (5 mg total) by mouth daily with breakfast. 5/16/25 5/16/26  Gerri Palacios APRN, DAVID   irbesartan (AVAPRO) 300 MG tablet Take 1 tablet (300 mg total) by mouth every evening. 5/13/25   Ned Hartley MD   JARDIANCE 25 mg tablet Take 1 tablet (25 mg total) by mouth once daily. 5/16/25   Gerri Palacios APRN, DAVID   lancets Misc To check BG 3 times daily, to use with insurance preferred meter 11/17/17   Ned Hartley MD   metFORMIN (GLUCOPHAGE-XR) 500 MG ER 24hr tablet Take 1 tablet (500 mg total) by mouth 2 (two) times daily with meals. 5/16/25   Gerri Palacios APRN, DAVID   multivitamin capsule Take 1 capsule by mouth once daily.    Provider,  Historical   NIFEdipine (PROCARDIA-XL) 60 MG (OSM) 24 hr tablet Take 1 tablet (60 mg total) by mouth once daily. 5/13/25   Ned Hartley MD   pantoprazole (PROTONIX) 40 MG tablet Take 1 tablet (40 mg total) by mouth before breakfast. 5/13/25   Ned Hartley MD   vit C-vit E-lutein-min-om-3 [id number] mg-unit-mg-mg Cap Take 1 tablet by mouth once daily.    Provider, Historical     PHYSICAL EXAM     Vitals: Blood pressure: 130/80.  General: No acute distress. Well-developed. Well-nourished.  Eyes: EOMI. Sclerae anicteric.  HENT: Normocephalic. Atraumatic. Nares patent. Moist oral mucosa.  Cardiovascular: Regular rate. Regular rhythm. No murmurs. No rubs. No gallops. Normal S1, S2.  Respiratory: Normal respiratory effort. Clear to auscultation bilaterally. No rales. No rhonchi. No wheezing.  Musculoskeletal: No  obvious deformity.  Extremities: No lower extremity edema.  Neurological: Alert & oriented x3. No slurred speech. Normal gait.  Psychiatric: Normal mood. Normal affect. Good insight. Good judgment.  Skin: Warm. Dry. No rash. Lesion on earlobe.          /80   Pulse 63   Wt 74.7 kg (164 lb 10.9 oz)   SpO2 99%   BMI 25.79 kg/m²     Physical Exam    ASSESSMENT/PLAN     TYPE 2 DIABETES MELLITUS:  - Monitored the patient's blood sugars which are stable, with readings of 157 on Tuesday the 3rd, 170s, and 114 on Saturday the 31st.  - Patient enrolled in the digital medicine program that monitors blood sugar and sends alerts if levels are too high.  - Prescribed Trulicity once weekly, Glipizide extended release, and reduced Metformin dosage from 1000 mg to 500 mg twice daily due to GI issues.  Lab Results   Component Value Date    LABA1C 6.8 (H) 04/11/2018    HGBA1C 6.7 (H) 05/02/2025      ESSENTIAL HYPERTENSION:  - Monitored blood pressure readings: 140/66 on the 13th, 162/86 on the 16th, 168/84 on the 21st, and 130/80 today.  - Noted significant improvement in blood pressure with recent  readings averaging 118/67 over the last 3 days and 116/61 over the last 30 days.  - Continue current antihypertensive medication regimen.  -     carvediloL (COREG) 25 MG tablet; Take 1 tablet (25 mg total) by mouth 2 (two) times daily with meals.  -     irbesartan (AVAPRO) 300 MG tablet; Take 1 tablet (300 mg total) by mouth every evening.   -     NIFEdipine (PROCARDIA-XL) 60 MG (OSM) 24 hr tablet; Take 1 tablet (60 mg total) by mouth once daily.     OBSTRUCTIVE SLEEP APNEA:  - Noted the patient discontinued CPAP therapy due to adverse effects including dental caries and need for endodontic treatment.  - Referred the patient back to sleep medicine to discuss alternative treatment options, including possibility of an implantable device.    DEPRESSED MOOD:  - Evaluated the patient's mental health status, noting feelings of depression and lack of motivation possibly related to medications and media consumption.  - Discussed that limiting news exposure may help improve mood if it is causing distress.  - Reminded the patient about existing behavioral health referral and encouraged follow-up.    SKIN LESION:  - Examined lesion on earlobe that the patient noticed approximately 2 months ago.  - Referred to Dr. Delgado, the in-house dermatologist who practices on Wednesdays, for dermatological evaluation.    I spent a total of 38 minutes on the day of the visit.This includes face to face time and non-face to face time preparing to see the patient (eg, review of tests), obtaining and/or reviewing separately obtained history, documenting clinical information in the electronic or other health record, independently interpreting results and communicating results to the patient/family/caregiver, or care coordinator.     Patient education provided from [place]. Patient was counseled on when and how to seek emergent care.   This note was generated with the assistance of ambient listening technology. Verbal consent was obtained by the  patient and accompanying visitor(s) for the recording of patient appointment to facilitate this note. I attest to having reviewed and edited the generated note for accuracy, though some syntax or spelling errors may persist. Please contact the author of this note for any clarification.       Margo ELIZABETH, APRN, FNP-c   Department of Internal Medicine - [facility]  8:06 AM

## 2025-06-17 ENCOUNTER — TELEPHONE (OUTPATIENT)
Dept: ENDOSCOPY | Facility: HOSPITAL | Age: 75
End: 2025-06-17
Payer: MEDICARE

## 2025-06-17 VITALS — BODY MASS INDEX: 25.9 KG/M2 | WEIGHT: 165 LBS | HEIGHT: 67 IN

## 2025-06-17 DIAGNOSIS — K21.9 GASTROESOPHAGEAL REFLUX DISEASE, UNSPECIFIED WHETHER ESOPHAGITIS PRESENT: Primary | ICD-10-CM

## 2025-06-23 ENCOUNTER — OFFICE VISIT (OUTPATIENT)
Dept: DERMATOLOGY | Facility: CLINIC | Age: 75
End: 2025-06-23
Payer: MEDICARE

## 2025-06-23 DIAGNOSIS — L91.8 SKIN TAG: ICD-10-CM

## 2025-06-23 DIAGNOSIS — L82.1 SEBORRHEIC KERATOSES: ICD-10-CM

## 2025-06-23 DIAGNOSIS — Z12.83 SCREENING EXAM FOR SKIN CANCER: ICD-10-CM

## 2025-06-23 DIAGNOSIS — D18.01 CHERRY ANGIOMA: ICD-10-CM

## 2025-06-23 DIAGNOSIS — L57.0 AK (ACTINIC KERATOSIS): Primary | ICD-10-CM

## 2025-06-23 DIAGNOSIS — D22.9 MULTIPLE BENIGN NEVI: ICD-10-CM

## 2025-06-23 PROCEDURE — 17000 DESTRUCT PREMALG LESION: CPT | Mod: S$PBB,,, | Performed by: STUDENT IN AN ORGANIZED HEALTH CARE EDUCATION/TRAINING PROGRAM

## 2025-06-23 PROCEDURE — 99213 OFFICE O/P EST LOW 20 MIN: CPT | Mod: 25,S$PBB,, | Performed by: STUDENT IN AN ORGANIZED HEALTH CARE EDUCATION/TRAINING PROGRAM

## 2025-06-23 PROCEDURE — 99213 OFFICE O/P EST LOW 20 MIN: CPT | Mod: PBBFAC,25 | Performed by: STUDENT IN AN ORGANIZED HEALTH CARE EDUCATION/TRAINING PROGRAM

## 2025-06-23 PROCEDURE — 99999 PR PBB SHADOW E&M-EST. PATIENT-LVL III: CPT | Mod: PBBFAC,,, | Performed by: STUDENT IN AN ORGANIZED HEALTH CARE EDUCATION/TRAINING PROGRAM

## 2025-06-23 PROCEDURE — 17000 DESTRUCT PREMALG LESION: CPT | Mod: PBBFAC | Performed by: STUDENT IN AN ORGANIZED HEALTH CARE EDUCATION/TRAINING PROGRAM

## 2025-06-23 NOTE — PROGRESS NOTES
Subjective:      Patient ID:  Esteban Mike is a 74 y.o. male who presents for No chief complaint on file.    Esteban Mike is a 74 y.o. male who presents for: UBSE screening exam for skin cancer.    Last office visit 12/15/2023 with Dr. Fine     The patient has the following lesions of concern:  Location: right helix   Duration: months   Symptoms: none   Relieving factors/Previous treatments: none     Pertinent history:  History of blistering sunburns: No  History of tanning bed use: No  Family history of melanoma: No  Personal history of mole removal: Yes  Personal history of skin cancer: No        Review of Systems   Skin:  Positive for wears hat. Negative for daily sunscreen use, activity-related sunscreen use and recent sunburn.   Hematologic/Lymphatic: Bruises/bleeds easily (baby aspirin daily).       Objective:   Physical Exam   Constitutional: He appears well-developed and well-nourished. No distress.   Neurological: He is alert and oriented to person, place, and time. He is not disoriented.   Psychiatric: He has a normal mood and affect.   Skin:   Areas Examined (abnormalities noted in diagram):   Scalp / Hair Palpated and Inspected  Head / Face Inspection Performed  Neck Inspection Performed  Chest / Axilla Inspection Performed  Abdomen Inspection Performed  Back Inspection Performed  RUE Inspected  LUE Inspection Performed                     Diagram Legend     Erythematous scaling macule/papule c/w actinic keratosis       Vascular papule c/w angioma      Pigmented verrucoid papule/plaque c/w seborrheic keratosis      Yellow umbilicated papule c/w sebaceous hyperplasia      Irregularly shaped tan macule c/w lentigo     1-2 mm smooth white papules consistent with Milia      Movable subcutaneous cyst with punctum c/w epidermal inclusion cyst      Subcutaneous movable cyst c/w pilar cyst      Firm pink to brown papule c/w dermatofibroma      Pedunculated fleshy papule(s) c/w skin  tag(s)      Evenly pigmented macule c/w junctional nevus     Mildly variegated pigmented, slightly irregular-bordered macule c/w mildly atypical nevus      Flesh colored to evenly pigmented papule c/w intradermal nevus       Pink pearly papule/plaque c/w basal cell carcinoma      Erythematous hyperkeratotic cursted plaque c/w SCC      Surgical scar with no sign of skin cancer recurrence      Open and closed comedones      Inflammatory papules and pustules      Verrucoid papule consistent consistent with wart     Erythematous eczematous patches and plaques     Dystrophic onycholytic nail with subungual debris c/w onychomycosis     Umbilicated papule    Erythematous-base heme-crusted tan verrucoid plaque consistent with inflamed seborrheic keratosis     Erythematous Silvery Scaling Plaque c/w Psoriasis     See annotation      Assessment / Plan:        AK (actinic keratosis)  Cryosurgery Procedure Note    Verbal consent from the patient is obtained including, but not limited to, risk of hypopigmentation/hyperpigmentation, scar, recurrence of lesion. The patient is aware of the precancerous quality and need for treatment of these lesions. Liquid nitrogen cryosurgery is applied to the 1 actinic keratoses, as detailed in the physical exam, to produce a freeze injury. The patient is aware that blisters may form and is instructed on wound care with gentle cleansing and use of vaseline ointment to keep moist until healed. The patient is supplied a handout on cryosurgery and is instructed to call if lesions do not completely resolve.    Seborrheic keratoses  These are benign inherited growths without a malignant potential. Reassurance given to patient. No treatment is necessary.     Cherry angioma  This is a benign vascular lesion. Reassurance given. No treatment required.     Skin tag  Reassurance given to patient. No treatment is necessary.   Treatment of benign, asymptomatic lesions may be considered cosmetic.    Multiple  benign nevi  Reassurance given to patient. No treatment is necessary.   Treatment of benign, asymptomatic lesions may be considered cosmetic.    Screening exam for skin cancer  Total body skin examination performed today including at least 12 points as noted in physical examination. No lesions suspicious for malignancy noted.    Recommend daily sun protection/avoidance, use of at least SPF 30, broad spectrum sunscreen (OTC drug), skin self examinations, and routine physician surveillance to optimize early detection    RTC 1 year, sooner prn

## 2025-06-23 NOTE — PATIENT INSTRUCTIONS

## 2025-06-24 ENCOUNTER — ANESTHESIA EVENT (OUTPATIENT)
Dept: ENDOSCOPY | Facility: HOSPITAL | Age: 75
End: 2025-06-24
Payer: MEDICARE

## 2025-06-24 NOTE — ANESTHESIA PREPROCEDURE EVALUATION
06/24/2025  Esteban Mike is a 74 y.o., male.  Past Medical History:   Diagnosis Date    Aortic valve disease     Bilateral carotid artery stenosis 07/18/2018    CAD (coronary artery disease)     Carotid artery occlusion      LICA, 70-80% JULIUS    Cataract     Diabetes mellitus     Diabetes mellitus type II     Elevated LFTs     GERD (gastroesophageal reflux disease)     Hyperlipidemia     Hypertension     PVD (peripheral vascular disease)      Past Surgical History:   Procedure Laterality Date    CARDIAC CATHETERIZATION      carotid      CEA      COLONOSCOPY N/A 12/4/2020    Procedure: COLONOSCOPY;  Surgeon: William Holman MD;  Location: [facility] (4TH FLR);  Service: Endoscopy;  Laterality: N/A;  merged orders together    CORONARY ARTERY BYPASS GRAFT  5/2/08    X4    ESOPHAGOGASTRODUODENOSCOPY N/A 4/1/2019    Procedure: EGD (ESOPHAGOGASTRODUODENOSCOPY);  Surgeon: William Holman MD;  Location: [facility] (4TH FLR);  Service: Endoscopy;  Laterality: N/A;    ESOPHAGOGASTRODUODENOSCOPY N/A 12/4/2020    Procedure: EGD (ESOPHAGOGASTRODUODENOSCOPY);  Surgeon: William Holman MD;  Location: [facility] (4TH FLR);  Service: Endoscopy;  Laterality: N/A;  covid test [facility] 12/1    ESOPHAGOGASTRODUODENOSCOPY N/A 5/18/2022    Procedure: EGD (ESOPHAGOGASTRODUODENOSCOPY);  Surgeon: William Holman MD;  Location: [facility] (2ND FLR);  Service: Endoscopy;  Laterality: N/A;  fully vaccinated  2nd floor availability/ inst. mailed-RB    HERNIA REPAIR      ica stent      TONSILLECTOMY           Pre-op Assessment    I have reviewed the Patient Summary Reports.     I have reviewed the Nursing Notes. I have reviewed the NPO Status.   I have reviewed the Medications.     Review of Systems  Social:  Social Alcohol Use, Non-Smoker       Hematology/Oncology:  Hematology Normal   Oncology  Normal                                   EENT/Dental:  EENT/Dental Normal           Cardiovascular:     Hypertension   CAD   CABG/stent                                       Pulmonary:        Sleep Apnea                Renal/:  Renal/ Normal                 Hepatic/GI:     GERD Liver Disease,               Musculoskeletal:  Musculoskeletal Normal                Neurological:  Neurology Normal                                      Endocrine:  Diabetes, type 2           Dermatological:  Skin Normal    Psych:  Psychiatric Normal                  Physical Exam  General: Well nourished    Airway:  Mallampati: II   Mouth Opening: Normal  TM Distance: Normal    Chest/Lungs:  Normal Respiratory Rate    Heart:  Rate: Normal    Anesthesia Plan  Type of Anesthesia, risks & benefits discussed:    Anesthesia Type: Gen Natural Airway  Intra-op Monitoring Plan: Standard ASA Monitors  Induction:  IV  Informed Consent: Informed consent signed with the Patient and all parties understand the risks and agree with anesthesia plan.  All questions answered.   ASA Score: 3  Day of Surgery Review of History & Physical: H&P Update referred to the surgeon/provider.    Ready For Surgery From Anesthesia Perspective.     .

## 2025-06-30 ENCOUNTER — RESULTS FOLLOW-UP (OUTPATIENT)
Dept: INTERNAL MEDICINE | Facility: CLINIC | Age: 75
End: 2025-06-30

## 2025-06-30 ENCOUNTER — HOSPITAL ENCOUNTER (OUTPATIENT)
Facility: HOSPITAL | Age: 75
Discharge: HOME | End: 2025-06-30
Attending: INTERNAL MEDICINE | Admitting: INTERNAL MEDICINE
Payer: MEDICARE

## 2025-06-30 ENCOUNTER — ANESTHESIA (OUTPATIENT)
Dept: ENDOSCOPY | Facility: HOSPITAL | Age: 75
End: 2025-06-30
Payer: MEDICARE

## 2025-06-30 VITALS
HEIGHT: 67 IN | TEMPERATURE: 98 F | BODY MASS INDEX: 25.9 KG/M2 | HEART RATE: 56 BPM | WEIGHT: 165 LBS | DIASTOLIC BLOOD PRESSURE: 69 MMHG | SYSTOLIC BLOOD PRESSURE: 138 MMHG | RESPIRATION RATE: 18 BRPM | OXYGEN SATURATION: 99 %

## 2025-06-30 DIAGNOSIS — K21.9 GASTROESOPHAGEAL REFLUX DISEASE, UNSPECIFIED WHETHER ESOPHAGITIS PRESENT: ICD-10-CM

## 2025-06-30 LAB — POCT GLUCOSE: 114 MG/DL (ref 70–110)

## 2025-06-30 PROCEDURE — 99900035 HC TECH TIME PER 15 MIN (STAT)

## 2025-06-30 PROCEDURE — 94761 N-INVAS EAR/PLS OXIMETRY MLT: CPT

## 2025-06-30 PROCEDURE — 43239 EGD BIOPSY SINGLE/MULTIPLE: CPT | Mod: ,,, | Performed by: INTERNAL MEDICINE

## 2025-06-30 PROCEDURE — 37000009 HC ANESTHESIA EA ADD 15 MINS: Performed by: INTERNAL MEDICINE

## 2025-06-30 PROCEDURE — 37000008 HC ANESTHESIA 1ST 15 MINUTES: Performed by: INTERNAL MEDICINE

## 2025-06-30 PROCEDURE — 88305 TISSUE EXAM BY PATHOLOGIST: CPT | Mod: TC,91 | Performed by: INTERNAL MEDICINE

## 2025-06-30 PROCEDURE — 27201012 HC FORCEPS, HOT/COLD, DISP: Performed by: INTERNAL MEDICINE

## 2025-06-30 PROCEDURE — 25000003 PHARM REV CODE 250: Performed by: NURSE ANESTHETIST, CERTIFIED REGISTERED

## 2025-06-30 PROCEDURE — 43239 EGD BIOPSY SINGLE/MULTIPLE: CPT | Performed by: INTERNAL MEDICINE

## 2025-06-30 PROCEDURE — 82962 GLUCOSE BLOOD TEST: CPT | Performed by: INTERNAL MEDICINE

## 2025-06-30 PROCEDURE — 63600175 PHARM REV CODE 636 W HCPCS: Performed by: NURSE ANESTHETIST, CERTIFIED REGISTERED

## 2025-06-30 RX ORDER — PROPOFOL 10 MG/ML
VIAL (ML) INTRAVENOUS
Status: DISCONTINUED | OUTPATIENT
Start: 2025-06-30 | End: 2025-06-30

## 2025-06-30 RX ORDER — SODIUM CHLORIDE 9 MG/ML
INJECTION, SOLUTION INTRAVENOUS CONTINUOUS
Status: DISCONTINUED | OUTPATIENT
Start: 2025-06-30 | End: 2025-06-30 | Stop reason: HOSPADM

## 2025-06-30 RX ORDER — LIDOCAINE HYDROCHLORIDE 20 MG/ML
INJECTION INTRAVENOUS
Status: DISCONTINUED | OUTPATIENT
Start: 2025-06-30 | End: 2025-06-30

## 2025-06-30 RX ORDER — PROPOFOL 10 MG/ML
VIAL (ML) INTRAVENOUS CONTINUOUS PRN
Status: DISCONTINUED | OUTPATIENT
Start: 2025-06-30 | End: 2025-06-30

## 2025-06-30 RX ADMIN — SODIUM CHLORIDE: 9 INJECTION, SOLUTION INTRAVENOUS at 12:06

## 2025-06-30 RX ADMIN — PROPOFOL 40 MG: 10 INJECTION, EMULSION INTRAVENOUS at 01:06

## 2025-06-30 RX ADMIN — PROPOFOL 20 MG: 10 INJECTION, EMULSION INTRAVENOUS at 01:06

## 2025-06-30 RX ADMIN — LIDOCAINE HYDROCHLORIDE 50 MG: 20 INJECTION INTRAVENOUS at 01:06

## 2025-06-30 RX ADMIN — PROPOFOL 150 MCG/KG/MIN: 10 INJECTION, EMULSION INTRAVENOUS at 01:06

## 2025-06-30 NOTE — PLAN OF CARE
Pt arrived to recovery dosc via stretcher per endo team. Bedside report received pt attached to bedside monitor. VSS. Pt resting without distress post procedure. Pt on room air. Oxygen sat 95%. Pt IV access CDI, saline infusing. Safety maintained.

## 2025-06-30 NOTE — TRANSFER OF CARE
Anesthesia Transfer of Care Note    Patient: Esteban Mike    Procedure(s) Performed: Procedure(s) (LRB):  EGD (ESOPHAGOGASTRODUODENOSCOPY) (N/A)    Patient location: PACU    Anesthesia Type: general    Transport from OR: Transported from OR on room air with adequate spontaneous ventilation    Post pain: adequate analgesia    Post assessment: no apparent anesthetic complications and tolerated procedure well    Post vital signs: stable    Level of consciousness: awake and alert    Nausea/Vomiting: no nausea/vomiting    Complications: none    Transfer of care protocol was followed      Last vitals:

## 2025-06-30 NOTE — PROVATION PATIENT INSTRUCTIONS
Discharge Summary/Instructions after an Endoscopic Procedure  Patient Name: Esteban Mike  Patient MRN: [id number]  Patient YOB: 1950 Monday, June 30, 2025  William Holman MD  Dear patient,  As a result of recent federal legislation (The Federal Cures Act), you may   receive lab or pathology results from your procedure in your [facility]   account before your physician is able to contact you. Your physician or   their representative will relay the results to you with their   recommendations at their soonest availability.  Thank you,  RESTRICTIONS:  During your procedure today, you received medications for sedation.  These   medications may affect your judgment, balance and coordination.  Therefore,   for 24 hours, you have the following restrictions:   - DO NOT drive a car, operate machinery, make legal/financial decisions,   sign important papers or drink alcohol.    ACTIVITY:  Today: no heavy lifting, straining or running due to procedural   sedation/anesthesia.  The following day: return to full activity including work.  DIET:  Eat and drink normally unless instructed otherwise.     TREATMENT FOR COMMON SIDE EFFECTS:  - Mild abdominal pain, nausea, belching, bloating or excessive gas:  rest,   eat lightly and use a heating pad.  - Sore Throat: treat with throat lozenges and/or gargle with warm salt   water.  - Because air was used during the procedure, expelling large amounts of air   from your rectum or belching is normal.  - If a bowel prep was taken, you may not have a bowel movement for 1-3 days.    This is normal.  SYMPTOMS TO WATCH FOR AND REPORT TO YOUR PHYSICIAN:  1. Abdominal pain or bloating, other than gas cramps.  2. Chest pain.  3. Back pain.  4. Signs of infection such as: chills or fever occurring within 24 hours   after the procedure.  5. Rectal bleeding, which would show as bright red, maroon, or black stools.   (A tablespoon of blood from the rectum is not serious, especially  if   hemorrhoids are present.)  6. Vomiting.  7. Weakness or dizziness.  GO DIRECTLY TO THE NEAREST EMERGENCY ROOM IF YOU HAVE ANY OF THE FOLLOWING:      Difficulty breathing              Chills and/or fever over 101 F   Persistent vomiting and/or vomiting blood   Severe abdominal pain   Severe chest pain   Black, tarry stools   Bleeding- more than one tablespoon   Any other symptom or condition that you feel may need urgent attention  Your doctor recommends these additional instructions:  If any biopsies were taken, your doctors clinic will contact you in 1 to 2   weeks with any results.  - Discharge patient to home.   - Follow an antireflux regimen indefinitely.   - Use Protonix 40 mg once daily (or any other full strength proton pump   inhibitor) - best taken 45 minutes before your first protein containing   meal.   - Repeat upper endoscopy in 3 years for surveillance based on pathology   results.   - Return to GI clinic at the next available appointment.   - Return to primary care physician.   - Await pathology results.   - Telephone endoscopist for pathology results in 3 weeks.   - The findings and recommendations were discussed with the patient.  For questions, problems or results please call your physician - William Holman MD at Work:  [phone].  [facility], EMERGENCY ROOM PHONE NUMBER: [phone]  IF A COMPLICATION OR EMERGENCY SITUATION ARISES AND YOU ARE UNABLE TO REACH   YOUR PHYSICIAN - GO DIRECTLY TO THE EMERGENCY ROOM.  William Holman MD  6/30/2025 1:40:41 PM  This report has been verified and signed electronically.  Dear patient,  As a result of recent federal legislation (The Federal Cures Act), you may   receive lab or pathology results from your procedure in your [facility]   account before your physician is able to contact you. Your physician or   their representative will relay the results to you with their   recommendations at their soonest availability.  Thank  you,  PROVATION

## 2025-06-30 NOTE — H&P
[facility]  History & Physical    Subjective:      Chief Complaint/Reason for Admission:     EGD for follow up of GERD    Esteban Mike is a 74 y.o. male.    Past Medical History:   Diagnosis Date    Aortic valve disease     Bilateral carotid artery stenosis 07/18/2018    CAD (coronary artery disease)     Carotid artery occlusion      LICA, 70-80% JULIUS    Cataract     Diabetes mellitus     Diabetes mellitus type II     Elevated LFTs     GERD (gastroesophageal reflux disease)     Hyperlipidemia     Hypertension     PVD (peripheral vascular disease)      Past Surgical History:   Procedure Laterality Date    CARDIAC CATHETERIZATION      carotid      CEA      COLONOSCOPY N/A 12/4/2020    Procedure: COLONOSCOPY;  Surgeon: William Holman MD;  Location: [facility] (4TH FLR);  Service: Endoscopy;  Laterality: N/A;  merged orders together    CORONARY ARTERY BYPASS GRAFT  5/2/08    X4    ESOPHAGOGASTRODUODENOSCOPY N/A 4/1/2019    Procedure: EGD (ESOPHAGOGASTRODUODENOSCOPY);  Surgeon: William Holman MD;  Location: [facility] (4TH FLR);  Service: Endoscopy;  Laterality: N/A;    ESOPHAGOGASTRODUODENOSCOPY N/A 12/4/2020    Procedure: EGD (ESOPHAGOGASTRODUODENOSCOPY);  Surgeon: William Holman MD;  Location: [facility] (4TH FLR);  Service: Endoscopy;  Laterality: N/A;  covid test [facility] 12/1    ESOPHAGOGASTRODUODENOSCOPY N/A 5/18/2022    Procedure: EGD (ESOPHAGOGASTRODUODENOSCOPY);  Surgeon: William Holman MD;  Location: [facility] (2ND FLR);  Service: Endoscopy;  Laterality: N/A;  fully vaccinated  2nd floor availability/ inst. mailed-RB    HERNIA REPAIR      ica stent      TONSILLECTOMY       Social History[1]    No medications prior to admission.     Review of patient's allergies indicates:  No Known Allergies     Review of Systems   Constitutional:  Negative for fever.       Objective:      Vital Signs (Most Recent)  Temp: 97.9 °F (36.6 °C) (06/30/25 1336)  Pulse: (!)  56 (06/30/25 1356)  Resp: 18 (06/30/25 1356)  BP: 138/69 (06/30/25 1356)  SpO2: 99 % (06/30/25 1357)    Vital Signs Range (Last 24H):  Temp:  [97.9 °F (36.6 °C)]   Pulse:  [56-63]   Resp:  [18]   BP: (117-156)/(57-75)   SpO2:  [95 %-99 %]     Physical Exam  Cardiovascular:      Rate and Rhythm: Bradycardia present.   Pulmonary:      Effort: Pulmonary effort is normal.   Neurological:      Mental Status: He is alert and oriented to person, place, and time.             Assessment:      EGD for follow up of GERD      Plan:      EGD for follow up of GERD         [1]   Social History  Tobacco Use    Smoking status: Never    Smokeless tobacco: Never   Substance Use Topics    Alcohol use: Yes     Comment: Socially     Drug use: No

## 2025-06-30 NOTE — ANESTHESIA POSTPROCEDURE EVALUATION
Anesthesia Post Evaluation    Patient: Esteban Mike    Procedure(s) Performed: Procedure(s) (LRB):  EGD (ESOPHAGOGASTRODUODENOSCOPY) (N/A)    Final Anesthesia Type: general      Patient location during evaluation: PACU  Patient participation: Yes- Able to Participate  Level of consciousness: awake and alert  Post-procedure vital signs: reviewed and stable  Pain management: adequate  Airway patency: patent    PONV status at discharge: No PONV  Anesthetic complications: no      Cardiovascular status: stable  Respiratory status: spontaneous ventilation  Hydration status: euvolemic  Follow-up not needed.          Vitals Value Taken Time   /69 06/30/25 13:56   Temp 36.6 °C (97.9 °F) 06/30/25 13:36   Pulse 56 06/30/25 13:56   Resp 18 06/30/25 13:56   SpO2 99 % 06/30/25 13:56         Event Time   Out of Recovery 13:51:00         Pain/Chano Score: Chano Score: 10 (6/30/2025  1:51 PM)

## 2025-07-02 ENCOUNTER — TELEPHONE (OUTPATIENT)
Dept: CARDIOLOGY | Facility: CLINIC | Age: 75
End: 2025-07-02
Payer: MEDICARE

## 2025-07-02 LAB
ESTROGEN SERPL-MCNC: NORMAL PG/ML
INSULIN SERPL-ACNC: NORMAL U[IU]/ML
LAB AP CLINICAL INFORMATION: NORMAL
LAB AP GROSS DESCRIPTION: NORMAL
LAB AP PERFORMING LOCATION(S): NORMAL
LAB AP REPORT FOOTNOTES: NORMAL

## 2025-07-03 ENCOUNTER — OFFICE VISIT (OUTPATIENT)
Dept: CARDIOLOGY | Facility: CLINIC | Age: 75
End: 2025-07-03
Attending: FAMILY MEDICINE
Payer: MEDICARE

## 2025-07-03 VITALS
BODY MASS INDEX: 27.01 KG/M2 | SYSTOLIC BLOOD PRESSURE: 121 MMHG | DIASTOLIC BLOOD PRESSURE: 58 MMHG | HEART RATE: 57 BPM | WEIGHT: 172.06 LBS | OXYGEN SATURATION: 95 % | HEIGHT: 67 IN

## 2025-07-03 DIAGNOSIS — I25.718 CORONARY ARTERY DISEASE OF AUTOLOGOUS VEIN BYPASS GRAFT WITH STABLE ANGINA PECTORIS: ICD-10-CM

## 2025-07-03 DIAGNOSIS — I77.9 BILATERAL CAROTID ARTERY DISEASE, UNSPECIFIED TYPE: ICD-10-CM

## 2025-07-03 DIAGNOSIS — I10 HYPERTENSION, ESSENTIAL: ICD-10-CM

## 2025-07-03 DIAGNOSIS — I25.810 CORONARY ARTERY DISEASE INVOLVING AUTOLOGOUS VEIN CORONARY BYPASS GRAFT WITHOUT ANGINA PECTORIS: ICD-10-CM

## 2025-07-03 DIAGNOSIS — I25.118 CORONARY ARTERY DISEASE OF NATIVE ARTERY OF NATIVE HEART WITH STABLE ANGINA PECTORIS: Primary | ICD-10-CM

## 2025-07-03 DIAGNOSIS — E11.51 TYPE 2 DIABETES, WITH PERIPHERAL CIRCULATORY DISORDER NOT AT GOAL: ICD-10-CM

## 2025-07-03 DIAGNOSIS — E78.5 HYPERLIPIDEMIA, UNSPECIFIED HYPERLIPIDEMIA TYPE: ICD-10-CM

## 2025-07-03 LAB
OHS QRS DURATION: 84 MS
OHS QTC CALCULATION: 384 MS

## 2025-07-03 PROCEDURE — 99215 OFFICE O/P EST HI 40 MIN: CPT | Mod: PBBFAC,PN | Performed by: INTERNAL MEDICINE

## 2025-07-03 PROCEDURE — 93005 ELECTROCARDIOGRAM TRACING: CPT | Mod: PBBFAC,PN | Performed by: INTERNAL MEDICINE

## 2025-07-03 PROCEDURE — 99999 PR PBB SHADOW E&M-EST. PATIENT-LVL V: CPT | Mod: PBBFAC,,, | Performed by: INTERNAL MEDICINE

## 2025-07-03 PROCEDURE — 93010 ELECTROCARDIOGRAM REPORT: CPT | Mod: S$PBB,,, | Performed by: INTERNAL MEDICINE

## 2025-07-03 RX ORDER — NITROGLYCERIN 0.4 MG/1
0.4 TABLET SUBLINGUAL EVERY 5 MIN PRN
Qty: 40 TABLET | Refills: 3 | Status: SHIPPED | OUTPATIENT
Start: 2025-07-03 | End: 2026-07-03

## 2025-07-03 NOTE — ASSESSMENT & PLAN NOTE
Stress test ordered.  He has had angina for six-months.  Further recommendations to follow.  Sublingual nitroglycerin prescribed.    CABG, 4 vessels 2008 documented.  [place] [facility] [place]

## 2025-07-03 NOTE — ASSESSMENT & PLAN NOTE
Current LDL 66 mg%, HDL 39, triglycerides 84 mg%.  Atorvastatin 80 mg will be continued.  It is tolerated well

## 2025-07-03 NOTE — ASSESSMENT & PLAN NOTE
Clinical status stable.  He has chronic right occlusion with previous surgical endarterectomy 2006.  His left carotid artery has been stented, most recent Doppler study with stable results less than 50% residual obstruction.  Condition stable.  He has never had a neurologic event.

## 2025-07-03 NOTE — PROGRESS NOTES
[facility]     Subjective:    Patient ID:  Esteban Mike is a 74 y.o. male who presents for follow-up of Coronary artery disease involving autologous vein coronary , Diabetes Mellitus, Hyperlipidemia, Carotid Artery Disease, Coronary Artery Disease, and Hypertension    Review of patient's allergies indicates:  No Known Allergies  The patient has history of 4 vessel bypass 2008.  His last stress test was 2018.  He had abnormal stress testing exercise Electrocardiogram.  This was followed by a PET stress which showed normal perfusion normal ejection fraction.    He developed angina with activity six-months ago.  It is somewhat stable pattern he reports.  He is on calcium channel blockers and beta-blockers.  He is enrolled in digital hypertension.  Most of his blood pressure readings are in good range.  He is diabetic.  He takes Avapro full-dose.  He is on carvedilol as well as 60 mg nifedipine.    He states he is getting angina about once a week.  He is a .  He does his own cooking and admits to eating a bad fast food diet.  He is not a smoker.  His LDL is 66 mg % utilizing atorvastatin 80 mg.  He takes Trulicity and metformin.    He has history of carotid disease.  He had right carotid endarterectomy 2006.  There was subsequent confirmation of occluded right carotid artery.  He had left internal carotid artery stent 2007.  It was performed by Dr. Willi Case.  The procedure was uncomplicated.  He has never had a stroke.    Today's electrocardiogram confirms sinus bradycardia heart rate 58 minimal ST abnormality no infarct pattern.    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  This is when he feels angina.         Review of Systems   Constitutional: Positive for weight loss. Negative for chills, decreased appetite, diaphoresis, fever, malaise/fatigue, night sweats  and weight gain.   HENT:  Negative for congestion, ear discharge, ear pain, hearing loss, hoarse voice, nosebleeds, odynophagia, sore throat, stridor and tinnitus.    Eyes:  Negative for blurred vision, discharge, double vision, pain, photophobia, redness, vision loss in left eye, vision loss in right eye, visual disturbance and visual halos.   Cardiovascular:  Positive for chest pain. Negative for claudication, cyanosis, dyspnea on exertion, irregular heartbeat, leg swelling, near-syncope, orthopnea, palpitations, paroxysmal nocturnal dyspnea and syncope.   Respiratory:  Negative for cough, hemoptysis, shortness of breath, sleep disturbances due to breathing, snoring, sputum production and wheezing.    Endocrine: Negative for cold intolerance, heat intolerance, polydipsia, polyphagia and polyuria.   Hematologic/Lymphatic: Negative for adenopathy and bleeding problem. Does not bruise/bleed easily.   Skin:  Negative for color change, dry skin, flushing, itching, nail changes, poor wound healing, rash, skin cancer, suspicious lesions and unusual hair distribution.   Musculoskeletal:  Negative for arthritis, back pain, falls, gout, joint pain, joint swelling, muscle cramps, muscle weakness, myalgias, neck pain and stiffness.   Gastrointestinal:  Negative for bloating, abdominal pain, anorexia, change in bowel habit, bowel incontinence, constipation, diarrhea, dysphagia, excessive appetite, flatus, heartburn, hematemesis, hematochezia, hemorrhoids, jaundice, melena, nausea and vomiting.   Genitourinary:  Negative for bladder incontinence, decreased libido, dysuria, flank pain, frequency, genital sores, hematuria, hesitancy, incomplete emptying, nocturia and urgency.   Neurological:  Negative for aphonia, brief paralysis, difficulty with concentration, disturbances in coordination, excessive daytime sleepiness, dizziness, focal weakness, headaches, light-headedness, loss of balance, numbness, paresthesias, seizures,  "sensory change, tremors, vertigo and weakness.   Psychiatric/Behavioral:  Negative for altered mental status, depression, hallucinations, memory loss, substance abuse, suicidal ideas and thoughts of violence. The patient does not have insomnia and is not nervous/anxious.    Allergic/Immunologic: Negative for hives and persistent infections.        Objective:       Vitals:    07/03/25 1240   BP: (!) 121/58   Pulse: (!) 57   SpO2: 95%   Weight: 78 kg (172 lb 1.1 oz)   Height: 5' 7" (1.702 m)    Physical Exam  Constitutional:       General: He is not in acute distress.     Appearance: He is well-developed. He is not diaphoretic.   HENT:      Head: Normocephalic and atraumatic.      Nose: Nose normal.   Eyes:      General: No scleral icterus.        Right eye: No discharge.      Conjunctiva/sclera: Conjunctivae normal.      Pupils: Pupils are equal, round, and reactive to light.   Neck:      Thyroid: No thyromegaly.      Vascular: No JVD.      Trachea: No tracheal deviation.   Cardiovascular:      Rate and Rhythm: Normal rate and regular rhythm.      Pulses:           Carotid pulses are 2+ on the right side and 2+ on the left side.       Radial pulses are 2+ on the right side and 2+ on the left side.        Dorsalis pedis pulses are 2+ on the right side and 2+ on the left side.        Posterior tibial pulses are 2+ on the right side and 2+ on the left side.      Heart sounds: Normal heart sounds. No murmur heard.     No friction rub. No gallop.   Pulmonary:      Effort: Pulmonary effort is normal. No respiratory distress.      Breath sounds: Normal breath sounds. No stridor. No wheezing or rales.   Chest:      Chest wall: No tenderness.   Abdominal:      General: Bowel sounds are normal. There is no distension.      Palpations: Abdomen is soft. There is no mass.      Tenderness: There is no abdominal tenderness. There is no guarding or rebound.   Musculoskeletal:         General: No tenderness. Normal range of motion. "      Cervical back: Normal range of motion and neck supple.   Lymphadenopathy:      Cervical: No cervical adenopathy.   Skin:     General: Skin is warm and dry.      Coloration: Skin is not pale.      Findings: No erythema or rash.   Neurological:      Mental Status: He is alert and oriented to person, place, and time.      Cranial Nerves: No cranial nerve deficit.      Coordination: Coordination normal.   Psychiatric:         Behavior: Behavior normal.         Thought Content: Thought content normal.         Judgment: Judgment normal.           Assessment:       1. Coronary artery disease of native artery of native heart with stable angina pectoris    2. Coronary artery disease involving autologous vein coronary bypass graft without angina pectoris    3. Bilateral carotid artery disease, unspecified type    4. Coronary artery disease of autologous vein bypass graft with stable angina pectoris    5. Hypertension, essential    6. Hyperlipidemia, unspecified hyperlipidemia type    7. Type 2 diabetes, with peripheral circulatory disorder not at goal      Results for orders placed or performed in visit on 07/03/25   IN OFFICE EKG 12-LEAD (to [place])    Collection Time: 07/03/25 12:31 PM   Result Value Ref Range    QRS Duration 84 ms    [facility] QTC Calculation 384 ms     *Note: Due to a large number of results and/or encounters for the requested time period, some results have not been displayed. A complete set of results can be found in Results Review.       Current Medications[1]     Lab Results   Component Value Date    WBC 10.3 04/11/2018    RBC 4.66 04/11/2018    HGB 14.2 04/11/2018    HCT 42.3 04/11/2018    MCV 90.8 04/11/2018    MCH 30.5 04/11/2018    MCHC 33.6 04/11/2018    RDW 12.5 04/11/2018     04/11/2018    MPV 12.4 04/11/2018    GRAN 3.6 10/22/2014    GRAN 56.6 10/22/2014    LYMPH 1,576 04/11/2018    LYMPH 15.3 04/11/2018    MONO 639 04/11/2018    MONO 6.2 04/11/2018    EOS 0 (L) 04/11/2018    BASO 10  04/11/2018    EOSINOPHIL 0.0 04/11/2018    BASOPHIL 0.1 04/11/2018    MG 2.6 05/06/2008        CMP  Lab Results   Component Value Date     05/02/2025    K 4.5 05/02/2025     05/02/2025    CO2 25 05/02/2025     (H) 05/02/2025    BUN 16 05/02/2025    CREATININE 0.9 05/02/2025    CALCIUM 9.3 05/02/2025    PROT 6.9 05/02/2025    ALBUMIN 4.1 05/02/2025    BILITOT 0.5 05/02/2025    ALKPHOS 89 05/02/2025    AST 15 05/02/2025    ALT 20 05/02/2025    ANIONGAP 9 05/02/2025    ESTGFRAFRICA 99 07/06/2022    EGFRNONAA 86 07/06/2022        Lab Results   Component Value Date    LABURIN No growth 02/17/2022            Results for orders placed or performed in visit on 07/03/25   IN OFFICE EKG 12-LEAD (to [place])    Collection Time: 07/03/25 12:31 PM   Result Value Ref Range    QRS Duration 84 ms    [facility] QTC Calculation 384 ms    Narrative    Test Reason : I25.810,    Vent. Rate :  58 BPM     Atrial Rate :  58 BPM     P-R Int : 170 ms          QRS Dur :  84 ms      QT Int : 392 ms       P-R-T Axes :  54  57  25 degrees    QTcB Int : 384 ms    Sinus bradycardia  No previous ECGs available  Confirmed by MARILYNN Vega ([id number]) on 7/3/2025 3:48:59 PM    Referred By: CORRINE DORAN           Confirmed By: MARILYNN Vega        VAS US Carotid Bilateral 05/21/2025 [id number] Final   CV Ultrasound Bilateral Doppler Carotid 05/13/2024 [id number] Final   CV Ultrasound Bilateral Doppler Carotid 05/11/2021 [id number] Final             Plan:       Problem List Items Addressed This Visit          Cardiac/Vascular    Coronary artery disease of autologous vein bypass graft with stable angina pectoris    Stress test ordered.  He has had angina for six-months.  Further recommendations to follow.  Sublingual nitroglycerin prescribed.    CABG, 4 vessels 2008 documented.  [place] [facility] [place]         Hyperlipidemia    Current LDL 66 mg%, HDL 39, triglycerides 84 mg%.  Atorvastatin 80 mg will be continued.  It is tolerated well          Hypertension, essential    He is enrolled in digital hypertension.  Most of his readings are in good range.  I did not increase medications today.  Today's blood pressure 121/58 mm Hg.         Carotid disease, bilateral    Clinical status stable.  He has chronic right occlusion with previous surgical endarterectomy 2006.  His left carotid artery has been stented, most recent Doppler study with stable results less than 50% residual obstruction.  Condition stable.  He has never had a neurologic event.            Endocrine    Type 2 diabetes, with peripheral circulatory disorder not at goal    No claudication symptoms.  His TEMI study showed calcinosis.  Condition stable.  He is on Trulicity for his diabetes as well as Jardiance.  Also takes metformin.  Current therapy to continue.    Pedal pulses palpable on exam.          Other Visit Diagnoses         Coronary artery disease of native artery of native heart with stable angina pectoris    -  Primary    Relevant Medications    nitroGLYCERIN (NITROSTAT) 0.4 MG SL tablet    Other Relevant Orders    Nuclear Stress - Cardiology Interpreted               Sublingual nitroglycerin prescribed.  Exercise nuclear stress test ordered.  He had abnormal stress test by Electrocardiogram years previous.  Imaging stress test needed.    We discussed the likelihood of progression of disease or vein graft disease.  He is interested in aggressive workup if warranted based on stress testing.    I made a four-month follow-up.             Micky Arauz MD  07/03/2025   1:33 PM               [1]   Current Outpatient Medications:     aspirin 81 MG chewable tablet, Take 81 mg by mouth once daily., Disp: , Rfl:     atorvastatin (LIPITOR) 80 MG tablet, Take 1 tablet (80 mg total) by mouth once daily., Disp: 90 tablet, Rfl: 3    blood sugar diagnostic Strp, To check BG 3 times daily, to use with insurance preferred meter, Disp: 200 strip, Rfl: 11    blood-glucose meter kit, To check BG 3 times  daily, to use with insurance preferred meter, Disp: 1 each, Rfl: 0    carvediloL (COREG) 25 MG tablet, Take 1 tablet (25 mg total) by mouth 2 (two) times daily with meals., Disp: 180 tablet, Rfl: 3    dulaglutide (TRULICITY) 4.5 mg/0.5 mL pen injector, Inject 4.5 mg into the skin every 7 days., Disp: 4 pen , Rfl: 11    fish oil-omega-3 fatty acids 300-1,000 mg capsule, Take 2 g by mouth once daily. 3 tablets, Disp: , Rfl:     glipiZIDE 5 MG TR24, Take 1 tablet (5 mg total) by mouth daily with breakfast., Disp: 90 tablet, Rfl: 3    irbesartan (AVAPRO) 300 MG tablet, Take 1 tablet (300 mg total) by mouth every evening., Disp: 90 tablet, Rfl: 3    JARDIANCE 25 mg tablet, Take 1 tablet (25 mg total) by mouth once daily., Disp: 90 tablet, Rfl: 3    lancets Misc, To check BG 3 times daily, to use with insurance preferred meter, Disp: 200 each, Rfl: 11    metFORMIN (GLUCOPHAGE-XR) 500 MG ER 24hr tablet, Take 1 tablet (500 mg total) by mouth 2 (two) times daily with meals., Disp: 180 tablet, Rfl: 3    multivitamin capsule, Take 1 capsule by mouth once daily., Disp: , Rfl:     NIFEdipine (PROCARDIA-XL) 60 MG (OSM) 24 hr tablet, Take 1 tablet (60 mg total) by mouth once daily., Disp: 90 tablet, Rfl: 0    pantoprazole (PROTONIX) 40 MG tablet, Take 1 tablet (40 mg total) by mouth before breakfast., Disp: 90 tablet, Rfl: 3    vit C-vit E-lutein-min-om-3 481-52-4-150 mg-unit-mg-mg Cap, Take 1 tablet by mouth once daily., Disp: , Rfl:     nitroGLYCERIN (NITROSTAT) 0.4 MG SL tablet, Place 1 tablet (0.4 mg total) under the tongue every 5 (five) minutes as needed for Chest pain., Disp: 40 tablet, Rfl: 3

## 2025-07-03 NOTE — ASSESSMENT & PLAN NOTE
No claudication symptoms.  His TEMI study showed calcinosis.  Condition stable.  He is on Trulicity for his diabetes as well as Jardiance.  Also takes metformin.  Current therapy to continue.    Pedal pulses palpable on exam.

## 2025-07-03 NOTE — ASSESSMENT & PLAN NOTE
He is enrolled in digital hypertension.  Most of his readings are in good range.  I did not increase medications today.  Today's blood pressure 121/58 mm Hg.

## 2025-07-17 ENCOUNTER — HOSPITAL ENCOUNTER (OUTPATIENT)
Dept: CARDIOLOGY | Facility: HOSPITAL | Age: 75
Discharge: HOME | End: 2025-07-17
Attending: INTERNAL MEDICINE
Payer: MEDICARE

## 2025-07-17 VITALS
DIASTOLIC BLOOD PRESSURE: 63 MMHG | HEIGHT: 67 IN | BODY MASS INDEX: 27 KG/M2 | SYSTOLIC BLOOD PRESSURE: 165 MMHG | HEART RATE: 63 BPM | WEIGHT: 172 LBS | RESPIRATION RATE: 16 BRPM

## 2025-07-17 DIAGNOSIS — I25.810 CORONARY ARTERY DISEASE INVOLVING AUTOLOGOUS VEIN CORONARY BYPASS GRAFT WITHOUT ANGINA PECTORIS: ICD-10-CM

## 2025-07-17 DIAGNOSIS — I25.118 CORONARY ARTERY DISEASE OF NATIVE ARTERY OF NATIVE HEART WITH STABLE ANGINA PECTORIS: ICD-10-CM

## 2025-07-17 LAB
CV STRESS BASE HR: 54 BPM
DIASTOLIC BLOOD PRESSURE: 76 MMHG
EJECTION FRACTION- HIGH: 65 %
END DIASTOLIC INDEX-HIGH: 153 ML/M2
END DIASTOLIC INDEX-LOW: 93 ML/M2
END SYSTOLIC INDEX-HIGH: 71 ML/M2
END SYSTOLIC INDEX-LOW: 31 ML/M2
NUC REST DIASTOLIC VOLUME INDEX: 110
NUC REST EJECTION FRACTION: 69
NUC REST SYSTOLIC VOLUME INDEX: 34
NUC STRESS DIASTOLIC VOLUME INDEX: 105
NUC STRESS EJECTION FRACTION: 65 %
NUC STRESS SYSTOLIC VOLUME INDEX: 37
OHS CV CPX 1 MINUTE RECOVERY HEART RATE: 87 BPM
OHS CV CPX 85 PERCENT MAX PREDICTED HEART RATE MALE: 126
OHS CV CPX ESTIMATED METS: 9
OHS CV CPX MAX PREDICTED HEART RATE: 148
OHS CV CPX PATIENT HEIGHT IN: 67
OHS CV CPX PATIENT IS FEMALE: 0
OHS CV CPX PATIENT IS MALE: 1
OHS CV CPX PEAK DIASTOLIC BLOOD PRESSURE: 68 MMHG
OHS CV CPX PEAK HEAR RATE: 102 BPM
OHS CV CPX PEAK RATE PRESSURE PRODUCT: NORMAL
OHS CV CPX PEAK SYSTOLIC BLOOD PRESSURE: 183 MMHG
OHS CV CPX PERCENT MAX PREDICTED HEART RATE ACHIEVED: 69
OHS CV CPX RATE PRESSURE PRODUCT PRESENTING: 7830
OHS CV INITIAL DOSE: 10.1 MCG/KG/MIN
OHS CV PEAK DOSE: 31.1 MCG/KG/MIN
RETIRED EF AND QEF - SEE NOTES: 53 %
STRESS ECHO POST EXERCISE DUR MIN: 5 MINUTES
STRESS ECHO POST EXERCISE DUR SEC: 34 SECONDS
SYSTOLIC BLOOD PRESSURE: 145 MMHG

## 2025-07-17 PROCEDURE — A9502 TC99M TETROFOSMIN: HCPCS | Performed by: INTERNAL MEDICINE

## 2025-07-17 PROCEDURE — 93017 CV STRESS TEST TRACING ONLY: CPT

## 2025-07-17 RX ADMIN — TETROFOSMIN 10.1 MILLICURIE: 1.38 INJECTION, POWDER, LYOPHILIZED, FOR SOLUTION INTRAVENOUS at 07:07

## 2025-07-17 RX ADMIN — TETROFOSMIN 31.1 MILLICURIE: 1.38 INJECTION, POWDER, LYOPHILIZED, FOR SOLUTION INTRAVENOUS at 08:07

## 2025-07-17 NOTE — PROGRESS NOTES
Subjective:      Patient ID: Esteban Mike is a 74 y.o. male.    Chief Complaint: Diabetic Foot Exam and Diabetes Mellitus    Patient presents today for diabetic foot exam.  Has not been here in a couple of years. Not trimmed his nails. Apparently having problems w/ his diabetes but he is staying active. Just changed to Glipizide ER & also reduced Metformin from 1000mg bid to 500mg bid d/t GI issuses. Otherwise doing well - had scope. Also just had nuclear stress test - due to see cardio.for results tomorrow.  10/22/23  Esteban is a 74 y.o. male who presents to the clinic for annual evaluation & tx of diabetic feet.  He was new on referral from his PCP last year on 08/24/2022 to establish care  & for annual diabetic foot exam (prior to that he had not seen a podiatrist in 4 years).  No new concerns w/ his feet.    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    PCP: Ned Hartley MD  6/11/25    Just got CPAP August 2023, w/ 73st b'day d/t sleep apnea.  Past Medical History:   Diagnosis Date    Cataract     Diabetes mellitus     Elevated LFTs     Hyperlipidemia     PVD (peripheral vascular disease)      Retired from [employer].  Dx DM 2008 after quad bypass. 2006 R endarterectomy completely occluded; 2007 L carotid stent.  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    Hyperlipidemia    PVD (peripheral vascular disease)    Internal carotid artery occlusion, right    Left carotid artery stenosis    NAFLD (nonalcoholic fatty liver disease)    Hypertension, essential    Type 2 diabetes, with peripheral circulatory disorder not at goal    Diabetic neuropathy associated with type 2 diabetes mellitus    Noncompliance    Benign prostatic hyperplasia    NIDIA (obstructive sleep apnea)    Gastroesophageal reflux disease with esophagitis    Positive cardiac stress test    Hypercalcemia    Carotid disease,  bilateral      Hemoglobin A1C   Date Value Ref Range Status   01/16/2025 6.3 (H) 4.0 - 5.6 % Final     Comment:     ADA Screening Guidelines:  5.7-6.4%  Consistent with prediabetes  >or=6.5%  Consistent with diabetes    High levels of fetal hemoglobin interfere with the HbA1C  assay. Heterozygous hemoglobin variants (HbS, HgC, etc)do  not significantly interfere with this assay.   However, presence of multiple variants may affect accuracy.     07/16/2024 6.3 (H) 4.0 - 5.6 % Final     Comment:     ADA Screening Guidelines:  5.7-6.4%  Consistent with prediabetes  >or=6.5%  Consistent with diabetes    High levels of fetal hemoglobin interfere with the HbA1C  assay. Heterozygous hemoglobin variants (HbS, HgC, etc)do  not significantly interfere with this assay.   However, presence of multiple variants may affect accuracy.     09/07/2023 6.8 (H) 4.0 - 5.6 % Final     Comment:     ADA Screening Guidelines:  5.7-6.4%  Consistent with prediabetes  >or=6.5%  Consistent with diabetes    High levels of fetal hemoglobin interfere with the HbA1C  assay. Heterozygous hemoglobin variants (HbS, HgC, etc)do  not significantly interfere with this assay.   However, presence of multiple variants may affect accuracy.     07/08/2019 9.7 % Final     Hemoglobin A1c   Date Value Ref Range Status   05/02/2025 6.7 (H) 4.0 - 5.6 % Final     Comment:     ADA Screening Guidelines:  5.7-6.4%  Consistent with prediabetes  >=6.5%  Consistent with diabetes    High levels of fetal hemoglobin interfere with the HbA1C  assay. Heterozygous hemoglobin variants (HbS, HgC, etc)do  not significantly interfere with this assay.   However, presence of multiple variants may affect accuracy.     Review of Systems   Constitutional: Negative for malaise/fatigue.   Cardiovascular:  Negative for leg swelling.   Skin:  Positive for color change (yellow nails) and dry skin. Negative for nail changes.   Musculoskeletal:  Positive for back pain and joint pain. Negative  for arthritis, falls, gout, joint swelling, muscle weakness and myalgias.   Neurological:  Positive for paresthesias. Negative for focal weakness, numbness (DM neuropathy) and weakness.   Psychiatric/Behavioral:  The patient is not nervous/anxious.        Objective:      Physical Exam  Vitals reviewed.   Constitutional:       General: He is not in acute distress.     Appearance: He is well-developed and overweight.   HENT:      Head: Normocephalic.   Cardiovascular:      Pulses:           Dorsalis pedis pulses are 2+ on the right side and 2+ on the left side.        Posterior tibial pulses are 2+ on the right side and 2+ on the left side.      Comments: No edema noted to b/l LE.    Musculoskeletal:         General: No swelling or tenderness.      Right lower leg: No edema.      Left lower leg: No edema.      Right foot: Deformity present. No swelling or tenderness.      Left foot: Deformity (hammertoe 2-5 B/L) present. No swelling or tenderness.      Comments: MS strength 5/5 in DF/PF/Inv/Ev resistance with no reproduction of pain in any direction.   Passive ROM ankle and pedal joints is painless. Adequate pedal joint ROM with no crepitation bilateral.      Feet:      Right foot:      Protective Sensation: 2 sites tested.  2 sites sensed.      Skin integrity: Skin integrity normal.      Toenail Condition: Right toenails are long. Fungal disease present.     Left foot:      Protective Sensation: 2 sites tested.  2 sites sensed.      Skin integrity: Skin integrity normal.      Toenail Condition: Left toenails are long. Fungal disease present.     Comments: Nails are slightly dystrophic & hypertrophic, discolored light yellow, sparing 4th B/L 3rd R.   Skin:     General: Skin is warm and dry.      Capillary Refill: Capillary refill takes less than 2 seconds.      Findings: No bruising or erythema.      Comments: Interdigital spaces CDI B/L.   Skin texture normal.   Pedal hair normal.   Neurological:      Mental Status:  He is alert and oriented to person, place, and time.      Sensory: No sensory deficit.      Motor: Motor function is intact. No weakness or abnormal muscle tone.      Gait: Gait is intact. Gait normal.      Comments: Epicritic sensation grossly intact B/L despite intermittent paresthesias including numbness & tingling B/L feet; no clearly identified trigger or source nor hyperemia.   Psychiatric:         Mood and Affect: Mood and affect normal.         Behavior: Behavior normal. Behavior is cooperative.         Assessment:       Encounter Diagnoses   Name Primary?    Type 2 diabetes, with peripheral circulatory disorder not at goal     Type 2 diabetes mellitus with diabetic polyneuropathy, without long-term current use of insulin Yes    Encounter for diabetic foot exam     PVD (peripheral vascular disease)     Enlarged and hypertrophic nails        Plan:       Esteban was seen today for diabetic foot exam and diabetes mellitus.    Diagnoses and all orders for this visit:    Type 2 diabetes mellitus with diabetic polyneuropathy, without long-term current use of insulin    Type 2 diabetes, with peripheral circulatory disorder not at goal  -     Ambulatory referral/consult to Podiatry    Encounter for diabetic foot exam    PVD (peripheral vascular disease)    Enlarged and hypertrophic nails    I counseled the patient on his conditions, their implications  medical mgmt.    - Shoe inspection. Diabetic Foot Education. Patient reminded of the importance of good nutrition & blood sugar control to help prevent podiatric complications of diabetes. Patient instructed on proper foot hygeine. We discussed wearing proper supportive shoe gear, daily foot inspections, never walking w/out protective shoe gear, annual diabetic foot exam, sooner p.r.n.     Courtesy debridement of toenails - reduced & debrided to the soft tissue attachment, using a nail nipper, removing all offending nail & debris.   The patient will continue to monitor  the areas daily, inspect the feet, wear protective shoe gear when ambulatory, & moisturizer to maintain skin integrity.        A total of 25 mins.was spent on chart review, patient visit & documentation.

## 2025-07-20 ENCOUNTER — PATIENT MESSAGE (OUTPATIENT)
Dept: GASTROENTEROLOGY | Facility: CLINIC | Age: 75
End: 2025-07-20
Payer: MEDICARE

## 2025-07-24 ENCOUNTER — PATIENT MESSAGE (OUTPATIENT)
Dept: CARDIOLOGY | Facility: CLINIC | Age: 75
End: 2025-07-24
Payer: MEDICARE

## 2025-07-29 ENCOUNTER — OFFICE VISIT (OUTPATIENT)
Dept: PODIATRY | Facility: CLINIC | Age: 75
End: 2025-07-29
Attending: FAMILY MEDICINE
Payer: MEDICARE

## 2025-07-29 VITALS
DIASTOLIC BLOOD PRESSURE: 69 MMHG | WEIGHT: 174.06 LBS | HEIGHT: 67 IN | HEART RATE: 57 BPM | BODY MASS INDEX: 27.32 KG/M2 | SYSTOLIC BLOOD PRESSURE: 135 MMHG

## 2025-07-29 DIAGNOSIS — E11.9 ENCOUNTER FOR DIABETIC FOOT EXAM: ICD-10-CM

## 2025-07-29 DIAGNOSIS — I73.9 PVD (PERIPHERAL VASCULAR DISEASE): ICD-10-CM

## 2025-07-29 DIAGNOSIS — E11.42 TYPE 2 DIABETES MELLITUS WITH DIABETIC POLYNEUROPATHY, WITHOUT LONG-TERM CURRENT USE OF INSULIN: Primary | ICD-10-CM

## 2025-07-29 DIAGNOSIS — Q84.5 ENLARGED AND HYPERTROPHIC NAILS: ICD-10-CM

## 2025-07-29 DIAGNOSIS — E11.51 TYPE 2 DIABETES, WITH PERIPHERAL CIRCULATORY DISORDER NOT AT GOAL: ICD-10-CM

## 2025-07-29 PROCEDURE — 99213 OFFICE O/P EST LOW 20 MIN: CPT | Mod: S$PBB,,, | Performed by: PODIATRIST

## 2025-07-29 PROCEDURE — 99214 OFFICE O/P EST MOD 30 MIN: CPT | Mod: PBBFAC,PN | Performed by: PODIATRIST

## 2025-07-29 PROCEDURE — 99999 PR PBB SHADOW E&M-EST. PATIENT-LVL IV: CPT | Mod: PBBFAC,,, | Performed by: PODIATRIST

## 2025-07-29 RX ORDER — GLIPIZIDE 5 MG/1
5 TABLET ORAL
COMMUNITY
Start: 2025-07-28

## 2025-07-30 ENCOUNTER — OFFICE VISIT (OUTPATIENT)
Dept: CARDIOLOGY | Facility: CLINIC | Age: 75
End: 2025-07-30
Payer: MEDICARE

## 2025-07-30 DIAGNOSIS — I77.9 BILATERAL CAROTID ARTERY DISEASE, UNSPECIFIED TYPE: ICD-10-CM

## 2025-07-30 DIAGNOSIS — E11.51 TYPE 2 DIABETES, WITH PERIPHERAL CIRCULATORY DISORDER NOT AT GOAL: ICD-10-CM

## 2025-07-30 DIAGNOSIS — I25.718 CORONARY ARTERY DISEASE OF AUTOLOGOUS VEIN BYPASS GRAFT WITH STABLE ANGINA PECTORIS: Primary | ICD-10-CM

## 2025-07-30 PROCEDURE — 99999 PR STA SHADOW: CPT | Mod: PBBFAC,95,,

## 2025-07-30 PROCEDURE — 99212 OFFICE O/P EST SF 10 MIN: CPT | Performed by: INTERNAL MEDICINE

## 2025-07-30 NOTE — ASSESSMENT & PLAN NOTE
100% right carotid occlusion.  History of left-sided stenting and stable follow-up ultrasound studies.  Asymptomatic neurologic status.

## 2025-07-30 NOTE — ASSESSMENT & PLAN NOTE
Stress test normal.  Mild angina intermittently.  Continued medical therapy advised.  No heart catheterization recommended

## 2025-07-30 NOTE — PROGRESS NOTES
[facility] Cardiology     Subjective:    Patient ID:  Esteban Mike is a 74 y.o. male who presents for follow-up of CABG Hx, Chest Pain, Diabetes Mellitus, Hypertension, and Hyperlipidemia    Review of patient's allergies indicates:  No Known Allergies  This is a virtual visit audio, visual.    The visit occurred at my [place] office.    Face-to-face patient time 10 minutes.    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  He had reported exertional chest pains consistent with angina intermittently.  I ordered a nuclear stress test which showed normal perfusion study normal ejection fraction.  The patient's chest pain symptoms appeared to be intermittent and not new onset.    Since our evaluation at the office he has not had any symptoms of chest pain.  His stress test results were given to him today.  He had a negative PET stress in 2018.  He is on calcium channel blockers and beta-blockers.  He enjoys working outside.    His chest pains are related to activity only.  He has not had any pains at rest or prolonged episodes of chest discomfort.         Review of Systems   Constitutional: Negative for chills, decreased appetite, diaphoresis, fever, malaise/fatigue, night sweats, weight gain and weight loss.   HENT:  Negative for congestion, ear discharge, ear pain, hearing loss, hoarse voice, nosebleeds, odynophagia, sore throat, stridor and tinnitus.    Eyes:  Negative for blurred vision, discharge, double vision, pain, photophobia, redness, vision loss in left eye, vision loss in right eye, visual disturbance and visual halos.   Cardiovascular:  Negative for chest pain, claudication, cyanosis, dyspnea on exertion, irregular heartbeat, leg swelling, near-syncope, orthopnea, palpitations, paroxysmal nocturnal dyspnea and syncope.   Respiratory:  Negative for cough, hemoptysis, shortness of breath, sleep  disturbances due to breathing, snoring, sputum production and wheezing.    Endocrine: Negative for cold intolerance, heat intolerance, polydipsia, polyphagia and polyuria.   Hematologic/Lymphatic: Negative for adenopathy and bleeding problem. Does not bruise/bleed easily.   Skin:  Negative for color change, dry skin, flushing, itching, nail changes, poor wound healing, rash, skin cancer, suspicious lesions and unusual hair distribution.   Musculoskeletal:  Negative for arthritis, back pain, falls, gout, joint pain, joint swelling, muscle cramps, muscle weakness, myalgias, neck pain and stiffness.   Gastrointestinal:  Negative for bloating, abdominal pain, anorexia, change in bowel habit, bowel incontinence, constipation, diarrhea, dysphagia, excessive appetite, flatus, heartburn, hematemesis, hematochezia, hemorrhoids, jaundice, melena, nausea and vomiting.   Genitourinary:  Negative for bladder incontinence, decreased libido, dysuria, flank pain, frequency, genital sores, hematuria, hesitancy, incomplete emptying, nocturia and urgency.   Neurological:  Negative for aphonia, brief paralysis, difficulty with concentration, disturbances in coordination, excessive daytime sleepiness, dizziness, focal weakness, headaches, light-headedness, loss of balance, numbness, paresthesias, seizures, sensory change, tremors, vertigo and weakness.   Psychiatric/Behavioral:  Negative for altered mental status, depression, hallucinations, memory loss, substance abuse, suicidal ideas and thoughts of violence. The patient does not have insomnia and is not nervous/anxious.    Allergic/Immunologic: Negative for hives and persistent infections.        Objective:     There were no vitals filed for this visit. Physical Exam  Constitutional:       General: He is not in acute distress.     Appearance: He is well-developed. He is not diaphoretic.   HENT:      Head: Normocephalic and atraumatic.      Nose: Nose normal.   Eyes:      General: No  scleral icterus.        Right eye: No discharge.      Conjunctiva/sclera: Conjunctivae normal.      Pupils: Pupils are equal, round, and reactive to light.   Neck:      Thyroid: No thyromegaly.      Vascular: No JVD.      Trachea: No tracheal deviation.   Cardiovascular:      Rate and Rhythm: Normal rate and regular rhythm.      Pulses:           Carotid pulses are 2+ on the right side and 2+ on the left side.       Radial pulses are 2+ on the right side and 2+ on the left side.        Dorsalis pedis pulses are 2+ on the right side and 2+ on the left side.        Posterior tibial pulses are 2+ on the right side and 2+ on the left side.      Heart sounds: Normal heart sounds. No murmur heard.     No friction rub. No gallop.   Pulmonary:      Effort: Pulmonary effort is normal. No respiratory distress.      Breath sounds: Normal breath sounds. No stridor. No wheezing or rales.   Chest:      Chest wall: No tenderness.   Abdominal:      General: Bowel sounds are normal. There is no distension.      Palpations: Abdomen is soft. There is no mass.      Tenderness: There is no abdominal tenderness. There is no guarding or rebound.   Musculoskeletal:         General: No tenderness. Normal range of motion.      Cervical back: Normal range of motion and neck supple.   Lymphadenopathy:      Cervical: No cervical adenopathy.   Skin:     General: Skin is warm and dry.      Coloration: Skin is not pale.      Findings: No erythema or rash.   Neurological:      Mental Status: He is alert and oriented to person, place, and time.      Cranial Nerves: No cranial nerve deficit.      Coordination: Coordination normal.   Psychiatric:         Behavior: Behavior normal.         Thought Content: Thought content normal.         Judgment: Judgment normal.           Assessment:       1. 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 Bilateral carotid artery disease, unspecified type    3. Type 2  diabetes, with peripheral circulatory disorder not at goal      Results for orders placed or performed during the hospital encounter of 07/17/25   Nuclear Stress - Cardiology Interpreted    Collection Time: 07/17/25  8:14 AM   Result Value Ref Range    [facility] CV CPX PATIENT HEIGHT IN 67     85% Max Predicted      Max Predicted      [facility] CV CPX PATIENT IS MALE 1.0     [facility] CV CPX PATIENT IS FEMALE 0.0     HR at rest 54 bpm    Systolic blood pressure 145 mmHg    Diastolic blood pressure 76 mmHg    RPP 7,830     Exercise duration (min) 5 minutes    Exercise duration (sec) 34 seconds    Peak  bpm    Peak Systolic  mmHg    Peak Diatolic BP 68 mmHg    Peak RPP 18,666     Estimated METs 9.0     % Max HR Achieved 69     1 Minute Recovery HR 87 bpm    dose 10.1 mcg/kg/min    dose 31.1 mcg/kg/min    EF + QEF 53 %    Ejection Fraction- High Stress 65 %    End diastolic index (mL/m2) 93 mL/m2    End diastolic index (mL/m2) 153 mL/m2    End systolic index (mL/m2) 31 mL/m2    End systolic index (mL/m2) 71 mL/m2    Nuc Rest EF 69     Nuc Rest Diastolic Volume Index 110     Nuc Rest Systolic Volume Index 34     Nuc Stress EF 65 %    Nuc Rest Diastolic Volume Index 105     Nuc Rest Systolic Volume Index 37      *Note: Due to a large number of results and/or encounters for the requested time period, some results have not been displayed. A complete set of results can be found in Results Review.       Current Medications[1]     Lab Results   Component Value Date    WBC 10.3 04/11/2018    RBC 4.66 04/11/2018    HGB 14.2 04/11/2018    HCT 42.3 04/11/2018    MCV 90.8 04/11/2018    MCH 30.5 04/11/2018    MCHC 33.6 04/11/2018    RDW 12.5 04/11/2018     04/11/2018    MPV 12.4 04/11/2018    GRAN 3.6 10/22/2014    GRAN 56.6 10/22/2014    LYMPH 1,576 04/11/2018    LYMPH 15.3 04/11/2018    MONO 639 04/11/2018    MONO 6.2 04/11/2018    EOS 0 (L) 04/11/2018    BASO 10 04/11/2018    EOSINOPHIL 0.0 04/11/2018    BASOPHIL  0.1 04/11/2018    MG 2.6 05/06/2008        CMP  Lab Results   Component Value Date     05/02/2025    K 4.5 05/02/2025     05/02/2025    CO2 25 05/02/2025     (H) 05/02/2025    BUN 16 05/02/2025    CREATININE 0.9 05/02/2025    CALCIUM 9.3 05/02/2025    PROT 6.9 05/02/2025    ALBUMIN 4.1 05/02/2025    BILITOT 0.5 05/02/2025    ALKPHOS 89 05/02/2025    AST 15 05/02/2025    ALT 20 05/02/2025    ANIONGAP 9 05/02/2025    ESTGFRAFRICA 99 07/06/2022    EGFRNONAA 86 07/06/2022        Lab Results   Component Value Date    LABURIN No growth 02/17/2022            Results for orders placed or performed in visit on 07/03/25   IN OFFICE EKG 12-LEAD (to [place])    Collection Time: 07/03/25 12:31 PM   Result Value Ref Range    QRS Duration 84 ms    [facility]HS QTC Calculation 384 ms    Narrative    Test Reason : I25.810,    Vent. 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  Mild angina intermittently.  Continued medical therapy advised.  No heart catheterization recommended         Carotid disease, bilateral    100% right carotid occlusion.  History of left-sided stenting and stable follow-up ultrasound studies.  Asymptomatic neurologic status.            Endocrine    Type 2 diabetes, with peripheral circulatory disorder not at goal    Condition stable.               Results of stress test given to the patient.    I advise continued medical therapy.  He will notify me if things change.             Micky Arauz,  MD  07/30/2025   3:26 PM               [1]   Current Outpatient Medications:     aspirin 81 MG chewable tablet, Take 81 mg by mouth once daily., Disp: , Rfl:     atorvastatin (LIPITOR) 80 MG tablet, Take 1 tablet (80 mg total) by mouth once daily., Disp: 90 tablet, Rfl: 3    blood sugar diagnostic Strp, To check BG 3 times daily, to use with insurance preferred meter, Disp: 200 strip, Rfl: 11    blood-glucose meter kit, To check BG 3 times daily, to use with insurance preferred meter, Disp: 1 each, Rfl: 0    carvediloL (COREG) 25 MG tablet, Take 1 tablet (25 mg total) by mouth 2 (two) times daily with meals., Disp: 180 tablet, Rfl: 3    dulaglutide (TRULICITY) 4.5 mg/0.5 mL pen injector, Inject 4.5 mg into the skin every 7 days., Disp: 4 pen , Rfl: 11    fish oil-omega-3 fatty acids 300-1,000 mg capsule, Take 2 g by mouth once daily. 3 tablets, Disp: , Rfl:     glipiZIDE (GLUCOTROL) 5 MG tablet, Take 5 mg by mouth daily with breakfast., Disp: , Rfl:     irbesartan (AVAPRO) 300 MG tablet, Take 1 tablet (300 mg total) by mouth every evening., Disp: 90 tablet, Rfl: 3    JARDIANCE 25 mg tablet, Take 1 tablet (25 mg total) by mouth once daily., Disp: 90 tablet, Rfl: 3    lancets Misc, To check BG 3 times daily, to use with insurance preferred meter, Disp: 200 each, Rfl: 11    metFORMIN (GLUCOPHAGE-XR) 500 MG ER 24hr tablet, Take 1 tablet (500 mg total) by mouth 2 (two) times daily with meals., Disp: 180 tablet, Rfl: 3    multivitamin capsule, Take 1 capsule by mouth once daily., Disp: , Rfl:     NIFEdipine (PROCARDIA-XL) 60 MG (OSM) 24 hr tablet, Take 1 tablet (60 mg total) by mouth once daily., Disp: 90 tablet, Rfl: 0    nitroGLYCERIN (NITROSTAT) 0.4 MG SL tablet, Place 1 tablet (0.4 mg total) under the tongue every 5 (five) minutes as needed for Chest pain., Disp: 40 tablet, Rfl: 3    pantoprazole (PROTONIX) 40 MG tablet, Take 1 tablet (40 mg total) by mouth before breakfast., Disp: 90 tablet, Rfl: 3    vit C-vit  E-lutein-min-om-3 550-66-8-150 mg-unit-mg-mg Cap, Take 1 tablet by mouth once daily., Disp: , Rfl:

## 2025-09-02 ENCOUNTER — OFFICE VISIT (OUTPATIENT)
Dept: SLEEP MEDICINE | Facility: CLINIC | Age: 75
End: 2025-09-02
Payer: MEDICARE

## 2025-09-02 VITALS
HEIGHT: 67 IN | SYSTOLIC BLOOD PRESSURE: 128 MMHG | BODY MASS INDEX: 27.3 KG/M2 | DIASTOLIC BLOOD PRESSURE: 72 MMHG | WEIGHT: 173.94 LBS | HEART RATE: 55 BPM

## 2025-09-02 DIAGNOSIS — G47.33 OSA (OBSTRUCTIVE SLEEP APNEA): Primary | ICD-10-CM

## 2025-09-02 PROCEDURE — 99999 PR PBB SHADOW E&M-EST. PATIENT-LVL II: CPT | Mod: PBBFAC,,, | Performed by: NURSE PRACTITIONER

## 2025-09-02 PROCEDURE — 99212 OFFICE O/P EST SF 10 MIN: CPT | Mod: PBBFAC | Performed by: NURSE PRACTITIONER

## 2025-09-02 PROCEDURE — 99214 OFFICE O/P EST MOD 30 MIN: CPT | Mod: S$PBB,,, | Performed by: NURSE PRACTITIONER
